# Patient Record
Sex: MALE | Race: BLACK OR AFRICAN AMERICAN | NOT HISPANIC OR LATINO | Employment: OTHER | URBAN - METROPOLITAN AREA
[De-identification: names, ages, dates, MRNs, and addresses within clinical notes are randomized per-mention and may not be internally consistent; named-entity substitution may affect disease eponyms.]

---

## 2017-03-06 DIAGNOSIS — I10 ESSENTIAL HYPERTENSION: Chronic | ICD-10-CM

## 2017-03-06 DIAGNOSIS — K64.9 HEMORRHOIDS, UNSPECIFIED HEMORRHOID TYPE: ICD-10-CM

## 2017-03-06 RX ORDER — HYDROCHLOROTHIAZIDE 25 MG/1
TABLET ORAL
Qty: 90 TABLET | Refills: 1 | Status: SHIPPED | OUTPATIENT
Start: 2017-03-06 | End: 2017-04-12 | Stop reason: SDUPTHER

## 2017-03-06 RX ORDER — DOCUSATE SODIUM 100 MG/1
CAPSULE, LIQUID FILLED ORAL
Qty: 60 CAPSULE | Refills: 2 | Status: SHIPPED | OUTPATIENT
Start: 2017-03-06 | End: 2017-04-12

## 2017-03-07 ENCOUNTER — TELEPHONE (OUTPATIENT)
Dept: FAMILY MEDICINE | Facility: CLINIC | Age: 67
End: 2017-03-07

## 2017-03-07 NOTE — TELEPHONE ENCOUNTER
----- Message from Zuly Love sent at 3/6/2017  3:40 PM CST -----  Contact: self  docusate sodium (COLACE) 100 MG capsule  hydrochlorothiazide (HYDRODIURIL) 25 MG tablet    Pt calling to request a refill of the above to be sent to [facility]. Please call pt to [phone].

## 2017-04-12 ENCOUNTER — OFFICE VISIT (OUTPATIENT)
Dept: FAMILY MEDICINE | Facility: CLINIC | Age: 67
End: 2017-04-12
Payer: MEDICARE

## 2017-04-12 VITALS
OXYGEN SATURATION: 98 % | WEIGHT: 260.06 LBS | HEART RATE: 60 BPM | HEIGHT: 68 IN | SYSTOLIC BLOOD PRESSURE: 100 MMHG | TEMPERATURE: 98 F | DIASTOLIC BLOOD PRESSURE: 72 MMHG | BODY MASS INDEX: 39.41 KG/M2

## 2017-04-12 DIAGNOSIS — E66.01 MORBID OBESITY WITH BMI OF 40.0-44.9, ADULT: Chronic | ICD-10-CM

## 2017-04-12 DIAGNOSIS — N40.0 BENIGN PROSTATIC HYPERPLASIA, PRESENCE OF LOWER URINARY TRACT SYMPTOMS UNSPECIFIED, UNSPECIFIED MORPHOLOGY: ICD-10-CM

## 2017-04-12 DIAGNOSIS — M10.9 GOUT, ARTHRITIS: Chronic | ICD-10-CM

## 2017-04-12 DIAGNOSIS — M48.02 CERVICAL STENOSIS OF SPINAL CANAL: Chronic | ICD-10-CM

## 2017-04-12 DIAGNOSIS — R53.83 FATIGUE, UNSPECIFIED TYPE: ICD-10-CM

## 2017-04-12 DIAGNOSIS — E78.5 HYPERLIPIDEMIA, UNSPECIFIED HYPERLIPIDEMIA TYPE: ICD-10-CM

## 2017-04-12 DIAGNOSIS — I10 ESSENTIAL HYPERTENSION: Chronic | ICD-10-CM

## 2017-04-12 DIAGNOSIS — R73.03 PREDIABETES: Primary | Chronic | ICD-10-CM

## 2017-04-12 DIAGNOSIS — N52.9 ERECTILE DYSFUNCTION, UNSPECIFIED ERECTILE DYSFUNCTION TYPE: ICD-10-CM

## 2017-04-12 PROCEDURE — 99215 OFFICE O/P EST HI 40 MIN: CPT | Mod: S$GLB,,, | Performed by: INTERNAL MEDICINE

## 2017-04-12 PROCEDURE — 1160F RVW MEDS BY RX/DR IN RCRD: CPT | Mod: S$GLB,,, | Performed by: INTERNAL MEDICINE

## 2017-04-12 PROCEDURE — 1157F ADVNC CARE PLAN IN RCRD: CPT | Mod: S$GLB,,, | Performed by: INTERNAL MEDICINE

## 2017-04-12 PROCEDURE — 3078F DIAST BP <80 MM HG: CPT | Mod: S$GLB,,, | Performed by: INTERNAL MEDICINE

## 2017-04-12 PROCEDURE — 99999 PR PBB SHADOW E&M-EST. PATIENT-LVL III: CPT | Mod: PBBFAC,,, | Performed by: INTERNAL MEDICINE

## 2017-04-12 PROCEDURE — 1126F AMNT PAIN NOTED NONE PRSNT: CPT | Mod: S$GLB,,, | Performed by: INTERNAL MEDICINE

## 2017-04-12 PROCEDURE — 3074F SYST BP LT 130 MM HG: CPT | Mod: S$GLB,,, | Performed by: INTERNAL MEDICINE

## 2017-04-12 PROCEDURE — 1159F MED LIST DOCD IN RCRD: CPT | Mod: S$GLB,,, | Performed by: INTERNAL MEDICINE

## 2017-04-12 PROCEDURE — 99499 UNLISTED E&M SERVICE: CPT | Mod: S$GLB,,, | Performed by: INTERNAL MEDICINE

## 2017-04-12 RX ORDER — TADALAFIL 5 MG/1
5 TABLET ORAL DAILY PRN
Qty: 3 TABLET | Refills: 11 | Status: CANCELLED | OUTPATIENT
Start: 2017-04-12 | End: 2018-04-12

## 2017-04-12 RX ORDER — ALLOPURINOL 100 MG/1
100 TABLET ORAL DAILY
Qty: 90 TABLET | Refills: 3 | Status: SHIPPED | OUTPATIENT
Start: 2017-04-12 | End: 2018-01-10 | Stop reason: SDUPTHER

## 2017-04-12 RX ORDER — METFORMIN HYDROCHLORIDE 500 MG/1
TABLET, EXTENDED RELEASE ORAL
Qty: 90 TABLET | Refills: 3 | Status: SHIPPED | OUTPATIENT
Start: 2017-04-12 | End: 2018-01-10 | Stop reason: SDUPTHER

## 2017-04-12 RX ORDER — TAMSULOSIN HYDROCHLORIDE 0.4 MG/1
0.4 CAPSULE ORAL DAILY
Qty: 30 CAPSULE | Refills: 11 | Status: SHIPPED | OUTPATIENT
Start: 2017-04-12 | End: 2018-01-10 | Stop reason: SDUPTHER

## 2017-04-12 RX ORDER — LISINOPRIL 10 MG/1
10 TABLET ORAL DAILY
Qty: 90 TABLET | Refills: 3 | Status: SHIPPED | OUTPATIENT
Start: 2017-04-12 | End: 2018-01-10 | Stop reason: SDUPTHER

## 2017-04-12 RX ORDER — HYDROCHLOROTHIAZIDE 25 MG/1
25 TABLET ORAL DAILY
Qty: 90 TABLET | Refills: 3 | Status: SHIPPED | OUTPATIENT
Start: 2017-04-12 | End: 2018-01-10 | Stop reason: SDUPTHER

## 2017-04-12 RX ORDER — PRAVASTATIN SODIUM 20 MG/1
20 TABLET ORAL DAILY
Qty: 90 TABLET | Refills: 3 | Status: SHIPPED | OUTPATIENT
Start: 2017-04-12 | End: 2018-01-10 | Stop reason: SDUPTHER

## 2017-04-12 RX ORDER — TADALAFIL 20 MG/1
20 TABLET ORAL DAILY PRN
Qty: 5 TABLET | Refills: 11 | Status: SHIPPED | OUTPATIENT
Start: 2017-04-12 | End: 2019-11-12 | Stop reason: ALTCHOICE

## 2017-04-12 NOTE — PROGRESS NOTES
Assessment & Plan  Prediabetes - discussed need for weight loss, and the benefits that he may see from even losing 10 pounds.  Refilled metformin.  Check A1c today.  Has room to increase if necessary.  -     metformin (GLUCOPHAGE-XR) 500 MG 24 hr tablet; TAKE 1 TABLET (500 MG TOTAL) BY MOUTH DAILY WITH BREAKFAST.  Dispense: 90 tablet; Refill: 3  -     Hemoglobin A1c; Future; Expected date: 4/12/17    Essential hypertension-controlled, no change, refilled HCTZ and lisinopril.  -     hydrochlorothiazide (HYDRODIURIL) 25 MG tablet; Take 1 tablet (25 mg total) by mouth once daily.  Dispense: 90 tablet; Refill: 3  -     lisinopril 10 MG tablet; Take 1 tablet (10 mg total) by mouth once daily.  Dispense: 90 tablet; Refill: 3    Hyperlipidemia, unspecified hyperlipidemia type-check lipid profile, if needed, increase the dose of pravastatin for LDL goal less than 100  -     pravastatin (PRAVACHOL) 20 MG tablet; Take 1 tablet (20 mg total) by mouth once daily.  Dispense: 90 tablet; Refill: 3  -     Comprehensive metabolic panel; Future; Expected date: 4/12/17  -     Lipid panel; Future; Expected date: 4/12/17    Erectile dysfunction, unspecified erectile dysfunction type-Cialis 5 mg ineffective.  Trial of higher dose Cialis.  -     tadalafil (CIALIS) 20 MG Tab; Take 1 tablet (20 mg total) by mouth daily as needed for Erectile Dysfunction.  Dispense: 5 tablet; Refill: 11    Gout, arthritis-check uric acid level.  Had a single episode of what sounds like podagra.  Has been on allopurinol prophylaxis ever since.  Refilled.  -     allopurinol (ZYLOPRIM) 100 MG tablet; Take 1 tablet (100 mg total) by mouth once daily.  Dispense: 90 tablet; Refill: 3  -     Uric acid; Future; Expected date: 4/12/17    Morbid obesity with BMI of 40.0-44.9, adult-discussed at length the need for weight loss, gave him some handouts about a 2000 kcal diet    Fatigue, unspecified type-check CBC, history of borderline hemoglobin.  Referral to sleep  medicine to rule out occult sleep apnea.  Certainly has risk factors of morbid obesity, male sex, age  -     CBC auto differential; Future; Expected date: 4/12/17  -     Ambulatory consult for Sleep Study    Benign prostatic hyperplasia, presence of lower urinary tract symptoms unspecified, unspecified [id number] of tamsulosin, refilled today.  -     tamsulosin (FLOMAX) 0.4 mg Cp24; Take 1 capsule (0.4 mg total) by mouth once daily.  Dispense: 30 capsule; Refill: 11    Cervical stenosis of spinal canal-overdue for follow-up with neurosurgery for this and our staff to assist him in arranging this.    Other orders  -     Cancel: tadalafil (CIALIS) 5 MG tablet; Take 1 tablet (5 mg total) by mouth daily as needed for Erectile Dysfunction.  Dispense: 3 tablet; Refill: 11        Medications Discontinued During This Encounter   Medication Reason    docusate sodium (COLACE) 100 MG capsule     hydrocortisone (PROCTOSOL HC) 2.5 % rectal cream Therapy completed    allopurinol (ZYLOPRIM) 100 MG tablet Reorder    hydrochlorothiazide (HYDRODIURIL) 25 MG tablet Reorder    lisinopril 10 MG tablet Reorder    metformin (GLUCOPHAGE-XR) 500 MG 24 hr tablet Reorder    pravastatin (PRAVACHOL) 20 MG tablet Reorder    tadalafil (CIALIS) 5 MG tablet Reorder    tamsulosin (FLOMAX) 0.4 mg Cp24 Reorder       Follow-up: Return in about 6 months (around 10/12/2017).      =================================================================      No chief complaint on file.      PARAS Acuna is a 66 y.o. male, last appointment with this clinic was 11/25/2016.    Former patient of Dr. Coe.  Hypertension. No outside BP checks.    3/11/2014 exercise stress echo negative for ischemia.  Normal LV systolic function LVEF 55-60.  Concentric remodeling.  Prediabetes, metformin.  Last A1c in May 2016, 6.1.  Sometimes overeats.  Hyperlipidemia.  Last lipid profile May 2016, borderline with an HDL less than 40, LDL less than  100  Chronic neck pain with history of surgery by neurosurgery, hydrocodone. 3/31/2016 pt underwent a C3 through C7 Laminoplasty with posterior cervical approach.  Last RF hydrocodone 11/2016 #90; prior 5/2016.  Not 100% quite yet.  Has not followed up with neurosurgery.  Does not think he finished physical therapy.  Has not seen Dr. Caruso, plan was 1 year follow up with repeat CT.   Reported history of Abdominal aortic aneurysm.  However, ultrasound done June 2016 did not show AAA.  ED, Cialis, low dose ineffective.  Hemorrhoids, hydrocortisone suppositories.  Gout, allopurinol.  No recent flare.  Would get on the foot.  1 episode.    Feels tired all the time.  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  Recalls remote sleep study, cannot recall the results.  Does not recall a dx of SIS.  Does not think he snores.  Risk factors - age, male, obesity.  No hematuria, blood in stool.  No hemoptysis.      Not taking the flomax.  Notes no dysuria.  Some nocturia.      Patient Care Team:  Clark Vargas MD as PCP - General (Internal Medicine)    Patient Active Problem List    Diagnosis Date Noted    Hemorrhoids 06/01/2016    Cervical stenosis of spinal canal 03/31/2016     3/31/2016 pt underwent a C3 through C7 Laminoplasty with posterior cervical approach      Prediabetes 03/29/2016    Hypertension 03/20/2014     3/11/2014 exercise stress echo negative for ischemia.  Normal LV systolic function LVEF 55-60.  Concentric remodeling.      Hyperlipidemia 03/20/2014    Morbid obesity with BMI of 40.0-44.9, adult 03/06/2014    Back pain 07/11/2013    ED (erectile dysfunction) 06/07/2013    Gout, arthritis 01/07/2013       PAST MEDICAL HISTORY:  Past Medical History:   Diagnosis Date    Erectile dysfunction     Gout     Hyperlipidemia     Hypertension     Morbid obesity     Screening for AAA (abdominal aortic aneurysm)     6/2016 AAA US no aneurysm       PAST SURGICAL HISTORY:  Past Surgical History:    Procedure Laterality Date    SPINE SURGERY  3/31/16    C3-C7 laminoplasty,Dr Caruso    tooth implant       Family History   Problem Relation Age of Onset    COPD Mother        SOCIAL HISTORY:  Social History     Social History    Marital status:      Spouse name: N/A    Number of children: N/A    Years of education: N/A     Occupational History    Not on file.     Social History Main Topics    Smoking status: Former Smoker     Quit date: 1/7/2009    Smokeless tobacco: Never Used    Alcohol use Yes      Comment: occasional beer- average week 12 beers a week    Drug use: No    Sexual activity: Not Currently     Partners: Female      Comment: , children.     Other Topics Concern    Not on file     Social History Narrative       ALLERGIES AND MEDICATIONS: updated and reviewed.  Review of patient's allergies indicates:   Allergen Reactions    Codeine Swelling     Codeine with Aspirin caused chest pain     Current Outpatient Prescriptions   Medication Sig Dispense Refill    allopurinol (ZYLOPRIM) 100 MG tablet TAKE 1 TABLET (100 MG TOTAL) BY MOUTH ONCE DAILY. 90 tablet 1    docusate sodium (COLACE) 100 MG capsule TAKE 1 CAPSULE (100 MG TOTAL) BY MOUTH 2 (TWO) TIMES DAILY. 60 capsule 2    hydrochlorothiazide (HYDRODIURIL) 25 MG tablet TAKE 1 TABLET (25 MG TOTAL) BY MOUTH ONCE DAILY. 90 tablet 1    hydrocodone-acetaminophen 10-325mg (NORCO)  mg Tab Take 1 tablet by mouth every 4 (four) hours as needed (pain 6-7/10). 90 tablet 0    hydrocortisone (PROCTOSOL HC) 2.5 % rectal cream PLACE RECTALLY 2 (TWO) TIMES DAILY. 28.35 g 2    lisinopril 10 MG tablet TAKE 1 TABLET (10 MG TOTAL) BY MOUTH ONCE DAILY. 90 tablet 1    metformin (GLUCOPHAGE-XR) 500 MG 24 hr tablet TAKE 1 TABLET (500 MG TOTAL) BY MOUTH DAILY WITH BREAKFAST. 90 tablet 1    pravastatin (PRAVACHOL) 20 MG tablet TAKE 1 TABLET BY MOUTH EVERY DAY 90 tablet 1    tadalafil (CIALIS) 5 MG tablet Take 1 tablet (5 mg total) by mouth  "daily as needed for Erectile Dysfunction. 3 tablet 11    tamsulosin (FLOMAX) 0.4 mg Cp24 Take 1 capsule (0.4 mg total) by mouth once daily. 15 capsule 0     No current facility-administered medications for this visit.      Not taking flomax      Review of Systems   Constitutional: Negative for fever, malaise/fatigue and weight loss.   HENT: Negative for congestion.    Eyes: Negative for blurred vision and pain.   Respiratory: Negative for shortness of breath and wheezing.    Cardiovascular: Negative for chest pain, palpitations and leg swelling.   Gastrointestinal: Negative for abdominal pain, blood in stool, constipation, diarrhea and heartburn.   Genitourinary: Positive for frequency. Negative for dysuria, hematuria and urgency.        Nocturia   Musculoskeletal: Positive for neck pain. Negative for joint pain.   Neurological: Negative for tingling, focal weakness, weakness and headaches.   Psychiatric/Behavioral: Negative for depression. The patient is not nervous/anxious.        Physical Exam   Vitals:    04/12/17 0949   BP: 100/72   Pulse: 60   Temp: 98 °F (36.7 °C)   SpO2: 98%   Weight: 117.9 kg (260 lb 0.5 oz)   Height: 5' 8" (1.727 m)    There is no height or weight on file to calculate BMI.            Physical Exam   Constitutional: He is oriented to person, place, and time. He appears well-developed and well-nourished. No distress.   Eyes: EOM are normal.   Neck:   Limited axial rotation   Cardiovascular: Normal rate, regular rhythm and normal heart sounds.    No murmur heard.  Pulmonary/Chest: Effort normal and breath sounds normal.   Genitourinary:   Genitourinary Comments: JASEN - no external hemorrhoids.  Prostate difficult to palpate, no nodules, mild enlargement   Musculoskeletal: Normal range of motion. He exhibits edema.   1+ edema to mid tibiae bilaterally   Neurological: He is alert and oriented to person, place, and time. Coordination normal.   Skin: Skin is warm and dry.   Psychiatric: He has a " normal mood and affect. His behavior is normal. Thought content normal.     Old records reviewed

## 2017-04-12 NOTE — MR AVS SNAPSHOT
[facility]  [address]  [place] [address]  Phone: [phone]  Fax: [phone]                  Armand Painitng   2017 9:40 AM   Office Visit    Description:  Male : 1950   Provider:  Clark Vargas MD   Department[facility]  [facility]           Reason for Visit     Medication Refill     Establish Care           Diagnoses this Visit        Comments    Prediabetes    -  Primary     Essential hypertension         Hyperlipidemia, unspecified hyperlipidemia type         Erectile dysfunction, unspecified erectile dysfunction type         Gout, arthritis         Morbid obesity with BMI of 40.0-44.9, adult         Fatigue, unspecified type         Benign prostatic hyperplasia, presence of lower urinary tract symptoms unspecified, unspecified morphology         Cervical stenosis of spinal canal                To Do List           Goals (5 Years of Data)              Today    16    Blood Pressure < 140/90   100/72  100/72  100/72      Follow-Up and Disposition     Return in about 6 months (around 10/12/2017).       These Medications        Disp Refills Start End    allopurinol (ZYLOPRIM) 100 MG tablet 90 tablet 3 2017     Take 1 tablet (100 mg total) by mouth once daily. - Oral    Pharmacy: [facility]/pharmacy #[address] [place], [place] [address] [facility] [employer] Ph #: [phone]       hydrochlorothiazide (HYDRODIURIL) 25 MG tablet 90 tablet 3 2017     Take 1 tablet (25 mg total) by mouth once daily. - Oral    Pharmacy: [facility]/pharmacy #[address] [place] [address] [place] [id number] [facility] [employer] Ph #: [phone]       lisinopril 10 MG tablet 90 tablet 3 2017     Take 1 tablet (10 mg total) by mouth once daily. - Oral    Pharmacy: [facility]/pharmacy #[address] [place] [address] [place] [id number] [facility] [employer] Ph #: [phone]       metformin (GLUCOPHAGE-XR) 500 MG 24 hr tablet 90 tablet 3 2017     TAKE 1 TABLET (500 MG TOTAL) BY MOUTH DAILY WITH BREAKFAST.    Pharmacy:  [facility]/pharmacy #[address] [place] [address] Ph #: [phone]       pravastatin (PRAVACHOL) 20 MG tablet 90 tablet 3 4/12/2017     Take 1 tablet (20 mg total) by mouth once daily. - Oral    Pharmacy: [facility]/pharmacy #[address] [place] [address] Ph #: [phone]       tadalafil (CIALIS) 20 MG Tab 5 tablet 11 4/12/2017 4/12/2018    Take 1 tablet (20 mg total) by mouth daily as needed for Erectile Dysfunction. - Oral    Pharmacy: [facility]/pharmacy #[address] [place] [address] Ph #: [phone]       tamsulosin (FLOMAX) 0.4 mg Cp24 30 capsule 11 4/12/2017 4/27/2017    Take 1 capsule (0.4 mg total) by mouth once daily. - Oral    [facility] [place] [address] Ph #: [phone]         [facility]     [facility] Nurse Care Line - 24/7 Assistance  Unless otherwise directed by your provider, please contact [facility], our nurse care line that is available for 24/7 assistance.     Registered nurses in the [facility] provide: appointment scheduling, clinical advisement, health education, and other advisory services.  Call: [phone] (toll free)               Medications           Message regarding Medications     Verify the changes and/or additions to your medication regime listed below are the same as discussed with your clinician today.  If any of these changes or additions are incorrect, please notify your healthcare provider.        CHANGE how you are taking these medications     Start Taking Instead of    allopurinol (ZYLOPRIM) 100 MG tablet allopurinol (ZYLOPRIM) 100 MG tablet    Dosage:  Take 1 tablet (100 mg total) by mouth once daily. Dosage:  TAKE 1 TABLET (100 MG TOTAL) BY MOUTH ONCE DAILY.    Reason for Change:  Reorder     hydrochlorothiazide (HYDRODIURIL) 25 MG tablet hydrochlorothiazide (HYDRODIURIL) 25 MG tablet    Dosage:  Take 1 tablet (25 mg total) by mouth once daily. Dosage:  TAKE 1  TABLET (25 MG TOTAL) BY MOUTH ONCE DAILY.    Reason for Change:  Reorder     lisinopril 10 MG tablet lisinopril 10 MG tablet    Dosage:  Take 1 tablet (10 mg total) by mouth once daily. Dosage:  TAKE 1 TABLET (10 MG TOTAL) BY MOUTH ONCE DAILY.    Reason for Change:  Reorder     pravastatin (PRAVACHOL) 20 MG tablet pravastatin (PRAVACHOL) 20 MG tablet    Dosage:  Take 1 tablet (20 mg total) by mouth once daily. Dosage:  TAKE 1 TABLET BY MOUTH EVERY DAY    Reason for Change:  Reorder     tadalafil (CIALIS) 20 MG Tab tadalafil (CIALIS) 5 MG tablet    Dosage:  Take 1 tablet (20 mg total) by mouth daily as needed for Erectile Dysfunction. Dosage:  Take 1 tablet (5 mg total) by mouth daily as needed for Erectile Dysfunction.    Reason for Change:  Reorder       STOP taking these medications     docusate sodium (COLACE) 100 MG capsule TAKE 1 CAPSULE (100 MG TOTAL) BY MOUTH 2 (TWO) TIMES DAILY.    hydrocortisone (PROCTOSOL HC) 2.5 % rectal cream PLACE RECTALLY 2 (TWO) TIMES DAILY.           Verify that the below list of medications is an accurate representation of the medications you are currently taking.  If none reported, the list may be blank. If incorrect, please contact your healthcare provider. Carry this list with you in case of emergency.           Current Medications     hydrocodone-acetaminophen 10-325mg (NORCO)  mg Tab Take 1 tablet by mouth every 4 (four) hours as needed (pain 6-7/10).    allopurinol (ZYLOPRIM) 100 MG tablet Take 1 tablet (100 mg total) by mouth once daily.    hydrochlorothiazide (HYDRODIURIL) 25 MG tablet Take 1 tablet (25 mg total) by mouth once daily.    lisinopril 10 MG tablet Take 1 tablet (10 mg total) by mouth once daily.    metformin (GLUCOPHAGE-XR) 500 MG 24 hr tablet TAKE 1 TABLET (500 MG TOTAL) BY MOUTH DAILY WITH BREAKFAST.    pravastatin (PRAVACHOL) 20 MG tablet Take 1 tablet (20 mg total) by mouth once daily.    tadalafil (CIALIS) 20 MG Tab Take 1 tablet (20 mg total) by  "mouth daily as needed for Erectile Dysfunction.    tamsulosin (FLOMAX) 0.4 mg Cp24 Take 1 capsule (0.4 mg total) by mouth once daily.           Clinical Reference Information           Your Vitals Were     BP Pulse Temp Height Weight SpO2    100/72 60 98 °F (36.7 °C) 5' 8" (1.727 m) 117.9 kg (260 lb 0.5 oz) 98%    BMI                39.54 kg/m2          Blood Pressure          Most Recent Value    BP  100/72      Allergies as of 4/12/2017     Codeine      Immunizations Administered on Date of Encounter - 4/12/2017     None      Orders Placed During Today's Visit      Normal Orders This Visit    Ambulatory consult for Sleep Study     Future Labs/Procedures Expected by Expires    CBC auto differential  4/12/2017 4/12/2018    Comprehensive metabolic panel  4/12/2017 4/12/2018    Hemoglobin A1c  4/12/2017 4/12/2018    Lipid panel  4/12/2017 4/12/2018    Uric acid  4/12/2017 4/12/2018      [facility] Sign-Up     Activating your [facility] account is as easy as 1-2-3!     1) Visit [email or web address], select Sign Up Now, enter this activation code and your date of birth, then select Next.  [email or web address]  Expires: 5/27/2017 10:13 AM      2) Create a username and password to use when you visit [facility] in the future and select a security question in case you lose your password and select Next.    3) Enter your e-mail address and click Sign Up!    Additional Information  If you have questions, please e-mail [email or web address] or call [phone] to talk to our [facility] staff. Remember, [facility] is NOT to be used for urgent needs. For medical emergencies, dial 911.         Instructions      Understanding USDA MyPlate  The USDA (U.S. Department of Agriculture) has guidelines to help you make healthy food choices. These are called MyPlate. MyPlate shows the food groups that make up healthy meals using the image of a place setting. Before you eat, think about the healthiest choices for what to put onto your plate or into " your cup or bowl. To learn more about building a healthy plate, visit www.choosemyplate.gov.    The food groups  · Fruits. Any fruit or 100% fruit juice counts as part of the Fruit Group. Fruits may be fresh, canned, frozen, or dried, and may be whole, cut-up, or pureed. Make half your plate fruits and vegetables.  · Vegetables. Any vegetable or 100% vegetable juice counts as a member of the Vegetable Group. Vegetables may be fresh, frozen, canned, or dried. They can be served raw or cooked and may be whole, cut-up, or mashed. Make half your plate fruits and vegetables.  · Grains. All foods made from grains are part of the Grains Group. These include wheat, rice, oats, cornmeal, and barley such as bread, pasta, oatmeal, cereal, tortillas, and grits. Grains should be no more than a quarter of your plate. At least half of your grains should be whole grains.  · Protein. This group includes meat, poultry, seafood, beans and peas, eggs, processed soy products (like tofu), nuts (including nut butters), and seeds. Make protein choices no more than a quarter of your plate. Meat and poultry choices should be lean or low fat.  · Dairy. All fluid milk products and foods made from milk that contain calcium, like yogurt and cheese are part of the Dairy Group. (Foods that have little calcium, such as cream, butter, and cream cheese, are not part of the group.) Most dairy choices should be low-fat or fat-free.  · Oils. These are fats that are liquid at room temperature. They include canola, corn, olive, soybean, and sunflower oil. Foods that are mainly oil include mayonnaise, certain salad dressings, and soft margarines. You should have only 5 to 7 teaspoons of oils a day. You probably already get this much from the food you eat.  Use [employer] to help build your meals  The [employer] can help you plan and track your meals and activity. You can look up individual foods to see or compare their nutritional value. You can get  guidelines for what and how much you should eat. You can compare your food choices. And you can assess personal physical activities and see ways you can improve. Go to www.[employer].gov/supertracker/. For more eating tips and nutritional information, go to www.VHXmyplate.gov/ten-tips.  Date Last Reviewed: 6/1/2015 © 2000-2016 [employer]. [address] [place] [address], [place] [address]. All rights reserved. This information is not intended as a substitute for professional medical care. Always follow your healthcare professional's instructions.        MyPlate Worksheet: 2,000 Calories  Your calorie needs are about 2,000 calories a day. Below are the U.S. Department of Agriculture (USDA) guidelines for your daily recommended amount of each food group.  Vegetables  2½ cups Fruits  2 cups Grains  6 ounces Dairy  3 cups Protein  5½ ounces   Eat a variety of vegetables each day.  Aim for these amounts each week:  · 1½ cups dark green vegetables  · 5½ cups red or orange- colored vegetables  · 1½ cups dry beans and peas  · 5 cups starchy vegetables  · 4 cups other vegetables Eat a variety of fruits each day.  Go easy on fruit juices.   Good choices of fruits include:  · Berries  · Bananas  · Apples  · Melon  · Dried fruit  · Frozen fruit  · Canned fruit Choose whole grains whenever you can.  Aim to eat at least 3 ounces of whole grains each day:  · Bread  · Cereal  · Rice  · Pasta  · Potatoes  · Tortillas Choose low-fat or fat-free milk, yogurt, or cheese each day.  Good choices include:  · Low-fat or fat-free milk or chocolate milk  · Low-fat or fat-free yogurt  · Low-fat or fat-free cottage cheese or other reduced-fat cheeses  · Calcium-fortified milk alternatives Choose low-fat or lean meats, poultry, fish and seafood each day.   Vary your protein. Choose more:  · Fish and other seafood  · Lean low-fat meat and poultry  · Eggs  · Beans, peas  · Tofu  · Unsalted nuts and seeds  Choose less high-fat and red  "meat.   Source: USDA MyPlate, www.choosemyplate.gov  Know your limits on oils (fats) and sugars:  · Your allowance for oils is 27 grams or 6 teaspoons a day (oil includes vegetable oil, mayonnaise, soft margarine, salad dressing, nuts, olives, avocados, and some fish).  · Limit the extras (solid fats and sugars, also called "empty calories") to 270 calories a day.  · Cut back on salt (sodium). Stay under 2,300 mg sodium a day. If you have a health condition such as heart disease or high blood pressure, your doctor will likely tell you to limit sodium to no more than 1,500 mg a day.  Get moving and be active!  Aim for at least 30 minutes of physical activity most days of the week or 150 minutes of exercise a week.  MyPlate Servings Worksheet: 2,000 calories  This worksheet tells you how many servings you should get each day from each food group, and tells you how much food makes a serving. Use this as a guide as you plan your meals throughout the day. Track your progress daily by writing in what you actually ate.  Food Group Daily MyPlate Goal  What You Ate Today    Vegetables 5 Half-cups or 5 Servings  One serving is:  ½ cup cut-up raw or cooked vegetables  1 cup raw, leafy vegetables  ½ baked sweet potato  ½ cup vegetable juice  Note: At meals, fill half your plate with vegetables and fruit.     Fruits 4 Half-cups or 4 Servings  One serving is:  ½ cup fresh, frozen, or canned fruit  1 medium piece of fruit  1 cup of berries or melon  ½ cup dried fruit  ½ cup 100% fruit juice  Note: Make most choices fruit instead of juice.     Grains 6 Servings or 6 Ounces  One serving is:  1 slice bread  1 cup dry cereal  ½ cup cooked rice, pasta, or cereal  1 5-inch tortilla  Note: Choose whole grains for at least half of your servings each day.     Dairy 3 Servings or 3 Cups  One serving is:  1 cup milk  1½ ounces reduced-fat hard cheese  2 ounces processed cheese  1 cup low-fat yogurt  1/3 cup shredded cheese  Note: Choose " low-fat or fat-free most often.     Protein 5½ Servings or 5½ Ounces  One serving is:  1 ounce cooked lean beef, pork, lamb, or ham  1 ounce cooked chicken or turkey (no skin)  1 ounce cooked fish or shellfish (not fried)  1 egg  ¼ cup egg substitute  ½ ounce nuts or seeds  1 tablespoon peanut or almond butter  ¼ cup cooked dry beans or peas  ½ cup tofu  2 tablespoons hummus        Date Last Reviewed: 6/1/2015  © 9446-5650 [employer]. [address] [place] [address]. All rights reserved. This information is not intended as a substitute for professional medical care. Always follow your healthcare professional's instructions.             Language Assistance Services     ATTENTION: Language assistance services are available, free of charge. Please call [phone].      ATENCIÓN: Si habla español, tiene a gtz disposición servicios gratuitos de asistencia lingüística. Llame al [phone].     CHÚ Ý: N?u b?n nói Ti?ng Vi?t, có các d?ch v? h? tr? ngôn ng? mi?n phí dành cho b?n. G?i s? [phone].         [facility] complies with applicable Federal civil rights laws and does not discriminate on the basis of race, color, national origin, age, disability, or sex.

## 2017-04-12 NOTE — PATIENT INSTRUCTIONS
Understanding USDA MyPlate  The USDA (U.S. Department of Agriculture) has guidelines to help you make healthy food choices. These are called MyPlate. MyPlate shows the food groups that make up healthy meals using the image of a place setting. Before you eat, think about the healthiest choices for what to put onto your plate or into your cup or bowl. To learn more about building a healthy plate, visit www.choosemyplate.gov.    The food groups  · Fruits. Any fruit or 100% fruit juice counts as part of the Fruit Group. Fruits may be fresh, canned, frozen, or dried, and may be whole, cut-up, or pureed. Make half your plate fruits and vegetables.  · Vegetables. Any vegetable or 100% vegetable juice counts as a member of the Vegetable Group. Vegetables may be fresh, frozen, canned, or dried. They can be served raw or cooked and may be whole, cut-up, or mashed. Make half your plate fruits and vegetables.  · Grains. All foods made from grains are part of the Grains Group. These include wheat, rice, oats, cornmeal, and barley such as bread, pasta, oatmeal, cereal, tortillas, and grits. Grains should be no more than a quarter of your plate. At least half of your grains should be whole grains.  · Protein. This group includes meat, poultry, seafood, beans and peas, eggs, processed soy products (like tofu), nuts (including nut butters), and seeds. Make protein choices no more than a quarter of your plate. Meat and poultry choices should be lean or low fat.  · Dairy. All fluid milk products and foods made from milk that contain calcium, like yogurt and cheese are part of the Dairy Group. (Foods that have little calcium, such as cream, butter, and cream cheese, are not part of the group.) Most dairy choices should be low-fat or fat-free.  · Oils. These are fats that are liquid at room temperature. They include canola, corn, olive, soybean, and sunflower oil. Foods that are mainly oil include mayonnaise, certain salad dressings,  and soft margarines. You should have only 5 to 7 teaspoons of oils a day. You probably already get this much from the food you eat.  Use [employer] to help build your meals  The [employer] can help you plan and track your meals and activity. You can look up individual foods to see or compare their nutritional value. You can get guidelines for what and how much you should eat. You can compare your food choices. And you can assess personal physical activities and see ways you can improve. Go to www.[employer].gov/supertracker/. For more eating tips and nutritional information, go to www.[employer].gov/ten-tips.  Date Last Reviewed: 6/1/2015 © 2000-2016 [employer]. [address] [place] [address], [place] [address]. All rights reserved. This information is not intended as a substitute for professional medical care. Always follow your healthcare professional's instructions.        MyPlate Worksheet: 2,000 Calories  Your calorie needs are about 2,000 calories a day. Below are the U.S. Department of Agriculture (USDA) guidelines for your daily recommended amount of each food group.  Vegetables  2½ cups Fruits  2 cups Grains  6 ounces Dairy  3 cups Protein  5½ ounces   Eat a variety of vegetables each day.  Aim for these amounts each week:  · 1½ cups dark green vegetables  · 5½ cups red or orange- colored vegetables  · 1½ cups dry beans and peas  · 5 cups starchy vegetables  · 4 cups other vegetables Eat a variety of fruits each day.  Go easy on fruit juices.   Good choices of fruits include:  · Berries  · Bananas  · Apples  · Melon  · Dried fruit  · Frozen fruit  · Canned fruit Choose whole grains whenever you can.  Aim to eat at least 3 ounces of whole grains each day:  · Bread  · Cereal  · Rice  · Pasta  · Potatoes  · Tortillas Choose low-fat or fat-free milk, yogurt, or cheese each day.  Good choices include:  · Low-fat or fat-free milk or chocolate milk  · Low-fat or fat-free yogurt  · Low-fat or  "fat-free cottage cheese or other reduced-fat cheeses  · Calcium-fortified milk alternatives Choose low-fat or lean meats, poultry, fish and seafood each day.   Vary your protein. Choose more:  · Fish and other seafood  · Lean low-fat meat and poultry  · Eggs  · Beans, peas  · Tofu  · Unsalted nuts and seeds  Choose less high-fat and red meat.   Source: USDA MyPlate, www.choosemyplate.gov  Know your limits on oils (fats) and sugars:  · Your allowance for oils is 27 grams or 6 teaspoons a day (oil includes vegetable oil, mayonnaise, soft margarine, salad dressing, nuts, olives, avocados, and some fish).  · Limit the extras (solid fats and sugars, also called "empty calories") to 270 calories a day.  · Cut back on salt (sodium). Stay under 2,300 mg sodium a day. If you have a health condition such as heart disease or high blood pressure, your doctor will likely tell you to limit sodium to no more than 1,500 mg a day.  Get moving and be active!  Aim for at least 30 minutes of physical activity most days of the week or 150 minutes of exercise a week.  MyPlate Servings Worksheet: 2,000 calories  This worksheet tells you how many servings you should get each day from each food group, and tells you how much food makes a serving. Use this as a guide as you plan your meals throughout the day. Track your progress daily by writing in what you actually ate.  Food Group Daily MyPlate Goal  What You Ate Today    Vegetables 5 Half-cups or 5 Servings  One serving is:  ½ cup cut-up raw or cooked vegetables  1 cup raw, leafy vegetables  ½ baked sweet potato  ½ cup vegetable juice  Note: At meals, fill half your plate with vegetables and fruit.     Fruits 4 Half-cups or 4 Servings  One serving is:  ½ cup fresh, frozen, or canned fruit  1 medium piece of fruit  1 cup of berries or melon  ½ cup dried fruit  ½ cup 100% fruit juice  Note: Make most choices fruit instead of juice.     Grains 6 Servings or 6 Ounces  One serving is:  1 slice " bread  1 cup dry cereal  ½ cup cooked rice, pasta, or cereal  1 5-inch tortilla  Note: Choose whole grains for at least half of your servings each day.     Dairy 3 Servings or 3 Cups  One serving is:  1 cup milk  1½ ounces reduced-fat hard cheese  2 ounces processed cheese  1 cup low-fat yogurt  1/3 cup shredded cheese  Note: Choose low-fat or fat-free most often.     Protein 5½ Servings or 5½ Ounces  One serving is:  1 ounce cooked lean beef, pork, lamb, or ham  1 ounce cooked chicken or turkey (no skin)  1 ounce cooked fish or shellfish (not fried)  1 egg  ¼ cup egg substitute  ½ ounce nuts or seeds  1 tablespoon peanut or almond butter  ¼ cup cooked dry beans or peas  ½ cup tofu  2 tablespoons hummus        Date Last Reviewed: 6/1/2015  © 0254-2120 [employer], [employer] [address] [place] [address], [place] [address]. All rights reserved. This information is not intended as a substitute for professional medical care. Always follow your healthcare professional's instructions.

## 2017-05-10 ENCOUNTER — OFFICE VISIT (OUTPATIENT)
Dept: FAMILY MEDICINE | Facility: CLINIC | Age: 67
End: 2017-05-10
Payer: MEDICARE

## 2017-05-10 VITALS
TEMPERATURE: 98 F | HEIGHT: 68 IN | WEIGHT: 259.81 LBS | HEART RATE: 65 BPM | OXYGEN SATURATION: 97 % | BODY MASS INDEX: 39.38 KG/M2 | SYSTOLIC BLOOD PRESSURE: 94 MMHG | DIASTOLIC BLOOD PRESSURE: 62 MMHG

## 2017-05-10 DIAGNOSIS — J01.90 ACUTE BACTERIAL SINUSITIS: Primary | ICD-10-CM

## 2017-05-10 DIAGNOSIS — B96.89 ACUTE BACTERIAL SINUSITIS: Primary | ICD-10-CM

## 2017-05-10 PROCEDURE — 99999 PR PBB SHADOW E&M-EST. PATIENT-LVL III: CPT | Mod: PBBFAC,,, | Performed by: INTERNAL MEDICINE

## 2017-05-10 PROCEDURE — 1159F MED LIST DOCD IN RCRD: CPT | Mod: S$GLB,,, | Performed by: INTERNAL MEDICINE

## 2017-05-10 PROCEDURE — 3074F SYST BP LT 130 MM HG: CPT | Mod: S$GLB,,, | Performed by: INTERNAL MEDICINE

## 2017-05-10 PROCEDURE — 99213 OFFICE O/P EST LOW 20 MIN: CPT | Mod: S$GLB,,, | Performed by: INTERNAL MEDICINE

## 2017-05-10 PROCEDURE — 1160F RVW MEDS BY RX/DR IN RCRD: CPT | Mod: S$GLB,,, | Performed by: INTERNAL MEDICINE

## 2017-05-10 PROCEDURE — 3078F DIAST BP <80 MM HG: CPT | Mod: S$GLB,,, | Performed by: INTERNAL MEDICINE

## 2017-05-10 RX ORDER — AMOXICILLIN 875 MG/1
875 TABLET, FILM COATED ORAL 2 TIMES DAILY
Qty: 10 TABLET | Refills: 0 | Status: SHIPPED | OUTPATIENT
Start: 2017-05-10 | End: 2018-01-10 | Stop reason: ALTCHOICE

## 2017-05-10 NOTE — MR AVS SNAPSHOT
[facility]  [address]  [place] [address]  Phone: [phone]  Fax: [phone]                  Armand Painting   5/10/2017 2:40 PM   Office Visit    Description:  Male : 1950   Provider:  Clark Vargas MD   Department:  [facility]           Reason for Visit     Headache     Dizziness           Diagnoses this Visit        Comments    Acute bacterial sinusitis    -  Primary            To Do List           Goals (5 Years of Data)              Today    17    Blood Pressure < 140/90   94/62  94/62  94/62       These Medications        Disp Refills Start End    amoxicillin (AMOXIL) 875 MG tablet 10 tablet 0 5/10/2017     Take 1 tablet (875 mg total) by mouth 2 (two) times daily. - Oral    Pharmacy: [facility]/pharmacy #[id number] [address] #: [phone]         [facility]     [facility] -  Assistance  Unless otherwise directed by your provider, please contact [facility], our nurse care line that is available for  assistance.     Registered nurses in the [facility] provide: appointment scheduling, clinical advisement, health education, and other advisory services.  Call: [phone] (toll free)               Medications           Message regarding Medications     Verify the changes and/or additions to your medication regime listed below are the same as discussed with your clinician today.  If any of these changes or additions are incorrect, please notify your healthcare provider.        START taking these NEW medications        Refills    amoxicillin (AMOXIL) 875 MG tablet 0    Sig: Take 1 tablet (875 mg total) by mouth 2 (two) times daily.    Class: Normal    Route: Oral           Verify that the below list of medications is an accurate representation of the medications you are currently taking.  If none reported, the list may be blank. If incorrect, please contact  "your healthcare provider. Carry this list with you in case of emergency.           Current Medications     allopurinol (ZYLOPRIM) 100 MG tablet Take 1 tablet (100 mg total) by mouth once daily.    hydrochlorothiazide (HYDRODIURIL) 25 MG tablet Take 1 tablet (25 mg total) by mouth once daily.    hydrocodone-acetaminophen 10-325mg (NORCO)  mg Tab Take 1 tablet by mouth every 4 (four) hours as needed (pain 6-7/10).    lisinopril 10 MG tablet Take 1 tablet (10 mg total) by mouth once daily.    metformin (GLUCOPHAGE-XR) 500 MG 24 hr tablet TAKE 1 TABLET (500 MG TOTAL) BY MOUTH DAILY WITH BREAKFAST.    pravastatin (PRAVACHOL) 20 MG tablet Take 1 tablet (20 mg total) by mouth once daily.    tadalafil (CIALIS) 20 MG Tab Take 1 tablet (20 mg total) by mouth daily as needed for Erectile Dysfunction.    amoxicillin (AMOXIL) 875 MG tablet Take 1 tablet (875 mg total) by mouth 2 (two) times daily.    tamsulosin (FLOMAX) 0.4 mg Cp24 Take 1 capsule (0.4 mg total) by mouth once daily.           Clinical Reference Information           Your Vitals Were     BP Pulse Temp Height Weight SpO2    94/62 65 97.9 °F (36.6 °C) (Oral) 5' 8" (1.727 m) 117.8 kg (259 lb 13 oz) 97%    BMI                39.5 kg/m2          Blood Pressure          Most Recent Value    BP  94/62      Allergies as of 5/10/2017     Codeine      Immunizations Administered on Date of Encounter - 5/10/2017     None      [facility] Sign-Up     Activating your [facility] account is as easy as 1-2-3!     1) Visit [email or web address], select Sign Up Now, enter this activation code and your date of birth, then select Next.  [email or web address]  Expires: 5/27/2017 10:13 AM      2) Create a username and password to use when you visit [facility] in the future and select a security question in case you lose your password and select Next.    3) Enter your e-mail address and click Sign Up!    Additional Information  If you have questions, please e-mail [email or web address] or call " [phone] to talk to our [facility] staff. Remember, [facility] is NOT to be used for urgent needs. For medical emergencies, dial 911.         Language Assistance Services     ATTENTION: Language assistance services are available, free of charge. Please call [phone].      ATENCIÓN: Si habla wan, tiene a gtz disposición servicios gratuitos de asistencia lingüística. Llame al [phone].     CHÚ Ý: N?u b?n nói Ti?ng Vi?t, có các d?ch v? h? tr? ngôn ng? mi?n phí dành cho b?n. G?i s? [phone].         [facility] complies with applicable Federal civil rights laws and does not discriminate on the basis of race, color, national origin, age, disability, or sex.

## 2017-05-10 NOTE — PROGRESS NOTES
Assessment & Plan  Acute bacterial sinusitis-ongoing for the past 2 weeks, she notes the pain is reminiscent of his previous head injury of 9 or 10 years ago but his neurologic exam is nonfocal and he does have tenderness of the sinus area so I will treat for bacterial sinusitis.  Amoxicillin twice a day for 10 days  -     amoxicillin (AMOXIL) 875 MG tablet; Take 1 tablet (875 mg total) by mouth 2 (two) times daily.  Dispense: 10 tablet; Refill: 0      There are no discontinued medications.    Follow-up: No Follow-up on file.      =================================================================      Chief Complaint   Patient presents with    Headache    Dizziness       PARAS Acuna is a 66 y.o. male, last appointment with this clinic was 4/12/2017.    Headaches for the past 2 weeks, feels like a toothache.  He had dentures knocked out a few years ago but feels like they're still in there.  They'll last for several hours.  Sometimes better after sleeping but sometimes not.  Recalls a hx of head injury at work years ago - knocked his teeth out and nasal fracture.  This was about 8-9 years ago.  Has had headaches on and off but not quite like this.  Pointing to the left side of his face, around the eye.  Hurts under the nose.  No ear pain.  No fever no chills.  But feels weak.  Aching.  No sore throat.  Feels like the left side of the body is weak, taht comes and goes.      Patient Care Team:  Clark Vargas MD as PCP - General (Internal Medicine)    Patient Active Problem List    Diagnosis Date Noted    Hemorrhoids 06/01/2016    Cervical stenosis of spinal canal 03/31/2016     3/31/2016 pt underwent a C3 through C7 Laminoplasty with posterior cervical approach      Prediabetes 03/29/2016    Hypertension 03/20/2014     3/11/2014 exercise stress echo negative for ischemia.  Normal LV systolic function LVEF 55-60.  Concentric remodeling.      Hyperlipidemia 03/20/2014    Morbid obesity with BMI of 40.0-44.9,  adult 03/06/2014    Back pain 07/11/2013    ED (erectile dysfunction) 06/07/2013    Gout, arthritis 01/07/2013       PAST MEDICAL HISTORY:  Past Medical History:   Diagnosis Date    Erectile dysfunction     Gout     Hyperlipidemia     Hypertension     Morbid obesity     Screening for AAA (abdominal aortic aneurysm)     6/2016 AAA US no aneurysm       PAST SURGICAL HISTORY:  Past Surgical History:   Procedure Laterality Date    SPINE SURGERY  3/31/16    C3-C7 laminoplasty,Dr Caruso    tooth implant         SOCIAL HISTORY:  Social History     Social History    Marital status:      Spouse name: N/A    Number of children: N/A    Years of education: N/A     Occupational History    Not on file.     Social History Main Topics    Smoking status: Former Smoker     Quit date: 1/7/2009    Smokeless tobacco: Never Used    Alcohol use Yes      Comment: occasional beer- average week 12 beers a week    Drug use: No    Sexual activity: Not Currently     Partners: Female      Comment: , children.     Other Topics Concern    Not on file     Social History Narrative       ALLERGIES AND MEDICATIONS: updated and reviewed.  Review of patient's allergies indicates:   Allergen Reactions    Codeine Swelling     Codeine with Aspirin caused chest pain     Current Outpatient Prescriptions   Medication Sig Dispense Refill    allopurinol (ZYLOPRIM) 100 MG tablet Take 1 tablet (100 mg total) by mouth once daily. 90 tablet 3    hydrochlorothiazide (HYDRODIURIL) 25 MG tablet Take 1 tablet (25 mg total) by mouth once daily. 90 tablet 3    hydrocodone-acetaminophen 10-325mg (NORCO)  mg Tab Take 1 tablet by mouth every 4 (four) hours as needed (pain 6-7/10). 90 tablet 0    lisinopril 10 MG tablet Take 1 tablet (10 mg total) by mouth once daily. 90 tablet 3    metformin (GLUCOPHAGE-XR) 500 MG 24 hr tablet TAKE 1 TABLET (500 MG TOTAL) BY MOUTH DAILY WITH BREAKFAST. 90 tablet 3    pravastatin (PRAVACHOL) 20  "MG tablet Take 1 tablet (20 mg total) by mouth once daily. 90 tablet 3    tadalafil (CIALIS) 20 MG Tab Take 1 tablet (20 mg total) by mouth daily as needed for Erectile Dysfunction. 5 tablet 11    tamsulosin (FLOMAX) 0.4 mg Cp24 Take 1 capsule (0.4 mg total) by mouth once daily. 30 capsule 11     No current facility-administered medications for this visit.        Review of Systems   Constitutional: Negative for chills and fever.   HENT: Negative for sore throat.    Respiratory: Negative for cough.    Neurological: Positive for headaches.       Physical Exam   Vitals:    05/10/17 1414   BP: 94/62   Pulse: 65   Temp: 97.9 °F (36.6 °C)   TempSrc: Oral   SpO2: 97%   Weight: 117.8 kg (259 lb 13 oz)   Height: 5' 8" (1.727 m)    Body mass index is 39.5 kg/(m^2).  Weight: 117.8 kg (259 lb 13 oz)   Height: 5' 8" (172.7 cm)     Physical Exam   Constitutional: He is oriented to person, place, and time. He appears well-developed and well-nourished.   HENT:   Right Ear: Tympanic membrane and ear canal normal.   Left Ear: Tympanic membrane and ear canal normal.   Mouth/Throat: No oral lesions. No oropharyngeal exudate or posterior oropharyngeal erythema.   Tender on palpation of the neck/sinus on the left  Oral cavity- he is completely edentulous on top.  The gingiva are unremarkable    Eyes: EOM are normal.   Neck: Neck supple.   Cardiovascular: Normal rate, regular rhythm and normal heart sounds.    Pulmonary/Chest: Effort normal and breath sounds normal. He has no wheezes.   Lymphadenopathy:     He has no cervical adenopathy.   Neurological: He is alert and oriented to person, place, and time.    5/5.  Knee extension 5/5.  Hip flexion 5/5.  Romberg negative, pronator drift negative.  Cranial nerves II through XII intact   Skin: Skin is warm and dry.   Psychiatric: He has a normal mood and affect. His behavior is normal.     "

## 2017-12-21 ENCOUNTER — TELEPHONE (OUTPATIENT)
Dept: FAMILY MEDICINE | Facility: CLINIC | Age: 67
End: 2017-12-21

## 2017-12-21 NOTE — TELEPHONE ENCOUNTER
Need to know which ones are costing him $. Possibly the pravastatin can change to lovastatin ($4 generic), tamsulosin can change to doxazosin, and metformin XR change to regular metformin if these are the ones costing too much.  Otherwise the other ones are the most generic/cheapest he can get.

## 2017-12-21 NOTE — TELEPHONE ENCOUNTER
Spoke w/patient, requesting cheaper medications. Patient states he use to pay only zero dollars and know [facility] informed him that it will cost $16.00 for all medications. Please advise.

## 2017-12-21 NOTE — TELEPHONE ENCOUNTER
----- Message from Gabriel Chandra sent at 12/21/2017 11:19 AM CST -----  Contact: Armand [phone]  Pt is calling inquiring about the increase in price of his medicine. He said it is unaffordable. He wants to know if something can be subscribed at a cheaper price. Please give him a call at your earliest convenience.

## 2018-01-09 NOTE — PROGRESS NOTES
This note was created by combination of typed  and Dragon dictation.  Transcription errors may be present.  If there are any questions, please contact me.    Assessment & Plan  Essential hypertension - stable refilled HCTZ and lisinopril. Checking CMP  -     hydroCHLOROthiazide (HYDRODIURIL) 25 MG tablet; Take 1 tablet (25 mg total) by mouth once daily.  Dispense: 90 tablet; Refill: 3  -     lisinopril 10 MG tablet; Take 1 tablet (10 mg total) by mouth once daily.  Dispense: 90 tablet; Refill: 3    Morbid obesity with BMI of 40.0-44.9, adult - handout re: my plate; advised to monitor and decrease intake of fatty meats, starchy foods/carbs.    Prediabetes - check A1c; on metformin, refilled.  Needs to lose weight.  -     metFORMIN (GLUCOPHAGE-XR) 500 MG 24 hr tablet; TAKE 1 TABLET (500 MG TOTAL) BY MOUTH DAILY WITH BREAKFAST.  Dispense: 90 tablet; Refill: 3    Hyperlipidemia, unspecified hyperlipidemia type- check lipid on pravastatin 20.  nonfasting but hard to get him in the office.  -     pravastatin (PRAVACHOL) 20 MG tablet; Take 1 tablet (20 mg total) by mouth once daily.  Dispense: 90 tablet; Refill: 3    Snoring - recalls remote sleep study, not on file, re-test - home sleep study.  Fatigue, snoring, HTN, obese.  -     Home Sleep Studies; Future    Greater trochanteric bursitis of right hip - referral to orthopedics for consideration of injection.  -     Ambulatory referral to Orthopedics    Gout, arthritis - check uric acid on allopurinol.  Refilled.  Stable no flares.  -     allopurinol (ZYLOPRIM) 100 MG tablet; Take 1 tablet (100 mg total) by mouth once daily.  Dispense: 90 tablet; Refill: 3    Benign prostatic hyperplasia with urinary frequency - refilled flomax. stable.  -     tamsulosin (FLOMAX) 0.4 mg Cp24; Take 1 capsule (0.4 mg total) by mouth once daily.  Dispense: 90 capsule; Refill: 3    Decreased visual acuity  -     Ambulatory referral to Optometry    Decreased hearing of both ears  -      Ambulatory referral to ENT    Need for vaccination for Strep pneumoniae  -     Pneumococcal Conjugate Vaccine (13 Valent) (IM)        Medications Discontinued During This Encounter   Medication Reason    amoxicillin (AMOXIL) 875 MG tablet Therapy completed    hydrocodone-acetaminophen 10-325mg (NORCO)  mg Tab Therapy completed       Follow-up: No Follow-up on file.      =================================================================      Chief Complaint   Patient presents with    Annual Exam    Hip Pain       PARAS Acuna is a 67 y.o. male, last appointment with this clinic was Visit date not found.    Hypertension. No outside BP checks.    3/11/2014 exercise stress echo negative for ischemia.  Normal LV systolic function LVEF 55-60.  Concentric remodeling.  Prediabetes, metformin.    Hyperlipidemia.  on statin  Chronic neck pain with history of surgery by neurosurgery, hydrocodone. 3/31/2016 pt underwent a C3 through C7 Laminoplasty with posterior cervical approach.  Last RF hydrocodone 11/2016 #90; prior 5/2016.  Not 100% quite yet.  Has not seen Dr. Caruso, plan was 1 year follow up with repeat CT.   Reported history of Abdominal aortic aneurysm.  However, ultrasound done June 2016 did not show AAA.  ED, Cialis, low dose ineffective.  Hemorrhoids, hydrocortisone suppositories.  Gout, allopurinol.  No recent flare.  Would get on the foot.  1 episode.    Last visit - wanted him to get labs, he never got them done.  He called c/o expensive meds.  Need to know which ones are costing him $. Possibly the pravastatin can change to lovastatin ($4 generic), tamsulosin can change to doxazosin, and metformin XR change to regular metformin if these are the ones costing too much.  Otherwise the other ones are the most generic/cheapest he can get.  Needs labs.already ordered.    Having pain on both sides of his ribcage.  If he stands up a long time. And back pain with this.  No pain with inspiration.      And his hip  is hurting, the right hip.  No fall.  No previous hip pain.  Walks with a cane. That is new.  Started walking with a cane about 6 months ago. B/c of right side hip pain. No knee pain.     No gout flares.    nonfasting today but I'll check labs.     Fatigue.  Snoring. Recalls hx of sleep study, thinks might have shown SIS but never given CPAP.  Wife notes he snores, he tells me.    Tells me [facility].    Requesting eye check and hearing check.  Notes decreased hearing acuity and visual acuity gradual.    Patient Care Team:  Clark Vargas MD as PCP - General (Internal Medicine)    Patient Active Problem List    Diagnosis Date Noted    Hemorrhoids 06/01/2016    Cervical stenosis of spinal canal 03/31/2016     3/31/2016 pt underwent a C3 through C7 Laminoplasty with posterior cervical approach      Prediabetes 03/29/2016    Hypertension 03/20/2014     3/11/2014 exercise stress echo negative for ischemia.  Normal LV systolic function LVEF 55-60.  Concentric remodeling.      Hyperlipidemia 03/20/2014    Morbid obesity with BMI of 40.0-44.9, adult 03/06/2014    Back pain 07/11/2013    ED (erectile dysfunction) 06/07/2013    Gout, arthritis 01/07/2013       PAST MEDICAL HISTORY:  Past Medical History:   Diagnosis Date    Erectile dysfunction     Gout     Hyperlipidemia     Hypertension     Morbid obesity     Screening for AAA (abdominal aortic aneurysm)     6/2016 AAA US no aneurysm       PAST SURGICAL HISTORY:  Past Surgical History:   Procedure Laterality Date    SPINE SURGERY  3/31/16    C3-C7 laminoplasty,Dr Caruso    tooth implant         SOCIAL HISTORY:  Social History     Social History    Marital status:      Spouse name: N/A    Number of children: N/A    Years of education: N/A     Occupational History    Not on file.     Social History Main Topics    Smoking status: Former Smoker     Quit date: 1/7/2009    Smokeless tobacco: Never Used    Alcohol use Yes       "Comment: occasional beer- average week 12 beers a week    Drug use: No    Sexual activity: Not Currently     Partners: Female      Comment: , children.     Other Topics Concern    Not on file     Social History Narrative    No narrative on file       ALLERGIES AND MEDICATIONS: updated and reviewed.  Review of patient's allergies indicates:   Allergen Reactions    Codeine Swelling     Codeine with Aspirin caused chest pain     Current Outpatient Prescriptions   Medication Sig Dispense Refill    aspirin (ECOTRIN) 81 MG EC tablet Take 81 mg by mouth once daily.      hydrochlorothiazide (HYDRODIURIL) 25 MG tablet Take 1 tablet (25 mg total) by mouth once daily. 90 tablet 3    lisinopril 10 MG tablet Take 1 tablet (10 mg total) by mouth once daily. 90 tablet 3    metformin (GLUCOPHAGE-XR) 500 MG 24 hr tablet TAKE 1 TABLET (500 MG TOTAL) BY MOUTH DAILY WITH BREAKFAST. 90 tablet 3    pravastatin (PRAVACHOL) 20 MG tablet Take 1 tablet (20 mg total) by mouth once daily. 90 tablet 3    tamsulosin (FLOMAX) 0.4 mg Cp24 Take 1 capsule (0.4 mg total) by mouth once daily. 30 capsule 11    allopurinol (ZYLOPRIM) 100 MG tablet Take 1 tablet (100 mg total) by mouth once daily. 90 tablet 3    tadalafil (CIALIS) 20 MG Tab Take 1 tablet (20 mg total) by mouth daily as needed for Erectile Dysfunction. 5 tablet 11     No current facility-administered medications for this visit.        Review of Systems   Constitutional: Negative for chills and fever.   Respiratory: Negative for shortness of breath.    Cardiovascular: Negative for chest pain and palpitations.   Musculoskeletal: Positive for back pain and joint pain.       Physical Exam   Vitals:    01/10/18 1040   BP: 122/80   Pulse: 76   Temp: 98 °F (36.7 °C)   SpO2: 97%   Weight: 117.4 kg (258 lb 14.9 oz)   Height: 5' 8" (1.727 m)    Body mass index is 39.37 kg/m².  Weight: 117.4 kg (258 lb 14.9 oz)   Height: 5' 8" (172.7 cm)     Physical Exam   Constitutional: He is " oriented to person, place, and time. He appears well-developed and well-nourished.   HENT:   Right Ear: Tympanic membrane and external ear normal.   Left Ear: Tympanic membrane and external ear normal.   Nose: Nose normal.   Mouth/Throat: Oropharynx is clear and moist.   Eyes: EOM are normal.   Neck: Trachea normal. Carotid bruit is not present. No thyroid mass and no thyromegaly present.   Decreased ROM of the neck, s/p surgery   Cardiovascular: Normal rate, regular rhythm, S1 normal, S2 normal, normal heart sounds and intact distal pulses.    No murmur heard.  Pulmonary/Chest: Effort normal and breath sounds normal.   Abdominal: Soft. He exhibits no mass. There is no splenomegaly or hepatomegaly. There is no tenderness.   Musculoskeletal: He exhibits no edema or deformity.   Right hip inversion and eversion without pain, but TTP greater trochanter which mimics his complaint.   Straight leg raise on the right without pain   Lymphadenopathy:     He has no cervical adenopathy.     He has no axillary adenopathy.   Neurological: He is alert and oriented to person, place, and time. He has normal strength and normal reflexes. No cranial nerve deficit or sensory deficit.   Skin: Skin is warm and dry. No rash (on exposed skin) noted.   On exposed skin   Psychiatric: He has a normal mood and affect. His speech is normal and behavior is normal. Thought content normal.

## 2018-01-10 ENCOUNTER — LAB VISIT (OUTPATIENT)
Dept: LAB | Facility: HOSPITAL | Age: 68
End: 2018-01-10
Attending: INTERNAL MEDICINE
Payer: MEDICARE

## 2018-01-10 ENCOUNTER — OFFICE VISIT (OUTPATIENT)
Dept: FAMILY MEDICINE | Facility: CLINIC | Age: 68
End: 2018-01-10
Payer: MEDICARE

## 2018-01-10 VITALS
HEART RATE: 76 BPM | OXYGEN SATURATION: 97 % | BODY MASS INDEX: 39.24 KG/M2 | DIASTOLIC BLOOD PRESSURE: 80 MMHG | SYSTOLIC BLOOD PRESSURE: 122 MMHG | WEIGHT: 258.94 LBS | TEMPERATURE: 98 F | HEIGHT: 68 IN

## 2018-01-10 DIAGNOSIS — H54.7 DECREASED VISUAL ACUITY: ICD-10-CM

## 2018-01-10 DIAGNOSIS — R53.83 FATIGUE, UNSPECIFIED TYPE: ICD-10-CM

## 2018-01-10 DIAGNOSIS — E78.5 HYPERLIPIDEMIA, UNSPECIFIED HYPERLIPIDEMIA TYPE: Chronic | ICD-10-CM

## 2018-01-10 DIAGNOSIS — E78.5 HYPERLIPIDEMIA, UNSPECIFIED HYPERLIPIDEMIA TYPE: ICD-10-CM

## 2018-01-10 DIAGNOSIS — M10.9 GOUT, ARTHRITIS: Chronic | ICD-10-CM

## 2018-01-10 DIAGNOSIS — Z23 NEED FOR VACCINATION FOR STREP PNEUMONIAE: ICD-10-CM

## 2018-01-10 DIAGNOSIS — R06.83 SNORING: ICD-10-CM

## 2018-01-10 DIAGNOSIS — R35.0 BENIGN PROSTATIC HYPERPLASIA WITH URINARY FREQUENCY: ICD-10-CM

## 2018-01-10 DIAGNOSIS — H91.93 DECREASED HEARING OF BOTH EARS: ICD-10-CM

## 2018-01-10 DIAGNOSIS — M70.61 GREATER TROCHANTERIC BURSITIS OF RIGHT HIP: ICD-10-CM

## 2018-01-10 DIAGNOSIS — R73.03 PREDIABETES: Chronic | ICD-10-CM

## 2018-01-10 DIAGNOSIS — I10 ESSENTIAL HYPERTENSION: Primary | Chronic | ICD-10-CM

## 2018-01-10 DIAGNOSIS — N40.1 BENIGN PROSTATIC HYPERPLASIA WITH URINARY FREQUENCY: ICD-10-CM

## 2018-01-10 DIAGNOSIS — E66.01 MORBID OBESITY WITH BMI OF 40.0-44.9, ADULT: Chronic | ICD-10-CM

## 2018-01-10 LAB
ALBUMIN SERPL BCP-MCNC: 3.8 G/DL
ALP SERPL-CCNC: 59 U/L
ALT SERPL W/O P-5'-P-CCNC: 13 U/L
ANION GAP SERPL CALC-SCNC: 8 MMOL/L
AST SERPL-CCNC: 14 U/L
BASOPHILS # BLD AUTO: 0.02 K/UL
BASOPHILS NFR BLD: 0.3 %
BILIRUB SERPL-MCNC: 0.4 MG/DL
BUN SERPL-MCNC: 14 MG/DL
CALCIUM SERPL-MCNC: 10.1 MG/DL
CHLORIDE SERPL-SCNC: 100 MMOL/L
CHOLEST SERPL-MCNC: 184 MG/DL
CHOLEST/HDLC SERPL: 4.2 {RATIO}
CO2 SERPL-SCNC: 31 MMOL/L
CREAT SERPL-MCNC: 1.1 MG/DL
DIFFERENTIAL METHOD: ABNORMAL
EOSINOPHIL # BLD AUTO: 0.1 K/UL
EOSINOPHIL NFR BLD: 2.1 %
ERYTHROCYTE [DISTWIDTH] IN BLOOD BY AUTOMATED COUNT: 15.7 %
EST. GFR  (AFRICAN AMERICAN): >60 ML/MIN/1.73 M^2
EST. GFR  (NON AFRICAN AMERICAN): >60 ML/MIN/1.73 M^2
ESTIMATED AVG GLUCOSE: 117 MG/DL
GLUCOSE SERPL-MCNC: 105 MG/DL
HBA1C MFR BLD HPLC: 5.7 %
HCT VFR BLD AUTO: 40.3 %
HDLC SERPL-MCNC: 44 MG/DL
HDLC SERPL: 23.9 %
HGB BLD-MCNC: 12.6 G/DL
IMM GRANULOCYTES # BLD AUTO: 0.01 K/UL
IMM GRANULOCYTES NFR BLD AUTO: 0.2 %
LDLC SERPL CALC-MCNC: 101.4 MG/DL
LYMPHOCYTES # BLD AUTO: 3 K/UL
LYMPHOCYTES NFR BLD: 46.2 %
MCH RBC QN AUTO: 25.6 PG
MCHC RBC AUTO-ENTMCNC: 31.3 G/DL
MCV RBC AUTO: 82 FL
MONOCYTES # BLD AUTO: 0.4 K/UL
MONOCYTES NFR BLD: 6.4 %
NEUTROPHILS # BLD AUTO: 2.9 K/UL
NEUTROPHILS NFR BLD: 44.8 %
NONHDLC SERPL-MCNC: 140 MG/DL
NRBC BLD-RTO: 0 /100 WBC
PLATELET # BLD AUTO: 264 K/UL
PMV BLD AUTO: 12.4 FL
POTASSIUM SERPL-SCNC: 3.7 MMOL/L
PROT SERPL-MCNC: 8.4 G/DL
RBC # BLD AUTO: 4.92 M/UL
SODIUM SERPL-SCNC: 139 MMOL/L
TRIGL SERPL-MCNC: 193 MG/DL
URATE SERPL-MCNC: 7.9 MG/DL
WBC # BLD AUTO: 6.52 K/UL

## 2018-01-10 PROCEDURE — 99499 UNLISTED E&M SERVICE: CPT | Mod: S$GLB,,, | Performed by: INTERNAL MEDICINE

## 2018-01-10 PROCEDURE — G0009 ADMIN PNEUMOCOCCAL VACCINE: HCPCS | Mod: S$GLB,,, | Performed by: INTERNAL MEDICINE

## 2018-01-10 PROCEDURE — 80053 COMPREHEN METABOLIC PANEL: CPT

## 2018-01-10 PROCEDURE — 82728 ASSAY OF FERRITIN: CPT

## 2018-01-10 PROCEDURE — 99499 UNLISTED E&M SERVICE: CPT | Mod: ,,, | Performed by: INTERNAL MEDICINE

## 2018-01-10 PROCEDURE — 83540 ASSAY OF IRON: CPT

## 2018-01-10 PROCEDURE — 99999 PR PBB SHADOW E&M-EST. PATIENT-LVL V: CPT | Mod: PBBFAC,,, | Performed by: INTERNAL MEDICINE

## 2018-01-10 PROCEDURE — 99214 OFFICE O/P EST MOD 30 MIN: CPT | Mod: S$GLB,,, | Performed by: INTERNAL MEDICINE

## 2018-01-10 PROCEDURE — 36415 COLL VENOUS BLD VENIPUNCTURE: CPT | Mod: PO

## 2018-01-10 PROCEDURE — 80061 LIPID PANEL: CPT

## 2018-01-10 PROCEDURE — 85025 COMPLETE CBC W/AUTO DIFF WBC: CPT

## 2018-01-10 PROCEDURE — 90670 PCV13 VACCINE IM: CPT | Mod: S$GLB,,, | Performed by: INTERNAL MEDICINE

## 2018-01-10 PROCEDURE — 84550 ASSAY OF BLOOD/URIC ACID: CPT

## 2018-01-10 PROCEDURE — 83036 HEMOGLOBIN GLYCOSYLATED A1C: CPT

## 2018-01-10 RX ORDER — TAMSULOSIN HYDROCHLORIDE 0.4 MG/1
0.4 CAPSULE ORAL DAILY
Qty: 90 CAPSULE | Refills: 3 | Status: SHIPPED | OUTPATIENT
Start: 2018-01-10 | End: 2018-10-08 | Stop reason: SDUPTHER

## 2018-01-10 RX ORDER — ALLOPURINOL 100 MG/1
100 TABLET ORAL DAILY
Qty: 90 TABLET | Refills: 3 | Status: SHIPPED | OUTPATIENT
Start: 2018-01-10 | End: 2018-10-08 | Stop reason: SDUPTHER

## 2018-01-10 RX ORDER — LISINOPRIL 10 MG/1
10 TABLET ORAL DAILY
Qty: 90 TABLET | Refills: 3 | Status: SHIPPED | OUTPATIENT
Start: 2018-01-10 | End: 2018-10-08 | Stop reason: SDUPTHER

## 2018-01-10 RX ORDER — METFORMIN HYDROCHLORIDE 500 MG/1
TABLET, EXTENDED RELEASE ORAL
Qty: 90 TABLET | Refills: 3 | Status: SHIPPED | OUTPATIENT
Start: 2018-01-10 | End: 2018-10-08 | Stop reason: SDUPTHER

## 2018-01-10 RX ORDER — ASPIRIN 81 MG/1
81 TABLET ORAL DAILY
COMMUNITY

## 2018-01-10 RX ORDER — PRAVASTATIN SODIUM 20 MG/1
20 TABLET ORAL DAILY
Qty: 90 TABLET | Refills: 3 | Status: SHIPPED | OUTPATIENT
Start: 2018-01-10 | End: 2018-10-08 | Stop reason: SDUPTHER

## 2018-01-10 RX ORDER — HYDROCHLOROTHIAZIDE 25 MG/1
25 TABLET ORAL DAILY
Qty: 90 TABLET | Refills: 3 | Status: SHIPPED | OUTPATIENT
Start: 2018-01-10 | End: 2018-10-08 | Stop reason: SDUPTHER

## 2018-01-10 NOTE — PATIENT INSTRUCTIONS
Understanding Trochanteric Bursitis    A bursa is a thin, slippery, sac-like film. It contains a small amount of fluid. This structure is found between bones and soft tissues in and around joints. A bursa cushions and protects a joint. It keeps parts of a joint from rubbing against each other. If a bursa becomes inflamed and irritated, it is known as bursitis.  The trochanteric bursa is found on the hip joint. It lies on top of the bump at the top of the thighbone called the greater trochanter. Inflammation of this bursa is called trochanteric bursitis.     How to say it  [id number]   Causes of trochanteric bursitis  Causes may include:  · Overuse of the hip during running or other sports, dance, or work  · Falling on or irritation to the side of the hip  This condition may occur along with other problems, such as osteoarthritis of the hip or knee, or low back problems. In rare cases, it may occur after hip surgery.  Symptoms of trochanteric bursitis  · Pain or aching on the side of the hip. The pain may travel down the leg.  · Swelling, tenderness, or warmth on the side of the hip at the bony bump at the top of the thigh  Treatment for trochanteric bursitis  These may include:  · Resting the hip. This allows the bursa to heal.  · Prescription or over-the-counter pain medicines. These help reduce inflammation, swelling, and pain.  · Cold packs and heat packs. These help reduce pain and swelling.  · Stretching and strengthening exercises. These improve flexibility and strength around the hip.  · Physical therapy. This includes exercises or other treatments.  · Injections of medicine into the bursa. This may help reduce inflammation and relieve symptoms.  Possible complications  If you dont give your hip time to heal, the problem may not go away, may return, or may get worse. Rest and treat your hip as directed.     When to call your healthcare provider  Call your healthcare provider right away if you have  any of these:  · Fever of 100.4°F (38°C) or higher, or as directed  · Redness, swelling, or warmth that gets worse  · Symptoms that dont get better with prescribed medicines, or get worse  · New symptoms   Date Last Reviewed: 3/29/2016  © 0541-1173 [employer]. [address] [place] [address], [place] [address]. All rights reserved. This information is not intended as a substitute for professional medical care. Always follow your healthcare professional's instructions.        Weight Management: Fact and Fiction    Knowing the truth about losing weight can help you separate what works from what doesnt. Dont be taken in by expensive weight-loss fads like pills, herbs, and special foods that promise unbelievable results. Theres no magic way to lose weight. If you have questions about weight loss, ask your healthcare provider.  Fiction: The faster I lose weight, the better.  Fact: Rapid weight loss is usually due to loss of water or muscle mass. What youre trying to get rid of is extra fat. Aim to lose a 1/2 pound to 2 pounds a week. Then youre more likely to lose fat rather than water or muscle.  Fiction: Skipping meals will help me lose weight.  Fact: When you skip meals, you dont give your body the energy it needs to work. Hunger makes you more likely to overeat later on. Its best to spread your meals throughout the day. Eat at least three meals a day.  Fiction: I cant start exercising until I lose weight.  Fact: The sooner you start exercising the better. Exercise helps burn more calories, tone your muscles, and keep your appetite in check. People who continue to exercise after they lose weight are more likely to keep the weight off.  Fiction: The fewer calories I eat, the better.  Fact: This seems like it should be true, but its not. When you eat too few calories, your body acts as if its on a desert island. It thinks food is scarce, so it slows down your metabolism (how fast you burn  calories) to save energy. By eating too few calories, you make it harder to lose weight.  Fiction: Once I lose weight, I can go back to living the way I did before.  Fact: Going back to your old eating habits and giving up exercise is a sure way to regain any weight youve lost. The lifestyle changes that help you lose extra weight can also help keep it off. This is why you need to make realistic changes you can stick with.  Fiction: Low-fat and fat-free mean low-calorie.  Fact: All foods, even fat-free ones, have calories. Eat too many calories and youll gain weight. Its OK to treat yourself to a fat-free cookie or two. Just dont eat the whole box! A dietitian will help you figure this out, and will likely recommend that you eat three meals a day, with protein with each meal.   Date Last Reviewed: 2/4/2016 © 2000-2017 [employer]. [address] [place] [address]. All rights reserved. This information is not intended as a substitute for professional medical care. Always follow your healthcare professional's instructions.        Understanding USDA MyPlate  The USDA (U.S. Department of Agriculture) has guidelines to help you make healthy food choices. These are called MyPlate. MyPlate shows the food groups that make up healthy meals using the image of a place setting. Before you eat, think about the healthiest choices for what to put onto your plate or into your cup or bowl. To learn more about building a healthy plate, visit www.choosemyplate.gov.    The food groups  · Fruits. Any fruit or 100% fruit juice counts as part of the Fruit Group. Fruits may be fresh, canned, frozen, or dried, and may be whole, cut-up, or pureed. Make half your plate fruits and vegetables.  · Vegetables. Any vegetable or 100% vegetable juice counts as a member of the Vegetable Group. Vegetables may be fresh, frozen, canned, or dried. They can be served raw or cooked and may be whole, cut-up, or mashed. Make  half your plate fruits and vegetables.  · Grains. All foods made from grains are part of the Grains Group. These include wheat, rice, oats, cornmeal, and barley such as bread, pasta, oatmeal, cereal, tortillas, and grits. Grains should be no more than a quarter of your plate. At least half of your grains should be whole grains.  · Protein. This group includes meat, poultry, seafood, beans and peas, eggs, processed soy products (like tofu), nuts (including nut butters), and seeds. Make protein choices no more than a quarter of your plate. Meat and poultry choices should be lean or low fat.  · Dairy. All fluid milk products and foods made from milk that contain calcium, like yogurt and cheese are part of the Dairy Group. (Foods that have little calcium, such as cream, butter, and cream cheese, are not part of the group.) Most dairy choices should be low-fat or fat-free.  · Oils. These are fats that are liquid at room temperature. They include canola, corn, olive, soybean, and sunflower oil. Foods that are mainly oil include mayonnaise, certain salad dressings, and soft margarines. You should have only 5 to 7 teaspoons of oils a day. You probably already get this much from the food you eat.  Use [employer] to help build your meals  The SuperTracker can help you plan and track your meals and activity. You can look up individual foods to see or compare their nutritional value. You can get guidelines for what and how much you should eat. You can compare your food choices. And you can assess personal physical activities and see ways you can improve. Go to www.choosemyplate.gov/supertracker/. For more eating tips and nutritional information, go to www.choosemyplate.gov/ten-tips.  Date Last Reviewed: 6/1/2015  © 0057-8261 [employer]. [address] [place] [address], [place] [address]. All rights reserved. This information is not intended as a substitute for professional medical care. Always follow your healthcare  professional's instructions.

## 2018-01-10 NOTE — PROGRESS NOTES
Prevnar-13 vaccine administered, anyi well. Instructed to wait 15mins for observation, no reaction noted @ time of discharge.

## 2018-01-11 ENCOUNTER — TELEPHONE (OUTPATIENT)
Dept: FAMILY MEDICINE | Facility: CLINIC | Age: 68
End: 2018-01-11

## 2018-01-11 DIAGNOSIS — D64.9 NORMOCYTIC ANEMIA: Primary | ICD-10-CM

## 2018-01-11 NOTE — TELEPHONE ENCOUNTER
Normocytic anemia seen previously.  No previous iron panel.      Please see if we can add on iron panel and ferritin for D64.9

## 2018-01-12 LAB
FERRITIN SERPL-MCNC: 44 NG/ML
IRON SERPL-MCNC: 52 UG/DL
SATURATED IRON: 11 %
TOTAL IRON BINDING CAPACITY: 453 UG/DL
TRANSFERRIN SERPL-MCNC: 306 MG/DL

## 2018-01-12 NOTE — PROGRESS NOTES
CBC mild anemia, seen previously.  Iron profile - iron level OK, though the sat level is low.  Ferritin is reasonable.  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  CMP WNL.  Uric acid high, on allopurinol but no flare since first ever episode.  Would not escalate.  Lipid OK. TG high but nonfasting.  nonHDL 140. On statin.  No change.  a1c 5.7 on metformin.    No changes.

## 2018-01-16 ENCOUNTER — TELEPHONE (OUTPATIENT)
Dept: FAMILY MEDICINE | Facility: CLINIC | Age: 68
End: 2018-01-16

## 2018-01-16 ENCOUNTER — INITIAL CONSULT (OUTPATIENT)
Dept: OPTOMETRY | Facility: CLINIC | Age: 68
End: 2018-01-16
Payer: MEDICARE

## 2018-01-16 DIAGNOSIS — H25.13 NUCLEAR SCLEROSIS OF BOTH EYES: Primary | ICD-10-CM

## 2018-01-16 DIAGNOSIS — H52.7 REFRACTIVE ERROR: ICD-10-CM

## 2018-01-16 DIAGNOSIS — R06.83 SNORING: Primary | ICD-10-CM

## 2018-01-16 DIAGNOSIS — Z13.5 SCREENING FOR GLAUCOMA: ICD-10-CM

## 2018-01-16 DIAGNOSIS — I10 ESSENTIAL HYPERTENSION: Chronic | ICD-10-CM

## 2018-01-16 PROCEDURE — 92015 DETERMINE REFRACTIVE STATE: CPT | Mod: S$GLB,,, | Performed by: OPTOMETRIST

## 2018-01-16 PROCEDURE — 99999 PR PBB SHADOW E&M-EST. PATIENT-LVL II: CPT | Mod: PBBFAC,,, | Performed by: OPTOMETRIST

## 2018-01-16 PROCEDURE — 92004 COMPRE OPH EXAM NEW PT 1/>: CPT | Mod: S$GLB,,, | Performed by: OPTOMETRIST

## 2018-01-16 NOTE — PROGRESS NOTES
Subjective:       Patient ID: Armand Painting is a 67 y.o. male      Chief Complaint   Patient presents with    Concerns About Ocular Health    Hypertensive Eye Exam     History of Present Illness  Dls: 10 yrs     Pt here for hypertensive eye exam.   Pt states no changes in vision. Pt wears pal's rarely. Pt c/o tearing ou   no burning no itching no pain some ha's no floaters.     Eye meds:  None     Assessment/Plan:     1. Nuclear sclerosis of both eyes  Educated pt on presence of cataracts and effects on vision. No surgery at this time. Recheck in one year.    2. Essential hypertension  No hypertensive retinopathy. Continue BP control. RTC 1 year for DFE.    3. Screening for glaucoma  No changes related to glaucoma. Monitor yearly.    4. Refractive error  Educated patient on refractive error and discussed lens options. Dispensed updated spectacle Rx. Educated about adaptation period to new specs.    Eyeglass Final Rx     Eyeglass Final Rx       Sphere Cylinder Axis Add    Right -0.50 +3.00 015 +2.75    Left +2.00 +1.25 165 +2.75    Expiration Date:  1/17/2019    Comments:  OPPOSITE SIGNS                Follow-up in about 1 year (around 1/16/2019) for Annual eye exam.

## 2018-01-16 NOTE — LETTER
January 16, 2018      Clark Vargas MD  [address] [place] [address]           [facility]  [address] [place] [address]  Phone: [phone]  Fax: [phone]          Patient: Armand Painting   MR Number: [id number]   YOB: 1950   Date of Visit: 1/16/2018       Dear Dr. Clark Vargas:    Thank you for referring Armand Painting to me for evaluation. Attached you will find relevant portions of my assessment and plan of care.    If you have questions, please do not hesitate to call me. I look forward to following Armand Painting along with you.    Sincerely,    Maricel Cotter, OD    Enclosure  CC:  No Recipients    If you would like to receive this communication electronically, please contact [email or web address] or [phone] to request more information on [employer] Link access.    For providers and/or their staff who would like to refer a patient to [facility], please contact us through our one-stop-shop provider referral line, [facility], at [phone].    If you feel you have received this communication in error or would no longer like to receive these types of communications, please e-mail [email or web address]

## 2018-01-16 NOTE — TELEPHONE ENCOUNTER
Please call pt - his insurance does not cover home sleep test. Apparently it has to be done at the sleep lab.  I will order the in-lab sleep study so he should be called to get that set up.

## 2018-01-26 ENCOUNTER — TELEPHONE (OUTPATIENT)
Dept: SLEEP MEDICINE | Facility: OTHER | Age: 68
End: 2018-01-26

## 2018-02-20 ENCOUNTER — TELEPHONE (OUTPATIENT)
Dept: SLEEP MEDICINE | Facility: OTHER | Age: 68
End: 2018-02-20

## 2018-03-08 ENCOUNTER — TELEPHONE (OUTPATIENT)
Dept: SLEEP MEDICINE | Facility: OTHER | Age: 68
End: 2018-03-08

## 2018-03-27 ENCOUNTER — TELEPHONE (OUTPATIENT)
Dept: SLEEP MEDICINE | Facility: OTHER | Age: 68
End: 2018-03-27

## 2018-04-03 ENCOUNTER — TELEPHONE (OUTPATIENT)
Dept: SLEEP MEDICINE | Facility: OTHER | Age: 68
End: 2018-04-03

## 2018-04-18 ENCOUNTER — HOSPITAL ENCOUNTER (OUTPATIENT)
Dept: SLEEP MEDICINE | Facility: HOSPITAL | Age: 68
Discharge: HOME | End: 2018-04-18
Attending: INTERNAL MEDICINE
Payer: MEDICARE

## 2018-04-18 DIAGNOSIS — R06.83 SNORING: ICD-10-CM

## 2018-04-18 PROCEDURE — 95811 POLYSOM 6/>YRS CPAP 4/> PARM: CPT | Mod: 26,,, | Performed by: INTERNAL MEDICINE

## 2018-04-18 PROCEDURE — 95811 POLYSOM 6/>YRS CPAP 4/> PARM: CPT

## 2018-04-18 PROCEDURE — 95811 PR POLYSOMNOGRAPHY W/CPAP: ICD-10-PCS | Mod: 26,,, | Performed by: INTERNAL MEDICINE

## 2018-04-19 NOTE — PROGRESS NOTES
Patient was educated about the purpose of the wires and what data was being collected.  Patient was informed that the technician may need to enter the room during the night to fix leads or make adjustments to the equipment. Patient qualified for cpap titration. Cpap was started with a full face mask due to patient mouth breathing.     EKG appears to be Bradycardia/Frequent PAC's, PVC's/Trigeminy/Couplet    Large Simplus Full Face Mask in use. Humidification3/Cflex3    Optimal pressure 89paF04  Supine Rem observed at optimal pressure    Patient stated that cpap was okay    Follow up instructions were provided to patient.

## 2018-05-01 ENCOUNTER — TELEPHONE (OUTPATIENT)
Dept: FAMILY MEDICINE | Facility: CLINIC | Age: 68
End: 2018-05-01

## 2018-05-01 DIAGNOSIS — G47.33 OSA (OBSTRUCTIVE SLEEP APNEA): Primary | ICD-10-CM

## 2018-05-01 NOTE — TELEPHONE ENCOUNTER
Spoke w/patient, informed of results and orders. Verbalized understanding.   Orders faxed to [facility] 5/1/18

## 2018-05-01 NOTE — PROGRESS NOTES
Sleep study positive for sleep apnea with AHI 55.1.  Oxygen braxton 73%.  Recommendations were CPAP with 15 pressure.    Please call pt - the sleep study does show he has sleep apnea.  Will order CPAP machine for pt.

## 2018-07-08 DIAGNOSIS — I10 ESSENTIAL HYPERTENSION: Chronic | ICD-10-CM

## 2018-07-09 RX ORDER — LISINOPRIL 10 MG/1
10 TABLET ORAL DAILY
Qty: 90 TABLET | Refills: 1 | Status: SHIPPED | OUTPATIENT
Start: 2018-07-09 | End: 2019-09-24 | Stop reason: SDUPTHER

## 2018-07-09 RX ORDER — HYDROCHLOROTHIAZIDE 25 MG/1
25 TABLET ORAL DAILY
Qty: 90 TABLET | Refills: 1 | Status: SHIPPED | OUTPATIENT
Start: 2018-07-09 | End: 2019-06-19 | Stop reason: SDUPTHER

## 2018-10-05 NOTE — PROGRESS NOTES
This note was created by combination of typed  and Dragon dictation.  Transcription errors may be present.  If there are any questions, please contact me.    Assessment & Plan:   Prediabetes  -check A1c on metformin.  -     metFORMIN (GLUCOPHAGE-XR) 500 MG 24 hr tablet; TAKE 1 TABLET (500 MG TOTAL) BY MOUTH DAILY WITH BREAKFAST.  Dispense: 90 tablet; Refill: 3  -     Hemoglobin A1c; Future; Expected date: 10/08/2018    Gout, arthritis  -notes had a recent flare.  Last uric acid was not to goal.  I would titrate up.  Refilled allopurinol 100 for now  -     allopurinol (ZYLOPRIM) 100 MG tablet; Take 1 tablet (100 mg total) by mouth once daily.  Dispense: 90 tablet; Refill: 3  -     Uric acid; Future; Expected date: 10/08/2018    Essential hypertension   -stable, refilled HCTZ and low-dose lisinopril.  -     hydroCHLOROthiazide (HYDRODIURIL) 25 MG tablet; Take 1 tablet (25 mg total) by mouth once daily.  Dispense: 90 tablet; Refill: 3  -     lisinopril 10 MG tablet; Take 1 tablet (10 mg total) by mouth once daily.  Dispense: 90 tablet; Refill: 3    Hyperlipidemia, unspecified hyperlipidemia type  -check lipid profile on pravastatin 20.  Ate a bowl of corn flakes this morning  -     pravastatin (PRAVACHOL) 20 MG tablet; Take 1 tablet (20 mg total) by mouth once daily.  Dispense: 90 tablet; Refill: 3  -     Comprehensive metabolic panel; Future; Expected date: 10/08/2018  -     Lipid panel; Future; Expected date: 10/08/2018    Benign prostatic hyperplasia with urinary frequency  -refilled flomax.  -     tamsulosin (FLOMAX) 0.4 mg Cap; Take 1 capsule (0.4 mg total) by mouth once daily. for 15 days  Dispense: 90 capsule; Refill: 3    Screening for colon cancer  Anemia, unspecified type  -with anemia, borderline iron - iron level was OK though the sat level was a little low. Needs colonoscopy - none on record - referral submitted.  -     Case request GI: COLONOSCOPY  -     CBC auto differential; Future; Expected  date: 10/08/2018    Cervical stenosis of spinal canal 3/2016 C3-7 laminoplasty  -s/p surgery 3/2016 lost to follow up contact info given for Dr. Caruso.  Plan with Dr. Caruso 5/2016 was follow up 1 year with CT scan.   Will defer to Dr. Caruso for imaging. If no hardware issue - home PT handout provided.  Hx of formal PT but issue was high copay per session    Flu shot at outside pharmacy    Medications Discontinued During This Encounter   Medication Reason    allopurinol (ZYLOPRIM) 100 MG tablet Reorder    hydroCHLOROthiazide (HYDRODIURIL) 25 MG tablet Reorder    lisinopril 10 MG tablet Reorder    metFORMIN (GLUCOPHAGE-XR) 500 MG 24 hr tablet Reorder    pravastatin (PRAVACHOL) 20 MG tablet Reorder    tamsulosin (FLOMAX) 0.4 mg Cp24 Reorder         Current Outpatient Medications:     allopurinol (ZYLOPRIM) 100 MG tablet, Take 1 tablet (100 mg total) by mouth once daily., Disp: 90 tablet, Rfl: 3    aspirin (ECOTRIN) 81 MG EC tablet, Take 81 mg by mouth once daily., Disp: , Rfl:     hydroCHLOROthiazide (HYDRODIURIL) 25 MG tablet, TAKE 1 TABLET (25 MG TOTAL) BY MOUTH ONCE DAILY., Disp: 90 tablet, Rfl: 1    hydroCHLOROthiazide (HYDRODIURIL) 25 MG tablet, Take 1 tablet (25 mg total) by mouth once daily., Disp: 90 tablet, Rfl: 3    lisinopril 10 MG tablet, Take 1 tablet (10 mg total) by mouth once daily., Disp: 90 tablet, Rfl: 3    metFORMIN (GLUCOPHAGE-XR) 500 MG 24 hr tablet, TAKE 1 TABLET (500 MG TOTAL) BY MOUTH DAILY WITH BREAKFAST., Disp: 90 tablet, Rfl: 3    pravastatin (PRAVACHOL) 20 MG tablet, Take 1 tablet (20 mg total) by mouth once daily., Disp: 90 tablet, Rfl: 3    lisinopril 10 MG tablet, TAKE 1 TABLET (10 MG TOTAL) BY MOUTH ONCE DAILY., Disp: 90 tablet, Rfl: 1    tadalafil (CIALIS) 20 MG Tab, Take 1 tablet (20 mg total) by mouth daily as needed for Erectile Dysfunction., Disp: 5 tablet, Rfl: 11    tamsulosin (FLOMAX) 0.4 mg Cap, Take 1 capsule (0.4 mg total) by mouth once daily. for 15 days, Disp:  90 capsule, Rfl: 3    Follow Up: No Follow-up on file.    Subjective:     Chief Complaint   Patient presents with    Follow-up       PARAS Acuna is a 68 y.o. male, last appointment with this clinic was Visit date not found.    Hypertension. No outside BP checks.    3/11/2014 exercise stress echo negative for ischemia.  Normal LV systolic function LVEF 55-60.  Concentric remodeling.  Prediabetes, metformin.    Hyperlipidemia.  on statin  Chronic neck pain with history of surgery by neurosurgery, hydrocodone. 3/31/2016 pt underwent a C3 through C7 Laminoplasty with posterior cervical approach.    Reported history of Abdominal aortic aneurysm.  However, ultrasound done June 2016 did not show AAA.  ED, Cialis, low dose ineffective.  Hemorrhoids, hydrocortisone suppositories.  Gout, allopurinol.  No recent flare.  Would get on the foot.  1 episode.  SIS    1/2018 CBC mild anemia ferritin was OK, suspect hemoglobinopathy  Uric acid was high on allopurinol but no recent flare.  Lipid nonfasting ok on statin  a1c 5.7 metformin    Needs colonoscopy. Consider Hb electrophoresis    C/o neck pain and stiffness ever since his surgery. 3/2016.  Has not seen anyone in follow up. Affecting sleep. Intensity 9/10. Worse with standing for long periods of time. Middle of the posterior neck. Still with issues with lifting the right arm.  Lost to follow up.  PT was expensive-  Per session cost was high. No chronic NSAIDS    Not doing any self directed exercises.      Denies SE of medications.     Thinks he had gout flare a few weeks ago.  Short duration. Typically right podagra    Patient Care Team:  Clark Vargas MD as PCP - General (Internal Medicine)  Fernando Jacinto MD as Consulting Physician (Orthopedic Surgery)    Patient Active Problem List    Diagnosis Date Noted    SIS (obstructive sleep apnea) on sleep study 4/2018 with AHI 55.  CPAP at 15     Nuclear sclerosis of both eyes 01/16/2018    Refractive error 01/16/2018     Benign prostatic hyperplasia with urinary frequency 01/10/2018    Hemorrhoids 2016    Cervical stenosis of spinal canal 3/2016 C3-7 laminoplasty 2016     3/31/2016 pt underwent a C3 through C7 Laminoplasty with posterior cervical approach      Prediabetes 2016    Essential hypertension 2014     3/11/2014 exercise stress echo negative for ischemia.  Normal LV systolic function LVEF 55-60.  Concentric remodeling.      Mixed hyperlipidemia 2014    Morbid obesity with BMI of 40.0-44.9, adult 2014    Back pain 2013    ED (erectile dysfunction) 2013    Gout, arthritis 2013       PAST MEDICAL HISTORY:  Past Medical History:   Diagnosis Date    Arthritis     Erectile dysfunction     Gout     Hyperlipidemia     Hypertension     Morbid obesity     Nuclear sclerosis of both eyes 2018    Screening for AAA (abdominal aortic aneurysm)     2016 AAA US no aneurysm       PAST SURGICAL HISTORY:  Past Surgical History:   Procedure Laterality Date    LAMINOPLASTY/ C3-C7 N/A 3/31/2016    Performed by [facility]uso [facility] at [facility] OR [address] [place] [address]    SPINE SURGERY  3/31/16    C3-C7 laminoplasty,Dr Caruso    tooth implant         SOCIAL HISTORY:  Social History     Socioeconomic History    Marital status:      Spouse name: Not on file    Number of children: Not on file    Years of education: Not on file    Highest education level: Not on file   Social Needs    Financial resource strain: Not on file    Food insecurity - worry: Not on file    Food insecurity - inability: Not on file    Transportation needs - medical: Not on file    Transportation needs - non-medical: Not on file   Occupational History    Not on file   Tobacco Use    Smoking status: Former Smoker     Last attempt to quit: 2009     Years since quittin.7    Smokeless tobacco: Never Used   Substance and Sexual Activity    Alcohol use: Yes     Comment: occasional beer- average week  "12 beers a week    Drug use: No    Sexual activity: Not Currently     Partners: Female     Comment: , children.   Other Topics Concern    Not on file   Social History Narrative    Not on file       ALLERGIES AND MEDICATIONS: updated and reviewed.  Review of patient's allergies indicates:   Allergen Reactions    Codeine Swelling     Codeine with Aspirin caused chest pain     Current Outpatient Medications   Medication Sig Dispense Refill    allopurinol (ZYLOPRIM) 100 MG tablet Take 1 tablet (100 mg total) by mouth once daily. 90 tablet 3    aspirin (ECOTRIN) 81 MG EC tablet Take 81 mg by mouth once daily.      hydroCHLOROthiazide (HYDRODIURIL) 25 MG tablet Take 1 tablet (25 mg total) by mouth once daily. 90 tablet 3    hydroCHLOROthiazide (HYDRODIURIL) 25 MG tablet TAKE 1 TABLET (25 MG TOTAL) BY MOUTH ONCE DAILY. 90 tablet 1    lisinopril 10 MG tablet Take 1 tablet (10 mg total) by mouth once daily. 90 tablet 3    metFORMIN (GLUCOPHAGE-XR) 500 MG 24 hr tablet TAKE 1 TABLET (500 MG TOTAL) BY MOUTH DAILY WITH BREAKFAST. 90 tablet 3    pravastatin (PRAVACHOL) 20 MG tablet Take 1 tablet (20 mg total) by mouth once daily. 90 tablet 3    lisinopril 10 MG tablet TAKE 1 TABLET (10 MG TOTAL) BY MOUTH ONCE DAILY. 90 tablet 1    tadalafil (CIALIS) 20 MG Tab Take 1 tablet (20 mg total) by mouth daily as needed for Erectile Dysfunction. 5 tablet 11    tamsulosin (FLOMAX) 0.4 mg Cp24 Take 1 capsule (0.4 mg total) by mouth once daily. 90 capsule 3     No current facility-administered medications for this visit.        Review of Systems   Constitutional: Negative for chills and fever.   Respiratory: Negative for shortness of breath.    Cardiovascular: Negative for chest pain and palpitations.       Objective:   Physical Exam   Vitals:    10/08/18 0841   BP: 134/78   Pulse: 94   Temp: 97.8 °F (36.6 °C)   SpO2: 99%   Weight: 117.9 kg (260 lb 0.5 oz)   Height: 5' 8" (1.727 m)    Body mass index is 39.54 " "kg/m².  Weight: 117.9 kg (260 lb 0.5 oz)   Height: 5' 8" (172.7 cm)     Physical Exam   Constitutional: He is oriented to person, place, and time. He appears well-developed and well-nourished. No distress.   Eyes: EOM are normal.   Neck:   Unable to maintain extension to horizontal  No deformity no swelling   Cardiovascular: Normal rate, regular rhythm and normal heart sounds.   No murmur heard.  Pulmonary/Chest: Effort normal and breath sounds normal.   Musculoskeletal: Normal range of motion. He exhibits no edema.   Seated straight leg raise to 90 degrees without pain   Neurological: He is alert and oriented to person, place, and time. Coordination normal.   Skin: Skin is warm and dry.   Psychiatric: He has a normal mood and affect. His behavior is normal. Thought content normal.     "

## 2018-10-08 ENCOUNTER — OFFICE VISIT (OUTPATIENT)
Dept: FAMILY MEDICINE | Facility: CLINIC | Age: 68
End: 2018-10-08
Payer: MEDICARE

## 2018-10-08 ENCOUNTER — LAB VISIT (OUTPATIENT)
Dept: LAB | Facility: HOSPITAL | Age: 68
End: 2018-10-08
Attending: INTERNAL MEDICINE
Payer: MEDICARE

## 2018-10-08 VITALS
DIASTOLIC BLOOD PRESSURE: 78 MMHG | HEIGHT: 68 IN | OXYGEN SATURATION: 99 % | BODY MASS INDEX: 39.41 KG/M2 | TEMPERATURE: 98 F | WEIGHT: 260.06 LBS | SYSTOLIC BLOOD PRESSURE: 134 MMHG | HEART RATE: 94 BPM

## 2018-10-08 DIAGNOSIS — I10 ESSENTIAL HYPERTENSION: Chronic | ICD-10-CM

## 2018-10-08 DIAGNOSIS — E78.5 HYPERLIPIDEMIA, UNSPECIFIED HYPERLIPIDEMIA TYPE: Chronic | ICD-10-CM

## 2018-10-08 DIAGNOSIS — D64.9 ANEMIA, UNSPECIFIED TYPE: ICD-10-CM

## 2018-10-08 DIAGNOSIS — M48.02 CERVICAL STENOSIS OF SPINAL CANAL: Chronic | ICD-10-CM

## 2018-10-08 DIAGNOSIS — N40.1 BENIGN PROSTATIC HYPERPLASIA WITH URINARY FREQUENCY: ICD-10-CM

## 2018-10-08 DIAGNOSIS — R35.0 BENIGN PROSTATIC HYPERPLASIA WITH URINARY FREQUENCY: ICD-10-CM

## 2018-10-08 DIAGNOSIS — M10.9 GOUT, ARTHRITIS: Chronic | ICD-10-CM

## 2018-10-08 DIAGNOSIS — R73.03 PREDIABETES: Primary | Chronic | ICD-10-CM

## 2018-10-08 DIAGNOSIS — R73.03 PREDIABETES: Chronic | ICD-10-CM

## 2018-10-08 DIAGNOSIS — Z12.11 SCREENING FOR COLON CANCER: ICD-10-CM

## 2018-10-08 LAB
ALBUMIN SERPL BCP-MCNC: 3.5 G/DL
ALP SERPL-CCNC: 57 U/L
ALT SERPL W/O P-5'-P-CCNC: 15 U/L
ANION GAP SERPL CALC-SCNC: 8 MMOL/L
AST SERPL-CCNC: 21 U/L
BASOPHILS # BLD AUTO: 0.02 K/UL
BASOPHILS NFR BLD: 0.3 %
BILIRUB SERPL-MCNC: 0.3 MG/DL
BUN SERPL-MCNC: 14 MG/DL
CALCIUM SERPL-MCNC: 9.5 MG/DL
CHLORIDE SERPL-SCNC: 102 MMOL/L
CHOLEST SERPL-MCNC: 147 MG/DL
CHOLEST/HDLC SERPL: 3.9 {RATIO}
CO2 SERPL-SCNC: 29 MMOL/L
CREAT SERPL-MCNC: 1 MG/DL
DIFFERENTIAL METHOD: ABNORMAL
EOSINOPHIL # BLD AUTO: 0.2 K/UL
EOSINOPHIL NFR BLD: 2.4 %
ERYTHROCYTE [DISTWIDTH] IN BLOOD BY AUTOMATED COUNT: 15.4 %
EST. GFR  (AFRICAN AMERICAN): >60 ML/MIN/1.73 M^2
EST. GFR  (NON AFRICAN AMERICAN): >60 ML/MIN/1.73 M^2
ESTIMATED AVG GLUCOSE: 126 MG/DL
GLUCOSE SERPL-MCNC: 145 MG/DL
HBA1C MFR BLD HPLC: 6 %
HCT VFR BLD AUTO: 37.1 %
HDLC SERPL-MCNC: 38 MG/DL
HDLC SERPL: 25.9 %
HGB BLD-MCNC: 11.5 G/DL
IMM GRANULOCYTES # BLD AUTO: 0.01 K/UL
IMM GRANULOCYTES NFR BLD AUTO: 0.1 %
LDLC SERPL CALC-MCNC: 74.8 MG/DL
LYMPHOCYTES # BLD AUTO: 3.1 K/UL
LYMPHOCYTES NFR BLD: 45 %
MCH RBC QN AUTO: 25.8 PG
MCHC RBC AUTO-ENTMCNC: 31 G/DL
MCV RBC AUTO: 83 FL
MONOCYTES # BLD AUTO: 0.5 K/UL
MONOCYTES NFR BLD: 7.1 %
NEUTROPHILS # BLD AUTO: 3.1 K/UL
NEUTROPHILS NFR BLD: 45.1 %
NONHDLC SERPL-MCNC: 109 MG/DL
NRBC BLD-RTO: 0 /100 WBC
PLATELET # BLD AUTO: 256 K/UL
PMV BLD AUTO: 12 FL
POTASSIUM SERPL-SCNC: 3.6 MMOL/L
PROT SERPL-MCNC: 7.5 G/DL
RBC # BLD AUTO: 4.45 M/UL
SODIUM SERPL-SCNC: 139 MMOL/L
TRIGL SERPL-MCNC: 171 MG/DL
URATE SERPL-MCNC: 6.3 MG/DL
WBC # BLD AUTO: 6.78 K/UL

## 2018-10-08 PROCEDURE — 99214 OFFICE O/P EST MOD 30 MIN: CPT | Mod: S$PBB,,, | Performed by: INTERNAL MEDICINE

## 2018-10-08 PROCEDURE — 3075F SYST BP GE 130 - 139MM HG: CPT | Mod: CPTII,,, | Performed by: INTERNAL MEDICINE

## 2018-10-08 PROCEDURE — 80061 LIPID PANEL: CPT

## 2018-10-08 PROCEDURE — 3078F DIAST BP <80 MM HG: CPT | Mod: CPTII,,, | Performed by: INTERNAL MEDICINE

## 2018-10-08 PROCEDURE — 1101F PT FALLS ASSESS-DOCD LE1/YR: CPT | Mod: CPTII,,, | Performed by: INTERNAL MEDICINE

## 2018-10-08 PROCEDURE — 80053 COMPREHEN METABOLIC PANEL: CPT

## 2018-10-08 PROCEDURE — 84550 ASSAY OF BLOOD/URIC ACID: CPT

## 2018-10-08 PROCEDURE — 99213 OFFICE O/P EST LOW 20 MIN: CPT | Mod: PBBFAC,PO | Performed by: INTERNAL MEDICINE

## 2018-10-08 PROCEDURE — 83036 HEMOGLOBIN GLYCOSYLATED A1C: CPT

## 2018-10-08 PROCEDURE — 99999 PR PBB SHADOW E&M-EST. PATIENT-LVL III: CPT | Mod: PBBFAC,,, | Performed by: INTERNAL MEDICINE

## 2018-10-08 PROCEDURE — 36415 COLL VENOUS BLD VENIPUNCTURE: CPT | Mod: PO

## 2018-10-08 PROCEDURE — 85025 COMPLETE CBC W/AUTO DIFF WBC: CPT

## 2018-10-08 RX ORDER — HYDROCHLOROTHIAZIDE 25 MG/1
25 TABLET ORAL DAILY
Qty: 90 TABLET | Refills: 3 | Status: SHIPPED | OUTPATIENT
Start: 2018-10-08 | End: 2019-11-12 | Stop reason: SDUPTHER

## 2018-10-08 RX ORDER — ALLOPURINOL 100 MG/1
100 TABLET ORAL DAILY
Qty: 90 TABLET | Refills: 3 | Status: SHIPPED | OUTPATIENT
Start: 2018-10-08 | End: 2019-06-19

## 2018-10-08 RX ORDER — METFORMIN HYDROCHLORIDE 500 MG/1
TABLET, EXTENDED RELEASE ORAL
Qty: 90 TABLET | Refills: 3 | Status: SHIPPED | OUTPATIENT
Start: 2018-10-08 | End: 2019-10-31 | Stop reason: SDUPTHER

## 2018-10-08 RX ORDER — LISINOPRIL 10 MG/1
10 TABLET ORAL DAILY
Qty: 90 TABLET | Refills: 3 | Status: SHIPPED | OUTPATIENT
Start: 2018-10-08 | End: 2019-06-19

## 2018-10-08 RX ORDER — PRAVASTATIN SODIUM 20 MG/1
20 TABLET ORAL DAILY
Qty: 90 TABLET | Refills: 3 | Status: SHIPPED | OUTPATIENT
Start: 2018-10-08 | End: 2019-10-31 | Stop reason: SDUPTHER

## 2018-10-08 RX ORDER — TAMSULOSIN HYDROCHLORIDE 0.4 MG/1
0.4 CAPSULE ORAL DAILY
Qty: 90 CAPSULE | Refills: 3 | Status: SHIPPED | OUTPATIENT
Start: 2018-10-08 | End: 2019-06-19

## 2018-10-08 NOTE — PATIENT INSTRUCTIONS
GET THE COLONOSCOPY DONE -  VERY IMPORTANT.    FOLLOW UP WITH DR. ZAFAR - [phone] ABOUT YOUR NECK SURGERY.     IF NO PROBLEMS WITH THE HARDWARE AFTER SURGERY - DO THE SELF DIRECTED PHYSICAL THERAPY.

## 2018-10-09 NOTE — PROGRESS NOTES
a1c pre-DM stable on metformin  Lipid OK on statin nonHDL 109 though TG and HDL could be better  Uric acid good on allopurinol  CBC stable mild anemia going back to 2013.   Colonoscopy ordered at [facility].  Results to pt. No changes in meds.

## 2018-10-16 DIAGNOSIS — Z12.11 COLON CANCER SCREENING: Primary | ICD-10-CM

## 2018-10-31 ENCOUNTER — ANESTHESIA EVENT (OUTPATIENT)
Dept: ENDOSCOPY | Facility: HOSPITAL | Age: 68
End: 2018-10-31
Payer: MEDICARE

## 2018-10-31 ENCOUNTER — ANESTHESIA (OUTPATIENT)
Dept: ENDOSCOPY | Facility: HOSPITAL | Age: 68
End: 2018-10-31
Payer: MEDICARE

## 2018-10-31 ENCOUNTER — HOSPITAL ENCOUNTER (OUTPATIENT)
Facility: HOSPITAL | Age: 68
Discharge: HOME | End: 2018-10-31
Attending: INTERNAL MEDICINE | Admitting: INTERNAL MEDICINE
Payer: MEDICARE

## 2018-10-31 VITALS
HEART RATE: 72 BPM | DIASTOLIC BLOOD PRESSURE: 74 MMHG | OXYGEN SATURATION: 97 % | SYSTOLIC BLOOD PRESSURE: 130 MMHG | RESPIRATION RATE: 18 BRPM | TEMPERATURE: 98 F

## 2018-10-31 DIAGNOSIS — Z12.11 SCREEN FOR COLON CANCER: ICD-10-CM

## 2018-10-31 PROCEDURE — 37000008 HC ANESTHESIA 1ST 15 MINUTES: Performed by: INTERNAL MEDICINE

## 2018-10-31 PROCEDURE — 88305 TISSUE EXAM BY PATHOLOGIST: CPT | Performed by: PATHOLOGY

## 2018-10-31 PROCEDURE — D9220A PRA ANESTHESIA: Mod: PT,ANES,, | Performed by: ANESTHESIOLOGY

## 2018-10-31 PROCEDURE — 25000003 PHARM REV CODE 250: Performed by: INTERNAL MEDICINE

## 2018-10-31 PROCEDURE — 88305 TISSUE EXAM BY PATHOLOGIST: CPT | Mod: 26,,, | Performed by: PATHOLOGY

## 2018-10-31 PROCEDURE — 27201012 HC FORCEPS, HOT/COLD, DISP: Performed by: INTERNAL MEDICINE

## 2018-10-31 PROCEDURE — 37000009 HC ANESTHESIA EA ADD 15 MINS: Performed by: INTERNAL MEDICINE

## 2018-10-31 PROCEDURE — 63600175 PHARM REV CODE 636 W HCPCS: Performed by: NURSE ANESTHETIST, CERTIFIED REGISTERED

## 2018-10-31 PROCEDURE — D9220A PRA ANESTHESIA: Mod: PT,CRNA,, | Performed by: NURSE ANESTHETIST, CERTIFIED REGISTERED

## 2018-10-31 PROCEDURE — 45380 COLONOSCOPY AND BIOPSY: CPT | Performed by: INTERNAL MEDICINE

## 2018-10-31 RX ORDER — PROPOFOL 10 MG/ML
VIAL (ML) INTRAVENOUS
Status: COMPLETED
Start: 2018-10-31 | End: 2018-10-31

## 2018-10-31 RX ORDER — PROPOFOL 10 MG/ML
VIAL (ML) INTRAVENOUS
Status: DISCONTINUED | OUTPATIENT
Start: 2018-10-31 | End: 2018-10-31

## 2018-10-31 RX ORDER — SODIUM CHLORIDE 9 MG/ML
INJECTION, SOLUTION INTRAVENOUS CONTINUOUS
Status: DISCONTINUED | OUTPATIENT
Start: 2018-10-31 | End: 2018-10-31 | Stop reason: HOSPADM

## 2018-10-31 RX ORDER — LIDOCAINE HCL/PF 100 MG/5ML
SYRINGE (ML) INTRAVENOUS
Status: DISCONTINUED | OUTPATIENT
Start: 2018-10-31 | End: 2018-10-31

## 2018-10-31 RX ORDER — LIDOCAINE HYDROCHLORIDE 20 MG/ML
INJECTION, SOLUTION EPIDURAL; INFILTRATION; INTRACAUDAL; PERINEURAL
Status: DISCONTINUED
Start: 2018-10-31 | End: 2018-10-31 | Stop reason: HOSPADM

## 2018-10-31 RX ADMIN — PROPOFOL 70 MG: 10 INJECTION, EMULSION INTRAVENOUS at 09:10

## 2018-10-31 RX ADMIN — SODIUM CHLORIDE: 0.9 INJECTION, SOLUTION INTRAVENOUS at 08:10

## 2018-10-31 RX ADMIN — PROPOFOL 50 MG: 10 INJECTION, EMULSION INTRAVENOUS at 09:10

## 2018-10-31 RX ADMIN — LIDOCAINE HYDROCHLORIDE 100 MG: 20 INJECTION, SOLUTION INTRAVENOUS at 09:10

## 2018-10-31 RX ADMIN — PROPOFOL 40 MG: 10 INJECTION, EMULSION INTRAVENOUS at 09:10

## 2018-10-31 NOTE — H&P
Endoscopy Pre-Procedure H&P    Reason for Procedure: screening colonoscopy    HPI:  Pt is a 68 y.o. male who presents for screening colonoscopy.  No family history and no GI symptoms.  He has had a colonoscopy before (unsure if 5 or 10 years ago) and is unsure of results.  He doesn't think he had polyps.    Past Medical History:   Diagnosis Date    Arthritis     Erectile dysfunction     Gout     Hyperlipidemia     Hypertension     Morbid obesity     Nuclear sclerosis of both eyes 1/16/2018    Screening for AAA (abdominal aortic aneurysm)     6/2016 AAA US no aneurysm       Past Surgical History:   Procedure Laterality Date    LAMINOPLASTY/ C3-C7 N/A 3/31/2016    Performed by Venkata Caruso MD at [facility] OR 2ND FLR    SPINE SURGERY  3/31/16    C3-C7 laminoplasty,Dr Caruso    tooth implant         Family History   Problem Relation Age of Onset    COPD Mother     No Known Problems Father     No Known Problems Sister     Cataracts Brother     Glaucoma Brother     No Known Problems Maternal Aunt     No Known Problems Maternal Uncle     No Known Problems Paternal Aunt     No Known Problems Paternal Uncle     No Known Problems Maternal Grandmother     No Known Problems Maternal Grandfather     No Known Problems Paternal Grandmother     No Known Problems Paternal Grandfather     Amblyopia Neg Hx     Blindness Neg Hx     Cancer Neg Hx     Diabetes Neg Hx     Hypertension Neg Hx     Macular degeneration Neg Hx     Retinal detachment Neg Hx     Strabismus Neg Hx     Stroke Neg Hx     Thyroid disease Neg Hx        Social History     Socioeconomic History    Marital status:      Spouse name: Not on file    Number of children: Not on file    Years of education: Not on file    Highest education level: Not on file   Social Needs    Financial resource strain: Not on file    Food insecurity - worry: Not on file    Food insecurity - inability: Not on file    Transportation needs - medical:  Not on file    Transportation needs - non-medical: Not on file   Occupational History    Not on file   Tobacco Use    Smoking status: Former Smoker     Last attempt to quit: 2009     Years since quittin.8    Smokeless tobacco: Never Used   Substance and Sexual Activity    Alcohol use: Yes     Comment: occasional beer- average week 12 beers a week    Drug use: No    Sexual activity: Not Currently     Partners: Female     Comment: , children.   Other Topics Concern    Not on file   Social History Narrative    Not on file       Review of patient's allergies indicates:   Allergen Reactions    Codeine Swelling     Codeine with Aspirin caused chest pain       No current facility-administered medications on file prior to encounter.      Current Outpatient Medications on File Prior to Encounter   Medication Sig Dispense Refill    allopurinol (ZYLOPRIM) 100 MG tablet Take 1 tablet (100 mg total) by mouth once daily. 90 tablet 3    aspirin (ECOTRIN) 81 MG EC tablet Take 81 mg by mouth once daily.      hydroCHLOROthiazide (HYDRODIURIL) 25 MG tablet TAKE 1 TABLET (25 MG TOTAL) BY MOUTH ONCE DAILY. 90 tablet 1    hydroCHLOROthiazide (HYDRODIURIL) 25 MG tablet Take 1 tablet (25 mg total) by mouth once daily. 90 tablet 3    lisinopril 10 MG tablet TAKE 1 TABLET (10 MG TOTAL) BY MOUTH ONCE DAILY. 90 tablet 1    lisinopril 10 MG tablet Take 1 tablet (10 mg total) by mouth once daily. 90 tablet 3    metFORMIN (GLUCOPHAGE-XR) 500 MG 24 hr tablet TAKE 1 TABLET (500 MG TOTAL) BY MOUTH DAILY WITH BREAKFAST. 90 tablet 3    pravastatin (PRAVACHOL) 20 MG tablet Take 1 tablet (20 mg total) by mouth once daily. 90 tablet 3    tadalafil (CIALIS) 20 MG Tab Take 1 tablet (20 mg total) by mouth daily as needed for Erectile Dysfunction. 5 tablet 11    tamsulosin (FLOMAX) 0.4 mg Cap Take 1 capsule (0.4 mg total) by mouth once daily. for 15 days 90 capsule 3       ROS: Negative x 10    Patient Vitals for the past  24 hrs:   BP Temp Pulse Resp   10/31/18 0836 117/73 97.7 °F (36.5 °C) (!) 54 18     Gen: Well developed, well nourished, no apparent distress  HEENT: Anicteric, PERRL, MMM  CV: Regular rate and rhythm, no murmurs   Lungs: CTAB, no increased work of breathing  Abd: Soft, NT, ND, normal BS, no HSM  Ext: No C/C/E  Neuro: Alert and oriented to person, place, time; no weakness  Skin: No rashes/lesions  Psych: Normal mood and affect    Assessment:  Armand Painting is a 68 y.o. male who presents for screening colonoscopy.    Recommendations:  1. Colonoscopy  2. F/U with PCP     Clementine Batista MD

## 2018-10-31 NOTE — [employer] PATIENT INSTRUCTIONS
Discharge Summary/Instructions after an Endoscopic Procedure  Patient Name: Armand Painting  Patient MRN: [id number]  Patient YOB: 1950  Wednesday, October 31, 2018  Clementine Batista MD  RESTRICTIONS:  During your procedure today, you received medications for sedation.  These   medications may affect your judgment, balance and coordination.  Therefore,   for 24 hours, you have the following restrictions:   - DO NOT drive a car, operate machinery, make legal/financial decisions,   sign important papers or drink alcohol.    ACTIVITY:  Today: no heavy lifting, straining or running due to procedural   sedation/anesthesia.  The following day: return to full activity including work.  DIET:  Eat and drink normally unless instructed otherwise.     TREATMENT FOR COMMON SIDE EFFECTS:  - Mild abdominal pain, nausea, belching, bloating or excessive gas:  rest,   eat lightly and use a heating pad.  - Sore Throat: treat with throat lozenges and/or gargle with warm salt   water.  - Because air was used during the procedure, expelling large amounts of air   from your rectum or belching is normal.  - If a bowel prep was taken, you may not have a bowel movement for 1-3 days.    This is normal.  SYMPTOMS TO WATCH FOR AND REPORT TO YOUR PHYSICIAN:  1. Abdominal pain or bloating, other than gas cramps.  2. Chest pain.  3. Back pain.  4. Signs of infection such as: chills or fever occurring within 24 hours   after the procedure.  5. Rectal bleeding, which would show as bright red, maroon, or black stools.   (A tablespoon of blood from the rectum is not serious, especially if   hemorrhoids are present.)  6. Vomiting.  7. Weakness or dizziness.  GO DIRECTLY TO THE NEAREST EMERGENCY ROOM IF YOU HAVE ANY OF THE FOLLOWING:      Difficulty breathing              Chills and/or fever over 101 F   Persistent vomiting and/or vomiting blood   Severe abdominal pain   Severe chest pain   Black, tarry stools   Bleeding- more than one  tablespoon   Any other symptom or condition that you feel may need urgent attention  Your doctor recommends these additional instructions:  If any biopsies were taken, your doctors clinic will contact you in 1 to 2   weeks with any results.  - Patient has a contact number available for emergencies.  The signs and   symptoms of potential delayed complications were discussed with the   patient.  Return to normal activities tomorrow.  Written discharge   instructions were provided to the patient.   - Discharge patient to home.   - Resume previous diet today.   - Continue present medications.   - Await pathology results.   - Repeat colonoscopy in 5-10 years for surveillance based on pathology   results.   - Return to primary care physician in 1 month.   - The findings and recommendations were discussed with the patient and their   family.  For questions, problems or results please call your physician - Clementine Batista MD at Work:  [phone].  [facility] can be reached at [phone]     IF A COMPLICATION OR EMERGENCY SITUATION ARISES AND YOU ARE UNABLE TO REACH   YOUR PHYSICIAN - GO DIRECTLY TO THE EMERGENCY ROOM.  Clementine Batista MD  10/31/2018 9:25:02 AM  This report has been verified and signed electronically.  [employer]

## 2018-10-31 NOTE — ANESTHESIA POSTPROCEDURE EVALUATION
Anesthesia Post Evaluation    Patient: Armand Painting    Procedure(s) Performed: Procedure(s) (LRB):  COLONOSCOPY (N/A)    Final Anesthesia Type: general  Patient location during evaluation: GI PACU  Patient participation: Yes- Able to Participate  Level of consciousness: awake and alert and oriented  Post-procedure vital signs: reviewed and stable  Pain management: adequate  Airway patency: patent  PONV status at discharge: No PONV  Anesthetic complications: no      Cardiovascular status: blood pressure returned to baseline and hemodynamically stable  Respiratory status: unassisted, spontaneous ventilation and room air  Hydration status: euvolemic  Follow-up not needed.        Visit Vitals  /73   Pulse 62   Temp 36.6 °C (97.9 °F) (Oral)   Resp 17   SpO2 99%       Pain/Jas Score: Pain Assessment Performed: Yes (10/31/2018  8:11 AM)  Presence of Pain: denies (10/31/2018  9:20 AM)  Jas Score: 10 (10/31/2018  9:20 AM)

## 2018-10-31 NOTE — TRANSFER OF CARE
Anesthesia Transfer of Care Note    Patient: Armand Painting    Procedure(s) Performed: Procedure(s) (LRB):  COLONOSCOPY (N/A)    Patient location: GI    Anesthesia Type: general    Transport from OR: Transported from OR on room air with adequate spontaneous ventilation    Post pain: adequate analgesia    Post assessment: no apparent anesthetic complications and tolerated procedure well    Post vital signs: stable    Level of consciousness: awake, alert and oriented    Nausea/Vomiting: no nausea/vomiting    Complications: none    Transfer of care protocol was followed      Last vitals:   Visit Vitals  /73   Pulse 62   Temp 36.6 °C (97.9 °F) (Oral)   Resp 17   SpO2 99%

## 2018-10-31 NOTE — ANESTHESIA PREPROCEDURE EVALUATION
10/31/2018  Armand Painting is a 68 y.o., male   Pre-operative evaluation for Procedure(s) (LRB):  COLONOSCOPY (N/A)    Armand Painting is a 68 y.o. male     Patient Active Problem List   Diagnosis    Gout, arthritis    ED (erectile dysfunction)    Back pain    Morbid obesity with BMI of 40.0-44.9, adult    Essential hypertension    Mixed hyperlipidemia    Prediabetes    Cervical stenosis of spinal canal 3/2016 C3-7 laminoplasty    Hemorrhoids    Benign prostatic hyperplasia with urinary frequency    Nuclear sclerosis of both eyes    Refractive error    SIS (obstructive sleep apnea) on sleep study 4/2018 with AHI 55.  CPAP at 15       Review of patient's allergies indicates:   Allergen Reactions    Codeine Swelling     Codeine with Aspirin caused chest pain       No current facility-administered medications on file prior to encounter.      Current Outpatient Medications on File Prior to Encounter   Medication Sig Dispense Refill    allopurinol (ZYLOPRIM) 100 MG tablet Take 1 tablet (100 mg total) by mouth once daily. 90 tablet 3    aspirin (ECOTRIN) 81 MG EC tablet Take 81 mg by mouth once daily.      hydroCHLOROthiazide (HYDRODIURIL) 25 MG tablet TAKE 1 TABLET (25 MG TOTAL) BY MOUTH ONCE DAILY. 90 tablet 1    hydroCHLOROthiazide (HYDRODIURIL) 25 MG tablet Take 1 tablet (25 mg total) by mouth once daily. 90 tablet 3    lisinopril 10 MG tablet TAKE 1 TABLET (10 MG TOTAL) BY MOUTH ONCE DAILY. 90 tablet 1    lisinopril 10 MG tablet Take 1 tablet (10 mg total) by mouth once daily. 90 tablet 3    metFORMIN (GLUCOPHAGE-XR) 500 MG 24 hr tablet TAKE 1 TABLET (500 MG TOTAL) BY MOUTH DAILY WITH BREAKFAST. 90 tablet 3    pravastatin (PRAVACHOL) 20 MG tablet Take 1 tablet (20 mg total) by mouth once daily. 90 tablet 3    tadalafil (CIALIS) 20 MG Tab Take 1 tablet (20 mg total) by mouth daily as needed for  Erectile Dysfunction. 5 tablet 11    tamsulosin (FLOMAX) 0.4 mg Cap Take 1 capsule (0.4 mg total) by mouth once daily. for 15 days 90 capsule 3       Past Surgical History:   Procedure Laterality Date    LAMINOPLASTY/ C3-C7 N/A 3/31/2016    Performed by Venkata Caruso MD at [facility] OR [address] [place] [address]    SPINE SURGERY  3/31/16    C3-C7 laminoplasty,Dr Caruso    tooth implant         Social History     Socioeconomic History    Marital status:      Spouse name: Not on file    Number of children: Not on file    Years of education: Not on file    Highest education level: Not on file   Social Needs    Financial resource strain: Not on file    Food insecurity - worry: Not on file    Food insecurity - inability: Not on file    Transportation needs - medical: Not on file    Transportation needs - non-medical: Not on file   Occupational History    Not on file   Tobacco Use    Smoking status: Former Smoker     Last attempt to quit: 2009     Years since quittin.8    Smokeless tobacco: Never Used   Substance and Sexual Activity    Alcohol use: Yes     Comment: occasional beer- average week 12 beers a week    Drug use: No    Sexual activity: Not Currently     Partners: Female     Comment: , children.   Other Topics Concern    Not on file   Social History Narrative    Not on file   Anesthesia Evaluation    I have reviewed the Patient Summary Reports.     I have reviewed the Medications.     Review of Systems  Anesthesia Hx:  No problems with previous Anesthesia Denies Hx of Anesthetic complications  History of prior surgery of interest to airway management or planning: Denies Family Hx of Anesthesia complications.   Denies Personal Hx of Anesthesia complications.   Social:  Smoker, Alcohol Use    Hematology/Oncology:  Hematology Normal   Oncology Normal     EENT/Dental:EENT/Dental Normal   Cardiovascular:   Exercise tolerance: good Hypertension hyperlipidemia    Pulmonary:   Sleep Apnea     Renal/:  Renal/ Normal     Hepatic/GI:  Hepatic/GI Normal    Musculoskeletal:   Cervical stenosis C3-C7, s/p laminectomy Spine Disorders: cervical    Neurological:  Neurology Normal    Endocrine:  Endocrine Normal Pre diabetes   Psych:  Psychiatric Normal       There is no height or weight on file to calculate BMI.      Physical Exam  General:  Well nourished, Morbid Obesity    Airway/Jaw/Neck:  Airway Findings: Mouth Opening: Normal Tongue: Normal  General Airway Assessment: Adult, Average  Mallampati: II  TM Distance: Normal, at least 6 cm  Jaw/Neck Findings:  Neck ROM: Normal ROM      Dental:  Dental Findings: In tact   Chest/Lungs:  Chest/Lungs Findings: Normal Respiratory Rate, Clear to auscultation     Heart/Vascular:  Heart Findings: Rate: Normal  Rhythm: Regular Rhythm        Mental Status:  Mental Status Findings:  Alert and Oriented, Cooperative         Anesthesia Plan  Type of Anesthesia, risks & benefits discussed:  Anesthesia Type:  general  Patient's Preference:   Intra-op Monitoring Plan: standard ASA monitors  Intra-op Monitoring Plan Comments:   Post Op Pain Control Plan: multimodal analgesia  Post Op Pain Control Plan Comments:   Induction:   IV  Beta Blocker:  Patient is not currently on a Beta-Blocker (No further documentation required).       Informed Consent: Patient understands risks and agrees with Anesthesia plan.  Questions answered. Anesthesia consent signed with patient.  ASA Score: 3     Day of Surgery Review of History & Physical:    H&P update referred to the provider.         Ready For Surgery From Anesthesia Perspective.

## 2018-11-01 PROBLEM — K57.30 DIVERTICULOSIS OF LARGE INTESTINE WITHOUT HEMORRHAGE: Status: ACTIVE | Noted: 2018-11-01

## 2018-11-02 PROBLEM — D12.6 TUBULAR ADENOMA OF COLON: Status: ACTIVE | Noted: 2018-11-02

## 2019-05-10 ENCOUNTER — OFFICE VISIT (OUTPATIENT)
Dept: URGENT CARE | Facility: CLINIC | Age: 69
End: 2019-05-10
Payer: MEDICARE

## 2019-05-10 VITALS
HEART RATE: 65 BPM | BODY MASS INDEX: 39.53 KG/M2 | OXYGEN SATURATION: 97 % | SYSTOLIC BLOOD PRESSURE: 110 MMHG | DIASTOLIC BLOOD PRESSURE: 84 MMHG | TEMPERATURE: 98 F | WEIGHT: 260 LBS

## 2019-05-10 DIAGNOSIS — S79.911A HIP INJURY, RIGHT, INITIAL ENCOUNTER: Primary | ICD-10-CM

## 2019-05-10 PROCEDURE — 96372 THER/PROPH/DIAG INJ SC/IM: CPT | Mod: 59,S$GLB,, | Performed by: STUDENT IN AN ORGANIZED HEALTH CARE EDUCATION/TRAINING PROGRAM

## 2019-05-10 PROCEDURE — 3074F SYST BP LT 130 MM HG: CPT | Mod: CPTII,S$GLB,, | Performed by: STUDENT IN AN ORGANIZED HEALTH CARE EDUCATION/TRAINING PROGRAM

## 2019-05-10 PROCEDURE — 3074F PR MOST RECENT SYSTOLIC BLOOD PRESSURE < 130 MM HG: ICD-10-PCS | Mod: CPTII,S$GLB,, | Performed by: STUDENT IN AN ORGANIZED HEALTH CARE EDUCATION/TRAINING PROGRAM

## 2019-05-10 PROCEDURE — 3079F PR MOST RECENT DIASTOLIC BLOOD PRESSURE 80-89 MM HG: ICD-10-PCS | Mod: CPTII,S$GLB,, | Performed by: STUDENT IN AN ORGANIZED HEALTH CARE EDUCATION/TRAINING PROGRAM

## 2019-05-10 PROCEDURE — 99214 OFFICE O/P EST MOD 30 MIN: CPT | Mod: 25,S$GLB,, | Performed by: STUDENT IN AN ORGANIZED HEALTH CARE EDUCATION/TRAINING PROGRAM

## 2019-05-10 PROCEDURE — 96372 PR INJECTION,THERAP/PROPH/DIAG2ST, IM OR SUBCUT: ICD-10-PCS | Mod: 59,S$GLB,, | Performed by: STUDENT IN AN ORGANIZED HEALTH CARE EDUCATION/TRAINING PROGRAM

## 2019-05-10 PROCEDURE — 1101F PT FALLS ASSESS-DOCD LE1/YR: CPT | Mod: CPTII,S$GLB,, | Performed by: STUDENT IN AN ORGANIZED HEALTH CARE EDUCATION/TRAINING PROGRAM

## 2019-05-10 PROCEDURE — 99214 PR OFFICE/OUTPT VISIT, EST, LEVL IV, 30-39 MIN: ICD-10-PCS | Mod: 25,S$GLB,, | Performed by: STUDENT IN AN ORGANIZED HEALTH CARE EDUCATION/TRAINING PROGRAM

## 2019-05-10 PROCEDURE — 1101F PR PT FALLS ASSESS DOC 0-1 FALLS W/OUT INJ PAST YR: ICD-10-PCS | Mod: CPTII,S$GLB,, | Performed by: STUDENT IN AN ORGANIZED HEALTH CARE EDUCATION/TRAINING PROGRAM

## 2019-05-10 PROCEDURE — 3079F DIAST BP 80-89 MM HG: CPT | Mod: CPTII,S$GLB,, | Performed by: STUDENT IN AN ORGANIZED HEALTH CARE EDUCATION/TRAINING PROGRAM

## 2019-05-10 RX ORDER — NAPROXEN 500 MG/1
500 TABLET ORAL 2 TIMES DAILY PRN
Qty: 14 TABLET | Refills: 0 | Status: SHIPPED | OUTPATIENT
Start: 2019-05-10 | End: 2019-05-17

## 2019-05-10 RX ORDER — KETOROLAC TROMETHAMINE 30 MG/ML
30 INJECTION, SOLUTION INTRAMUSCULAR; INTRAVENOUS
Status: COMPLETED | OUTPATIENT
Start: 2019-05-10 | End: 2019-05-10

## 2019-05-10 RX ORDER — IBUPROFEN 800 MG/1
TABLET ORAL
Refills: 0 | COMMUNITY
Start: 2019-03-29 | End: 2019-05-10

## 2019-05-10 RX ADMIN — KETOROLAC TROMETHAMINE 30 MG: 30 INJECTION, SOLUTION INTRAMUSCULAR; INTRAVENOUS at 09:05

## 2019-05-10 NOTE — PROGRESS NOTES
Subjective:       Patient ID: Armand Painting is a 68 y.o. male.    Vitals:  weight is 117.9 kg (260 lb).     Chief Complaint: Back Pain    Pt states that he fell yesterday while at [employer] and injury to back and right side hip      Hip Injury   This is a new problem. The current episode started yesterday. The problem occurs constantly. The problem has been unchanged. Associated symptoms include arthralgias. Pertinent negatives include no abdominal pain, change in bowel habit, chest pain, chills, fatigue, fever, headaches, joint swelling, nausea, neck pain, numbness, urinary symptoms, vertigo, vomiting or weakness. The symptoms are aggravated by walking. He has tried NSAIDs for the symptoms. The treatment provided moderate relief.       Constitution: Negative for chills, fatigue and fever.   HENT: Negative for facial swelling, facial trauma and nosebleeds.    Neck: Negative for neck pain, neck stiffness and neck swelling.   Cardiovascular: Negative for chest trauma, chest pain and leg swelling.   Eyes: Negative for eye trauma, eye pain, double vision and blurred vision.   Gastrointestinal: Negative for abdominal trauma, abdominal pain, nausea, vomiting, rectal bleeding and bowel incontinence.   Genitourinary: Negative for dysuria, bladder incontinence, hematuria and genital trauma.   Musculoskeletal: Positive for trauma and joint pain. Negative for pain, joint swelling, abnormal ROM of joint and pain with walking.   Skin: Negative for color change, wound, abrasion and laceration.   Neurological: Negative for dizziness, history of vertigo, light-headedness, passing out, coordination disturbances, headaches, altered mental status, loss of consciousness and numbness.   Hematologic/Lymphatic: Negative for history of bleeding disorder.   Psychiatric/Behavioral: Negative for altered mental status, agitation and sleep disturbance.       Objective:       Vitals:    05/10/19 0912   BP: 110/84   Pulse: 65   Temp: 97.8 °F (36.6  °C)   SpO2: 97%   Weight: 117.9 kg (260 lb)     Physical Exam   Constitutional: He is oriented to person, place, and time. He appears well-developed and well-nourished. No distress.   HENT:   Head: Normocephalic and atraumatic.   Nose: Nose normal.   Eyes: Conjunctivae and EOM are normal. No scleral icterus.   Neck: Normal range of motion. Neck supple.   Cardiovascular: Normal rate, regular rhythm and intact distal pulses.   Musculoskeletal: Normal range of motion. He exhibits tenderness (Minimally TTP in R lateral hip with mild pain with internal rotation). He exhibits no edema or deformity.   Neurological: He is alert and oriented to person, place, and time. No cranial nerve deficit (grossly intact).   Skin: Skin is warm. No rash noted.   Psychiatric: He has a normal mood and affect. His behavior is normal. Judgment and thought content normal.   Nursing note and vitals reviewed.      Assessment:       1. Hip injury, right, initial encounter        Plan:         Hip injury, right, initial encounter  -     ketorolac injection 30 mg  -     naproxen (NAPROSYN) 500 MG tablet; Take 1 tablet (500 mg total) by mouth 2 (two) times daily as needed (Pain).  Dispense: 14 tablet; Refill: 0  - counseled on OTC pain medications and to avoid NSAIDS while taking naproxen    Diagnosis and medications reviewed with patient, questions answered, and return precautions given    Follow up if symptoms worsen or fail to improve.    Wayne Perez MD/MPH  [facility]  [facility]

## 2019-05-10 NOTE — PATIENT INSTRUCTIONS
Fall Prevention  Falls often occur due to slipping, tripping or losing your balance. Millions of people fall every year and injure themselves. Here are ways to reduce your risk of falling again.  · Think about your fall, was there anything that caused your fall that can be fixed, removed, or replaced?  · Make your home safe by keeping walkways clear of objects you may trip over.  · Use non-slip pads under rugs. Do not use area rugs or small throw rugs.  · Use non-slip mats in bathtubs and showers.  · Install handrails and lights on staircases.  · Do not walk in poorly lit areas.  · Do not stand on chairs or wobbly ladders.  · Use caution when reaching overhead or looking upward. This position can cause a loss of balance.  · Be sure your shoes fit properly, have non-slip bottoms and are in good condition.   · Wear shoes both inside and out. Avoid going barefoot or wearing slippers.  · Be cautious when going up and down stairs, curbs, and when walking on uneven sidewalks.  · If your balance is poor, consider using a cane or walker.  · If your fall was related to alcohol use, stop or limit alcohol intake.   · If your fall was related to use of sleeping medicines, talk to your doctor about this. You may need to reduce your dosage at bedtime if you awaken during the night to go to the bathroom.    · To reduce the need for nighttime bathroom trips:  ¨ Avoid drinking fluids for several hours before going to bed  ¨ Empty your bladder before going to bed  ¨ Men can keep a urinal at the bedside  · Stay as active as you can. Balance, flexibility, strength, and endurance all come from exercise. They all play a role in preventing falls. Ask your healthcare provider which types of activity are right for you.  · Get your vision checked on a regular basis.  · If you have pets, know where they are before you stand up or walk so you don't trip over them.  · Use night lights.  Date Last Reviewed: 11/5/2015  © 9047-7889 [employer]. [address] [place] [address], [place] [address]. All rights reserved. This information is not intended as a substitute for professional medical care. Always follow your healthcare professional's instructions.

## 2019-06-19 ENCOUNTER — OFFICE VISIT (OUTPATIENT)
Dept: FAMILY MEDICINE | Facility: CLINIC | Age: 69
End: 2019-06-19
Payer: MEDICARE

## 2019-06-19 ENCOUNTER — LAB VISIT (OUTPATIENT)
Dept: LAB | Facility: HOSPITAL | Age: 69
End: 2019-06-19
Attending: INTERNAL MEDICINE
Payer: MEDICARE

## 2019-06-19 VITALS
BODY MASS INDEX: 40.76 KG/M2 | OXYGEN SATURATION: 99 % | SYSTOLIC BLOOD PRESSURE: 106 MMHG | DIASTOLIC BLOOD PRESSURE: 78 MMHG | WEIGHT: 268.94 LBS | HEIGHT: 68 IN | HEART RATE: 71 BPM | TEMPERATURE: 98 F

## 2019-06-19 DIAGNOSIS — D64.9 ANEMIA, UNSPECIFIED TYPE: ICD-10-CM

## 2019-06-19 DIAGNOSIS — N40.1 BENIGN PROSTATIC HYPERPLASIA WITH URINARY FREQUENCY: ICD-10-CM

## 2019-06-19 DIAGNOSIS — M48.02 CERVICAL STENOSIS OF SPINAL CANAL: Chronic | ICD-10-CM

## 2019-06-19 DIAGNOSIS — R73.03 PREDIABETES: Chronic | ICD-10-CM

## 2019-06-19 DIAGNOSIS — R30.0 DYSURIA: Primary | ICD-10-CM

## 2019-06-19 DIAGNOSIS — R35.0 BENIGN PROSTATIC HYPERPLASIA WITH URINARY FREQUENCY: ICD-10-CM

## 2019-06-19 DIAGNOSIS — E66.01 MORBID OBESITY WITH BMI OF 40.0-44.9, ADULT: Chronic | ICD-10-CM

## 2019-06-19 DIAGNOSIS — N48.1 BALANITIS: ICD-10-CM

## 2019-06-19 LAB
ALBUMIN SERPL BCP-MCNC: 3.9 G/DL (ref 3.5–5.2)
ALP SERPL-CCNC: 64 U/L (ref 55–135)
ALT SERPL W/O P-5'-P-CCNC: 24 U/L (ref 10–44)
ANION GAP SERPL CALC-SCNC: 10 MMOL/L (ref 8–16)
AST SERPL-CCNC: 21 U/L (ref 10–40)
BASOPHILS # BLD AUTO: 0.02 K/UL (ref 0–0.2)
BASOPHILS NFR BLD: 0.3 % (ref 0–1.9)
BILIRUB SERPL-MCNC: 0.1 MG/DL (ref 0.1–1)
BUN SERPL-MCNC: 16 MG/DL (ref 8–23)
CALCIUM SERPL-MCNC: 10.3 MG/DL (ref 8.7–10.5)
CHLORIDE SERPL-SCNC: 100 MMOL/L (ref 95–110)
CO2 SERPL-SCNC: 31 MMOL/L (ref 23–29)
CREAT SERPL-MCNC: 1.1 MG/DL (ref 0.5–1.4)
DIFFERENTIAL METHOD: ABNORMAL
EOSINOPHIL # BLD AUTO: 0.2 K/UL (ref 0–0.5)
EOSINOPHIL NFR BLD: 2.5 % (ref 0–8)
ERYTHROCYTE [DISTWIDTH] IN BLOOD BY AUTOMATED COUNT: 15.7 % (ref 11.5–14.5)
EST. GFR  (AFRICAN AMERICAN): >60 ML/MIN/1.73 M^2
EST. GFR  (NON AFRICAN AMERICAN): >60 ML/MIN/1.73 M^2
ESTIMATED AVG GLUCOSE: 123 MG/DL (ref 68–131)
GLUCOSE SERPL-MCNC: 100 MG/DL (ref 70–110)
HBA1C MFR BLD HPLC: 5.9 % (ref 4–5.6)
HCT VFR BLD AUTO: 38.2 % (ref 40–54)
HGB BLD-MCNC: 12 G/DL (ref 14–18)
IMM GRANULOCYTES # BLD AUTO: 0.01 K/UL (ref 0–0.04)
IMM GRANULOCYTES NFR BLD AUTO: 0.1 % (ref 0–0.5)
LYMPHOCYTES # BLD AUTO: 3.6 K/UL (ref 1–4.8)
LYMPHOCYTES NFR BLD: 50.4 % (ref 18–48)
MCH RBC QN AUTO: 25.6 PG (ref 27–31)
MCHC RBC AUTO-ENTMCNC: 31.4 G/DL (ref 32–36)
MCV RBC AUTO: 81 FL (ref 82–98)
MONOCYTES # BLD AUTO: 0.5 K/UL (ref 0.3–1)
MONOCYTES NFR BLD: 6.7 % (ref 4–15)
NEUTROPHILS # BLD AUTO: 2.9 K/UL (ref 1.8–7.7)
NEUTROPHILS NFR BLD: 40 % (ref 38–73)
NRBC BLD-RTO: 0 /100 WBC
PLATELET # BLD AUTO: 246 K/UL (ref 150–350)
PMV BLD AUTO: 12.6 FL (ref 9.2–12.9)
POTASSIUM SERPL-SCNC: 3.7 MMOL/L (ref 3.5–5.1)
PROT SERPL-MCNC: 8.2 G/DL (ref 6–8.4)
RBC # BLD AUTO: 4.69 M/UL (ref 4.6–6.2)
SODIUM SERPL-SCNC: 141 MMOL/L (ref 136–145)
WBC # BLD AUTO: 7.14 K/UL (ref 3.9–12.7)

## 2019-06-19 PROCEDURE — 99214 OFFICE O/P EST MOD 30 MIN: CPT | Mod: S$GLB,,, | Performed by: INTERNAL MEDICINE

## 2019-06-19 PROCEDURE — 99999 PR PBB SHADOW E&M-EST. PATIENT-LVL IV: ICD-10-PCS | Mod: PBBFAC,,, | Performed by: INTERNAL MEDICINE

## 2019-06-19 PROCEDURE — 99999 PR PBB SHADOW E&M-EST. PATIENT-LVL IV: CPT | Mod: PBBFAC,,, | Performed by: INTERNAL MEDICINE

## 2019-06-19 PROCEDURE — 99499 RISK ADDL DX/OHS AUDIT: ICD-10-PCS | Mod: S$GLB,,, | Performed by: INTERNAL MEDICINE

## 2019-06-19 PROCEDURE — 3078F PR MOST RECENT DIASTOLIC BLOOD PRESSURE < 80 MM HG: ICD-10-PCS | Mod: CPTII,S$GLB,, | Performed by: INTERNAL MEDICINE

## 2019-06-19 PROCEDURE — 3078F DIAST BP <80 MM HG: CPT | Mod: CPTII,S$GLB,, | Performed by: INTERNAL MEDICINE

## 2019-06-19 PROCEDURE — 36415 COLL VENOUS BLD VENIPUNCTURE: CPT | Mod: PO

## 2019-06-19 PROCEDURE — 3074F PR MOST RECENT SYSTOLIC BLOOD PRESSURE < 130 MM HG: ICD-10-PCS | Mod: CPTII,S$GLB,, | Performed by: INTERNAL MEDICINE

## 2019-06-19 PROCEDURE — 85025 COMPLETE CBC W/AUTO DIFF WBC: CPT

## 2019-06-19 PROCEDURE — 3074F SYST BP LT 130 MM HG: CPT | Mod: CPTII,S$GLB,, | Performed by: INTERNAL MEDICINE

## 2019-06-19 PROCEDURE — 99214 PR OFFICE/OUTPT VISIT, EST, LEVL IV, 30-39 MIN: ICD-10-PCS | Mod: S$GLB,,, | Performed by: INTERNAL MEDICINE

## 2019-06-19 PROCEDURE — 83036 HEMOGLOBIN GLYCOSYLATED A1C: CPT

## 2019-06-19 PROCEDURE — 99499 UNLISTED E&M SERVICE: CPT | Mod: ,,, | Performed by: INTERNAL MEDICINE

## 2019-06-19 PROCEDURE — 1101F PR PT FALLS ASSESS DOC 0-1 FALLS W/OUT INJ PAST YR: ICD-10-PCS | Mod: CPTII,S$GLB,, | Performed by: INTERNAL MEDICINE

## 2019-06-19 PROCEDURE — 83020 HEMOGLOBIN ELECTROPHORESIS: CPT

## 2019-06-19 PROCEDURE — 99499 RISK ADDL DX/OHS AUDIT: ICD-10-PCS | Mod: ,,, | Performed by: INTERNAL MEDICINE

## 2019-06-19 PROCEDURE — 99499 UNLISTED E&M SERVICE: CPT | Mod: S$GLB,,, | Performed by: INTERNAL MEDICINE

## 2019-06-19 PROCEDURE — 80053 COMPREHEN METABOLIC PANEL: CPT

## 2019-06-19 PROCEDURE — 1101F PT FALLS ASSESS-DOCD LE1/YR: CPT | Mod: CPTII,S$GLB,, | Performed by: INTERNAL MEDICINE

## 2019-06-19 RX ORDER — TAMSULOSIN HYDROCHLORIDE 0.4 MG/1
0.8 CAPSULE ORAL DAILY
Qty: 180 CAPSULE | Refills: 3 | Status: SHIPPED | OUTPATIENT
Start: 2019-06-19 | End: 2019-11-12 | Stop reason: SDUPTHER

## 2019-06-19 RX ORDER — NYSTATIN AND TRIAMCINOLONE ACETONIDE 100000; 1 [USP'U]/G; MG/G
CREAM TOPICAL 4 TIMES DAILY
Qty: 15 G | Refills: 0 | Status: SHIPPED | OUTPATIENT
Start: 2019-06-19 | End: 2019-11-18 | Stop reason: SDUPTHER

## 2019-06-19 NOTE — PATIENT INSTRUCTIONS
TAMSULOSIN - FLOMAX - MEDICINE FOR PROSTATE.  START TAKING 2 OF THESE FOR PROSTATE.    I AM REFERRING YOU TO UROLOGY AS WELL.

## 2019-06-19 NOTE — PROGRESS NOTES
This note was created by combination of typed  and Dragon dictation.  Transcription errors may be present.  If there are any questions, please contact me.    Assessment & Plan:   Dysuria  Benign prostatic hyperplasia with urinary frequency  -check urinalysis and urine culture.  Benign exam.  Suspect there may be a component of a uncontrolled BPH.  On tamsulosin 0.4, increase it to 0.8 but I will refer to Urology as well for monitoring, checking postvoid residual etc  -     Ambulatory referral to Urology  -     Urine culture; Future; Expected date: 06/19/2019  -     Urinalysis; Future; Expected date: 06/19/2019    Anemia, unspecified type  -negative workup thus far including GI workup.  Iron stores were normal.  Check hemoglobin electrophoresis.  -     Hemoglobin Electrophoresis,Hgb A2 Hermes.; Future; Expected date: 06/19/2019    Balanitis  -trial of topical Mycolog  -     nystatin-triamcinolone (MYCOLOG II) cream; Apply topically 4 (four) times daily.  Dispense: 15 g; Refill: 0    Morbid obesity with BMI of 40.0-44.9, adult    Cervical stenosis of spinal canal 3/2016 C3-7 laminoplasty   -reports that he never got any better after his laminoplasty.  He has not contacted Neurosurgery in follow-up.  I am going to have him see pain management for evaluation as I am not convinced that he would require repeat surgery.  -     Ambulatory referral to Pain Clinic    Prediabetes  -on metformin, check A1c, metabolic panel, CBC  -     Hemoglobin A1c; Future; Expected date: 06/19/2019  -     Comprehensive metabolic panel; Future; Expected date: 06/19/2019  -     CBC auto differential; Future; Expected date: 06/19/2019        Medications Discontinued During This Encounter   Medication Reason    lisinopril 10 MG tablet Patient no longer taking    hydroCHLOROthiazide (HYDRODIURIL) 25 MG tablet Duplicate Order    allopurinol (ZYLOPRIM) 100 MG tablet Patient no longer taking    tamsulosin (FLOMAX) 0.4 mg Cap        meds  sent this encounter:  Medications Ordered This Encounter   Medications    nystatin-triamcinolone (MYCOLOG II) cream     Sig: Apply topically 4 (four) times daily.     Dispense:  15 g     Refill:  0    tamsulosin (FLOMAX) 0.4 mg Cap     Sig: Take 2 capsules (0.8 mg total) by mouth once daily. Increased dose     Dispense:  180 capsule     Refill:  3       Follow Up: No follow-ups on file.    Subjective:     Chief Complaint   Patient presents with    Urinary Tract Infection     burning on and off while urinating       PARAS Acuna is a 68 y.o. male, last appointment with this clinic was Visit date not found.    I last saw him 10/2018  Labs:  a1c pre-DM stable on metformin  Lipid OK on statin nonHDL 109 though TG and HDL could be better  Uric acid good on allopurinol  CBC stable mild anemia going back to 2013.   Colonoscopy ordered at [facility].    Anemia - colonoscopy 10/2018 diverticulosis.  5/2019 UC visit for fall and hip pain  Was supposed to f/u with neurosurgery Dr. Caruso re: C spine; did not.    Experiencing a burning sensation in the middle of abdomen that improves with urination.  Sometimes constant.  Currently burning sensation.   Uncircumcised - notes a sore on the foreskin. Hurts there when he pees. x3-4 weeks. Started small and seems to be enlarging.  No drainage. No dysuria as it's passing through. The volume of urination he thinks is good.  No rectal pain. Sometimes sensation of incomplete void.  Just urinated prior to arrival.     Notes pedal edema. Worse at the end of the day better after a night of sleep.  I do suspect that his body habitus plays a role in this.    Diet - feels diet is OK. Does drink gurwinder aid. And juice.     Patient Care Team:  Clark Vargas MD as PCP - General (Internal Medicine)  Fernando Jacinto MD as Consulting Physician (Orthopedic Surgery)  Venkata Caruso MD as Consulting Physician (Neurosurgery)    Patient Active Problem List    Diagnosis Date Noted    Tubular adenoma of colon  10/2018 colonoscopy sigmoid tubular adenoma 11/02/2018    Diverticulosis of large intestine without hemorrhage on colonoscopy 10/2018 11/01/2018    SIS (obstructive sleep apnea) on sleep study 4/2018 with AHI 55.  CPAP at 15     Nuclear sclerosis of both eyes 01/16/2018    Refractive error 01/16/2018    Benign prostatic hyperplasia with urinary frequency 01/10/2018    Hemorrhoids 06/01/2016    Cervical stenosis of spinal canal 3/2016 C3-7 laminoplasty 03/31/2016     3/31/2016 pt underwent a C3 through C7 Laminoplasty with posterior cervical approach      Prediabetes 03/29/2016    Essential hypertension no outside checks; 3/2014 exercise stress echo no ischemia LVEF 55-60 03/20/2014     3/11/2014 exercise stress echo negative for ischemia.  Normal LV systolic function LVEF 55-60.  Concentric remodeling.      Mixed hyperlipidemia 03/20/2014    Morbid obesity with BMI of 40.0-44.9, adult 03/06/2014    Back pain 07/11/2013    ED (erectile dysfunction) 06/07/2013    Gout, arthritis 01/07/2013       PAST MEDICAL HISTORY:  Past Medical History:   Diagnosis Date    Arthritis     Erectile dysfunction     Gout     Hyperlipidemia     Hypertension     Morbid obesity     Nuclear sclerosis of both eyes 1/16/2018    Screening for AAA (abdominal aortic aneurysm)     6/2016 AAA US no aneurysm       PAST SURGICAL HISTORY:  Past Surgical History:   Procedure Laterality Date    COLONOSCOPY N/A 10/31/2018    Per[facility]    LAMINOPLASTY/ C3-C7 N/A 3/31/2016    Performed by Venkata [facility]uso [facility] at [facility]    SPINE SURGERY  3/31/16    C3-C7 laminoplasty,Dr Caruso    tooth implant         SOCIAL HISTORY:  Social History     Socioeconomic History    Marital status:      Spouse name: Not on file    Number of children: Not on file    Years of education: Not on file    Highest education level: Not on file   Occupational History    Not on file   Social Needs    Financial  resource strain: Not on file    Food insecurity:     Worry: Not on file     Inability: Not on file    Transportation needs:     Medical: Not on file     Non-medical: Not on file   Tobacco Use    Smoking status: Former Smoker     Last attempt to quit: 1/7/2009     Years since quitting: 10.4    Smokeless tobacco: Never Used   Substance and Sexual Activity    Alcohol use: Yes     Comment: occasional beer- average week 12 beers a week    Drug use: No    Sexual activity: Not Currently     Partners: Female     Comment: , children.   Lifestyle    Physical activity:     Days per week: Not on file     Minutes per session: Not on file    Stress: Not on file   Relationships    Social connections:     Talks on phone: Not on file     Gets together: Not on file     Attends [employer] service: Not on file     Active member of club or organization: Not on file     Attends meetings of clubs or organizations: Not on file     Relationship status: Not on file   Other Topics Concern    Not on file   Social History Narrative    Not on file       ALLERGIES AND MEDICATIONS: updated and reviewed.  Review of patient's allergies indicates:   Allergen Reactions    Codeine Swelling     Codeine with Aspirin caused chest pain     Current Outpatient Medications   Medication Sig Dispense Refill    aspirin (ECOTRIN) 81 MG EC tablet Take 81 mg by mouth once daily.      hydroCHLOROthiazide (HYDRODIURIL) 25 MG tablet Take 1 tablet (25 mg total) by mouth once daily. 90 tablet 3    lisinopril 10 MG tablet TAKE 1 TABLET (10 MG TOTAL) BY MOUTH ONCE DAILY. 90 tablet 1    metFORMIN (GLUCOPHAGE-XR) 500 MG 24 hr tablet TAKE 1 TABLET (500 MG TOTAL) BY MOUTH DAILY WITH BREAKFAST. 90 tablet 3    pravastatin (PRAVACHOL) 20 MG tablet Take 1 tablet (20 mg total) by mouth once daily. 90 tablet 3    tamsulosin (FLOMAX) 0.4 mg Cap Take 1 capsule (0.4 mg total) by mouth once daily. for 15 days 90 capsule 3    tadalafil (CIALIS) 20 MG Tab Take 1  "tablet (20 mg total) by mouth daily as needed for Erectile Dysfunction. 5 tablet 11     No current facility-administered medications for this visit.        Review of Systems   Constitutional: Negative for chills and fever.   Cardiovascular: Negative for chest pain and palpitations.       Objective:   Physical Exam   Vitals:    06/19/19 1313   BP: 106/78   BP Location: Left arm   Patient Position: Sitting   BP Method: X-Large (Manual)   Pulse: 71   Temp: 97.9 °F (36.6 °C)   TempSrc: Oral   SpO2: 99%   Weight: 122 kg (268 lb 15.4 oz)   Height: 5' 8" (1.727 m)    Body mass index is 40.9 kg/m².  Weight: 122 kg (268 lb 15.4 oz)   Height: 5' 8" (172.7 cm)     Physical Exam   Constitutional: He is oriented to person, place, and time. He appears well-developed and well-nourished. No distress.   Eyes: EOM are normal.   Cardiovascular: Normal rate, regular rhythm and normal heart sounds.   No murmur heard.  Pulmonary/Chest: Effort normal and breath sounds normal.   Genitourinary:   Genitourinary Comments: On circumcised, foreskin is easily retractable, on the right of midline and the right lateral side are 2 small shallow ulcer/skin denuded, each is about 4 mm in diameter without raised or irregular edges, no induration no drainage   Musculoskeletal: Normal range of motion. He exhibits edema.   Pitting edema to the ankles bilaterally   Neurological: He is alert and oriented to person, place, and time. Coordination normal.   Skin: Skin is warm and dry.   Psychiatric: He has a normal mood and affect. His behavior is normal. Thought content normal.     "

## 2019-06-20 PROBLEM — D64.9 NORMOCYTIC ANEMIA: Status: ACTIVE | Noted: 2019-06-20

## 2019-06-20 LAB
HGB A2 MFR BLD HPLC: 2.1 % (ref 2.2–3.2)
HGB FRACT BLD ELPH-IMP: ABNORMAL
HGB FRACT BLD ELPH-IMP: NORMAL

## 2019-06-21 NOTE — PROGRESS NOTES
Chronic anemia   Hemoglobin electrophoresis negative.  Alpha-thalassemia?  Metabolic panel normal.  A1c 5.9 from 6.0.  Results to patient.  Up-to-date on colonoscopy.  Would check alpha-thalassemia next labs though Mentzer index > 13

## 2019-06-25 ENCOUNTER — TELEPHONE (OUTPATIENT)
Dept: FAMILY MEDICINE | Facility: CLINIC | Age: 69
End: 2019-06-25

## 2019-06-25 NOTE — TELEPHONE ENCOUNTER
Patient was advised of results.verbally understands. Stated that the increase flomax is helping at this time.

## 2019-06-25 NOTE — TELEPHONE ENCOUNTER
----- Message from Clark Vargas MD sent at 6/23/2019  8:45 PM CDT -----  UCx with enterobacter pan sensitive.   UA was normal  a1c 5.9 from 6.0  CBC mild anemia longstanding. Hb electrophoresis negative. Alpha thal?    Please call pt - did he have any improvement on higher dose flomax? I'm not convinced he has a UTI.    If he has no improvement at all - would call in augmentin for 14 day course.  If improvement - no antibiotic.  Referred to urolgoy at .  Follow up 6 months.

## 2019-06-26 ENCOUNTER — TELEPHONE (OUTPATIENT)
Dept: FAMILY MEDICINE | Facility: CLINIC | Age: 69
End: 2019-06-26

## 2019-06-26 NOTE — TELEPHONE ENCOUNTER
Per Dr. Vargas's last clinic note, it appears as though he would like him to see urology for surveillance purposes. I would keep the appointment as scheduled.    Thanks!  BALA Jackson

## 2019-06-26 NOTE — TELEPHONE ENCOUNTER
----- Message from Kelli Parrish sent at 6/26/2019 10:41 AM CDT -----  Contact: TEMI JOAQUIN [[id number]]  Name of Who is Calling: TEMI JOAQUIN [[id number]]      What is the request in detail: patient would like a call back regarding results.please call     Can the clinic reply by [facility]: no    What Number to Call Back if not in [facility]: [phone]

## 2019-07-18 ENCOUNTER — OFFICE VISIT (OUTPATIENT)
Dept: UROLOGY | Facility: CLINIC | Age: 69
End: 2019-07-18
Payer: MEDICARE

## 2019-07-18 VITALS
BODY MASS INDEX: 40.93 KG/M2 | HEIGHT: 68 IN | DIASTOLIC BLOOD PRESSURE: 76 MMHG | WEIGHT: 270.06 LBS | SYSTOLIC BLOOD PRESSURE: 118 MMHG

## 2019-07-18 DIAGNOSIS — R35.1 NOCTURIA: Primary | ICD-10-CM

## 2019-07-18 DIAGNOSIS — N52.01 ERECTILE DYSFUNCTION DUE TO ARTERIAL INSUFFICIENCY: ICD-10-CM

## 2019-07-18 DIAGNOSIS — N48.9 PENILE LESION: ICD-10-CM

## 2019-07-18 DIAGNOSIS — R30.0 DYSURIA: ICD-10-CM

## 2019-07-18 PROCEDURE — 1101F PT FALLS ASSESS-DOCD LE1/YR: CPT | Mod: CPTII,S$GLB,, | Performed by: UROLOGY

## 2019-07-18 PROCEDURE — 99204 PR OFFICE/OUTPT VISIT, NEW, LEVL IV, 45-59 MIN: ICD-10-PCS | Mod: S$GLB,,, | Performed by: UROLOGY

## 2019-07-18 PROCEDURE — 51798 POCT BLADDER SCAN: ICD-10-PCS | Mod: S$GLB,,, | Performed by: UROLOGY

## 2019-07-18 PROCEDURE — 3078F PR MOST RECENT DIASTOLIC BLOOD PRESSURE < 80 MM HG: ICD-10-PCS | Mod: CPTII,S$GLB,, | Performed by: UROLOGY

## 2019-07-18 PROCEDURE — 1101F PR PT FALLS ASSESS DOC 0-1 FALLS W/OUT INJ PAST YR: ICD-10-PCS | Mod: CPTII,S$GLB,, | Performed by: UROLOGY

## 2019-07-18 PROCEDURE — 3078F DIAST BP <80 MM HG: CPT | Mod: CPTII,S$GLB,, | Performed by: UROLOGY

## 2019-07-18 PROCEDURE — 51798 US URINE CAPACITY MEASURE: CPT | Mod: S$GLB,,, | Performed by: UROLOGY

## 2019-07-18 PROCEDURE — 99204 OFFICE O/P NEW MOD 45 MIN: CPT | Mod: S$GLB,,, | Performed by: UROLOGY

## 2019-07-18 PROCEDURE — 99999 PR PBB SHADOW E&M-EST. PATIENT-LVL III: ICD-10-PCS | Mod: PBBFAC,,, | Performed by: UROLOGY

## 2019-07-18 PROCEDURE — 3074F SYST BP LT 130 MM HG: CPT | Mod: CPTII,S$GLB,, | Performed by: UROLOGY

## 2019-07-18 PROCEDURE — 99999 PR PBB SHADOW E&M-EST. PATIENT-LVL III: CPT | Mod: PBBFAC,,, | Performed by: UROLOGY

## 2019-07-18 PROCEDURE — 3074F PR MOST RECENT SYSTOLIC BLOOD PRESSURE < 130 MM HG: ICD-10-PCS | Mod: CPTII,S$GLB,, | Performed by: UROLOGY

## 2019-07-18 RX ORDER — SILDENAFIL 100 MG/1
100 TABLET, FILM COATED ORAL DAILY PRN
Qty: 10 TABLET | Refills: 11 | Status: SHIPPED | OUTPATIENT
Start: 2019-07-18 | End: 2020-08-04

## 2019-07-18 NOTE — LETTER
July 18, 2019      Clark Vargas MD  [address]  [facility] [address]           [facility]  [address] [facility] [address]  [place] [address]  Phone: [phone]  Fax: [phone]          Patient: Armand Painting   MR Number: [id number]   YOB: 1950   Date of Visit: 7/18/2019       Dear Dr. Clark Vargas:    Thank you for referring Armand Painting to me for evaluation. Attached you will find relevant portions of my assessment and plan of care.    If you have questions, please do not hesitate to call me. I look forward to following Armand Painting along with you.    Sincerely,    Gricelda Pandey MD    Enclosure  CC:  No Recipients    If you would like to receive this communication electronically, please contact [email or web address] or [phone] to request more information on [email or web address] Link access.    For providers and/or their staff who would like to refer a patient to [facility], please contact us through our one-stop-shop provider referral line, [facility], at [phone].    If you feel you have received this communication in error or would no longer like to receive these types of communications, please e-mail [email or web address]

## 2019-07-18 NOTE — PROGRESS NOTES
"Subjective:       Armand Painting is a 68 y.o. male who is a new patient who was referred by Dr Vargas for evaluation of dysuria.      He recently saw PCP with c/o dysuria. UCx at that time showed moderate growth 50-99k Enterobacter. He was also increased to Flomax BID - urgency in AM, nocturia x 2-3, strong stream. Does not appear he was treated with antibiotics. He was given cream (nystatin-triamcinolone) for suspected balantis. He notes a small cut on foreskin. He does have some burning externally after voiding but denies any dysuria.     He also reports problems with ED. He was given Cialis by PCP but may not have worked well. He has not tried Viagra.     PVR (bladder scan) today - 45cc (not true PVR)    Last PSA 5/16 - 0.41      The following portions of the patient's history were reviewed and updated as appropriate: allergies, current medications, past family history, past medical history, past social history, past surgical history and problem list.    Review of Systems  Constitutional: no fever or chills  ENT: no nasal congestion or sore throat  Respiratory: no cough or shortness of breath  Cardiovascular: no chest pain or palpitations  Gastrointestinal: no nausea or vomiting, tolerating diet  Genitourinary: as per HPI  Hematologic/Lymphatic: no easy bruising or lymphadenopathy  Musculoskeletal: no arthralgias or myalgias  Skin: no rashes or lesions  Neurological: no seizures or tremors  Behavioral/Psych: no auditory or visual hallucinations       Objective:    Vitals: /76 (BP Location: Left arm, Patient Position: Sitting, BP Method: X-Large (Manual))   Ht 5' 8" (1.727 m)   Wt 122.5 kg (270 lb 1 oz)   BMI 41.06 kg/m²     Physical Exam   General: well developed, well nourished in no acute distress  Head: normocephalic, atraumatic  Neck: supple, trachea midline, no obvious enlargement of thyroid  HEENT: EOMI, mucus membranes moist, sclera anicteric, no hearing impairment  Lungs: symmetric expansion, " non-labored breathing  Cardiovascular: regular rate and rhythm, normal pulses  Abdomen: soft, non tender, non distended, no palpable masses, no hepatosplenomegaly, no hernias, bladder nonpalpable. No CVA tenderness.  Musculoskeletal: no peripheral edema, normal ROM in bilateral upper and lower extremities  Lymphatics: no cervical or inguinal lymphadenopathy  Skin: no rashes or lesions  Neuro: alert and oriented x 3, no gross deficits  Psych: normal judgment and insight, normal mood/affect and non-anxious  Genitourinary:   Penis - uncircumcised penis without plaques or scarring. SMALL ROUND,FLAT LESION ON FORESKIN (APPOX 1CM) LOVETT, WELL HEALING Urethra - orthotopic location without stenosis.  Scrotum  - no lesions or rashes, no hydrocele or hernia.  Testes - descended bilaterally, symmetric without masses, non tender.  Epididymides - no cysts or lesions, no spermatocele, symmetric   JASEN: patient declined exam      Lab Review   Urine analysis today in clinic shows - no urine      Lab Results   Component Value Date    WBC 7.14 06/19/2019    HGB 12.0 (L) 06/19/2019    HCT 38.2 (L) 06/19/2019    MCV 81 (L) 06/19/2019     06/19/2019     Lab Results   Component Value Date    CREATININE 1.1 06/19/2019    BUN 16 06/19/2019     Lab Results   Component Value Date    PSA 0.41 05/23/2016     No results found for: PSADIAG    Imaging  Reports and images were personally reviewed by me and discussed with the patient today         Assessment/Plan:      1. Nocturia    - Less bother with Flomax BID, continue   - Last PSA years ago but very low. Likely okay to hold on repeat check.      2. Dysuria    - No current dysuria. Unsure if UTI treated recently. Dysuria improved so will monitor   - May also be external irritation   - No specimen given today     3. Penile lesion    - Continue cream from PCP   - Unsure etiology, seems to be healing well   - Recheck few months     4. Erectile dysfunction due to arterial insufficiency    -  Cialis did not work well   - Trial Viagra 100mg on empty stomach (to [facility])       Follow up in 4 months

## 2019-07-19 LAB — POC RESIDUAL URINE VOLUME: 45 ML (ref 0–100)

## 2019-07-29 ENCOUNTER — OFFICE VISIT (OUTPATIENT)
Dept: PAIN MEDICINE | Facility: CLINIC | Age: 69
End: 2019-07-29
Payer: MEDICARE

## 2019-07-29 ENCOUNTER — TELEPHONE (OUTPATIENT)
Dept: NEUROSURGERY | Facility: CLINIC | Age: 69
End: 2019-07-29

## 2019-07-29 VITALS
OXYGEN SATURATION: 98 % | BODY MASS INDEX: 40.82 KG/M2 | HEART RATE: 71 BPM | SYSTOLIC BLOOD PRESSURE: 106 MMHG | HEIGHT: 68 IN | WEIGHT: 269.31 LBS | DIASTOLIC BLOOD PRESSURE: 65 MMHG

## 2019-07-29 DIAGNOSIS — M48.02 CERVICAL STENOSIS OF SPINAL CANAL: Primary | Chronic | ICD-10-CM

## 2019-07-29 DIAGNOSIS — Z98.890 HX OF CERVICAL SPINE SURGERY: ICD-10-CM

## 2019-07-29 DIAGNOSIS — M48.02 CERVICAL SPINAL STENOSIS: Primary | ICD-10-CM

## 2019-07-29 DIAGNOSIS — M48.02 CERVICAL STENOSIS OF SPINAL CANAL: Chronic | ICD-10-CM

## 2019-07-29 PROCEDURE — 99999 PR PBB SHADOW E&M-EST. PATIENT-LVL III: ICD-10-PCS | Mod: PBBFAC,,, | Performed by: PAIN MEDICINE

## 2019-07-29 PROCEDURE — 1100F PR PT FALLS ASSESS DOC 2+ FALLS/FALL W/INJURY/YR: ICD-10-PCS | Mod: CPTII,S$GLB,, | Performed by: PAIN MEDICINE

## 2019-07-29 PROCEDURE — 3288F FALL RISK ASSESSMENT DOCD: CPT | Mod: CPTII,S$GLB,, | Performed by: PAIN MEDICINE

## 2019-07-29 PROCEDURE — 99204 PR OFFICE/OUTPT VISIT, NEW, LEVL IV, 45-59 MIN: ICD-10-PCS | Mod: S$GLB,,, | Performed by: PAIN MEDICINE

## 2019-07-29 PROCEDURE — 3078F DIAST BP <80 MM HG: CPT | Mod: CPTII,S$GLB,, | Performed by: PAIN MEDICINE

## 2019-07-29 PROCEDURE — 3074F PR MOST RECENT SYSTOLIC BLOOD PRESSURE < 130 MM HG: ICD-10-PCS | Mod: CPTII,S$GLB,, | Performed by: PAIN MEDICINE

## 2019-07-29 PROCEDURE — 99204 OFFICE O/P NEW MOD 45 MIN: CPT | Mod: S$GLB,,, | Performed by: PAIN MEDICINE

## 2019-07-29 PROCEDURE — 3074F SYST BP LT 130 MM HG: CPT | Mod: CPTII,S$GLB,, | Performed by: PAIN MEDICINE

## 2019-07-29 PROCEDURE — 3078F PR MOST RECENT DIASTOLIC BLOOD PRESSURE < 80 MM HG: ICD-10-PCS | Mod: CPTII,S$GLB,, | Performed by: PAIN MEDICINE

## 2019-07-29 PROCEDURE — 99999 PR PBB SHADOW E&M-EST. PATIENT-LVL III: CPT | Mod: PBBFAC,,, | Performed by: PAIN MEDICINE

## 2019-07-29 PROCEDURE — 3288F PR FALLS RISK ASSESSMENT DOCUMENTED: ICD-10-PCS | Mod: CPTII,S$GLB,, | Performed by: PAIN MEDICINE

## 2019-07-29 PROCEDURE — 1100F PTFALLS ASSESS-DOCD GE2>/YR: CPT | Mod: CPTII,S$GLB,, | Performed by: PAIN MEDICINE

## 2019-07-29 NOTE — LETTER
July 29, 2019      Clark Vargas MD  [address]  [facility] [address]           [facility]  [address]  [place] [address]  Phone: [phone]  Fax: [phone]          Patient: Armand Painting   MR Number: [id number]   YOB: 1950   Date of Visit: 7/29/2019       Dear Dr. Clark Vargas:    Thank you for referring Armand Painting to me for evaluation. Attached you will find relevant portions of my assessment and plan of care.    If you have questions, please do not hesitate to call me. I look forward to following Armand Painting along with you.    Sincerely,    Matti Welch Jr., MD    Enclosure  CC:  No Recipients    If you would like to receive this communication electronically, please contact [email or web address] or [phone] to request more information on [employer] Link access.    For providers and/or their staff who would like to refer a patient to [facility], please contact us through our one-stop-shop provider referral line, [facility], at [phone].    If you feel you have received this communication in error or would no longer like to receive these types of communications, please e-mail [email or web address]

## 2019-07-29 NOTE — PROGRESS NOTES
Subjective:     Patient ID: Armand Painting is a 68 y.o. male    Chief Complaint: Neck Pain (pt takes medication and has done PT in the past )      Referred by: Clark Vargas MD      HPI:    Initial Encounter (7/29/19):  Armand Painting is a 68 y.o. male who presents today with chronic neck pain. This pain has been present for years.  Patient is status post C3 through 7 laminoplasty done by Dr. Caruso in 2016.  Patient states that following the surgery his upper extremity pain, numbness and weakness have improved, but his neck pain has persisted.  He states that he has limited range of motion more recently he has began hear cracking/clicking sound his neck with movement.  He has not been evaluated by Neurosurgery recently.  He denies any upper extremity symptoms associated with this pain.  This pain is described in detail below.    Physical Therapy:  Yes.  Not recently.    Non-pharmacologic Treatment:  Rest helps         · TENS?  No    Pain Medications:         · Currently taking:  Nothing    · Has tried in the past:  NSAIDs, Tylenol    · Has not tried:  Opioids, Muscle relaxants, TCAs, SNRIs, anticonvulsants, topical creams    Blood thinners:  Aspirin 81 mg a day    Interventional Therapies:  None    Relevant Surgeries:  C3 through 7 laminoplasty done by Dr. Caruso in 2016    Affecting sleep?  Yes    Affecting daily activities? yes    Depressive symptoms? no          · SI/HI? No    Work status: Retired    Pain Scores:    Best:    3/10  Worst:    810  Usually:  3/10  Today:  6/10    Review of Systems   Constitutional: Negative for activity change, appetite change, chills, fatigue, fever and unexpected weight change.   HENT: Negative for hearing loss.    Eyes: Negative for visual disturbance.   Respiratory: Negative for chest tightness and shortness of breath.    Cardiovascular: Negative for chest pain.   Gastrointestinal: Negative for abdominal pain, constipation, diarrhea, nausea and vomiting.   Genitourinary: Negative for  difficulty urinating.   Musculoskeletal: Positive for myalgias, neck pain and neck stiffness. Negative for back pain and gait problem.   Skin: Negative for rash.   Neurological: Negative for dizziness, weakness, light-headedness, numbness and headaches.   Psychiatric/Behavioral: Positive for sleep disturbance. Negative for hallucinations and suicidal ideas. The patient is not nervous/anxious.        Past Medical History:   Diagnosis Date    Arthritis     Erectile dysfunction     Gout     Hyperlipidemia     Hypertension     Morbid obesity     Nuclear sclerosis of both eyes 1/16/2018    Screening for AAA (abdominal aortic aneurysm)     6/2016 AAA US no aneurysm       Past Surgical History:   Procedure Laterality Date    COLONOSCOPY N/A 10/31/2018    [facility]    LAMINOPLASTY/ C3-C7 N/A 3/31/2016    [facility]    SPINE SURGERY  3/31/16    C3-C7 laminoplasty,Dr Caruso    tooth implant         Social History     Socioeconomic History    Marital status:      Spouse name: Not on file    Number of children: Not on file    Years of education: Not on file    Highest education level: Not on file   Occupational History    Not on file   Social Needs    Financial resource strain: Not on file    Food insecurity:     Worry: Not on file     Inability: Not on file    Transportation needs:     Medical: Not on file     Non-medical: Not on file   Tobacco Use    Smoking status: Former Smoker     Types: Cigarettes     Last attempt to quit: 1/7/2009     Years since quitting: 10.5    Smokeless tobacco: Never Used   Substance and Sexual Activity    Alcohol use: Yes     Comment: occasional beer- average week 12 beers a week    Drug use: No    Sexual activity: Not Currently     Partners: Female     Comment: , children.   Lifestyle    Physical activity:     Days per week: Not on file     Minutes per session: Not on file    Stress: Not  "on file   Relationships    Social connections:     Talks on phone: Not on file     Gets together: Not on file     Attends Gnosticism service: Not on file     Active member of club or organization: Not on file     Attends meetings of clubs or organizations: Not on file     Relationship status: Not on file   Other Topics Concern    Not on file   Social History Narrative    Not on file       Review of patient's allergies indicates:   Allergen Reactions    Codeine Swelling     Codeine with Aspirin caused chest pain       Current Outpatient Medications on File Prior to Visit   Medication Sig Dispense Refill    aspirin (ECOTRIN) 81 MG EC tablet Take 81 mg by mouth once daily.      hydroCHLOROthiazide (HYDRODIURIL) 25 MG tablet Take 1 tablet (25 mg total) by mouth once daily. 90 tablet 3    lisinopril 10 MG tablet TAKE 1 TABLET (10 MG TOTAL) BY MOUTH ONCE DAILY. 90 tablet 1    metFORMIN (GLUCOPHAGE-XR) 500 MG 24 hr tablet TAKE 1 TABLET (500 MG TOTAL) BY MOUTH DAILY WITH BREAKFAST. 90 tablet 3    nystatin-triamcinolone (MYCOLOG II) cream Apply topically 4 (four) times daily. 15 g 0    pravastatin (PRAVACHOL) 20 MG tablet Take 1 tablet (20 mg total) by mouth once daily. 90 tablet 3    sildenafil (VIAGRA) 100 MG tablet Take 1 tablet (100 mg total) by mouth daily as needed for Erectile Dysfunction. *generic tabs* 10 tablet 11    tadalafil (CIALIS) 20 MG Tab Take 1 tablet (20 mg total) by mouth daily as needed for Erectile Dysfunction. 5 tablet 11    tamsulosin (FLOMAX) 0.4 mg Cap Take 2 capsules (0.8 mg total) by mouth once daily. Increased dose 180 capsule 3     No current facility-administered medications on file prior to visit.        Objective:      /65   Pulse 71   Ht 5' 8" (1.727 m)   Wt 122.2 kg (269 lb 4.8 oz)   SpO2 98%   BMI 40.95 kg/m²     Exam:  GEN:  Well developed, well nourished.  No acute distress.  Normal pain behavior.  HEENT:  No trauma.  Mucous membranes moist.  Nares patent " bilaterally.  PSYCH: Normal affect. Thought content appropriate.  CHEST:  Breathing symmetric.  No audible wheezing.  ABD: Soft, non-distended.  SKIN:  Warm, pink, dry.  No rash on exposed areas.    EXT:  No cyanosis, clubbing, or edema.  No color change or changes in nail or hair growth.  NEURO/MUSCULOSKELETAL:  Fully alert, oriented, and appropriate. Speech normal kenan. No cranial nerve deficits.   Gait:   normal.  5/5 motor strength throughout upper extremities.   Sensory:   no  sensory deficit in the upper extremities.   Reflexes:   1 + and symmetric throughout.   absent  Sheehan's bilaterally.  C-Spine:   limited  ROM with pain on  all movements.  negative  facet loading bilaterally.   negative  Spurling's bilaterally.    No  TTP over cervical facet joints, cervical paraspinal muscles, shoulders, elbows or hands.          Imaging:  Narrative     Procedure comment: Axial images through the cervical spine were obtained without the administration of IV contrast.  Sagittal, coronal, and axial reformatted images were reviewed.    Comparison: MRI cervical spine 3/23/16    Findings:    Postoperative changes of a C3-C7 right laminoplasty are again identified. Postoperative material appears intact.  There is no evidence of fracture or subluxation.      C2-C3:Posterior disc osteophyte complex and uncovertebral spurring resulting in mild spinal canal stenosis and moderate bilateral neuroforaminal narrowing.  C3-C4:Posterior disc osteophyte complex and uncovertebral spurring resulting in mild central canal stenosis and severe bilateral neuroforaminal narrowing.  C4-C5:Posterior disc osteophyte complex and uncovertebral spurring resulting in severe bilateral neuroforaminal narrowing. No significant spinal canal stenosis.  C5-C6:Laminoplasty material abuts and impresses upon the right posterior lateral aspect of the canal resulting in moderate central canal stenosis. There is a posterior disc osteophyte complex and  uncovertebral spurring resulting in moderate bilateral neuroforaminal narrowing.  C6-C7:Posterior disc osteophyte complex and uncovertebral spurring without significant spinal canal narrowing. There is moderate left-sided neuroforaminal narrowing.  C7-T1:Posterior disc osteophyte complex and uncovertebral spurring without significant spinal canal stenosis. There is severe bilateral neural foraminal narrowing.    Soft tissues of the neck demonstrate scattered normal sized cervical lymph nodes. There is a midline posterior surgical incision with scattered disorganized fluid.      The airway is patent and the lung apices are unremarkable.  The visualized portions of the brain demonstrate no significant abnormality.   Impression          Postoperative changes of a C3-C7 laminoplasty. Postoperative hardware appears intact.    Laminoplasty material appears to abut and impress upon the posterior lateral aspect of the canal resulting in moderate central canal stenosis at C5-C6.    Multilevel cervical spondylosis and spinal canal or neural foraminal narrowing as detailed above.    Soft tissues demonstrate a posterior midline incision with scattered disorganized underlying fluid.  ______________________________________     Electronically signed by resident: CELSO RIVERA MD  Date: 05/11/16  Time: 15:06     ______________________________________     Electronically signed by: SHAUNNA MORA MD  Date: 05/11/16  Time: 15:37          Assessment:       Encounter Diagnosis   Name Primary?    Cervical stenosis of spinal canal 3/2016 C3-7 laminoplasty Yes         Plan:       Armand was seen today for neck pain.    Diagnoses and all orders for this visit:    Cervical stenosis of spinal canal 3/2016 C3-7 laminoplasty  -     Ambulatory Referral to Neurosurgery        Armand Painting is a 68 y.o. male with chronic neck pain following cervical laminoplasty in 2016.  Patient not having any overt signs or symptoms of radiculopathy or  myelopathy at this time.    1.  Refer to Neurosurgery to follow up with Dr. Caruso.  2.  If no neuro surgical interventions deemed appropriate, and no contraindications noted by Dr. Caruso, then we will likely refer to physical therapy.  3.  Return to clinic p.r.n..

## 2019-08-21 ENCOUNTER — OFFICE VISIT (OUTPATIENT)
Dept: NEUROSURGERY | Facility: CLINIC | Age: 69
End: 2019-08-21
Payer: MEDICARE

## 2019-08-21 ENCOUNTER — HOSPITAL ENCOUNTER (OUTPATIENT)
Dept: RADIOLOGY | Facility: HOSPITAL | Age: 69
Discharge: HOME | End: 2019-08-21
Attending: NEUROLOGICAL SURGERY
Payer: MEDICARE

## 2019-08-21 VITALS
SYSTOLIC BLOOD PRESSURE: 106 MMHG | TEMPERATURE: 98 F | HEIGHT: 68 IN | DIASTOLIC BLOOD PRESSURE: 67 MMHG | HEART RATE: 67 BPM | WEIGHT: 268.5 LBS | BODY MASS INDEX: 40.69 KG/M2

## 2019-08-21 DIAGNOSIS — M40.202 KYPHOSIS OF CERVICAL REGION, UNSPECIFIED KYPHOSIS TYPE: ICD-10-CM

## 2019-08-21 DIAGNOSIS — Z98.890 HX OF CERVICAL SPINE SURGERY: ICD-10-CM

## 2019-08-21 DIAGNOSIS — M48.02 CERVICAL SPINAL STENOSIS: ICD-10-CM

## 2019-08-21 DIAGNOSIS — M47.812 SPONDYLOSIS OF CERVICAL REGION WITHOUT MYELOPATHY OR RADICULOPATHY: Primary | ICD-10-CM

## 2019-08-21 PROCEDURE — 3288F FALL RISK ASSESSMENT DOCD: CPT | Mod: CPTII,S$GLB,, | Performed by: NEUROLOGICAL SURGERY

## 2019-08-21 PROCEDURE — 72125 CT CERVICAL SPINE WITHOUT CONTRAST: ICD-10-PCS | Mod: 26,,, | Performed by: RADIOLOGY

## 2019-08-21 PROCEDURE — 3074F PR MOST RECENT SYSTOLIC BLOOD PRESSURE < 130 MM HG: ICD-10-PCS | Mod: CPTII,S$GLB,, | Performed by: NEUROLOGICAL SURGERY

## 2019-08-21 PROCEDURE — 72125 CT NECK SPINE W/O DYE: CPT | Mod: 26,,, | Performed by: RADIOLOGY

## 2019-08-21 PROCEDURE — 99203 PR OFFICE/OUTPT VISIT, NEW, LEVL III, 30-44 MIN: ICD-10-PCS | Mod: S$GLB,,, | Performed by: NEUROLOGICAL SURGERY

## 2019-08-21 PROCEDURE — 3288F PR FALLS RISK ASSESSMENT DOCUMENTED: ICD-10-PCS | Mod: CPTII,S$GLB,, | Performed by: NEUROLOGICAL SURGERY

## 2019-08-21 PROCEDURE — 99999 PR PBB SHADOW E&M-EST. PATIENT-LVL III: CPT | Mod: PBBFAC,,, | Performed by: NEUROLOGICAL SURGERY

## 2019-08-21 PROCEDURE — 3074F SYST BP LT 130 MM HG: CPT | Mod: CPTII,S$GLB,, | Performed by: NEUROLOGICAL SURGERY

## 2019-08-21 PROCEDURE — 3078F PR MOST RECENT DIASTOLIC BLOOD PRESSURE < 80 MM HG: ICD-10-PCS | Mod: CPTII,S$GLB,, | Performed by: NEUROLOGICAL SURGERY

## 2019-08-21 PROCEDURE — 72125 CT NECK SPINE W/O DYE: CPT | Mod: TC

## 2019-08-21 PROCEDURE — 99203 OFFICE O/P NEW LOW 30 MIN: CPT | Mod: S$GLB,,, | Performed by: NEUROLOGICAL SURGERY

## 2019-08-21 PROCEDURE — 3078F DIAST BP <80 MM HG: CPT | Mod: CPTII,S$GLB,, | Performed by: NEUROLOGICAL SURGERY

## 2019-08-21 PROCEDURE — 1100F PTFALLS ASSESS-DOCD GE2>/YR: CPT | Mod: CPTII,S$GLB,, | Performed by: NEUROLOGICAL SURGERY

## 2019-08-21 PROCEDURE — 1100F PR PT FALLS ASSESS DOC 2+ FALLS/FALL W/INJURY/YR: ICD-10-PCS | Mod: CPTII,S$GLB,, | Performed by: NEUROLOGICAL SURGERY

## 2019-08-21 PROCEDURE — 99999 PR PBB SHADOW E&M-EST. PATIENT-LVL III: ICD-10-PCS | Mod: PBBFAC,,, | Performed by: NEUROLOGICAL SURGERY

## 2019-08-21 NOTE — PATIENT INSTRUCTIONS
I have personally reviewed the CT Cervical Spine with the pt which shows postoperative changes from previous C3-C7 posterior laminoplasty with no acute process.    I will refer the pt to physical therapy and schedule a 2 month follow up to see how he is doing.

## 2019-08-21 NOTE — PROGRESS NOTES
Subjective:   I, Patrice Salas, attest that this documentation has been prepared under the direction and in the presence of Venkata Caruso MD.     Patient ID: Armand Painting is a 68 y.o. male     Chief Complaint: Follow-up      HPI  Mr. Armand Painting is a pleasant 68 y.o. gentleman with cervical stenosis of spinal canal, who is s/p C3-C7 posterior laminoplasty  on 03/21/2016, who presents today for follow up. At the time of the last office visit, on 05/11/2016, the pt complained of mild neck pain and soreness wt night when removing the cervical collar. He also noted an inability to raise his right arm without assistance from his left hand.     Pt states he has continued to have neck pain posteriorly. He had been taking pain medications but has ran out and tries to deal with the pain. Pt endorses limited cervical ROM, occasional instances of hand issues, though he doesn't elaborate, and leg weakness. Pt has not participated in physical therapy. 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      Review of Systems   Constitutional: Negative for activity change, appetite change, fatigue, fever and unexpected weight change.   HENT: Negative for facial swelling.    Eyes: Negative.    Respiratory: Negative.    Cardiovascular: Negative.    Gastrointestinal: Negative for diarrhea, nausea and vomiting.   Endocrine: Negative.    Genitourinary: Negative.    Musculoskeletal: Positive for neck pain (with limited ROM). Negative for back pain, joint swelling and myalgias.   Neurological: Positive for weakness (LE). Negative for dizziness, numbness and headaches.   Psychiatric/Behavioral: Negative.       Past Medical History:   Diagnosis Date    Arthritis     Erectile dysfunction     Gout     Hyperlipidemia     Hypertension     Morbid obesity     Nuclear sclerosis of both eyes 1/16/2018    Screening for AAA (abdominal aortic aneurysm)     6/2016 AAA US no aneurysm       Objective:      Vitals:    08/21/19 0917   BP:  106/67   Pulse: 67   Temp: 97.8 °F (36.6 °C)      Physical Exam   Constitutional: He is oriented to person, place, and time. He appears well-nourished.   HENT:   Head: Normocephalic and atraumatic.   Neck: Neck supple.   Neurological: He is alert and oriented to person, place, and time. No cranial nerve deficit. He displays a negative Romberg sign. GCS eye subscore is 4. GCS verbal subscore is 5. GCS motor subscore is 6.   Reflex Scores:       Patellar reflexes are 2+ on the right side and 2+ on the left side.  Good extremity strength in the upper and lower extremities.          IMAGING:  CT Cervical Spine Without Contrast (08/21/2019) shows postoperative changes from previous C3-C7 posterior laminoplasty with no acute process.      I have personally reviewed the images with the pt.     I, Dr. Venkata Caruso, personally performed the services described in this documentation. All medical record entries made by the scribe, Patrice Salas, were at my direction and in my presence.  I have reviewed the chart and agree that the record reflects my personal performance and is accurate and complete. Venkata Caruso MD. 08/21/2019    Assessment:       Cervical stenosis.  Cervical spondylosis.  S/p laminoplasty.     Plan:   I have personally reviewed the CT Cervical Spine with the pt which shows postoperative changes from previous C3-C7 posterior laminoplasty with no acute process.    I will refer the pt to physical therapy and schedule a 2 month follow up to see how he is doing.

## 2019-09-06 ENCOUNTER — CLINICAL SUPPORT (OUTPATIENT)
Dept: REHABILITATION | Facility: HOSPITAL | Age: 69
End: 2019-09-06
Attending: NEUROLOGICAL SURGERY
Payer: MEDICARE

## 2019-09-06 DIAGNOSIS — M53.82 DECREASED ROM OF INTERVERTEBRAL DISCS OF CERVICAL SPINE: ICD-10-CM

## 2019-09-06 DIAGNOSIS — Z74.09 DECREASED STRENGTH, ENDURANCE, AND MOBILITY: ICD-10-CM

## 2019-09-06 DIAGNOSIS — R68.89 DECREASED STRENGTH, ENDURANCE, AND MOBILITY: ICD-10-CM

## 2019-09-06 DIAGNOSIS — R29.3 POOR POSTURE: ICD-10-CM

## 2019-09-06 DIAGNOSIS — M54.2 CERVICAL PAIN: ICD-10-CM

## 2019-09-06 DIAGNOSIS — R53.1 DECREASED STRENGTH, ENDURANCE, AND MOBILITY: ICD-10-CM

## 2019-09-06 PROCEDURE — G8985 CARRY GOAL STATUS: HCPCS | Mod: CK,PN

## 2019-09-06 PROCEDURE — G8984 CARRY CURRENT STATUS: HCPCS | Mod: CK,PN

## 2019-09-06 PROCEDURE — 97161 PT EVAL LOW COMPLEX 20 MIN: CPT | Mod: PN

## 2019-09-06 NOTE — PLAN OF CARE
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    Cervical pain     Decreased ROM of intervertebral discs of cervical spine     Poor posture     Decreased strength, endurance, and mobility      Physician: Venkata Caruso MD    Physician Orders: PT Eval and Treat   Medical Diagnosis from Referral:   M47.812 (ICD-10-CM) - Spondylosis of cervical region without myelopathy or radiculopathy   M40.202 (ICD-10-CM) - Kyphosis of cervical region, unspecified kyphosis type   Evaluation Date: 9/6/2019  Authorization Period Expiration: 9/19/2019  Plan of Care Expiration: 12/6/2019  Visit # / Visits authorized: 1/6    Time In: 2:05p  Time Out: 2:56p  Total Billable Time: 46 minutes    Precautions: Standard    Subjective   Date of onset: 1-2 years ago  History of current condition - Armand reports: he had neck surgery and it never got better - he thinks he had this surgery about a year or 2 ago. He states he had home PT after the surgery but didn't complete it. He states he thought his neck was supposed to be fine after the surgery but it is not getting any better so that is why he is seeking help. He had surgery because he had arm pain - he doesn't have that anymore, but the neck is still hurting him. He states that the most difficult thing to do is turn his neck. When the neck does hurt he takes pain pills and rests. Aggravating factors: sitting and standing for a long time. Uses icy hot for relief. Sometimes has numbness and tingling in R hand - because of carpal tunnel so he can deal with that. 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 Gets headaches from neck pain - pretty regularly but goes away quickly. Sleeps pretty good but recently been waking up through the night due to the neck pain.      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    Medications:   Armand has a current medication list which includes the following prescription(s): aspirin, hydrochlorothiazide, lisinopril, metformin, nystatin-triamcinolone, pravastatin, sildenafil, tadalafil, and tamsulosin.    Allergies:   Review of patient's allergies indicates:   Allergen Reactions    Codeine Swelling     Codeine with Aspirin caused chest pain        Imaging, x-ray on file from 7/29/2019.     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     Right Rotation 30 p!     Left Rotation 50 p!     Right Side Bending 25 p!   Left Side Bending 20 p!      Shoulder Range of Motion:   Shoulder Left Right   Flexion 90 90   Abduction 150 150     Strength:  Cervical MMT   Flexion 3/5   Extension 3-/5   Right Side Bend 3/5   Left Side Bend 3/5     Upper Extremity Strength  (R) UE  (L) UE    Shoulder flexion: 3+/5 Shoulder flexion: 3+/5   Shoulder Abduction: 3+/5 Shoulder abduction: 3+/5   Shoulder ER  3+/5 Shoulder ER 3+/5   Shoulder IR 4-/5 Shoulder IR 4-/5   Elbow flexion: 4/5 Elbow flexion: 4-/5   Elbow extension: 4-/5 Elbow extension: 4-/5   Wrist flexion: 3+/5 Wrist flexion: 3+/5   Wrist extension: 4/5 Wrist extension: 4/5     20# :  26#   Lower Trap 3/5 Lower Trap 3/5   Middle Trap 3+/5 Middle Trap 3+/5   Rhomboids 3+/5 Rhomboids 3+/5     Special Tests:  Distraction + had pain relief   Compression + increased pain   Spurlings - increased pain; no arm pain    DNF test Decreased neck flexor endurance     Joint Mobility: pt had cervical fusion, hypomobility     Thoracic mobility: decreased PA's over T1    Palpation: ttp on L side upper trap > R side upper trap;      Sensation: intact to light touch     Flexibility: decreased cervical flexors length    DID NOT ASSESS FOTO - ASSESS NEXT VISIT     TREATMENT   Treatment Time In: 2:35p  Treatment Time Out: 2:56p  Total Treatment time separate from Evaluation: 21 minutes    Armand received therapeutic exercises to develop strength, endurance, ROM, flexibility, posture and core stabilization for 21 minutes including:    Supine Y's with YTB x15 ea  Supine T's with YTB x15   Chin tucks x10, 3 sec hold  scap retracts x15, 3 sec hold   Upper trap stretch [id number]   Levator scap stretch 2x30 sec   SNAGs 5x5 sec ea   Thoracic extension over chair 5x10 sec     Home Exercises and Patient Education Provided    Education provided:   - performing HEP to improve pain, decreased forward head posture for prolonged periods of time     Written Home Exercises Provided: yes.  Exercises were reviewed and Armand was able to demonstrate them prior to the end of the session.  Armand demonstrated good  understanding of the education provided.     See EMR under Patient Instructions for exercises provided 9/6/2019.    Assessment   Armand is a 68 y.o. male referred to outpatient Physical Therapy with a medical diagnosis of spondylosis of cervical region without myelopathy or radiculopathy  and kyphosis of cervical region. Pt presents to therapy s/p cervical laminoplasty 3 years ago, he had home physical therapy but did not complete this and has since decreased his cervical mobility all together due to increased pain. He presents today with signs and symptoms including cervical pain, decreased cervical ROM, decreased shoulder ROM, increased thoracic kyphosis, poor posture, decreased strength in UE and trunk, impaired joint mobility, soft tissue restrictions with trigger points, and impaired functional mobility. Pt had positive compression and distraction test, negative spurling's and reverse spurlings, and decreased neck flexion endurance. Pt had tenderness to palpation over bilateral upper traps (L>R) with increased pain over noticeable trigger points. Pt has difficulty with mowing his lawn and riding his bike without pain and difficulty. Pt is appropriate and would benefit from physical therapy to improve noted impairments and return to prior level of function. Due to patient's financial restrictions, he is only able to attend therapy 1x every other week. In this case, he was given an extensive starting home exercise program and patient had good relief of pain with supine exercises and scap retracts.     Pt prognosis is Fair.   Pt will benefit from skilled outpatient Physical Therapy to address the deficits stated above and in the chart below, provide pt/family education, and to maximize pt's level of independence.     Plan of care discussed with patient: Yes  Pt's spiritual, cultural and educational needs considered and patient is agreeable to the plan of care and goals as stated below:     Anticipated Barriers for therapy: financial constraints     Medical Necessity is demonstrated by the following  History  Co-morbidities and personal factors that may impact the plan of care Co-morbidities:   high BMI, HTN and prior cervical surgery    Personal Factors:   lifestyle     moderate   Examination  Body  Structures and Functions, activity limitations and participation restrictions that may impact the plan of care Body Regions:   head  neck  back  upper extremities  trunk    Body Systems:    gross symmetry  ROM  strength  gross coordinated movement  transitions  motor control  motor learning    Participation Restrictions:   moderate    Activity limitations:   Learning and applying knowledge  no deficits    General Tasks and Commands  no deficits    Communication  no deficits    Mobility  lifting and carrying objects  driving (bike, car, motorcycle)  bike riding    Self care  no deficits    Domestic Life  shopping  cooking  doing house work (cleaning house, washing dishes, laundry)    Interactions/Relationships  no deficits    Life Areas  employment    Community and Social Life  recreation and leisure         moderate   Clinical Presentation stable and uncomplicated low   Decision Making/ Complexity Score: low     Goals:  Short Term GOALS: 4 weeks. Pt agrees with goals set.  1. Patient demonstrates independence with HEP.   2. Patient demonstrates independence with Postural Awareness.   3. Patient will report pain of 8/10 at worst, on 0-10 pain scale, with all activity.   4. Patient demonstrates increased cervical ROM by 10 degrees in all directions to improve tolerance to functional activities pain free.   5. Patient demonstrates increased strength BUE's by 1/3 muscle grade or greater to improve tolerance to functional activities pain free.      Long Term GOALS: 8 weeks. Pt agrees with goals set.  1. Patient demonstrates increased cervical ROM by 15 degrees in all planes to improve tolerance to functional activities pain free.   2. Patient demonstrates increased strength BUE's to 4/5 or greater to improve tolerance to functional activities pain free.  3. Patient demonstrates increased strength in lower trap, middle trap, and rhomboids to 4/5 or greater to improve postural stability for increased amount of time.    4.  Patient demonstrates improved overall function per FOTO Neck Survey to Limitation or less. Perform next visit.   5. Patient demonstrates able to ride his bike over an even terrain with < or = to minimal pain and difficulty in his neck.     Plan   Plan of care Certification: 9/6/2019 to 12/6/2019.    Outpatient Physical Therapy 1 times every other week for 8 weeks to include the following interventions: Gait Training, Manual Therapy, Neuromuscular Re-ed, Patient Education, Self Care, Therapeutic Activites and Therapeutic Exercise.     Jennifer Pinon, PT  09/06/2019    Thank you for your referral!     I have seen the patient, reviewed the therapist's plan of care, and I agree with the plan of care.      I certify the need for these services furnished under this plan of treatment and while under my care.      ___________________ ________ Physician/Referring Practitioner             ___________________________ Date of Signature

## 2019-09-13 ENCOUNTER — CLINICAL SUPPORT (OUTPATIENT)
Dept: REHABILITATION | Facility: HOSPITAL | Age: 69
End: 2019-09-13
Attending: NEUROLOGICAL SURGERY
Payer: MEDICARE

## 2019-09-13 DIAGNOSIS — R68.89 DECREASED STRENGTH, ENDURANCE, AND MOBILITY: ICD-10-CM

## 2019-09-13 DIAGNOSIS — R29.3 POOR POSTURE: ICD-10-CM

## 2019-09-13 DIAGNOSIS — M53.82 DECREASED ROM OF INTERVERTEBRAL DISCS OF CERVICAL SPINE: ICD-10-CM

## 2019-09-13 DIAGNOSIS — R53.1 DECREASED STRENGTH, ENDURANCE, AND MOBILITY: ICD-10-CM

## 2019-09-13 DIAGNOSIS — M54.2 CERVICAL PAIN: ICD-10-CM

## 2019-09-13 DIAGNOSIS — Z74.09 DECREASED STRENGTH, ENDURANCE, AND MOBILITY: ICD-10-CM

## 2019-09-13 PROCEDURE — 97110 THERAPEUTIC EXERCISES: CPT | Mod: PN

## 2019-09-13 NOTE — PROGRESS NOTES
Physical Therapy Daily Treatment Note     Name: Armand Painting  [facility] Number: [id number]    Therapy Diagnosis:   Encounter Diagnoses   Name Primary?    Cervical pain     Decreased ROM of intervertebral discs of cervical spine     Poor posture     Decreased strength, endurance, and mobility      Physician: Venkata Caruso MD    Visit Date: 9/13/2019    Physician Orders: PT Eval and Treat   Medical Diagnosis from Referral:   M47.812 (ICD-10-CM) - Spondylosis of cervical region without myelopathy or radiculopathy   M40.202 (ICD-10-CM) - Kyphosis of cervical region, unspecified kyphosis type   Evaluation Date: 9/6/2019  Authorization Period Expiration: 9/19/2019  Plan of Care Expiration: 12/6/2019  Visit # / Visits authorized: 2/6    Time In: 4:00p  Time Out: 5:10p  Total Billable Time: 30 minutes    Precautions: Standard and history ofcervical fusion    Subjective     Pt reports: he feels pretty good, he has been trying some of the exercises at home and he likes them.  He was compliant with home exercise program.  Response to previous treatment: good   Functional change: none to note    Pain: 3/10  Location: bilateral neck      Objective     CMS Impairment/Limitation/Restriction for FOTO Neck Survey  Status Limitation G-Code CMS Severity Modifier  Intake 43% 57% Current Status CK - At least 40 percent but less than 60 percent  Predicted 54% 46% Goal Status+ CK - At least 40 percent but less than 60 percent    Armand received therapeutic exercises to develop strength, endurance, ROM, flexibility, posture and core stabilization for 60 minutes including:    UBE 3/3 fwd/bwd  Scap retracts x20, 3 sec hold  Rows, RTB x30  Bilateral shoulder extension x30, RTB   Isometric cervical strengthening in all directions x10 ea, 5 sec hold   No money, YTB 3x10   Thoracic extension over chair, x15, 5 sec hold   Chin tucks x15, 3 sec hold   Supine T's, RTB, 2x15  Supine Y's, YTB, 2x15  Open books x15 ea side   Pec stretch over 1/2 foam  roll x3 min     Armand received cold pack for 10 minutes to cervical spine.    Home Exercises Provided and Patient Education Provided     Education provided:   - continue with HEP     Written Home Exercises Provided: Patient instructed to cont prior HEP.  Exercises were reviewed and Armand was able to demonstrate them prior to the end of the session.  Armand demonstrated good  understanding of the education provided.     See EMR under Patient Instructions for exercises provided 9/5/2019.    Assessment     Pt tolerated initial therapy session well. Since patient is only attending therapy 1x every other week, he was instructed to perform all exercises given to him. He had no increase in pain, but had difficulty performing cervical isometrics due to lack of understanding - performed manually by physical therapist to improve results. Pt required moderate to max verbal cueing to decrease cervical flexion throughout standing and sitting exercise. Pt would continue to benefit from further cervical and periscapular strengthening as well as improving posture. Continue with POC.     Armand is progressing well towards his goals.   Pt prognosis is Fair.     Pt will continue to benefit from skilled outpatient physical therapy to address the deficits listed in the problem list box on initial evaluation, provide pt/family education and to maximize pt's level of independence in the home and community environment.   Pt's spiritual, cultural and educational needs considered and pt agreeable to plan of care and goals.     Anticipated barriers to physical therapy: financial constraints, prior cervical surgery     Goals:  Short Term GOALS: 4 weeks. Pt agrees with goals set.  1. Patient demonstrates independence with HEP.   2. Patient demonstrates independence with Postural Awareness.   3. Patient will report pain of 8/10 at worst, on 0-10 pain scale, with all activity.   4. Patient demonstrates increased cervical ROM by 10 degrees in all  directions to improve tolerance to functional activities pain free.   5. Patient demonstrates increased strength BUE's by 1/3 muscle grade or greater to improve tolerance to functional activities pain free.      Long Term GOALS: 8 weeks. Pt agrees with goals set.  1. Patient demonstrates increased cervical ROM by 15 degrees in all planes to improve tolerance to functional activities pain free.   2. Patient demonstrates increased strength BUE's to 4/5 or greater to improve tolerance to functional activities pain free.  3. Patient demonstrates increased strength in lower trap, middle trap, and rhomboids to 4/5 or greater to improve postural stability for increased amount of time.    4. Patient demonstrates improved overall function per FOTO Neck Survey to Limitation or less. Perform next visit.   5. Patient demonstrates able to ride his bike over an even terrain with < or = to minimal pain and difficulty in his neck.     Plan     Incorporate new periscapular strengthening exercises next session, add more exercises to home program     Jennifer Pionn, PT   09/13/2019

## 2019-09-24 DIAGNOSIS — I10 ESSENTIAL HYPERTENSION: Chronic | ICD-10-CM

## 2019-09-24 RX ORDER — LISINOPRIL 10 MG/1
TABLET ORAL
Qty: 90 TABLET | Refills: 1 | Status: SHIPPED | OUTPATIENT
Start: 2019-09-24 | End: 2019-11-12 | Stop reason: SDUPTHER

## 2019-10-14 PROBLEM — M53.82 DECREASED ROM OF INTERVERTEBRAL DISCS OF CERVICAL SPINE: Status: RESOLVED | Noted: 2019-09-06 | Resolved: 2019-10-14

## 2019-10-14 PROBLEM — M54.2 CERVICAL PAIN: Status: RESOLVED | Noted: 2019-09-06 | Resolved: 2019-10-14

## 2019-10-14 PROBLEM — R68.89 DECREASED STRENGTH, ENDURANCE, AND MOBILITY: Status: RESOLVED | Noted: 2019-09-06 | Resolved: 2019-10-14

## 2019-10-14 PROBLEM — R29.3 POOR POSTURE: Status: RESOLVED | Noted: 2019-09-06 | Resolved: 2019-10-14

## 2019-10-14 PROBLEM — Z74.09 DECREASED STRENGTH, ENDURANCE, AND MOBILITY: Status: RESOLVED | Noted: 2019-09-06 | Resolved: 2019-10-14

## 2019-10-14 PROBLEM — R53.1 DECREASED STRENGTH, ENDURANCE, AND MOBILITY: Status: RESOLVED | Noted: 2019-09-06 | Resolved: 2019-10-14

## 2019-10-16 ENCOUNTER — CLINICAL SUPPORT (OUTPATIENT)
Dept: REHABILITATION | Facility: HOSPITAL | Age: 69
End: 2019-10-16
Attending: NEUROLOGICAL SURGERY
Payer: MEDICARE

## 2019-10-16 DIAGNOSIS — R68.89 DECREASED STRENGTH, ENDURANCE, AND MOBILITY: ICD-10-CM

## 2019-10-16 DIAGNOSIS — M54.2 CERVICAL PAIN: Primary | ICD-10-CM

## 2019-10-16 DIAGNOSIS — Z74.09 DECREASED STRENGTH, ENDURANCE, AND MOBILITY: ICD-10-CM

## 2019-10-16 DIAGNOSIS — R53.1 DECREASED STRENGTH, ENDURANCE, AND MOBILITY: ICD-10-CM

## 2019-10-16 DIAGNOSIS — R29.3 POOR POSTURE: ICD-10-CM

## 2019-10-16 DIAGNOSIS — M53.82 DECREASED ROM OF INTERVERTEBRAL DISCS OF CERVICAL SPINE: ICD-10-CM

## 2019-10-16 PROCEDURE — 97110 THERAPEUTIC EXERCISES: CPT | Mod: PN

## 2019-10-16 PROCEDURE — 97140 MANUAL THERAPY 1/> REGIONS: CPT | Mod: PN

## 2019-10-16 NOTE — PROGRESS NOTES
Physical Therapy Daily Treatment Note     Name: Armand Painting  [facility] Number: [id number]    Therapy Diagnosis:   Encounter Diagnoses   Name Primary?    Cervical pain Yes    Decreased ROM of intervertebral discs of cervical spine     Poor posture     Decreased strength, endurance, and mobility      Physician: Venkata Caruso MD    Visit Date: 10/16/2019    Physician Orders: PT Eval and Treat   Medical Diagnosis from Referral:   M47.812 (ICD-10-CM) - Spondylosis of cervical region without myelopathy or radiculopathy   M40.202 (ICD-10-CM) - Kyphosis of cervical region, unspecified kyphosis type   Evaluation Date: 9/6/2019  Authorization Period Expiration: 9/19/2019  Plan of Care Expiration: 12/6/2019  Visit # / Visits authorized: 3/6    Time In: 1500  Time Out: 1600  Total Billable Time: 60 minutes    Precautions: Standard and history ofcervical fusion    Subjective     Pt reports: stiffness in neck. Mild increase in pain today. Pt reports some days are better than others, regarding pain.  He was compliant with home exercise program.  Response to previous treatment: good   Functional change: none to note    Pain: 5/10  Location: bilateral neck      Objective     CMS Impairment/Limitation/Restriction for FOTO Neck Survey  Status Limitation G-Code CMS Severity Modifier  Intake 43% 57% Current Status CK - At least 40 percent but less than 60 percent  Predicted 54% 46% Goal Status+ CK - At least 40 percent but less than 60 percent    Armand received therapeutic exercises to develop strength, endurance, ROM, flexibility, posture and core stabilization for 40 minutes including:    UBE 3/3 fwd/bwd  Scap retracts x20, 3 sec hold  Rows, RTB x30  Bilateral shoulder extension x30, RTB   Isometric cervical strengthening in all directions x10 ea, 5 sec hold   No money, YTB 3x10   Thoracic extension over chair, x20, 5 sec hold   Chin tucks x15, 3 sec hold   Supine T's, RTB, 2x15  Supine Y's, YTB, 2x15  Open books x15 ea side   Pec  stretch over 1/2 foam roll x3 min     Manual therapy: STM to B UT region x 8 min    Armand received hot pack for 10 minutes to cervical spine.    Home Exercises Provided and Patient Education Provided     Education provided:   - continue with HEP     Written Home Exercises Provided: Patient instructed to cont prior HEP.  Exercises were reviewed and Armand was able to demonstrate them prior to the end of the session.  Armand demonstrated good  understanding of the education provided.     See EMR under Patient Instructions for exercises provided 9/5/2019.    Assessment      Pt tolerated session well with appropriate response to therex. Pt required cueing throughout for proper technique and count of therex. Pt requires mod-max verbal and tactile cueing to decrease forward head and rounded shoulders. Performed STM/trigger point release to B UT today with good response from pt. Pt with increased TTP on L UT compared to R. Pt would continue to benefit from further cervical and periscapular strengthening as well as improving posture. Continue with POC.     Armand is progressing well towards his goals.   Pt prognosis is Fair.     Pt will continue to benefit from skilled outpatient physical therapy to address the deficits listed in the problem list box on initial evaluation, provide pt/family education and to maximize pt's level of independence in the home and community environment.   Pt's spiritual, cultural and educational needs considered and pt agreeable to plan of care and goals.     Anticipated barriers to physical therapy: financial constraints, prior cervical surgery     Goals:  Short Term GOALS: 4 weeks. Pt agrees with goals set.  1. Patient demonstrates independence with HEP. (In Progress)  2. Patient demonstrates independence with Postural Awareness. (In Progress)  3. Patient will report pain of 8/10 at worst, on 0-10 pain scale, with all activity. (In Progress)  4. Patient demonstrates increased cervical ROM by 10  degrees in all directions to improve tolerance to functional activities pain free. (In Progress)  5. Patient demonstrates increased strength BUE's by 1/3 muscle grade or greater to improve tolerance to functional activities pain free. (In Progress)     Long Term GOALS: 8 weeks. Pt agrees with goals set.  1. Patient demonstrates increased cervical ROM by 15 degrees in all planes to improve tolerance to functional activities pain free.   2. Patient demonstrates increased strength BUE's to 4/5 or greater to improve tolerance to functional activities pain free.  3. Patient demonstrates increased strength in lower trap, middle trap, and rhomboids to 4/5 or greater to improve postural stability for increased amount of time.    4. Patient demonstrates improved overall function per FOTO Neck Survey to Limitation or less. Perform next visit.   5. Patient demonstrates able to ride his bike over an even terrain with < or = to minimal pain and difficulty in his neck.     Plan     Incorporate new periscapular strengthening exercises next session, add more exercises to home program     Janel Bliss, PTA   10/16/2019

## 2019-10-21 ENCOUNTER — PATIENT OUTREACH (OUTPATIENT)
Dept: ADMINISTRATIVE | Facility: OTHER | Age: 69
End: 2019-10-21

## 2019-10-23 ENCOUNTER — TELEPHONE (OUTPATIENT)
Dept: NEUROSURGERY | Facility: CLINIC | Age: 69
End: 2019-10-23

## 2019-10-23 ENCOUNTER — OFFICE VISIT (OUTPATIENT)
Dept: NEUROSURGERY | Facility: CLINIC | Age: 69
End: 2019-10-23
Payer: MEDICARE

## 2019-10-23 VITALS
TEMPERATURE: 97 F | HEART RATE: 60 BPM | WEIGHT: 268.5 LBS | BODY MASS INDEX: 40.83 KG/M2 | SYSTOLIC BLOOD PRESSURE: 128 MMHG | DIASTOLIC BLOOD PRESSURE: 83 MMHG

## 2019-10-23 DIAGNOSIS — M47.812 SPONDYLOSIS OF CERVICAL REGION WITHOUT MYELOPATHY OR RADICULOPATHY: Primary | ICD-10-CM

## 2019-10-23 DIAGNOSIS — M40.202 KYPHOSIS OF CERVICAL REGION, UNSPECIFIED KYPHOSIS TYPE: ICD-10-CM

## 2019-10-23 PROCEDURE — 99214 PR OFFICE/OUTPT VISIT, EST, LEVL IV, 30-39 MIN: ICD-10-PCS | Mod: S$GLB,,, | Performed by: NEUROLOGICAL SURGERY

## 2019-10-23 PROCEDURE — 1100F PR PT FALLS ASSESS DOC 2+ FALLS/FALL W/INJURY/YR: ICD-10-PCS | Mod: CPTII,S$GLB,, | Performed by: NEUROLOGICAL SURGERY

## 2019-10-23 PROCEDURE — 99999 PR PBB SHADOW E&M-EST. PATIENT-LVL III: CPT | Mod: PBBFAC,,, | Performed by: NEUROLOGICAL SURGERY

## 2019-10-23 PROCEDURE — 3079F DIAST BP 80-89 MM HG: CPT | Mod: CPTII,S$GLB,, | Performed by: NEUROLOGICAL SURGERY

## 2019-10-23 PROCEDURE — 3288F PR FALLS RISK ASSESSMENT DOCUMENTED: ICD-10-PCS | Mod: CPTII,S$GLB,, | Performed by: NEUROLOGICAL SURGERY

## 2019-10-23 PROCEDURE — 1100F PTFALLS ASSESS-DOCD GE2>/YR: CPT | Mod: CPTII,S$GLB,, | Performed by: NEUROLOGICAL SURGERY

## 2019-10-23 PROCEDURE — 3079F PR MOST RECENT DIASTOLIC BLOOD PRESSURE 80-89 MM HG: ICD-10-PCS | Mod: CPTII,S$GLB,, | Performed by: NEUROLOGICAL SURGERY

## 2019-10-23 PROCEDURE — 3074F SYST BP LT 130 MM HG: CPT | Mod: CPTII,S$GLB,, | Performed by: NEUROLOGICAL SURGERY

## 2019-10-23 PROCEDURE — 99214 OFFICE O/P EST MOD 30 MIN: CPT | Mod: S$GLB,,, | Performed by: NEUROLOGICAL SURGERY

## 2019-10-23 PROCEDURE — 3288F FALL RISK ASSESSMENT DOCD: CPT | Mod: CPTII,S$GLB,, | Performed by: NEUROLOGICAL SURGERY

## 2019-10-23 PROCEDURE — 3074F PR MOST RECENT SYSTOLIC BLOOD PRESSURE < 130 MM HG: ICD-10-PCS | Mod: CPTII,S$GLB,, | Performed by: NEUROLOGICAL SURGERY

## 2019-10-23 PROCEDURE — 99999 PR PBB SHADOW E&M-EST. PATIENT-LVL III: ICD-10-PCS | Mod: PBBFAC,,, | Performed by: NEUROLOGICAL SURGERY

## 2019-10-23 NOTE — TELEPHONE ENCOUNTER
----- Message from Jennifer Pinon, PT sent at 10/23/2019  1:11 PM CDT -----  Regarding: RE: Compliance  Kevin Brennan,            Yes, when I saw him for eval he said he was unable to pay a copay of 10 dollars two times a week or once a week so we decided on a good HEP and to come into PT once every other week. He came in, I believe, 2 times afterwards and then not again. I called him and left a message and there was no answer. I would not mind trying again, but yes compliance and finances were his biggest issues. Thanks,     Jennifer Pinon     ----- Message -----  From: Anu Howard RN  Sent: 10/23/2019   9:36 AM CDT  To: Janel Bliss PTA, Jennifer Pinon, PT  Subject: Compliance                                       Hi, Ladies,    I am guessing there have been some compliance issues with this patient. We saw him in clinic and Dr Caruso wants him to complete 2 months of PT. Pt has agreed.    Please let me know if there is something you need me to do.    Best!    Georgina

## 2019-10-23 NOTE — PATIENT INSTRUCTIONS
I recommend the patient participate in 2 full months of physical therapy and will refer him back.     I will schedule the patient for 3 month follow up to see how he is doing at that time.

## 2019-10-23 NOTE — PROGRESS NOTES
Subjective:   I, Patrice Salas, attest that this documentation has been prepared under the direction and in the presence of Venkata Caruso MD.     Patient ID: Armand Painting is a 69 y.o. male     Chief Complaint: No chief complaint on file.      HPI  Mr. Armand Painting is a pleasant 69 y.o. gentleman with cervical stenosis and spondylosis who is s/p C3-C7 posterior laminoplasty on 03/21/2016 and presents today for his 2 month follow up s/p physical therapy. At the time of the last office visit, on 08/21/2019, the pt complained of continued pain in the posterior aspect of the neck, limited cervical ROM, occasional instances of hand issues, and leg weakness. He was referred to pt and returns to discuss his progress.    Pt states he found physical therapy effective, though he only participated in four sessions. He is under the impression that additional sessions are no longer indicated.    Review of Systems   Constitutional: Negative for activity change, appetite change, fatigue, fever and unexpected weight change.   HENT: Negative for facial swelling.    Eyes: Negative.    Respiratory: Negative.    Cardiovascular: Negative.    Gastrointestinal: Negative for diarrhea, nausea and vomiting.   Endocrine: Negative.    Genitourinary: Negative.    Musculoskeletal: Negative for back pain, joint swelling, myalgias and neck pain.   Neurological: Negative for dizziness, weakness, numbness and headaches.   Psychiatric/Behavioral: Negative.       Past Medical History:   Diagnosis Date    Arthritis     Erectile dysfunction     Gout     Hyperlipidemia     Hypertension     Morbid obesity     Nuclear sclerosis of both eyes 1/16/2018    Screening for AAA (abdominal aortic aneurysm)     6/2016 AAA US no aneurysm       Objective:      Vitals:    10/23/19 0853   BP: 128/83   Pulse: 60   Temp: 97.3 °F (36.3 °C)      Physical Exam   Constitutional: He is oriented to person, place, and time. He appears well-nourished.   HENT:   Head:  Normocephalic and atraumatic.   Neck: Neck supple.   Neurological: He is alert and oriented to person, place, and time. No cranial nerve deficit. He displays a negative Romberg sign. GCS eye subscore is 4. GCS verbal subscore is 5. GCS motor subscore is 6.         I, Dr. Venkata Caruso, personally performed the services described in this documentation. All medical record entries made by the scribe, Patrice Salas, were at my direction and in my presence.  I have reviewed the chart and agree that the record reflects my personal performance and is accurate and complete. Venkata Caruso MD. 10/23/2019    Assessment:       Cervical stenosis.  S/p laminoplasty.     Plan:     I recommend the patient participate in 2 full months of physical therapy and will refer him back.     I will schedule the patient for 3 month follow up to see how he is doing at that time.

## 2019-10-29 ENCOUNTER — TELEPHONE (OUTPATIENT)
Dept: FAMILY MEDICINE | Facility: CLINIC | Age: 69
End: 2019-10-29

## 2019-10-29 DIAGNOSIS — M10.9 GOUT, ARTHRITIS: Chronic | ICD-10-CM

## 2019-10-29 DIAGNOSIS — R73.03 PREDIABETES: Chronic | ICD-10-CM

## 2019-10-29 DIAGNOSIS — D64.9 NORMOCYTIC ANEMIA: ICD-10-CM

## 2019-10-29 DIAGNOSIS — E78.2 MIXED HYPERLIPIDEMIA: Primary | ICD-10-CM

## 2019-10-29 NOTE — TELEPHONE ENCOUNTER
----- Message from Gabriel Chandra sent at 10/29/2019  9:39 AM CDT -----  Contact: Armand [phone]  Type: Lab    Caller is requesting to schedule their Lab appointment prior to annual appointment.    Order is not listed in EPIC.  Please enter order and contact patient to schedule.    Name of Caller: Armand Amador Date and Time of Labs: November 5    Date of EPP Appointment: November 12    Where would they like the lab performed? [facility]     Would the patient rather a call back or a response via [facility]? Call back     Best Call Back Number: Armand

## 2019-10-31 DIAGNOSIS — R73.03 PREDIABETES: Chronic | ICD-10-CM

## 2019-10-31 DIAGNOSIS — E78.5 HYPERLIPIDEMIA, UNSPECIFIED HYPERLIPIDEMIA TYPE: Chronic | ICD-10-CM

## 2019-10-31 RX ORDER — METFORMIN HYDROCHLORIDE 500 MG/1
TABLET, EXTENDED RELEASE ORAL
Qty: 90 TABLET | Refills: 0 | Status: SHIPPED | OUTPATIENT
Start: 2019-10-31 | End: 2019-11-12 | Stop reason: SDUPTHER

## 2019-10-31 RX ORDER — PRAVASTATIN SODIUM 20 MG/1
TABLET ORAL
Qty: 90 TABLET | Refills: 0 | Status: SHIPPED | OUTPATIENT
Start: 2019-10-31 | End: 2019-11-12 | Stop reason: SDUPTHER

## 2019-11-08 ENCOUNTER — LAB VISIT (OUTPATIENT)
Dept: LAB | Facility: HOSPITAL | Age: 69
End: 2019-11-08
Payer: MEDICARE

## 2019-11-08 DIAGNOSIS — R73.03 PREDIABETES: Chronic | ICD-10-CM

## 2019-11-08 DIAGNOSIS — D64.9 NORMOCYTIC ANEMIA: ICD-10-CM

## 2019-11-08 DIAGNOSIS — E78.2 MIXED HYPERLIPIDEMIA: ICD-10-CM

## 2019-11-08 DIAGNOSIS — M10.9 GOUT, ARTHRITIS: Chronic | ICD-10-CM

## 2019-11-08 LAB
ALBUMIN SERPL BCP-MCNC: 3.7 G/DL (ref 3.5–5.2)
ALP SERPL-CCNC: 52 U/L (ref 55–135)
ALT SERPL W/O P-5'-P-CCNC: 18 U/L (ref 10–44)
ANION GAP SERPL CALC-SCNC: 6 MMOL/L (ref 8–16)
AST SERPL-CCNC: 18 U/L (ref 10–40)
BASOPHILS # BLD AUTO: 0.02 K/UL (ref 0–0.2)
BASOPHILS NFR BLD: 0.3 % (ref 0–1.9)
BILIRUB SERPL-MCNC: 0.5 MG/DL (ref 0.1–1)
BUN SERPL-MCNC: 15 MG/DL (ref 8–23)
CALCIUM SERPL-MCNC: 9.5 MG/DL (ref 8.7–10.5)
CHLORIDE SERPL-SCNC: 100 MMOL/L (ref 95–110)
CHOLEST SERPL-MCNC: 160 MG/DL (ref 120–199)
CHOLEST/HDLC SERPL: 4 {RATIO} (ref 2–5)
CO2 SERPL-SCNC: 32 MMOL/L (ref 23–29)
CREAT SERPL-MCNC: 1.2 MG/DL (ref 0.5–1.4)
DIFFERENTIAL METHOD: ABNORMAL
EOSINOPHIL # BLD AUTO: 0.2 K/UL (ref 0–0.5)
EOSINOPHIL NFR BLD: 2.8 % (ref 0–8)
ERYTHROCYTE [DISTWIDTH] IN BLOOD BY AUTOMATED COUNT: 15.7 % (ref 11.5–14.5)
EST. GFR  (AFRICAN AMERICAN): >60 ML/MIN/1.73 M^2
EST. GFR  (NON AFRICAN AMERICAN): >60 ML/MIN/1.73 M^2
ESTIMATED AVG GLUCOSE: 131 MG/DL (ref 68–131)
GLUCOSE SERPL-MCNC: 103 MG/DL (ref 70–110)
HBA1C MFR BLD HPLC: 6.2 % (ref 4–5.6)
HCT VFR BLD AUTO: 40.5 % (ref 40–54)
HDLC SERPL-MCNC: 40 MG/DL (ref 40–75)
HDLC SERPL: 25 % (ref 20–50)
HGB BLD-MCNC: 11.9 G/DL (ref 14–18)
IMM GRANULOCYTES # BLD AUTO: 0.01 K/UL (ref 0–0.04)
IMM GRANULOCYTES NFR BLD AUTO: 0.1 % (ref 0–0.5)
LDLC SERPL CALC-MCNC: 92 MG/DL (ref 63–159)
LYMPHOCYTES # BLD AUTO: 3.7 K/UL (ref 1–4.8)
LYMPHOCYTES NFR BLD: 49.3 % (ref 18–48)
MCH RBC QN AUTO: 25.3 PG (ref 27–31)
MCHC RBC AUTO-ENTMCNC: 29.4 G/DL (ref 32–36)
MCV RBC AUTO: 86 FL (ref 82–98)
MONOCYTES # BLD AUTO: 0.6 K/UL (ref 0.3–1)
MONOCYTES NFR BLD: 8.1 % (ref 4–15)
NEUTROPHILS # BLD AUTO: 3 K/UL (ref 1.8–7.7)
NEUTROPHILS NFR BLD: 39.4 % (ref 38–73)
NONHDLC SERPL-MCNC: 120 MG/DL
NRBC BLD-RTO: 0 /100 WBC
PLATELET # BLD AUTO: 241 K/UL (ref 150–350)
PMV BLD AUTO: 12.4 FL (ref 9.2–12.9)
POTASSIUM SERPL-SCNC: 3.9 MMOL/L (ref 3.5–5.1)
PROT SERPL-MCNC: 7.9 G/DL (ref 6–8.4)
RBC # BLD AUTO: 4.71 M/UL (ref 4.6–6.2)
SODIUM SERPL-SCNC: 138 MMOL/L (ref 136–145)
TRIGL SERPL-MCNC: 140 MG/DL (ref 30–150)
URATE SERPL-MCNC: 9.3 MG/DL (ref 3.4–7)
WBC # BLD AUTO: 7.49 K/UL (ref 3.9–12.7)

## 2019-11-08 PROCEDURE — 80053 COMPREHEN METABOLIC PANEL: CPT

## 2019-11-08 PROCEDURE — 84550 ASSAY OF BLOOD/URIC ACID: CPT

## 2019-11-08 PROCEDURE — 36415 COLL VENOUS BLD VENIPUNCTURE: CPT | Mod: PO

## 2019-11-08 PROCEDURE — 83036 HEMOGLOBIN GLYCOSYLATED A1C: CPT

## 2019-11-08 PROCEDURE — 81269 HBA1/HBA2 GENE DUP/DEL VRNTS: CPT

## 2019-11-08 PROCEDURE — 85025 COMPLETE CBC W/AUTO DIFF WBC: CPT

## 2019-11-08 PROCEDURE — 80061 LIPID PANEL: CPT

## 2019-11-12 ENCOUNTER — OFFICE VISIT (OUTPATIENT)
Dept: FAMILY MEDICINE | Facility: CLINIC | Age: 69
End: 2019-11-12
Payer: MEDICARE

## 2019-11-12 VITALS
OXYGEN SATURATION: 96 % | DIASTOLIC BLOOD PRESSURE: 68 MMHG | WEIGHT: 268.5 LBS | BODY MASS INDEX: 40.69 KG/M2 | SYSTOLIC BLOOD PRESSURE: 104 MMHG | HEART RATE: 81 BPM | HEIGHT: 68 IN | TEMPERATURE: 98 F

## 2019-11-12 DIAGNOSIS — R73.03 PREDIABETES: Chronic | ICD-10-CM

## 2019-11-12 DIAGNOSIS — M48.02 CERVICAL STENOSIS OF SPINAL CANAL: Primary | Chronic | ICD-10-CM

## 2019-11-12 DIAGNOSIS — Z23 NEEDS FLU SHOT: ICD-10-CM

## 2019-11-12 DIAGNOSIS — N40.1 BENIGN PROSTATIC HYPERPLASIA WITH URINARY FREQUENCY: ICD-10-CM

## 2019-11-12 DIAGNOSIS — E78.5 HYPERLIPIDEMIA, UNSPECIFIED HYPERLIPIDEMIA TYPE: Chronic | ICD-10-CM

## 2019-11-12 DIAGNOSIS — I10 ESSENTIAL HYPERTENSION: Chronic | ICD-10-CM

## 2019-11-12 DIAGNOSIS — R35.0 BENIGN PROSTATIC HYPERPLASIA WITH URINARY FREQUENCY: ICD-10-CM

## 2019-11-12 PROCEDURE — 3288F PR FALLS RISK ASSESSMENT DOCUMENTED: ICD-10-PCS | Mod: CPTII,S$GLB,, | Performed by: INTERNAL MEDICINE

## 2019-11-12 PROCEDURE — 90662 IIV NO PRSV INCREASED AG IM: CPT | Mod: S$GLB,,, | Performed by: INTERNAL MEDICINE

## 2019-11-12 PROCEDURE — G0008 ADMIN INFLUENZA VIRUS VAC: HCPCS | Mod: S$GLB,,, | Performed by: INTERNAL MEDICINE

## 2019-11-12 PROCEDURE — 3078F PR MOST RECENT DIASTOLIC BLOOD PRESSURE < 80 MM HG: ICD-10-PCS | Mod: CPTII,S$GLB,, | Performed by: INTERNAL MEDICINE

## 2019-11-12 PROCEDURE — G0008 FLU VACCINE - HIGH DOSE (65+) PRESERVATIVE FREE IM: ICD-10-PCS | Mod: S$GLB,,, | Performed by: INTERNAL MEDICINE

## 2019-11-12 PROCEDURE — 99999 PR PBB SHADOW E&M-EST. PATIENT-LVL IV: CPT | Mod: PBBFAC,,, | Performed by: INTERNAL MEDICINE

## 2019-11-12 PROCEDURE — 99214 OFFICE O/P EST MOD 30 MIN: CPT | Mod: 25,S$GLB,, | Performed by: INTERNAL MEDICINE

## 2019-11-12 PROCEDURE — 90662 FLU VACCINE - HIGH DOSE (65+) PRESERVATIVE FREE IM: ICD-10-PCS | Mod: S$GLB,,, | Performed by: INTERNAL MEDICINE

## 2019-11-12 PROCEDURE — 99999 PR PBB SHADOW E&M-EST. PATIENT-LVL IV: ICD-10-PCS | Mod: PBBFAC,,, | Performed by: INTERNAL MEDICINE

## 2019-11-12 PROCEDURE — 3078F DIAST BP <80 MM HG: CPT | Mod: CPTII,S$GLB,, | Performed by: INTERNAL MEDICINE

## 2019-11-12 PROCEDURE — 1100F PTFALLS ASSESS-DOCD GE2>/YR: CPT | Mod: CPTII,S$GLB,, | Performed by: INTERNAL MEDICINE

## 2019-11-12 PROCEDURE — 3074F PR MOST RECENT SYSTOLIC BLOOD PRESSURE < 130 MM HG: ICD-10-PCS | Mod: CPTII,S$GLB,, | Performed by: INTERNAL MEDICINE

## 2019-11-12 PROCEDURE — 3074F SYST BP LT 130 MM HG: CPT | Mod: CPTII,S$GLB,, | Performed by: INTERNAL MEDICINE

## 2019-11-12 PROCEDURE — 99214 PR OFFICE/OUTPT VISIT, EST, LEVL IV, 30-39 MIN: ICD-10-PCS | Mod: 25,S$GLB,, | Performed by: INTERNAL MEDICINE

## 2019-11-12 PROCEDURE — 3288F FALL RISK ASSESSMENT DOCD: CPT | Mod: CPTII,S$GLB,, | Performed by: INTERNAL MEDICINE

## 2019-11-12 PROCEDURE — 1100F PR PT FALLS ASSESS DOC 2+ FALLS/FALL W/INJURY/YR: ICD-10-PCS | Mod: CPTII,S$GLB,, | Performed by: INTERNAL MEDICINE

## 2019-11-12 RX ORDER — LISINOPRIL 10 MG/1
10 TABLET ORAL DAILY
Qty: 90 TABLET | Refills: 3 | Status: SHIPPED | OUTPATIENT
Start: 2019-11-12 | End: 2020-07-13 | Stop reason: SDUPTHER

## 2019-11-12 RX ORDER — HYDROCHLOROTHIAZIDE 25 MG/1
25 TABLET ORAL DAILY
Qty: 90 TABLET | Refills: 3 | Status: SHIPPED | OUTPATIENT
Start: 2019-11-12 | End: 2020-07-13 | Stop reason: SDUPTHER

## 2019-11-12 RX ORDER — METFORMIN HYDROCHLORIDE 500 MG/1
TABLET, EXTENDED RELEASE ORAL
Qty: 90 TABLET | Refills: 3 | Status: SHIPPED | OUTPATIENT
Start: 2019-11-12 | End: 2020-07-13 | Stop reason: SDUPTHER

## 2019-11-12 RX ORDER — PRAVASTATIN SODIUM 20 MG/1
20 TABLET ORAL DAILY
Qty: 90 TABLET | Refills: 3 | Status: SHIPPED | OUTPATIENT
Start: 2019-11-12 | End: 2020-07-13 | Stop reason: SDUPTHER

## 2019-11-12 RX ORDER — TAMSULOSIN HYDROCHLORIDE 0.4 MG/1
0.8 CAPSULE ORAL DAILY
Qty: 180 CAPSULE | Refills: 3 | Status: SHIPPED | OUTPATIENT
Start: 2019-11-12 | End: 2020-07-13 | Stop reason: SDUPTHER

## 2019-11-12 NOTE — PATIENT INSTRUCTIONS
Understanding USDA MyPlate  The USDA (U.S. Department of Agriculture) has guidelines to help you make healthy food choices. These are called MyPlate. MyPlate shows the food groups that make up healthy meals using the image of a place setting. Before you eat, think about the healthiest choices for what to put onto your plate or into your cup or bowl. To learn more about building a healthy plate, visit www.choosemyplate.gov.    The food groups  · Fruits. Any fruit or 100% fruit juice counts as part of the Fruit Group. Fruits may be fresh, canned, frozen, or dried, and may be whole, cut-up, or pureed. Make half your plate fruits and vegetables.  · Vegetables. Any vegetable or 100% vegetable juice counts as a member of the Vegetable Group. Vegetables may be fresh, frozen, canned, or dried. They can be served raw or cooked and may be whole, cut-up, or mashed. Make half your plate fruits and vegetables.  · Grains. All foods made from grains are part of the Grains Group. These include wheat, rice, oats, cornmeal, and barley such as bread, pasta, oatmeal, cereal, tortillas, and grits. Grains should be no more than a quarter of your plate. At least half of your grains should be whole grains.  · Protein. This group includes meat, poultry, seafood, beans and peas, eggs, processed soy products (like tofu), nuts (including nut butters), and seeds. Make protein choices no more than a quarter of your plate. Meat and poultry choices should be lean or low fat.  · Dairy. All fluid milk products and foods made from milk that contain calcium, like yogurt and cheese are part of the Dairy Group. (Foods that have little calcium, such as cream, butter, and cream cheese, are not part of the group.) Most dairy choices should be low-fat or fat-free.  · Oils. These are fats that are liquid at room temperature. They include canola, corn, olive, soybean, and sunflower oil. Foods that are mainly oil include mayonnaise, certain salad dressings,  and soft margarines. You should have only 5 to 7 teaspoons of oils a day. You probably already get this much from the food you eat.  Use Smart Mocha to help build your meals  The SuperTracker can help you plan and track your meals and activity. You can look up individual foods to see or compare their nutritional value. You can get guidelines for what and how much you should eat. You can compare your food choices. And you can assess personal physical activities and see ways you can improve. Go to www.Mailcloud.gov/supertracker/. For more eating tips and nutritional information, go to www.Cogbooksmyplate.gov/ten-tips.  Date Last Reviewed: 6/1/2015  © 6944-3837 [employer] [address] [place] [address], [place] [address]. All rights reserved. This information is not intended as a substitute for professional medical care. Always follow your healthcare professional's instructions.

## 2019-11-12 NOTE — PROGRESS NOTES
This note was created by combination of typed  and M-Modal dictation.  Transcription errors may be present.  If there are any questions, please contact me.    Assessment & Plan:   Cervical stenosis of spinal canal 3/2016 C3-7 laminoplasty  -neurosurgery had instructed him to do 2 months of physical therapy.  He needs a re-referral for that and I have ordered that.  -     Ambulatory Referral to Physical/Occupational Therapy    Needs flu shot  -     Influenza - High Dose (65+) (PF) (IM)    Benign prostatic hyperplasia with urinary frequency  -stable on tamsulosin, refilled.  Seen by Urology.  -     tamsulosin (FLOMAX) 0.4 mg Cap; Take 2 capsules (0.8 mg total) by mouth once daily. Increased dose  Dispense: 180 capsule; Refill: 3    Essential hypertension  -stable on lisinopril and HCT refilled  -     lisinopril 10 MG tablet; Take 1 tablet (10 mg total) by mouth once daily.  Dispense: 90 tablet; Refill: 3  -     hydroCHLOROthiazide (HYDRODIURIL) 25 MG tablet; Take 1 tablet (25 mg total) by mouth once daily.  Dispense: 90 tablet; Refill: 3    Hyperlipidemia, unspecified hyperlipidemia type  -stable on pravastatin no change.  Future labs ordered  -     pravastatin (PRAVACHOL) 20 MG tablet; Take 1 tablet (20 mg total) by mouth once daily.  Dispense: 90 tablet; Refill: 3  -     Lipid panel; Future; Expected date: 05/10/2020    Prediabetes  -stable, on metformin, needs to work on diet and physical activity and weight loss.  We talked about strategies to improve his diet.  Avoid sweet drinks.  Reduce portion sizes.  Gave him a handout about My Plate. Avoid eating where he can see the serving dishes. Use smaller plates.   Talked about how 20 pounds weight loss can be the equivalent of taking a pain pill  His insurance covers a gym membership and he is encouraged to use it.  -     metFORMIN (GLUCOPHAGE-XR) 500 MG 24 hr tablet; TAKE 1 TABLET BY MOUTH EVERY DAY WITH BREAKFAST  Dispense: 90 tablet; Refill: 3  -      Comprehensive metabolic panel; Future; Expected date: 05/10/2020  -     Hemoglobin A1c; Future; Expected date: 05/10/2020    Medications Discontinued During This Encounter   Medication Reason    tadalafil (CIALIS) 20 MG Tab Alternate therapy    tamsulosin (FLOMAX) 0.4 mg Cap Reorder    lisinopril 10 MG tablet Reorder    hydroCHLOROthiazide (HYDRODIURIL) 25 MG tablet Reorder    pravastatin (PRAVACHOL) 20 MG tablet Reorder    metFORMIN (GLUCOPHAGE-XR) 500 MG 24 hr tablet Reorder       meds sent this encounter:  Medications Ordered This Encounter   Medications    hydroCHLOROthiazide (HYDRODIURIL) 25 MG tablet     Sig: Take 1 tablet (25 mg total) by mouth once daily.     Dispense:  90 tablet     Refill:  3    lisinopril 10 MG tablet     Sig: Take 1 tablet (10 mg total) by mouth once daily.     Dispense:  90 tablet     Refill:  3    metFORMIN (GLUCOPHAGE-XR) 500 MG 24 hr tablet     Sig: TAKE 1 TABLET BY MOUTH EVERY DAY WITH BREAKFAST     Dispense:  90 tablet     Refill:  3    pravastatin (PRAVACHOL) 20 MG tablet     Sig: Take 1 tablet (20 mg total) by mouth once daily.     Dispense:  90 tablet     Refill:  3    tamsulosin (FLOMAX) 0.4 mg Cap     Sig: Take 2 capsules (0.8 mg total) by mouth once daily. Increased dose     Dispense:  180 capsule     Refill:  3       Follow Up: No follow-ups on file. OV 6 months previsit labs ordered.     Subjective:     Chief Complaint   Patient presents with    Results       HPI  Armand is a 69 y.o. male, last appointment with this clinic was 6/19/2019.    Saw pain mgmt for chronic neck pain, was recommended to see neurosurgery.   Saw neurosurgery in follow up mid October. Cervical stenosis.  S/p PT x 4 sessions. Plan was continue PT  Saw urology for nocturia, PVR felt to be negative.  Hx of gout not on meds. previsit labs uric acid elevated    previsit labs mild anemia stable. GI workup previously WNL  Lipid good  a1c 6.2 from 5.9  Alpha thal pending    Needs referral back to  PT.  Was supposed to get re-referred to PT/OT with neurosurgery but don't see the order.    No chest pain no dyspnea.     Chronic back pain.  Wonders about a back brace and I discouraged him from getting one.  Work with PT/OT.    Obesity, needs to work on diet and physical activity.  He has a gym membership as part of his insurance and I have encouraged him to use it.  We talked about strategies to reduce food portions.  Eat with small plates.  Do not face the serving dishes while eating.    No episodes of gout.  Uric acid was high.     Diet - needs more vegetables. No sweet or cold drinks. Mostly water.  Physical activity - needs more.     Patient Care Team:  Clark Vargas MD as PCP - General (Internal Medicine)  Fernando Jcainto MD as Consulting Physician (Orthopedic Surgery)  Venkata Caruso MD as Consulting Physician (Neurosurgery)  Mina Betancourt MA as Care Coordinator    Patient Active Problem List    Diagnosis Date Noted    Normocytic anemia 10/18 colonoscopy tubular adenoma; 6/2019 Hb electrophoresis WNL 06/20/2019    Tubular adenoma of colon 10/2018 colonoscopy sigmoid tubular adenoma 11/02/2018    Diverticulosis of large intestine without hemorrhage on colonoscopy 10/2018 11/01/2018    SIS (obstructive sleep apnea) on sleep study 4/2018 with AHI 55.  CPAP at 15     Nuclear sclerosis of both eyes 01/16/2018    Refractive error 01/16/2018    Benign prostatic hyperplasia with urinary frequency 01/10/2018    Hemorrhoids 06/01/2016    Cervical stenosis of spinal canal 3/2016 C3-7 laminoplasty 03/31/2016     3/31/2016 pt underwent a C3 through C7 Laminoplasty with posterior cervical approach      Prediabetes 03/29/2016    Essential hypertension no outside checks; 3/2014 exercise stress echo no ischemia LVEF 55-60 03/20/2014     3/11/2014 exercise stress echo negative for ischemia.  Normal LV systolic function LVEF 55-60.  Concentric remodeling.      Mixed hyperlipidemia 03/20/2014    Morbid  obesity with BMI of 40.0-44.9, adult 03/06/2014    Back pain 07/11/2013    ED (erectile dysfunction) 06/07/2013    Gout, arthritis 01/07/2013       PAST MEDICAL HISTORY:  Past Medical History:   Diagnosis Date    Arthritis     Erectile dysfunction     Gout     Hyperlipidemia     Hypertension     Morbid obesity     Nuclear sclerosis of both eyes 1/16/2018    Screening for AAA (abdominal aortic aneurysm)     6/2016 AAA US no aneurysm       PAST SURGICAL HISTORY:  Past Surgical History:   Procedure Laterality Date    COLONOSCOPY N/A 10/31/2018    Procedure: COLONOSCOPY;  Surgeon: Clementine Batista MD;  [facility];  Service: Endoscopy;  Laterality: N/A;    SPINE SURGERY  3/31/16    C3-C7 laminoplasty,Dr Caruso    tooth implant         SOCIAL HISTORY:  Social History     Socioeconomic History    Marital status:      Spouse name: Not on file    Number of children: Not on file    Years of education: Not on file    Highest education level: Not on file   Occupational History    Not on file   Social Needs    Financial resource strain: Not on file    Food insecurity:     Worry: Not on file     Inability: Not on file    Transportation needs:     Medical: Not on file     Non-medical: Not on file   Tobacco Use    Smoking status: Former Smoker     Types: Cigarettes     Last attempt to quit: 1/7/2009     Years since quitting: 10.8    Smokeless tobacco: Former User   Substance and Sexual Activity    Alcohol use: Yes     Comment: occasional beer- average week 12 beers a week    Drug use: No    Sexual activity: Not Currently     Partners: Female     Comment: , children.   Lifestyle    Physical activity:     Days per week: Not on file     Minutes per session: Not on file    Stress: Not on file   Relationships    Social connections:     Talks on phone: Not on file     Gets together: Not on file     Attends Zoroastrian service: Not on file     Active member of club or organization: Not on file      Attends meetings of clubs or organizations: Not on file     Relationship status: Not on file   Other Topics Concern    Not on file   Social History Narrative    Not on file       ALLERGIES AND MEDICATIONS: updated and reviewed.  Review of patient's allergies indicates:   Allergen Reactions    Codeine Swelling     Codeine with Aspirin caused chest pain     Current Outpatient Medications   Medication Sig Dispense Refill    aspirin (ECOTRIN) 81 MG EC tablet Take 81 mg by mouth once daily.      hydroCHLOROthiazide (HYDRODIURIL) 25 MG tablet Take 1 tablet (25 mg total) by mouth once daily. 90 tablet 3    lisinopril 10 MG tablet TAKE 1 TABLET BY MOUTH EVERY DAY 90 tablet 1    metFORMIN (GLUCOPHAGE-XR) 500 MG 24 hr tablet TAKE 1 TABLET BY MOUTH EVERY DAY WITH BREAKFAST 90 tablet 0    nystatin-triamcinolone (MYCOLOG II) cream Apply topically 4 (four) times daily. 15 g 0    pravastatin (PRAVACHOL) 20 MG tablet TAKE 1 TABLET BY MOUTH EVERY DAY 90 tablet 0    sildenafil (VIAGRA) 100 MG tablet Take 1 tablet (100 mg total) by mouth daily as needed for Erectile Dysfunction. *generic tabs* 10 tablet 11    tamsulosin (FLOMAX) 0.4 mg Cap Take 2 capsules (0.8 mg total) by mouth once daily. Increased dose 180 capsule 3    tadalafil (CIALIS) 20 MG Tab Take 1 tablet (20 mg total) by mouth daily as needed for Erectile Dysfunction. (Patient not taking: Reported on 10/23/2019) 5 tablet 11     No current facility-administered medications for this visit.        Review of Systems   Constitutional: Negative for chills and fever.   Respiratory: Negative for cough and shortness of breath.    Cardiovascular: Negative for chest pain and palpitations.   Gastrointestinal: Negative for abdominal pain.   Genitourinary: Negative for dysuria.       Objective:   Physical Exam   Vitals:    11/12/19 1400   BP: 104/68   BP Location: Right arm   Patient Position: Sitting   BP Method: Large (Manual)   Pulse: 81   Temp: 98 °F (36.7 °C)  "  [facility]: [facility]   SpO2: 96%   Weight: 121.8 kg (268 lb 8.3 oz)   Height: 5' 8" (1.727 m)    Body mass index is 40.83 kg/m².  Weight: 121.8 kg (268 lb 8.3 oz)   Height: 5' 8" (172.7 cm)     Physical Exam   Constitutional: He is oriented to person, place, and time. He appears well-developed and well-nourished. No distress.   Eyes: EOM are normal.   Cardiovascular: Normal rate, regular rhythm and normal heart sounds.   No murmur heard.  Pulmonary/Chest: Effort normal and breath sounds normal.   Musculoskeletal: Normal range of motion. He exhibits edema.   Neurological: He is alert and oriented to person, place, and time. Coordination normal.   Skin: Skin is warm and dry.   Psychiatric: He has a normal mood and affect. His behavior is normal. Thought content normal.        Component      Latest Ref Rng & Units 11/8/2019 6/19/2019   WBC      3.90 - 12.70 K/uL 7.49 7.14   RBC      4.60 - 6.20 M/uL 4.71 4.69   Hemoglobin      14.0 - 18.0 g/dL 11.9 (L) 12.0 (L)   Hematocrit      40.0 - 54.0 % 40.5 38.2 (L)   MCV      82 - 98 fL 86 81 (L)   MCH      27.0 - 31.0 pg 25.3 (L) 25.6 (L)   MCHC      32.0 - 36.0 g/dL 29.4 (L) 31.4 (L)   RDW      11.5 - 14.5 % 15.7 (H) 15.7 (H)   Platelets      150 - 350 K/uL 241 246   MPV      9.2 - 12.9 fL 12.4 12.6   Immature Granulocytes      0.0 - 0.5 % 0.1 0.1   Gran # (ANC)      1.8 - 7.7 K/uL 3.0 2.9   Immature Grans (Abs)      0.00 - 0.04 K/uL 0.01 0.01   Lymph #      1.0 - 4.8 K/uL 3.7 3.6   Mono #      0.3 - 1.0 K/uL 0.6 0.5   Eos #      0.0 - 0.5 K/uL 0.2 0.2   Baso #      0.00 - 0.20 K/uL 0.02 0.02   nRBC      0 /100 WBC 0 0   Gran%      38.0 - 73.0 % 39.4 40.0   Lymph%      18.0 - 48.0 % 49.3 (H) 50.4 (H)   Mono%      4.0 - 15.0 % 8.1 6.7   Eosinophil%      0.0 - 8.0 % 2.8 2.5   Basophil%      0.0 - 1.9 % 0.3 0.3   Differential Method       Automated Automated   Sodium      136 - 145 mmol/L 138 141   Potassium      3.5 - 5.1 mmol/L 3.9 3.7   Chloride      95 - 110 mmol/L 100 100 "   CO2      23 - 29 mmol/L 32 (H) 31 (H)   Glucose      70 - 110 mg/dL 103 100   BUN, Bld      8 - 23 mg/dL 15 16   Creatinine      0.5 - 1.4 mg/dL 1.2 1.1   Calcium      8.7 - 10.5 mg/dL 9.5 10.3   PROTEIN TOTAL      6.0 - 8.4 g/dL 7.9 8.2   Albumin      3.5 - 5.2 g/dL 3.7 3.9   BILIRUBIN TOTAL      0.1 - 1.0 mg/dL 0.5 0.1   Alkaline Phosphatase      55 - 135 U/L 52 (L) 64   AST      10 - 40 U/L 18 21   ALT      10 - 44 U/L 18 24   Anion Gap      8 - 16 mmol/L 6 (L) 10   eGFR if African American      >60 mL/min/1.73 m:2 >60.0 >60.0   eGFR if non African American      >60 mL/min/1.73 m:2 >60.0 >60.0   Cholesterol      120 - 199 mg/dL 160    Triglycerides      30 - 150 mg/dL 140    HDL      40 - 75 mg/dL 40    LDL Cholesterol External      63.0 - 159.0 mg/dL 92.0    Hdl/Cholesterol Ratio      20.0 - 50.0 % 25.0    Total Cholesterol/HDL Ratio      2.0 - 5.0 4.0    Non-HDL Cholesterol      mg/dL 120    Hemoglobin A1C External      4.0 - 5.6 % 6.2 (H) 5.9 (H)   Estimated Avg Glucose      68 - 131 mg/dL 131 123   Uric Acid      3.4 - 7.0 mg/dL 9.3 (H)

## 2019-11-13 LAB
ALPHA GLOBIN RELEASED BY: NORMAL
ALPHA-GLOBIN SPECIMEN: NORMAL
GENETICIST REVIEW: NORMAL
HBA1 GENE MUT ANL BLD/T: NORMAL
HBA1 GENE MUT ANL BLD/T: NORMAL
REF LAB TEST METHOD: NORMAL
SERVICE CMNT-IMP: NORMAL
SPECIMEN SOURCE: NORMAL

## 2019-11-14 PROBLEM — D56.3 ALPHA THALASSEMIA SILENT CARRIER: Status: ACTIVE | Noted: 2019-11-14

## 2019-11-15 ENCOUNTER — CLINICAL SUPPORT (OUTPATIENT)
Dept: REHABILITATION | Facility: HOSPITAL | Age: 69
End: 2019-11-15
Attending: INTERNAL MEDICINE
Payer: MEDICARE

## 2019-11-15 DIAGNOSIS — R29.898 DECREASED ROM OF NECK: Primary | ICD-10-CM

## 2019-11-15 DIAGNOSIS — M25.611 DECREASED RANGE OF MOTION OF RIGHT SHOULDER: ICD-10-CM

## 2019-11-15 DIAGNOSIS — M54.2 NECK PAIN: ICD-10-CM

## 2019-11-15 DIAGNOSIS — R29.3 POSTURAL IMBALANCE: ICD-10-CM

## 2019-11-15 PROCEDURE — 97110 THERAPEUTIC EXERCISES: CPT | Mod: PN

## 2019-11-15 PROCEDURE — 97162 PT EVAL MOD COMPLEX 30 MIN: CPT | Mod: PN

## 2019-11-15 NOTE — PLAN OF CARE
[facility]   PATIENT EVALUATION    Name: Armand Painting  [facility] Number: [id number]    Diagnosis:   Encounter Diagnoses   Name Primary?    Decreased ROM of neck Yes    Neck pain     Decreased range of motion of right shoulder     Postural imbalance      Physician: Clark Vargas MD  Treatment Orders: PT Eval and Treat    History     Past Medical History:   Diagnosis Date    Arthritis     Erectile dysfunction     Gout     Hyperlipidemia     Hypertension     Morbid obesity     Nuclear sclerosis of both eyes 1/16/2018    Screening for AAA (abdominal aortic aneurysm)     6/2016 AAA US no aneurysm     Current Outpatient Medications   Medication Sig    aspirin (ECOTRIN) 81 MG EC tablet Take 81 mg by mouth once daily.    hydroCHLOROthiazide (HYDRODIURIL) 25 MG tablet Take 1 tablet (25 mg total) by mouth once daily.    lisinopril 10 MG tablet Take 1 tablet (10 mg total) by mouth once daily.    metFORMIN (GLUCOPHAGE-XR) 500 MG 24 hr tablet TAKE 1 TABLET BY MOUTH EVERY DAY WITH BREAKFAST    nystatin-triamcinolone (MYCOLOG II) cream Apply topically 4 (four) times daily.    pravastatin (PRAVACHOL) 20 MG tablet Take 1 tablet (20 mg total) by mouth once daily.    sildenafil (VIAGRA) 100 MG tablet Take 1 tablet (100 mg total) by mouth daily as needed for Erectile Dysfunction. *generic tabs*    tamsulosin (FLOMAX) 0.4 mg Cap Take 2 capsules (0.8 mg total) by mouth once daily. Increased dose     No current facility-administered medications for this visit.      Review of patient's allergies indicates:   Allergen Reactions    Codeine Swelling     Codeine with Aspirin caused chest pain       Precautions: standard    Evaluation Date: 11/15/19  Start Time: 1000  Stop Time: 1050  Visit # authorized: 1/6  Authorization period: 11/29/19  Plan of care expiration: 1/15/19  MD referral: Clark Vargas MD    Hx of present illness: Patient was having difficulty lifting R arm that resulted in a cervical laminoplasty  C3-C7 on 3/31/16. Neck has been hurting since the surgery.  Patient received some home health after the surgery and went to 3 visits outpatient in 2019 but failed to return.      Subjective     Armand Painting states he has neck pain and difficulty looking up. It hurts worse today maybe because of the cold. His neck hurts when he steps down such as going down stairs or jumping. He used to ride his [employer]tw[employer] bike but can't do it anymore due to pain because whenever he hits a bump his neck hurts. Patient reports he fell 3 months ago slipping in [employer] where it was wet on the floor. Pt reports he went to therapy a couple times this year but just didn't have any more appointments and he didn't get told to come in.      Pain:  Location: neck  Description: aching  Activities Which Increase Pain: looking up and to the side, stepping down, riding motorcycle  Activities Which Decrease Pain: heating  Pain Scale: 7/10 now 7/10 at worst 4/10 at best      Vertebral artery symptoms: dizziness sometimes when he is walking-doesn't last long    Red flags:  Pt. denies bowel/bladder symptoms. Recent weight loss? denies Constant/Night pain that is unchanging with change of position? denies PMH of CA? denies      Physical Therapy Goals: to look upright and to ride his Antwon    Objective     Observation: Patient ambulated into clinic with no AD    Posture: FHP, rounded shoulders, increased thoracic kyphosis, depressed L scapula, decreased cervical lordosis, L lateral shift, cervical flexion and L rotation    Cervical Spine Special Tests:  Vertebral Artery Test: neg  Compression: NT  Distraction: NT  Spurling's A: NT  Deep Cervical Flexors: weak    Sensation: Dermatomes: Intact       Cervical ROM: (measured in degrees)    Degrees Quality   extension 20    Flexion 15    Right Rotation 40    Left Rotation 35    Right SB 5    Left SB 10      Shoulder Active/ Passive ROM: (measured in degrees)   Shoulder Right UE Left UE   Flexion 80/WNL  "120/WNL   Abduction 70//WNL   ER WNL WNL   IR WNL WNL       Upper Extremity Strength: (grading 1-5 out of 5)      Right UE Left UE   Shoulder Flexion: 3-/5 5/5   Shoulder Abduction: 3-/5 5/5   Shoulder ER: 4-/5 4-/5   Shoulder IR: 4-/5 4-/5   Elbow Flexion: 5/5 4+/5   Elbow Extension: 4+/5 4+/5   Lower Trap: 4-/5 4-/5   Middle Trap: 4-/5 4-/5   Rhomboids: 4-/5 4-/5       Thoracic ROM   50% B rotation, 10% extension, WNL flexion    Palpation for condition: elevated B first rib, muscle tightness surrounding c/s and shoulders      Joint Mobility:  Decreased thoracic and cervical joint mobility       PT reviewed FOTO scores for Armand Painting on 11/15/19  FOTO score:  63% limited      Functional Limitations Reports - G Codes  Category: mobility  Tool: FOTO      Current (): CL  Goal (): CK      TREATMENT time separate from evaluation    Time In; 1027  Time Out: 1044    Therapeutic exercise: Armand received therapeutic exercises to develop strength and endurance, flexibility for 16 minutes including:     Supine chin tucks with towel under back of head  Supine neck rotations x15  Supine scaption 2x10  scap squeezes 20x5"  Thoracic extension over chair 20x3"    NV Add:  UBE  pulleys  Rows  Shoulder extension  Corner stretch  Open books  Levator/UT stretch    Manual therapy: Armand  received the following manual therapy techniques x 1 min    B first rib mobilization       Pt. Education: Instructed pt. regarding: HEP including proper form/technique; body mechanics, and posture re-education. Pt demonstrated and verbalized good understanding of the education provided. Armand demonstrated good return demonstration of activities.  · No cultural, environmental, or spiritual barriers identified to treatment or learning.      Assessment   Patient is a 69 y.o. male referred to outpatient physical therapy who presents with a PT diagnosis of neck pain and postural instability demonstrating joint dysfunction and functional " limitation as described below. Level of complexity is moderate;  based on patient's past medical history including the below co-morbidities and personal factors; functional limitations, and clinical presentation directly impacting his/her plan of care. Pt demonstrates good rehab potential. Pt will benefit from physcial therapy services in order to maximize pain free functional mobility. The following goals were discussed with the patient and patient is in agreement with them as to be addressed in the treatment plan. Pt was given a HEP consisting of chin tucks, scaption, thoracic extension, and scap squeezes. Pt verbally understood the instructions as they were given and demonstrated proper form and technique during therapy. Pt was advised to perform these exercises free of pain, and to stop performing them if pain occurs.         History  Co-morbidities and personal factors that may impact the plan of care Examination  Body Structures and Functions, activity limitations and participation restrictions that may impact the plan of care Clinical Presentation   Decision Making/ Complexity Score   Co-morbidities:   Obesity, hx of neck surgery, back pain, SIS            Personal Factors:   age Body Regions: neck, low back    Body Systems: musculoskeletal      Activity limitations: stepping down, turning head      Participation Restrictions: riding Antwon       evolving   moderate       Medical necessity is demonstrated by the following IMPAIRMENTS/PROBLEM LIST:   1) Pain limiting function   2) Postural dysfunction   3) Longus colli/suboccipital tightness   4) Upper trapezius/levator scapula tightness   5) Longus colli/scapula stabilizer weakness    6) Decreased cervical/shoulder ROM    7) Decreased cervical and thoracic joint mobility   8) Decreased UT/levator scapula/scalenes soft tissue extensibility   9) Lack of HEP    GOALS:   Short Term Goals: 4 weeks  1. Patient will be proficient and compliant in HEP  2. Decrease  pain at worst to no greater than 7/10.  3. Increase cervical ROM by >/= 10 degrees in all planes.    Long Term Goals: 8 weeks  1. Patient will be </=51% limited in mobility according to FOTO.  2. Pt will demonstrate B cervical rotation >/= 50 degrees in order to improve safety while driving.  3. Pt will demonstrate cervical extension >/= 30 degrees in order to improve functional mobility.  4. Patient will demonstrate WFL R shoulder AROM in order to improve functional mobility.      Plan     Pt will be treated by physical therapy 2 times a week for 8 weeks for pt. education, HEP, therapeutic exercises, neuromuscular re-education, manual therapy, dry needling, and modalities prn to achieve established goals. Armand may at times be seen by a PTA as part of the Rehab Team.     I certify the need for these services furnished under this plan of treatment and while under my care.  ______________________________ Physician/Referring Practitioner  Date of Signature      Haley Tirado, PT, DPT

## 2019-11-18 ENCOUNTER — OFFICE VISIT (OUTPATIENT)
Dept: UROLOGY | Facility: CLINIC | Age: 69
End: 2019-11-18
Payer: MEDICARE

## 2019-11-18 VITALS
SYSTOLIC BLOOD PRESSURE: 124 MMHG | RESPIRATION RATE: 16 BRPM | WEIGHT: 271.19 LBS | DIASTOLIC BLOOD PRESSURE: 82 MMHG | HEART RATE: 64 BPM | BODY MASS INDEX: 41.1 KG/M2 | HEIGHT: 68 IN

## 2019-11-18 DIAGNOSIS — N52.01 ERECTILE DYSFUNCTION DUE TO ARTERIAL INSUFFICIENCY: ICD-10-CM

## 2019-11-18 DIAGNOSIS — N48.9 PENILE LESION: Primary | ICD-10-CM

## 2019-11-18 DIAGNOSIS — R35.1 NOCTURIA: ICD-10-CM

## 2019-11-18 DIAGNOSIS — R30.0 DYSURIA: ICD-10-CM

## 2019-11-18 DIAGNOSIS — N48.1 BALANITIS: ICD-10-CM

## 2019-11-18 PROCEDURE — 1101F PR PT FALLS ASSESS DOC 0-1 FALLS W/OUT INJ PAST YR: ICD-10-PCS | Mod: CPTII,S$GLB,, | Performed by: UROLOGY

## 2019-11-18 PROCEDURE — 99214 PR OFFICE/OUTPT VISIT, EST, LEVL IV, 30-39 MIN: ICD-10-PCS | Mod: S$GLB,,, | Performed by: UROLOGY

## 2019-11-18 PROCEDURE — 99999 PR PBB SHADOW E&M-EST. PATIENT-LVL III: CPT | Mod: PBBFAC,,, | Performed by: UROLOGY

## 2019-11-18 PROCEDURE — 99214 OFFICE O/P EST MOD 30 MIN: CPT | Mod: S$GLB,,, | Performed by: UROLOGY

## 2019-11-18 PROCEDURE — 3079F DIAST BP 80-89 MM HG: CPT | Mod: CPTII,S$GLB,, | Performed by: UROLOGY

## 2019-11-18 PROCEDURE — 3074F SYST BP LT 130 MM HG: CPT | Mod: CPTII,S$GLB,, | Performed by: UROLOGY

## 2019-11-18 PROCEDURE — 99999 PR PBB SHADOW E&M-EST. PATIENT-LVL III: ICD-10-PCS | Mod: PBBFAC,,, | Performed by: UROLOGY

## 2019-11-18 PROCEDURE — 3074F PR MOST RECENT SYSTOLIC BLOOD PRESSURE < 130 MM HG: ICD-10-PCS | Mod: CPTII,S$GLB,, | Performed by: UROLOGY

## 2019-11-18 PROCEDURE — 3079F PR MOST RECENT DIASTOLIC BLOOD PRESSURE 80-89 MM HG: ICD-10-PCS | Mod: CPTII,S$GLB,, | Performed by: UROLOGY

## 2019-11-18 PROCEDURE — 1101F PT FALLS ASSESS-DOCD LE1/YR: CPT | Mod: CPTII,S$GLB,, | Performed by: UROLOGY

## 2019-11-18 RX ORDER — NYSTATIN AND TRIAMCINOLONE ACETONIDE 100000; 1 [USP'U]/G; MG/G
CREAM TOPICAL 3 TIMES DAILY
Qty: 60 G | Refills: 3 | Status: SHIPPED | OUTPATIENT
Start: 2019-11-18 | End: 2022-08-16 | Stop reason: ALTCHOICE

## 2019-11-18 NOTE — PROGRESS NOTES
"Subjective:       Armand Painting is a 69 y.o. male who is an established patient who was referred by Dr Vargas for evaluation of dysuria.      He recently saw PCP with c/o dysuria. UCx at that time showed moderate growth 50-99k Enterobacter. He was also increased to Flomax BID - urgency in AM, nocturia x 2-3, strong stream. Does not appear he was treated with antibiotics. He was given cream (nystatin-triamcinolone) for suspected balantis. He notes a small cut on foreskin. He does have some burning externally after voiding but denies any dysuria.     He also reports problems with ED. He was given Cialis by PCP but may not have worked well. He has not tried Viagra.     PVR (bladder scan) today - 45cc (not true PVR)    Last PSA 5/16 - 0.41    11/18/2019  Now reports intermittent dysuria that has returned. He reports this is more external irritation. The lesion has reappeared on foreskin. He is out of cream from PCP.     Also reports Viagra 100mg is not working well, worked only one time. He did not try it with emptying stomach.       The following portions of the patient's history were reviewed and updated as appropriate: allergies, current medications, past family history, past medical history, past social history, past surgical history and problem list.    Review of Systems  Constitutional: no fever or chills  ENT: no nasal congestion or sore throat  Respiratory: no cough or shortness of breath  Cardiovascular: no chest pain or palpitations  Gastrointestinal: no nausea or vomiting, tolerating diet  Genitourinary: as per HPI  Hematologic/Lymphatic: no easy bruising or lymphadenopathy  Musculoskeletal: no arthralgias or myalgias  Skin: no rashes or lesions  Neurological: no seizures or tremors  Behavioral/Psych: no auditory or visual hallucinations       Objective:    Vitals: Resp 16   Ht 5' 8" (1.727 m)   Wt 123 kg (271 lb 2.7 oz)   BMI 41.23 kg/m²     Physical Exam   General: well developed, well nourished in no acute " distress  Head: normocephalic, atraumatic  Neck: supple, trachea midline, no obvious enlargement of thyroid  HEENT: EOMI, mucus membranes moist, sclera anicteric, no hearing impairment  Lungs: symmetric expansion, non-labored breathing  Cardiovascular: regular rate and rhythm, normal pulses  Abdomen: soft, non tender, non distended, no palpable masses, no hepatosplenomegaly, no hernias, bladder nonpalpable. No CVA tenderness.  Musculoskeletal: no peripheral edema, normal ROM in bilateral upper and lower extremities  Lymphatics: no cervical or inguinal lymphadenopathy  Skin: no rashes or lesions  Neuro: alert and oriented x 3, no gross deficits  Psych: normal judgment and insight, normal mood/affect and non-anxious  Genitourinary:   Penis - uncircumcised penis without plaques or scarring. SMALL ROUND,FLAT LESION ON FORESKIN (R dorsal) (APPOX 1CM) LOVETT, WELL HEALING Urethra - orthotopic location without stenosis.  Scrotum  - no lesions or rashes, no hydrocele or hernia.  Testes - descended bilaterally, symmetric without masses, non tender.  Epididymides - no cysts or lesions, no spermatocele, symmetric   JASEN: patient declined exam      Lab Review   Urine analysis today in clinic shows - ++LE, 5-10 RBCs      Lab Results   Component Value Date    WBC 7.49 11/08/2019    HGB 11.9 (L) 11/08/2019    HCT 40.5 11/08/2019    MCV 86 11/08/2019     11/08/2019     Lab Results   Component Value Date    CREATININE 1.2 11/08/2019    BUN 15 11/08/2019     Lab Results   Component Value Date    PSA 0.41 05/23/2016     No results found for: PSADIAG    Imaging  Reports and images were personally reviewed by me and discussed with the patient today         Assessment/Plan:      1. Nocturia    - Less bother with Flomax BID, continue   - Last PSA years ago but very low. Likely okay to hold on repeat check.      2. Dysuria    - Intermittent dysuria has returned   - Consider cystoscopy - seems to be more external     3. Penile lesion     - Continue cream from PCP - refilled today (Mycolog)   - Unsure etiology   - Recheck few months   - If not improved, recommend biopsy of lesion.     4. Erectile dysfunction due to arterial insufficiency    - Cialis did not work well   - Trial Viagra 100mg on empty stomach (to [facility]) - not helpful. Again recommend empty stomach.    - Briefly discussed KESHIA or [facility].        Follow up in 3 months

## 2019-11-19 ENCOUNTER — CLINICAL SUPPORT (OUTPATIENT)
Dept: REHABILITATION | Facility: HOSPITAL | Age: 69
End: 2019-11-19
Attending: INTERNAL MEDICINE
Payer: MEDICARE

## 2019-11-19 DIAGNOSIS — M25.611 DECREASED RANGE OF MOTION OF RIGHT SHOULDER: ICD-10-CM

## 2019-11-19 DIAGNOSIS — R29.3 POSTURAL IMBALANCE: ICD-10-CM

## 2019-11-19 DIAGNOSIS — R29.898 DECREASED ROM OF NECK: ICD-10-CM

## 2019-11-19 DIAGNOSIS — M54.2 NECK PAIN: ICD-10-CM

## 2019-11-19 PROCEDURE — 97110 THERAPEUTIC EXERCISES: CPT | Mod: PN

## 2019-11-19 NOTE — PROGRESS NOTES
"  Physical Therapy Daily Treatment Note     Name: Armand Painting  Clinic Number: [id number]    Therapy Diagnosis:   Encounter Diagnoses   Name Primary?    Decreased range of motion of right shoulder     Decreased ROM of neck     Neck pain     Postural imbalance      Physician: Clark Vargas MD    Visit Date: 11/19/2019    Physician Orders: PT Eval and Treat  Medical Diagnosis: M48.02 (ICD-10-CM) - Cervical stenosis of spinal canal  Evaluation Date: 11/15/2019  Authorization Period Expiration: 11/29/19  Plan of Care Certification Period: 1/15/2020  Visit #/Visits authorized: 2/ 6                 PN Due: 12/13/2019     Time In: 0900  Time Out: 1000  Total Billable Time: 50+10 minutes heat    Precautions: Standard    Subjective     Pt reports: He is feeling some pain in neck this morning but not bad overall.     He was compliant with home exercise program.  Response to previous treatment: none reported  Functional change: ongoing    Pain: 2/10  Location: bilateral neck      Objective     Armand received therapeutic exercises to develop strength, endurance, ROM, flexibility and posture for 50 minutes including:    +UBE x 6 minutes    +Pulleys Flex/Abd x 3 min ea  +Corner Stretch 3x30 ea  +Open Books x 10 ea  +UT stretch 3x30 ea  +LS stretch 3x30 ea  Supine chin tucks with towel under back of head  Supine neck rotations x15  Supine scaption 2x10  Thoracic extension over chair 20x3"    Add Next Visit:  Rows 2x10  Shoulder Extension 2x10  +Standing wall angels 2x10     Armand received hot pack for 10 minutes to cervical spine.    Home Exercises Provided and Patient Education Provided     Education provided:   Cont to perform HEP as provided.     Written Home Exercises Provided: Patient instructed to cont prior HEP.  Exercises were reviewed and Armand was able to demonstrate them prior to the end of the session.  Armand demonstrated good  understanding of the education provided.     See EMR under Patient Instructions for " exercises provided prior visit.    Assessment     Pt tolerated initial treatment session well overall with no adverse response noted. Pt with increased cervical flexion noted throughout standing/seated there-ex, some improvement noted with verbal cueing. Pt with most difficulty this session completing supine chin tucks and supine cervical rotation. Progress to seated/standing chin tucks as appropriate.     Armand is progressing well towards his goals.   Pt prognosis is Good.     Pt will continue to benefit from skilled outpatient physical therapy to address the deficits listed in the problem list box on initial evaluation, provide pt/family education and to maximize pt's level of independence in the home and community environment.     Pt's spiritual, cultural and educational needs considered and pt agreeable to plan of care and goals.    Anticipated barriers to physical therapy: none    GOALS:   Short Term Goals: 4 weeks  1. Patient will be proficient and compliant in HEP  2. Decrease pain at worst to no greater than 7/10.  3. Increase cervical ROM by >/= 10 degrees in all planes.     Long Term Goals: 8 weeks  1. Patient will be </=51% limited in mobility according to FOTO.  2. Pt will demonstrate B cervical rotation >/= 50 degrees in order to improve safety while driving.  3. Pt will demonstrate cervical extension >/= 30 degrees in order to improve functional mobility.  4. Patient will demonstrate WFL R shoulder AROM in order to improve functional mobility.    Plan     Certification date: 11/15/2019 - 1/15/2020    Cont skilled PT session towards PT and patient's goals.    Nathalie Moffett, PT   11/19/2019

## 2019-11-20 ENCOUNTER — TELEPHONE (OUTPATIENT)
Dept: UROLOGY | Facility: CLINIC | Age: 69
End: 2019-11-20

## 2019-11-20 ENCOUNTER — NURSE TRIAGE (OUTPATIENT)
Dept: ADMINISTRATIVE | Facility: CLINIC | Age: 69
End: 2019-11-20

## 2019-11-20 RX ORDER — CLOTRIMAZOLE AND BETAMETHASONE DIPROPIONATE 10; .64 MG/G; MG/G
CREAM TOPICAL 2 TIMES DAILY
Qty: 45 G | Refills: 3 | Status: SHIPPED | OUTPATIENT
Start: 2019-11-20 | End: 2020-03-03 | Stop reason: SDUPTHER

## 2019-11-20 NOTE — TELEPHONE ENCOUNTER
----- Message from Jossy Hameed sent at 11/20/2019 11:36 AM CST -----  Contact: TEMI JOAQUIN [[id number]]   Name of Who is Calling : TEMI JOAQUIN [[id number]]      Patient is returning a call from staff in regards to medication patient also requesting medication be sent due to physical therapy yesterday patient states he is in pain  .....Please contact to further discuss and advise.    Can the clinic reply by [facility] : No    What Number to Call Back : [phone]

## 2019-11-20 NOTE — TELEPHONE ENCOUNTER
His insurance does not cover nystatin-triamcinolone cream. They cover both ingredients in separate tubes or their formulary is clotrimazole/betamethasone. Thanks

## 2019-11-20 NOTE — TELEPHONE ENCOUNTER
"Pt states he went to therapy yesterday and today he has excruciating neck pain and cannot move his head up, he states he also has a terrible headache and ibuprofen is not helping, he states he cannot lay down, advised him to see a provider within 4 hours, caller agreed    Reason for Disposition   [1] SEVERE neck pain (e.g., excruciating, unable to do any normal activities) AND [2] not improved after 2 hours of pain medicine    Additional Information   Negative: Shock suspected (e.g., cold/pale/clammy skin, too weak to stand, low BP, rapid pulse)   Negative: Difficult to awaken or acting confused (e.g., disoriented, slurred speech)   Negative: [1] Similar pain previously AND [2] it was from "heart attack"   Negative: [1] Similar pain previously AND [2] it was from "angina" AND [3] not relieved by nitroglycerin   Negative: Sounds like a life-threatening emergency to the triager   Negative: Followed a neck injury (e.g., MVA, sports, impact or collision)   Negative: Chest pain   Negative: Lymph node in the neck is swollen or painful to the touch   Negative: Sore throat is main symptom    Protocols used: NECK PAIN OR [id number]      "

## 2019-11-21 ENCOUNTER — TELEPHONE (OUTPATIENT)
Dept: NEUROSURGERY | Facility: CLINIC | Age: 69
End: 2019-11-21

## 2019-11-25 ENCOUNTER — HOSPITAL ENCOUNTER (EMERGENCY)
Facility: HOSPITAL | Age: 69
Discharge: HOME | End: 2019-11-26
Attending: EMERGENCY MEDICINE
Payer: MEDICARE

## 2019-11-25 VITALS
TEMPERATURE: 98 F | HEIGHT: 68 IN | SYSTOLIC BLOOD PRESSURE: 125 MMHG | OXYGEN SATURATION: 97 % | DIASTOLIC BLOOD PRESSURE: 82 MMHG | BODY MASS INDEX: 41.07 KG/M2 | RESPIRATION RATE: 15 BRPM | WEIGHT: 271 LBS | HEART RATE: 65 BPM

## 2019-11-25 DIAGNOSIS — M62.838 MUSCLE SPASMS OF NECK: ICD-10-CM

## 2019-11-25 DIAGNOSIS — G89.29 CHRONIC NECK PAIN: Primary | ICD-10-CM

## 2019-11-25 DIAGNOSIS — M54.2 CHRONIC NECK PAIN: Primary | ICD-10-CM

## 2019-11-25 PROCEDURE — 96372 THER/PROPH/DIAG INJ SC/IM: CPT

## 2019-11-25 PROCEDURE — 63600175 PHARM REV CODE 636 W HCPCS: Performed by: EMERGENCY MEDICINE

## 2019-11-25 PROCEDURE — 25000003 PHARM REV CODE 250: Performed by: EMERGENCY MEDICINE

## 2019-11-25 PROCEDURE — 99284 EMERGENCY DEPT VISIT MOD MDM: CPT | Mod: 25

## 2019-11-25 RX ORDER — TRAMADOL HYDROCHLORIDE 50 MG/1
50 TABLET ORAL
Status: COMPLETED | OUTPATIENT
Start: 2019-11-25 | End: 2019-11-25

## 2019-11-25 RX ORDER — METHOCARBAMOL 500 MG/1
500 TABLET, FILM COATED ORAL 3 TIMES DAILY
Qty: 15 TABLET | Refills: 0 | Status: SHIPPED | OUTPATIENT
Start: 2019-11-25 | End: 2019-11-30

## 2019-11-25 RX ORDER — LORAZEPAM 2 MG/ML
1 INJECTION INTRAMUSCULAR
Status: COMPLETED | OUTPATIENT
Start: 2019-11-25 | End: 2019-11-25

## 2019-11-25 RX ORDER — TRAMADOL HYDROCHLORIDE 50 MG/1
50 TABLET ORAL EVERY 6 HOURS PRN
Qty: 20 TABLET | Refills: 0 | Status: SHIPPED | OUTPATIENT
Start: 2019-11-25 | End: 2019-12-05

## 2019-11-25 RX ORDER — MELOXICAM 7.5 MG/1
7.5 TABLET ORAL DAILY
Qty: 20 TABLET | Refills: 0 | Status: SHIPPED | OUTPATIENT
Start: 2019-11-25 | End: 2020-03-03

## 2019-11-25 RX ORDER — KETOROLAC TROMETHAMINE 30 MG/ML
30 INJECTION, SOLUTION INTRAMUSCULAR; INTRAVENOUS
Status: COMPLETED | OUTPATIENT
Start: 2019-11-25 | End: 2019-11-25

## 2019-11-25 RX ADMIN — TRAMADOL HYDROCHLORIDE 50 MG: 50 TABLET, FILM COATED ORAL at 11:11

## 2019-11-25 RX ADMIN — LORAZEPAM 1 MG: 2 INJECTION, SOLUTION INTRAMUSCULAR; INTRAVENOUS at 11:11

## 2019-11-25 RX ADMIN — KETOROLAC TROMETHAMINE 30 MG: 30 INJECTION, SOLUTION INTRAMUSCULAR at 11:11

## 2019-11-26 NOTE — ED PROVIDER NOTES
Encounter Date: 11/25/2019    SCRIBE #1 NOTE: I, Chelo Osborne, am scribing for, and in the presence of,  Jensen Tineo MD. I have scribed the following portions of the note - Other sections scribed: HPI, ROS, PE.       History     Chief Complaint   Patient presents with    Neck Pain     reports neck surgery 1 year ago, he reports after therapy on Tuesday he has been having increased pain, left sided neck through spine and down to his leg      CC: Neck Pain    HPI: The patient is a 69 y.o male who presents to the ED complaining of chronic neck pain that gradually worsened 6 days ago. Patient states that he received neck surgery about 1 yr ago to correct RUE weakness. He sees a physical therapist and states that neck pain worsened after his therapy session 6 days ago. He reports of associated neck stiffness, and states that he was unable to sleep last night due to pain. Patient normally takes ibuprofen and tylenol with only mild relief. However, symptoms were too severe to move, prompting ED visit. He also reports of limited ROM of his neck, daily muscle spasms, and LUE weakness since the surgery. Current neck pain does not radiate to his back, upper extremities, or lower extremities. He denies urinary and fecal incontinence. No numbness or tingling. No fever. Other PMHx includes HTN and DM. Patient is allergic to codeine.     The history is provided by the patient. No  was used.     Review of patient's allergies indicates:   Allergen Reactions    Codeine Swelling     Codeine with Aspirin caused chest pain     Past Medical History:   Diagnosis Date    Arthritis     Erectile dysfunction     Gout     Hyperlipidemia     Hypertension     Morbid obesity     Nuclear sclerosis of both eyes 1/16/2018    Screening for AAA (abdominal aortic aneurysm)     6/2016 AAA US no aneurysm     Past Surgical History:   Procedure Laterality Date    COLONOSCOPY N/A 10/31/2018    Procedure: COLONOSCOPY;   Surgeon: Clementine Batista MD;  Location: [facility] END[facility];  Service: Endoscopy;  Laterality: N/A;    SPINE SURGERY  3/31/16    C3-C7 laminoplasty,Dr Caruso    tooth implant       Family History   Problem Relation Age of Onset    COPD Mother     No Known Problems Father     No Known Problems Sister     Cataracts Brother     Glaucoma Brother     No Known Problems Maternal Aunt     No Known Problems Maternal Uncle     No Known Problems Paternal Aunt     No Known Problems Paternal Uncle     No Known Problems Maternal Grandmother     No Known Problems Maternal Grandfather     No Known Problems Paternal Grandmother     No Known Problems Paternal Grandfather     Amblyopia Neg Hx     Blindness Neg Hx     Cancer Neg Hx     Diabetes Neg Hx     Hypertension Neg Hx     Macular degeneration Neg Hx     Retinal detachment Neg Hx     Strabismus Neg Hx     Stroke Neg Hx     Thyroid disease Neg Hx      Social History     Tobacco Use    Smoking status: Former Smoker     Types: Cigarettes     Last attempt to quit: 1/7/2009     Years since quitting: 10.8    Smokeless tobacco: Former User   Substance Use Topics    Alcohol use: Yes     Comment: occasional beer- average week 12 beers a week    Drug use: No     Review of Systems   Constitutional: Negative for chills, diaphoresis and fever.   HENT: Negative for congestion and sore throat.    Eyes: Negative.    Respiratory: Negative for cough and shortness of breath.    Cardiovascular: Negative for chest pain.   Gastrointestinal: Negative for abdominal pain.        No fecal incontinence.    Genitourinary: Negative for dysuria.        No urinary incontinence.    Musculoskeletal: Positive for neck pain and neck stiffness. Negative for back pain and myalgias (No extremity pain).   Skin: Negative for rash and wound.   Neurological: Positive for weakness (LUE weakness). Negative for numbness.        No tingling.    Psychiatric/Behavioral: Negative for confusion.   All other  systems reviewed and are negative.      Physical Exam     Initial Vitals [11/25/19 2302]   BP Pulse Resp Temp SpO2   (!) 142/85 76 15 98.2 °F (36.8 °C) 98 %      MAP       --         Physical Exam    Nursing note and vitals reviewed.  Constitutional: He appears well-developed and well-nourished. He is not diaphoretic. No distress.   HENT:   Head: Normocephalic and atraumatic.   Nose: Nose normal.   Eyes: Conjunctivae and EOM are normal. Pupils are equal, round, and reactive to light. Right eye exhibits no discharge. Left eye exhibits no discharge.   Neck: Neck supple. No tracheal deviation present.   Pain with ROM. Tenderness to the cervical paraspinous and trapezius muscles bilaterally.   Cardiovascular: Normal rate, regular rhythm and normal heart sounds.   No murmur heard.  Pulmonary/Chest: Breath sounds normal. No stridor. No respiratory distress. He has no wheezes. He has no rhonchi. He has no rales.   Abdominal: Soft. He exhibits no distension. There is no tenderness. There is no rebound and no guarding.   Musculoskeletal: Normal range of motion. He exhibits no edema or tenderness.   Neurological: He is alert and oriented to person, place, and time. He has normal strength. No cranial nerve deficit or sensory deficit. GCS score is 15. GCS eye subscore is 4. GCS verbal subscore is 5. GCS motor subscore is 6.   Skin: Skin is warm and dry. No rash noted.   Psychiatric: He has a normal mood and affect. His behavior is normal. Judgment and thought content normal.         ED Course   Procedures  Labs Reviewed - No data to display       Imaging Results    None          Medical Decision Making:   History:   Old Medical Records: I decided to obtain old medical records.  Initial Assessment:   Patient presents with worsening chronic neck pain. Patient unable to move his neck due to pain. No radicular symptoms. No neurologic symptoms. No fever.  No incontinence.  Tender palpation over cervical paraspinous muscles trapezius  muscles.  Will place on analgesics and muscle relaxers.  Patient may follow up with his primary care physician for further evaluation.  Differential Diagnosis:   Muscle strain, cervical radiculopathy, disc herniation,            Scribe Attestation:   Scribe #1: I performed the above scribed service and the documentation accurately describes the services I performed. I attest to the accuracy of the note.           I, Jensen Tineo MD, personally performed the services described in this documentation. All medical record entries made by the scribe were at my direction and in my presence.  I have reviewed the chart and agree that the record reflects my personal performance and is accurate and complete.                  Clinical Impression:       ICD-10-CM ICD-9-CM   1. Chronic neck pain M54.2 723.1    G89.29 338.29   2. Muscle spasms of neck M62.838 728.85         Disposition:   Disposition: Discharged  Condition: Stable                     Jensen Tineo MD  11/26/19 0020

## 2020-01-29 ENCOUNTER — OFFICE VISIT (OUTPATIENT)
Dept: NEUROSURGERY | Facility: CLINIC | Age: 70
End: 2020-01-29
Payer: MEDICARE

## 2020-01-29 VITALS — HEART RATE: 75 BPM | DIASTOLIC BLOOD PRESSURE: 89 MMHG | SYSTOLIC BLOOD PRESSURE: 126 MMHG | TEMPERATURE: 97 F

## 2020-01-29 DIAGNOSIS — M40.202 KYPHOSIS OF CERVICAL REGION, UNSPECIFIED KYPHOSIS TYPE: ICD-10-CM

## 2020-01-29 DIAGNOSIS — M48.02 CERVICAL SPINAL STENOSIS: ICD-10-CM

## 2020-01-29 DIAGNOSIS — M47.812 SPONDYLOSIS OF CERVICAL REGION WITHOUT MYELOPATHY OR RADICULOPATHY: Primary | ICD-10-CM

## 2020-01-29 PROCEDURE — 3079F DIAST BP 80-89 MM HG: CPT | Mod: CPTII,S$GLB,, | Performed by: NEUROLOGICAL SURGERY

## 2020-01-29 PROCEDURE — 1101F PR PT FALLS ASSESS DOC 0-1 FALLS W/OUT INJ PAST YR: ICD-10-PCS | Mod: CPTII,S$GLB,, | Performed by: NEUROLOGICAL SURGERY

## 2020-01-29 PROCEDURE — 99999 PR PBB SHADOW E&M-EST. PATIENT-LVL III: CPT | Mod: PBBFAC,,, | Performed by: NEUROLOGICAL SURGERY

## 2020-01-29 PROCEDURE — 1159F MED LIST DOCD IN RCRD: CPT | Mod: S$GLB,,, | Performed by: NEUROLOGICAL SURGERY

## 2020-01-29 PROCEDURE — 1159F PR MEDICATION LIST DOCUMENTED IN MEDICAL RECORD: ICD-10-PCS | Mod: S$GLB,,, | Performed by: NEUROLOGICAL SURGERY

## 2020-01-29 PROCEDURE — 1101F PT FALLS ASSESS-DOCD LE1/YR: CPT | Mod: CPTII,S$GLB,, | Performed by: NEUROLOGICAL SURGERY

## 2020-01-29 PROCEDURE — 3074F PR MOST RECENT SYSTOLIC BLOOD PRESSURE < 130 MM HG: ICD-10-PCS | Mod: CPTII,S$GLB,, | Performed by: NEUROLOGICAL SURGERY

## 2020-01-29 PROCEDURE — 99214 OFFICE O/P EST MOD 30 MIN: CPT | Mod: S$GLB,,, | Performed by: NEUROLOGICAL SURGERY

## 2020-01-29 PROCEDURE — 1125F AMNT PAIN NOTED PAIN PRSNT: CPT | Mod: S$GLB,,, | Performed by: NEUROLOGICAL SURGERY

## 2020-01-29 PROCEDURE — 3074F SYST BP LT 130 MM HG: CPT | Mod: CPTII,S$GLB,, | Performed by: NEUROLOGICAL SURGERY

## 2020-01-29 PROCEDURE — 99999 PR PBB SHADOW E&M-EST. PATIENT-LVL III: ICD-10-PCS | Mod: PBBFAC,,, | Performed by: NEUROLOGICAL SURGERY

## 2020-01-29 PROCEDURE — 3079F PR MOST RECENT DIASTOLIC BLOOD PRESSURE 80-89 MM HG: ICD-10-PCS | Mod: CPTII,S$GLB,, | Performed by: NEUROLOGICAL SURGERY

## 2020-01-29 PROCEDURE — 99214 PR OFFICE/OUTPT VISIT, EST, LEVL IV, 30-39 MIN: ICD-10-PCS | Mod: S$GLB,,, | Performed by: NEUROLOGICAL SURGERY

## 2020-01-29 PROCEDURE — 1125F PR PAIN SEVERITY QUANTIFIED, PAIN PRESENT: ICD-10-PCS | Mod: S$GLB,,, | Performed by: NEUROLOGICAL SURGERY

## 2020-01-29 NOTE — PATIENT INSTRUCTIONS
I have personally reviewed the CT cervical spine with the pt which shows postoperative changes from previous C3-C7 posterior laminoplasty with multilevel cervical spondylosis.    I will order a new MRI of the cervical spine and contact the pt with the results. If findings are unremarkable will refer to pain management.

## 2020-01-29 NOTE — PROGRESS NOTES
Subjective:   I, Patrice Salas, attest that this documentation has been prepared under the direction and in the presence of Venkata Caruso MD.     Patient ID: Armand Painting is a 69 y.o. male     Chief Complaint: No chief complaint on file.      HPI  Mr. Armand Painting is a pleasant 69 y.o. gentleman with Cervical stenosis, s/p  C3-C7 posterior laminoplasty on 03/21/2016, who presents today for his 3 month follow up after completing physical therapy. At the time of the last office visit, on 10/23/2019, the pt reported an improvement in his symptoms after just four sessions of PT.    Pt states he participated in three sessions of physical therapy before discontinuing due to pronounced soreness. He states he is refractory to return. He states he experiences pain upon waking up and whenever he inactive. He notes mild pain with extension and bilateral rotation and exhibits neck stiffness.      Review of Systems   Constitutional: Negative for activity change, appetite change, fatigue, fever and unexpected weight change.   HENT: Negative for facial swelling.    Eyes: Negative.    Respiratory: Negative.    Cardiovascular: Negative.    Gastrointestinal: Negative for diarrhea, nausea and vomiting.   Endocrine: Negative.    Genitourinary: Negative.    Musculoskeletal: Positive for neck pain and neck stiffness. Negative for back pain, joint swelling and myalgias.   Neurological: Negative for dizziness, weakness, numbness and headaches.   Psychiatric/Behavioral: Negative.       Past Medical History:   Diagnosis Date    Arthritis     Erectile dysfunction     Gout     Hyperlipidemia     Hypertension     Morbid obesity     Nuclear sclerosis of both eyes 1/16/2018    Screening for AAA (abdominal aortic aneurysm)     6/2016 AAA US no aneurysm       Objective:      Vitals:    01/29/20 0919   BP: 126/89   Pulse: 75   Temp: 97.4 °F (36.3 °C)      Physical Exam   Constitutional: He is oriented to person, place, and time. He appears  well-nourished.   HENT:   Head: Normocephalic and atraumatic.   Neck: Neck supple.   Limited cervical ROM secondary to pain.   Neurological: He is alert and oriented to person, place, and time. No cranial nerve deficit. He displays a negative Romberg sign. GCS eye subscore is 4. GCS verbal subscore is 5. GCS motor subscore is 6.          IMAGING:  CT Cervical Spine Without Contrast (08/21/2019) shows postoperative changes from previous C3-C7 posterior laminoplasty with multilevel cervical spondylosis.    I have personally reviewed the images with the pt.      I, Dr. Venkata Caruso, personally performed the services described in this documentation. All medical record entries made by the scribe, Patrice Salas, were at my direction and in my presence.  I have reviewed the chart and agree that the record reflects my personal performance and is accurate and complete. Venkata Caruso MD. 01/29/2020    Assessment:       1. Spondylosis of cervical region without myelopathy or radiculopathy    2. Cervical spinal stenosis    3. Kyphosis of cervical region, unspecified kyphosis type         Plan:   I have personally reviewed the CT cervical spine with the pt which shows postoperative changes from previous C3-C7 posterior laminoplasty with multilevel cervical spondylosis.    I will order a new MRI of the cervical spine and contact the pt with the results. If findings are unremarkable will refer to pain management.

## 2020-02-01 ENCOUNTER — HOSPITAL ENCOUNTER (OUTPATIENT)
Dept: RADIOLOGY | Facility: HOSPITAL | Age: 70
Discharge: HOME | End: 2020-02-01
Attending: NEUROLOGICAL SURGERY
Payer: MEDICARE

## 2020-02-01 DIAGNOSIS — M48.02 CERVICAL SPINAL STENOSIS: ICD-10-CM

## 2020-02-01 DIAGNOSIS — M40.202 KYPHOSIS OF CERVICAL REGION, UNSPECIFIED KYPHOSIS TYPE: ICD-10-CM

## 2020-02-01 DIAGNOSIS — M47.812 SPONDYLOSIS OF CERVICAL REGION WITHOUT MYELOPATHY OR RADICULOPATHY: ICD-10-CM

## 2020-02-01 PROCEDURE — 72141 MRI NECK SPINE W/O DYE: CPT | Mod: 26,,, | Performed by: RADIOLOGY

## 2020-02-01 PROCEDURE — 72141 MRI CERVICAL SPINE WITHOUT CONTRAST: ICD-10-PCS | Mod: 26,,, | Performed by: RADIOLOGY

## 2020-02-01 PROCEDURE — 72141 MRI NECK SPINE W/O DYE: CPT | Mod: TC

## 2020-02-03 ENCOUNTER — TELEPHONE (OUTPATIENT)
Dept: NEUROSURGERY | Facility: CLINIC | Age: 70
End: 2020-02-03

## 2020-02-03 DIAGNOSIS — M47.812 SPONDYLOSIS OF CERVICAL REGION WITHOUT MYELOPATHY OR RADICULOPATHY: Primary | ICD-10-CM

## 2020-02-04 ENCOUNTER — TELEPHONE (OUTPATIENT)
Dept: ADMINISTRATIVE | Facility: OTHER | Age: 70
End: 2020-02-04

## 2020-02-04 NOTE — TELEPHONE ENCOUNTER
Left voice message for patient to return call to schedule appointment from referral to Pain Medicine department.  Danni SALEH [phone]

## 2020-02-05 ENCOUNTER — TELEPHONE (OUTPATIENT)
Dept: NEUROSURGERY | Facility: CLINIC | Age: 70
End: 2020-02-05

## 2020-02-05 DIAGNOSIS — R29.898 DECREASED ROM OF NECK: ICD-10-CM

## 2020-02-05 DIAGNOSIS — M54.2 NECK PAIN: Primary | ICD-10-CM

## 2020-02-05 NOTE — TELEPHONE ENCOUNTER
----- Message from Jean Grant MA sent at 2/5/2020 11:12 AM CST -----      ----- Message -----  From: Venkata Caruso MD  Sent: 2/3/2020  11:32 AM CST  To: Jean Grant MA, Anu Howard RN    There is no significant stenosis at this point.  Would send to pain management to see if there is something that they can do.

## 2020-02-05 NOTE — TELEPHONE ENCOUNTER
Spoke with pt and informed of results and referral.  Made appointment for pain management and placed appt slip in mail per pt request

## 2020-02-18 DIAGNOSIS — M10.9 GOUT, ARTHRITIS: Chronic | ICD-10-CM

## 2020-02-18 NOTE — TELEPHONE ENCOUNTER
----- Message from Aleks Chapman sent at 2/18/2020  1:46 PM CST -----  Rx Refill/Request     Is this a Refill or New Rx: Refill   Rx Name and Strength: allopurinol 100 MG   Preferred Pharmacy with phone number: see below  Communication Preference: [phone]  Additional Information:     [facility]/pharmacy #[id number] - [place], [place] - [address] [facility] [address]  Phone: [phone] Fax: [phone]

## 2020-02-19 RX ORDER — ALLOPURINOL 100 MG/1
100 TABLET ORAL DAILY
Qty: 90 TABLET | Refills: 3 | Status: SHIPPED | OUTPATIENT
Start: 2020-02-19 | End: 2020-07-13 | Stop reason: SDUPTHER

## 2020-03-03 ENCOUNTER — OFFICE VISIT (OUTPATIENT)
Dept: UROLOGY | Facility: CLINIC | Age: 70
End: 2020-03-03
Payer: MEDICARE

## 2020-03-03 VITALS
BODY MASS INDEX: 40.76 KG/M2 | HEIGHT: 68 IN | DIASTOLIC BLOOD PRESSURE: 64 MMHG | WEIGHT: 268.94 LBS | SYSTOLIC BLOOD PRESSURE: 102 MMHG

## 2020-03-03 DIAGNOSIS — N52.01 ERECTILE DYSFUNCTION DUE TO ARTERIAL INSUFFICIENCY: ICD-10-CM

## 2020-03-03 DIAGNOSIS — N48.9 PENILE LESION: Primary | ICD-10-CM

## 2020-03-03 DIAGNOSIS — R30.0 DYSURIA: ICD-10-CM

## 2020-03-03 DIAGNOSIS — R35.1 NOCTURIA: ICD-10-CM

## 2020-03-03 PROCEDURE — 3074F PR MOST RECENT SYSTOLIC BLOOD PRESSURE < 130 MM HG: ICD-10-PCS | Mod: CPTII,S$GLB,, | Performed by: UROLOGY

## 2020-03-03 PROCEDURE — 99214 PR OFFICE/OUTPT VISIT, EST, LEVL IV, 30-39 MIN: ICD-10-PCS | Mod: S$GLB,,, | Performed by: UROLOGY

## 2020-03-03 PROCEDURE — 3078F DIAST BP <80 MM HG: CPT | Mod: CPTII,S$GLB,, | Performed by: UROLOGY

## 2020-03-03 PROCEDURE — 1101F PR PT FALLS ASSESS DOC 0-1 FALLS W/OUT INJ PAST YR: ICD-10-PCS | Mod: CPTII,S$GLB,, | Performed by: UROLOGY

## 2020-03-03 PROCEDURE — 99499 UNLISTED E&M SERVICE: CPT | Mod: S$GLB,,, | Performed by: UROLOGY

## 2020-03-03 PROCEDURE — 3078F PR MOST RECENT DIASTOLIC BLOOD PRESSURE < 80 MM HG: ICD-10-PCS | Mod: CPTII,S$GLB,, | Performed by: UROLOGY

## 2020-03-03 PROCEDURE — 99499 RISK ADDL DX/OHS AUDIT: ICD-10-PCS | Mod: S$GLB,,, | Performed by: UROLOGY

## 2020-03-03 PROCEDURE — 99214 OFFICE O/P EST MOD 30 MIN: CPT | Mod: S$GLB,,, | Performed by: UROLOGY

## 2020-03-03 PROCEDURE — 99999 PR PBB SHADOW E&M-EST. PATIENT-LVL III: ICD-10-PCS | Mod: PBBFAC,,, | Performed by: UROLOGY

## 2020-03-03 PROCEDURE — 1159F MED LIST DOCD IN RCRD: CPT | Mod: S$GLB,,, | Performed by: UROLOGY

## 2020-03-03 PROCEDURE — 1101F PT FALLS ASSESS-DOCD LE1/YR: CPT | Mod: CPTII,S$GLB,, | Performed by: UROLOGY

## 2020-03-03 PROCEDURE — 3074F SYST BP LT 130 MM HG: CPT | Mod: CPTII,S$GLB,, | Performed by: UROLOGY

## 2020-03-03 PROCEDURE — 99999 PR PBB SHADOW E&M-EST. PATIENT-LVL III: CPT | Mod: PBBFAC,,, | Performed by: UROLOGY

## 2020-03-03 PROCEDURE — 1126F AMNT PAIN NOTED NONE PRSNT: CPT | Mod: S$GLB,,, | Performed by: UROLOGY

## 2020-03-03 PROCEDURE — 1159F PR MEDICATION LIST DOCUMENTED IN MEDICAL RECORD: ICD-10-PCS | Mod: S$GLB,,, | Performed by: UROLOGY

## 2020-03-03 PROCEDURE — 1126F PR PAIN SEVERITY QUANTIFIED, NO PAIN PRESENT: ICD-10-PCS | Mod: S$GLB,,, | Performed by: UROLOGY

## 2020-03-03 RX ORDER — CLOTRIMAZOLE AND BETAMETHASONE DIPROPIONATE 10; .64 MG/G; MG/G
CREAM TOPICAL 2 TIMES DAILY
Qty: 45 G | Refills: 3 | Status: SHIPPED | OUTPATIENT
Start: 2020-03-03 | End: 2020-08-04 | Stop reason: SDUPTHER

## 2020-03-03 NOTE — PROGRESS NOTES
Patient, Armand Painting (MRN #[id number]), presented with a recorded BMI of 40.9 kg/m^2 consistent with the definition of morbid obesity (ICD-10 E66.01). The patient's morbid obesity was monitored, evaluated, addressed and/or treated. This addendum to the medical record is made on 03/03/2020.

## 2020-03-03 NOTE — PROGRESS NOTES
"Subjective:       Armand Painting is a 69 y.o. male who is an established patient who was referred by Dr Vargas for evaluation of dysuria.      He recently saw PCP with c/o dysuria. UCx at that time showed moderate growth 50-99k Enterobacter. He was also increased to Flomax BID - urgency in AM, nocturia x 2-3, strong stream. Does not appear he was treated with antibiotics. He was given cream (nystatin-triamcinolone) for suspected balantis. He notes a small cut on foreskin. He does have some burning externally after voiding but denies any dysuria.     He also reports problems with ED. He was given Cialis by PCP but may not have worked well. He has not tried Viagra.     PVR (bladder scan) today - 45cc (not true PVR)    Last PSA 5/16 - 0.41    11/18/2019  Now reports intermittent dysuria that has returned. He reports this is more external irritation. The lesion has reappeared on foreskin. He is out of cream from PCP.   Also reports Viagra 100mg is not working well, worked only one time. He did not try it with emptying stomach.     3/3/2020  He is using Lotrisone cream. He reports penile lesion is still present but improving. Concerned about cost of cream.       The following portions of the patient's history were reviewed and updated as appropriate: allergies, current medications, past family history, past medical history, past social history, past surgical history and problem list.    Review of Systems  Constitutional: no fever or chills  ENT: no nasal congestion or sore throat  Respiratory: no cough or shortness of breath  Cardiovascular: no chest pain or palpitations  Gastrointestinal: no nausea or vomiting, tolerating diet  Genitourinary: as per HPI  Hematologic/Lymphatic: no easy bruising or lymphadenopathy  Musculoskeletal: no arthralgias or myalgias  Skin: no rashes or lesions  Neurological: no seizures or tremors  Behavioral/Psych: no auditory or visual hallucinations       Objective:    Vitals: /64   Ht 5' 8" " (1.727 m)   Wt 122 kg (268 lb 15.4 oz)   BMI 40.90 kg/m²     Physical Exam   General: well developed, well nourished in no acute distress  Head: normocephalic, atraumatic  Neck: supple, trachea midline, no obvious enlargement of thyroid  HEENT: EOMI, mucus membranes moist, sclera anicteric, no hearing impairment  Lungs: symmetric expansion, non-labored breathing  Cardiovascular: regular rate and rhythm, normal pulses  Abdomen: soft, non tender, non distended, no palpable masses, no hepatosplenomegaly, no hernias, bladder nonpalpable. No CVA tenderness.  Musculoskeletal: no peripheral edema, normal ROM in bilateral upper and lower extremities  Lymphatics: no cervical or inguinal lymphadenopathy  Skin: no rashes or lesions  Neuro: alert and oriented x 3, no gross deficits  Psych: normal judgment and insight, normal mood/affect and non-anxious  Genitourinary:   Penis - uncircumcised penis without plaques or scarring. SMALL ROUND,FLAT LESION ON FORESKIN (R dorsal) (APPOX 1CM) LOVETT, WELL HEALING Urethra - orthotopic location without stenosis.  Scrotum  - no lesions or rashes, no hydrocele or hernia.  Testes - descended bilaterally, symmetric without masses, non tender.  Epididymides - no cysts or lesions, no spermatocele, symmetric   JASEN: patient declined exam      Lab Review   Urine analysis today in clinic shows - no urine      Lab Results   Component Value Date    WBC 7.49 11/08/2019    HGB 11.9 (L) 11/08/2019    HCT 40.5 11/08/2019    MCV 86 11/08/2019     11/08/2019     Lab Results   Component Value Date    CREATININE 1.2 11/08/2019    BUN 15 11/08/2019     Lab Results   Component Value Date    PSA 0.41 05/23/2016     No results found for: PSADIAG    Imaging  Reports and images were personally reviewed by me and discussed with the patient today         Assessment/Plan:      1. Nocturia    - Less bother with Flomax BID, continue   - Last PSA years ago but very low. Likely okay to hold on repeat check.       2. Dysuria    - Intermittent dysuria has returned   - Consider cystoscopy - seems to be more external     3. Penile lesion    - Lotrisone cream refilled   - Unsure etiology   - Recheck 5-6 months   - If not improved, recommend biopsy of lesion. Discussed excisional biopsy more thoroughly today - he declines this.      4. Erectile dysfunction due to arterial insufficiency    - Cialis did not work well   - Viagra 100mg on empty stomach (to [facility]) - not helpful. Again recommend empty stomach.    - Briefly discussed [facility] or [facility].        Follow up in 5 months

## 2020-03-10 ENCOUNTER — OFFICE VISIT (OUTPATIENT)
Dept: PAIN MEDICINE | Facility: CLINIC | Age: 70
End: 2020-03-10
Payer: MEDICARE

## 2020-03-10 VITALS
WEIGHT: 271.19 LBS | DIASTOLIC BLOOD PRESSURE: 76 MMHG | RESPIRATION RATE: 18 BRPM | HEIGHT: 68 IN | BODY MASS INDEX: 41.1 KG/M2 | OXYGEN SATURATION: 100 % | TEMPERATURE: 98 F | SYSTOLIC BLOOD PRESSURE: 109 MMHG | HEART RATE: 64 BPM

## 2020-03-10 DIAGNOSIS — M48.02 CERVICAL STENOSIS OF SPINAL CANAL: Primary | Chronic | ICD-10-CM

## 2020-03-10 DIAGNOSIS — M96.1 CERVICAL POST-LAMINECTOMY SYNDROME: ICD-10-CM

## 2020-03-10 DIAGNOSIS — M47.816 LUMBAR SPONDYLOSIS: ICD-10-CM

## 2020-03-10 DIAGNOSIS — G89.4 CHRONIC PAIN DISORDER: ICD-10-CM

## 2020-03-10 PROCEDURE — 1101F PT FALLS ASSESS-DOCD LE1/YR: CPT | Mod: CPTII,S$GLB,, | Performed by: ANESTHESIOLOGY

## 2020-03-10 PROCEDURE — 99999 PR PBB SHADOW E&M-EST. PATIENT-LVL III: CPT | Mod: PBBFAC,,, | Performed by: ANESTHESIOLOGY

## 2020-03-10 PROCEDURE — 1159F MED LIST DOCD IN RCRD: CPT | Mod: S$GLB,,, | Performed by: ANESTHESIOLOGY

## 2020-03-10 PROCEDURE — 1159F PR MEDICATION LIST DOCUMENTED IN MEDICAL RECORD: ICD-10-PCS | Mod: S$GLB,,, | Performed by: ANESTHESIOLOGY

## 2020-03-10 PROCEDURE — 3074F SYST BP LT 130 MM HG: CPT | Mod: CPTII,S$GLB,, | Performed by: ANESTHESIOLOGY

## 2020-03-10 PROCEDURE — 3078F PR MOST RECENT DIASTOLIC BLOOD PRESSURE < 80 MM HG: ICD-10-PCS | Mod: CPTII,S$GLB,, | Performed by: ANESTHESIOLOGY

## 2020-03-10 PROCEDURE — 99999 PR PBB SHADOW E&M-EST. PATIENT-LVL III: ICD-10-PCS | Mod: PBBFAC,,, | Performed by: ANESTHESIOLOGY

## 2020-03-10 PROCEDURE — 1125F PR PAIN SEVERITY QUANTIFIED, PAIN PRESENT: ICD-10-PCS | Mod: S$GLB,,, | Performed by: ANESTHESIOLOGY

## 2020-03-10 PROCEDURE — 99499 UNLISTED E&M SERVICE: CPT | Mod: S$GLB,,, | Performed by: ANESTHESIOLOGY

## 2020-03-10 PROCEDURE — 3074F PR MOST RECENT SYSTOLIC BLOOD PRESSURE < 130 MM HG: ICD-10-PCS | Mod: CPTII,S$GLB,, | Performed by: ANESTHESIOLOGY

## 2020-03-10 PROCEDURE — 1125F AMNT PAIN NOTED PAIN PRSNT: CPT | Mod: S$GLB,,, | Performed by: ANESTHESIOLOGY

## 2020-03-10 PROCEDURE — 99214 PR OFFICE/OUTPT VISIT, EST, LEVL IV, 30-39 MIN: ICD-10-PCS | Mod: S$GLB,,, | Performed by: ANESTHESIOLOGY

## 2020-03-10 PROCEDURE — 1101F PR PT FALLS ASSESS DOC 0-1 FALLS W/OUT INJ PAST YR: ICD-10-PCS | Mod: CPTII,S$GLB,, | Performed by: ANESTHESIOLOGY

## 2020-03-10 PROCEDURE — 99499 RISK ADDL DX/OHS AUDIT: ICD-10-PCS | Mod: S$GLB,,, | Performed by: ANESTHESIOLOGY

## 2020-03-10 PROCEDURE — 99214 OFFICE O/P EST MOD 30 MIN: CPT | Mod: S$GLB,,, | Performed by: ANESTHESIOLOGY

## 2020-03-10 PROCEDURE — 3078F DIAST BP <80 MM HG: CPT | Mod: CPTII,S$GLB,, | Performed by: ANESTHESIOLOGY

## 2020-03-10 NOTE — PROGRESS NOTES
Chronic Pain - New Consult    Referring Physician: Venkata Caruso MD    Chief Complaint:   Chief Complaint   Patient presents with    Low-back Pain    Hand Pain     Right Side    Neck Pain        SUBJECTIVE:    [facility] presents to the clinic for the evaluation of neck, lower back and right hand pain. The pain started 4 years ago and symptoms have been worsening.The pain is located in the neck area and radiates to the lower back and right hand.  The pain is described as aching, burning, numbing and sharp and is rated as 10/10. The pain is rated with a score of  10/10 on the BEST day and a score of 10/10 on the WORST day.  Symptoms interfere with daily activity, sleeping and work. The pain is exacerbated by Sitting, Laying, Bending, Walking, Night Time and Lifting.  The pain is mitigated by heat and physical therapy. He reports spending 0 hours per day reclining. The patient reports 2 hours of uninterrupted sleep per night.    Pt is a 66 yo male who presents for initial evaluation of chronic neck pain s/p C3-C7 posterior laminectomy in 2016 for cervical spinal stenosis and myelomalacia. Pt has been following w/ NSGY (Dr. Caruso) as he has had a recurrence of his neck pain over the past year or so. A trial of PT was intolerable due to pain. Dr Caruso updated his CT and MRI (see below) and referred him to us to consider interventional procedures. Patient reports that his pain is primarily located over his mid-upper cervical spine. When the pain is very bad, it will give him a posterior headache. He denies any radiation down his arms. Pain is constant, but made worse with head rotation and extension. He keeps his neck slightly flexed for comfort. He has occasional parasthesias in his right arm down to his thumb. He denies any weakness in his arms. He currently takes Tylenol PRN for the pain. He could not tolerate PT. He has never had spinal procedures.   Pt also reports chronic low back pain for over a decade. No  inciting injury. Pain is band-like pattern across low back. No pain shooting down his legs. No numbness, tingling, or weakness in the legs. He has never had injections or surgeries to the low back.     Patient denies night fever/night sweats, urinary incontinence, bowel incontinence, significant weight loss, significant motor weakness.    Physical Therapy/Home Exercise: Yes and was not able to tolerate after 3 sessions 2/2 excessive pain.    Pain Disability Index Review:  Last 3 PDI Scores 3/10/2020 2019   Pain Disability Index (PDI) 39 25       Pain Medications: None     report:  Not applicable    Pain Procedures: None    Imagin2020  MRI Cervical Spine Without Contrast  Narrative     EXAMINATION:  MRI CERVICAL SPINE WITHOUT CONTRAST    CLINICAL HISTORY:  cervical stenosis;.  Spondylosis without myelopathy or radiculopathy, cervical region    TECHNIQUE:  Multiplanar, multisequence MR images of the cervical spine were acquired without the administration of contrast.    COMPARISON:  2016 MRI, CT 2019    FINDINGS:  Prior C3 through C7 laminoplasty.    Artifacts noted from the hardware.    There is reversal of the cervical lordosis.    The vertebral body heights are well maintained.    There is mild anterolisthesis of C4 relative to C5.    There is significant disc space narrowing at C4-C5 and C6-C7.    There is ill define focal area of cord signal abnormality at C4 and C5 levels suggestive of areas of myelomalacia.    C2-C3: Posterior disc osteophyte complex, mild central canal stenosis.  There is moderate foraminal narrowing bilaterally.    C3-C4: Posterior disc osteophyte complex, significant facet joint osseous hypertrophy bilaterally the canal appears moderately narrowed, this could be accentuated from artifact from the hardware.  There is severe bilateral foraminal narrowing.    C4-C5: Anterolisthesis of C4 on C5 with uncovering of the disc.  Bilateral uncovertebral spur.  No canal  stenosis.  Severe bilateral foraminal narrowing.  Myelomalacia noted within the cord at this level.    C5-C6: No significant disc abnormality.  The hardware causes artifact in the posterior oral right lateral area of the canal possibly creating right lateral canal stenosis.  There is bilateral severe foraminal narrowing.    C6-C7: No disc abnormality, no canal stenosis.  There is uncovertebral spur creating severe left and mild right foraminal narrowing.    C7-T1: Posterior disc osteophyte complex and ligamentum flavum hypertrophy and uncovertebral spur bilaterally.  There is severe central canal stenosis and severe left and moderate right foraminal narrowing.    The upper thoracic spine appear within normal limits.  The paraspinal soft tissues demonstrate no significant abnormalities.  Incidentally seen is medial deviation of the bilateral common carotid arteries.   Impression       Postoperative changes of laminoplasty C3 through C7.    There is artifact created by the hardware limiting the evaluation of the spinal canal.    Multilevel variable degree of foraminal narrowing as detailed above.    Area of myelomalacia at the level of C4 and C5, similar to the prior exam.    Please see details of each levels above.      Electronically signed by: Cleo Espinoza MD  Date: 02/02/2020  Time: 14:06    Encounter     View Encounter               08/21/2019  CT Cervical Spine Without Contrast  Narrative     EXAMINATION:  CT CERVICAL SPINE WITHOUT CONTRAST    CLINICAL HISTORY:  s/p cervical laminoplasty; Spinal stenosis, cervical region    TECHNIQUE:  Low dose axial images, sagittal and coronal reformations were performed though the cervical spine.  Contrast was not administered.    COMPARISON:  CT cervical spine 05/11/2016, MRI cervical spine 03/23/2016    FINDINGS:  Craniocervical junction is within normal limits.    Postoperative changes from C3-C7 laminoplasty.  No evidence of hardware fracture.  Mild lucency  surrounding the right inferior C3 facet screw suggesting component of loosening.    Cervical kyphosis centered at C5-C6, progressed.    Vertebral body heights are stable.  No acute fracture.    Progressed disc space height loss C4-C5 through C7-T1 with endplate sclerosis and spurring, consistent with degenerative disc disease.    Degenerative findings:    *C2-C3: Posterior disc osteophyte complex, uncovertebral spurring, and facet arthropathy resulting in mild spinal canal stenosis and moderate bilateral neural foraminal narrowing, left greater than right.  *C3-C4: Posterior disc osteophyte complex, uncovertebral spurring, and facet arthropathy resulting in mild spinal canal stenosis, severe right and moderate left neural foraminal narrowing.  *C4-C5: Posterior disc osteophyte complex, uncovertebral spurring, and facet arthropathy resulting in severe bilateral neural foraminal narrowing.  No significant spinal canal stenosis.  *C5-C6: Laminoplasty material encroaches within the right lateral recess, posterior disc osteophyte complex, uncovertebral spurring, and facet arthropathy resulting in moderate spinal canal stenosis, and moderate bilateral neural foraminal narrowing.  *C6-C7: Posterior disc osteophyte complex, uncovertebral spurring, and facet arthropathy resulting in mild right and moderate left neural foraminal narrowing.  No significant spinal canal stenosis.  *C7-T1: Posterior disc osteophyte complex, uncovertebral spurring, and facet arthropathy resulting in moderate right and severe left neural foraminal narrowing.  No significant spinal canal stenosis.  Resolution of previously identified fluid within the posterior paraspinal soft tissues.   Impression       1. Postoperative changes from right sided C3-C7 instrumented laminoplasty.  No evidence of hardware failure.  Mild lucency surrounding the right inferior C3 facet screw, may represent component of loosening.  2. Resolution of previously identified  fluid within the posterior paraspinous soft tissues/ surgical bed.  3. Progression of multilevel cervical spondylosis and kyphosis, as above.    Electronically signed by resident: Brendon Gunn  Date: 2019  Time: 10:02             Past Medical History:   Diagnosis Date    Arthritis     Erectile dysfunction     Gout     Hyperlipidemia     Hypertension     Morbid obesity     Nuclear sclerosis of both eyes 2018    Screening for AAA (abdominal aortic aneurysm)     2016 AAA US no aneurysm     Past Surgical History:   Procedure Laterality Date    COLONOSCOPY N/A 10/31/2018    Procedure: COLONOSCOPY;  Surgeon: Clementine Batista MD;  Location: [facility];  Service: Endoscopy;  Laterality: N/A;    SPINE SURGERY  3/31/16    C3-C7 laminoplasty,Dr Caruso    tooth implant       Social History     Socioeconomic History    Marital status:      Spouse name: Not on file    Number of children: Not on file    Years of education: Not on file    Highest education level: Not on file   Occupational History    Not on file   Social Needs    Financial resource strain: Not on file    Food insecurity:     Worry: Not on file     Inability: Not on file    Transportation needs:     Medical: Not on file     Non-medical: Not on file   Tobacco Use    Smoking status: Former Smoker     Types: Cigarettes     Last attempt to quit: 2009     Years since quittin.1    Smokeless tobacco: Former User   Substance and Sexual Activity    Alcohol use: Yes     Comment: occasional beer- average week 12 beers a week    Drug use: No    Sexual activity: Not Currently     Partners: Female     Comment: , children.   Lifestyle    Physical activity:     Days per week: Not on file     Minutes per session: Not on file    Stress: Not on file   Relationships    Social connections:     Talks on phone: Not on file     Gets together: Not on file     Attends Restoration service: Not on file     Active member of club or  organization: Not on file     Attends meetings of clubs or organizations: Not on file     Relationship status: Not on file   Other Topics Concern    Not on file   Social History Narrative    Not on file     Family History   Problem Relation Age of Onset    COPD Mother     No Known Problems Father     No Known Problems Sister     Cataracts Brother     Glaucoma Brother     No Known Problems Maternal Aunt     No Known Problems Maternal Uncle     No Known Problems Paternal Aunt     No Known Problems Paternal Uncle     No Known Problems Maternal Grandmother     No Known Problems Maternal Grandfather     No Known Problems Paternal Grandmother     No Known Problems Paternal Grandfather     Amblyopia Neg Hx     Blindness Neg Hx     Cancer Neg Hx     Diabetes Neg Hx     Hypertension Neg Hx     Macular degeneration Neg Hx     Retinal detachment Neg Hx     Strabismus Neg Hx     Stroke Neg Hx     Thyroid disease Neg Hx        Review of patient's allergies indicates:   Allergen Reactions    Codeine Swelling     Codeine with Aspirin caused chest pain       Current Outpatient Medications   Medication Sig    allopurinoL (ZYLOPRIM) 100 MG tablet Take 1 tablet (100 mg total) by mouth once daily.    aspirin (ECOTRIN) 81 MG EC tablet Take 81 mg by mouth once daily.    clotrimazole-betamethasone 1-0.05% (LOTRISONE) cream Apply topically 2 (two) times daily.    hydroCHLOROthiazide (HYDRODIURIL) 25 MG tablet Take 1 tablet (25 mg total) by mouth once daily.    lisinopril 10 MG tablet Take 1 tablet (10 mg total) by mouth once daily.    metFORMIN (GLUCOPHAGE-XR) 500 MG 24 hr tablet TAKE 1 TABLET BY MOUTH EVERY DAY WITH BREAKFAST    multivitamin (ONE-A-DAY ESSENTIAL ORAL) Take by mouth.    nystatin-triamcinolone (MYCOLOG II) cream Apply topically 3 (three) times daily.    pravastatin (PRAVACHOL) 20 MG tablet Take 1 tablet (20 mg total) by mouth once daily.    sildenafil (VIAGRA) 100 MG tablet Take 1 tablet  "(100 mg total) by mouth daily as needed for Erectile Dysfunction. *generic tabs*    tamsulosin (FLOMAX) 0.4 mg Cap Take 2 capsules (0.8 mg total) by mouth once daily. Increased dose     No current facility-administered medications for this visit.        REVIEW OF SYSTEMS:    GENERAL:  No weight loss, malaise or fevers. + obesity  HEENT:  Negative for frequent or significant headaches.  NECK:  Negative for lumps, goiter and significant neck swelling. +Neck pain  RESPIRATORY:  Negative for cough, wheezing or shortness of breath.  CARDIOVASCULAR:  Negative for chest pain, leg swelling or palpitations. +HTN, HLD  GI:  Negative for abdominal discomfort, blood in stools or black stools or change in bowel habits.  : + BPH  MUSCULOSKELETAL:  See HPI. + gout  SKIN:  Negative for lesions, rash, and itching.  PSYCH:  Negative for mood disorder and recent psychosocial stressors.  HEMATOLOGY/LYMPHOLOGY:  Negative for prolonged bleeding, bruising easily or swollen nodes.  NEURO:   No history of syncope, paralysis, seizures or tremors.  All other reviewed and negative other than HPI.    OBJECTIVE:    /76   Pulse 64   Temp 97.7 °F (36.5 °C)   Resp 18   Ht 5' 8" (1.727 m)   Wt 123 kg (271 lb 2.7 oz)   SpO2 100%   BMI 41.23 kg/m²     PHYSICAL EXAMINATION:    General appearance: Well appearing, in no acute distress, alert and oriented x3. Obese  Psych:  Mood and affect appropriate.  Skin: Skin color, texture, turgor normal, no rashes or lesions, in both upper and lower body.  Head/face:  Normocephalic, atraumatic. No palpable lymph nodes.  Neck:  + kyphotic posture. Very limited ROM due to pain, especially with extension and lateral rotation. TTP throughout the cervical paraspinal muscles and BL superior trapezius muscles. + facet loading BL. Spurling Negative.  Cor: RRR  Pulm: CTA  GI:  Soft and non-tender.  Low back: + poor posture. ROM very limited/ painful with extension and rotation. TTP over lumbar paraspinals " BL. Facet loading + BL, negative SLR BL  Extremities: Peripheral joint ROM is full and pain free without obvious instability or laxity in all four extremities. No deformities, edema, or skin discoloration. Good capillary refill.  Musculoskeletal: Bilateral upper and lower extremity strength is normal and symmetric, except for symmetrically diminished (4/5) elbow extensor strength. No atrophy or tone abnormalities are noted.  Neuro: Bilateral upper and lower extremity coordination and muscle stretch reflexes are slightly brisk throughout but symmetric.  Plantar response are downgoing. No Sheehan's No loss of sensation is noted.  Gait: normal.    ASSESSMENT: 69 y.o. year old male with chronic neck pain, consistent with degenerative disc disease, cervical spondylosis, and a myofascial component of pain. He is s/p C3-C7 posterior laminoplasty in 2016 (Dr. Caruso) for cervical stenosis with myelomalacia. Recent imaging show stable post-surgical changes but advanced degenerative changes throughout his neck. With his history of C3-C7 posterior laminoplasty with hardware in place, we are somewhat limited in what procedures we can do for his neck. We will start with a cervical epidural steroid injection at C7-T1 or at T1-T2 for his cervical degenerative disc disease and see how well his pain responds.  Pt also has chronic, low back pain primarily 2/2 lumbar spondylosis. We will address this further at his next visit and consider scheduling BL lumbar MBBs to progress to lumbar RFA.    1. Cervical stenosis of spinal canal 3/2016 C3-7 laminoplasty     2. Chronic pain disorder     3. Cervical post-laminectomy syndrome     4. Lumbar spondylosis           PLAN:     - I have stressed the importance of physical activity and a home exercise plan to help with pain and improve health.  - Schedule for a Cervical Epidural Injection at C7-T1 or T1-T2 (depending on prior surgical extent) to help with pain and progress with a Home exercise  program.  - RTC 2 weeks after procedure to assess results of cervical RADHA and to further discuss low back pain and consider medial branch blocks  - Counseled patient regarding the importance of activity modification, smoking cessation, constant sleeping habits, weight loss, and staying active.    The above plan and management options were discussed at length with patient. Patient is in agreement with the above and verbalized understanding. It will be communicated with the referring physician via electronic record, fax, or mail.      Mykel Rain  03/10/2020    I have reviewed and concur with the resident's history, physical, assessment, and plan.  I have personally interviewed and examined the patient at bedside.  See below addendum for my evaluation and additional findings.  69-year-old male with chronic neck and lower back pain consistent with cervical post laminectomy syndrome and lumbar spondylosis.  We will schedule him for cervical epidural steroid injection at C7/T1 level for thoracic RADHA at T1/T2 level depending on prior surgical extent.  We will also consider bilateral lumbar medial branch block in the future for lumbar spondylosis.  Will follow up with him 2 weeks after the procedure.    Stan Brunner  03/10/2020

## 2020-03-23 PROBLEM — R29.898 DECREASED ROM OF NECK: Status: RESOLVED | Noted: 2019-09-06 | Resolved: 2020-03-23

## 2020-03-23 PROBLEM — M25.611 DECREASED RANGE OF MOTION OF RIGHT SHOULDER: Status: RESOLVED | Noted: 2019-11-15 | Resolved: 2020-03-23

## 2020-03-23 PROBLEM — M54.2 NECK PAIN: Status: RESOLVED | Noted: 2019-09-06 | Resolved: 2020-03-23

## 2020-03-23 PROBLEM — R29.3 POSTURAL IMBALANCE: Status: RESOLVED | Noted: 2019-09-06 | Resolved: 2020-03-23

## 2020-03-27 ENCOUNTER — TELEPHONE (OUTPATIENT)
Dept: PAIN MEDICINE | Facility: CLINIC | Age: 70
End: 2020-03-27

## 2020-03-27 NOTE — TELEPHONE ENCOUNTER
----- Message from Joseph Lozano sent at 3/26/2020  3:23 PM CDT -----  Contact: TEMI JOAQUIN [[id number]]  Name of Who is Calling: TEMI JOAQUIN [[id number]]      What is the request in detail: Would like to cancel upcoming injection due to COVID_19      Can the clinic reply by [facility]: no      [facility]: [phone]

## 2020-04-23 ENCOUNTER — TELEPHONE (OUTPATIENT)
Dept: PAIN MEDICINE | Facility: OTHER | Age: 70
End: 2020-04-23

## 2020-06-09 ENCOUNTER — TELEPHONE (OUTPATIENT)
Dept: FAMILY MEDICINE | Facility: CLINIC | Age: 70
End: 2020-06-09

## 2020-07-09 ENCOUNTER — LAB VISIT (OUTPATIENT)
Dept: LAB | Facility: HOSPITAL | Age: 70
End: 2020-07-09
Attending: INTERNAL MEDICINE
Payer: MEDICARE

## 2020-07-09 DIAGNOSIS — E78.5 HYPERLIPIDEMIA, UNSPECIFIED HYPERLIPIDEMIA TYPE: Chronic | ICD-10-CM

## 2020-07-09 DIAGNOSIS — R73.03 PREDIABETES: Chronic | ICD-10-CM

## 2020-07-09 LAB
ALBUMIN SERPL BCP-MCNC: 3.6 G/DL (ref 3.5–5.2)
ALP SERPL-CCNC: 58 U/L (ref 55–135)
ALT SERPL W/O P-5'-P-CCNC: 18 U/L (ref 10–44)
ANION GAP SERPL CALC-SCNC: 8 MMOL/L (ref 8–16)
AST SERPL-CCNC: 17 U/L (ref 10–40)
BILIRUB SERPL-MCNC: 0.2 MG/DL (ref 0.1–1)
BUN SERPL-MCNC: 13 MG/DL (ref 8–23)
CALCIUM SERPL-MCNC: 9.8 MG/DL (ref 8.7–10.5)
CHLORIDE SERPL-SCNC: 100 MMOL/L (ref 95–110)
CHOLEST SERPL-MCNC: 163 MG/DL (ref 120–199)
CHOLEST/HDLC SERPL: 4.1 {RATIO} (ref 2–5)
CO2 SERPL-SCNC: 32 MMOL/L (ref 23–29)
CREAT SERPL-MCNC: 1.2 MG/DL (ref 0.5–1.4)
EST. GFR  (AFRICAN AMERICAN): >60 ML/MIN/1.73 M^2
EST. GFR  (NON AFRICAN AMERICAN): >60 ML/MIN/1.73 M^2
ESTIMATED AVG GLUCOSE: 128 MG/DL (ref 68–131)
GLUCOSE SERPL-MCNC: 109 MG/DL (ref 70–110)
HBA1C MFR BLD HPLC: 6.1 % (ref 4–5.6)
HDLC SERPL-MCNC: 40 MG/DL (ref 40–75)
HDLC SERPL: 24.5 % (ref 20–50)
LDLC SERPL CALC-MCNC: 90.8 MG/DL (ref 63–159)
NONHDLC SERPL-MCNC: 123 MG/DL
POTASSIUM SERPL-SCNC: 4 MMOL/L (ref 3.5–5.1)
PROT SERPL-MCNC: 7.9 G/DL (ref 6–8.4)
SODIUM SERPL-SCNC: 140 MMOL/L (ref 136–145)
TRIGL SERPL-MCNC: 161 MG/DL (ref 30–150)

## 2020-07-09 PROCEDURE — 83036 HEMOGLOBIN GLYCOSYLATED A1C: CPT

## 2020-07-09 PROCEDURE — 80061 LIPID PANEL: CPT

## 2020-07-09 PROCEDURE — 36415 COLL VENOUS BLD VENIPUNCTURE: CPT | Mod: PO

## 2020-07-09 PROCEDURE — 80053 COMPREHEN METABOLIC PANEL: CPT

## 2020-07-12 PROBLEM — D64.9 NORMOCYTIC ANEMIA: Status: RESOLVED | Noted: 2019-06-20 | Resolved: 2020-07-12

## 2020-07-12 NOTE — PROGRESS NOTES
This note was created by combination of typed  and M-Modal dictation.  Transcription errors may be present.  If there are any questions, please contact me.    Assessment:     1. Prediabetes  metFORMIN (GLUCOPHAGE-XR) 500 MG XR 24hr tablet    Comprehensive metabolic panel    Hemoglobin A1C    CBC auto differential   2. Morbid obesity with BMI of 40.0-44.9, adult     3. Essential hypertension no outside checks; 3/2014 exercise stress echo no ischemia LVEF 55-60  lisinopriL 10 MG tablet    hydroCHLOROthiazide (HYDRODIURIL) 25 MG tablet   4. Mixed hyperlipidemia  pravastatin (PRAVACHOL) 20 MG tablet    Comprehensive metabolic panel    Lipid Panel   5. Alpha thalassemia silent carrier     6. Cervical stenosis of spinal canal 3/2016 C3-7 laminoplasty     7. Yeast dermatitis of penis     8. Tinea corporis  ketoconazole (NIZORAL) 2 % cream   9. Gout, arthritis  allopurinoL (ZYLOPRIM) 100 MG tablet    Uric acid   10. Benign prostatic hyperplasia with urinary frequency  tamsulosin (FLOMAX) 0.4 mg Cap       Plan:   1, 2.  Stay on metformin.  Work on therapeutic lifestyle modification and we talked about the importance of weight loss.  I have given him some home chair based exercises, given ongoing issues with his neck and his back.  3. Stable no change on lisinopril and hctz  4. TG not ideal, work on TLC, no change, pravastatin to pharmacy  5. Stable  6. Contacted pain mgmt to assist pt in rescheduling for cervical epidural injection  7. Continue lotrisone. Previous lesion appears to have healed.   8. Topical ketozonazole  9. Refilled allopurinol, check labs next visit.  10. Refilled flomax 0.8      Medications Discontinued During This Encounter   Medication Reason    tamsulosin (FLOMAX) 0.4 mg Cap Reorder    lisinopril 10 MG tablet Reorder    hydroCHLOROthiazide (HYDRODIURIL) 25 MG tablet Reorder    pravastatin (PRAVACHOL) 20 MG tablet Reorder    metFORMIN (GLUCOPHAGE-XR) 500 MG 24 hr tablet Reorder     allopurinoL (ZYLOPRIM) 100 MG tablet Reorder       meds sent this encounter:  Medications Ordered This Encounter   Medications    allopurinoL (ZYLOPRIM) 100 MG tablet     Sig: Take 1 tablet (100 mg total) by mouth once daily.     Dispense:  90 tablet     Refill:  3    hydroCHLOROthiazide (HYDRODIURIL) 25 MG tablet     Sig: Take 1 tablet (25 mg total) by mouth once daily.     Dispense:  90 tablet     Refill:  3     .    ketoconazole (NIZORAL) 2 % cream     Sig: Apply topically once daily. To the armpits     Dispense:  60 g     Refill:  2    lisinopriL 10 MG tablet     Sig: Take 1 tablet (10 mg total) by mouth once daily.     Dispense:  90 tablet     Refill:  3     .    metFORMIN (GLUCOPHAGE-XR) 500 MG XR 24hr tablet     Sig: TAKE 1 TABLET BY MOUTH EVERY DAY WITH BREAKFAST     Dispense:  90 tablet     Refill:  3    pravastatin (PRAVACHOL) 20 MG tablet     Sig: Take 1 tablet (20 mg total) by mouth once daily.     Dispense:  90 tablet     Refill:  3    tamsulosin (FLOMAX) 0.4 mg Cap     Sig: Take 2 capsules (0.8 mg total) by mouth once daily. Increased dose     Dispense:  180 capsule     Refill:  3       Follow Up: No follow-ups on file. OV 6 mo with previsit labs, recall entered    Subjective:     Chief Complaint   Patient presents with    Hypertension    Diabetes       PARAS Acuna is a 69 y.o. male, last appointment with this clinic was Visit date not found.    Saw NS and pain mgmt for neck and L spine pain. Hx of intervention.  Plan was cervical epidural steroid injection  Consider lumbar MBBs     lesion, seen by urology 3/2020, plan was if no improvement then needs bx.    previsit labs lipid TG elevated, nonHDL < 130  CMP WNL  a1c 6.1 from 6.2    Ongoing back issues. Did not get the steroid injection b/c of covid. Is interested in rescheduling this.    Sometimes burning initially with urinating in the morning.   Has been using the cream (lotrisone) but not 100% relief. Thinks that the original lesion  resolved but then a new one appeared, next to the original area.  About a month ago? He wonders if this appeared b/c of dribbling on the skin and irritating it. And stopping the cream.     Rash in the axillae no pain. No itching.     Needs more physical activity. Discussed importance of weight loss on BP, glucose, ortho issues.    Patient Care Team:  Clark Vargas MD as PCP - General (Internal Medicine)  Fernando Jacinto MD as Consulting Physician (Orthopedic Surgery)  Venkata Caruso MD as Consulting Physician (Neurosurgery)  Mina Betancourt MA as Care Coordinator    Patient Active Problem List    Diagnosis Date Noted    Chronic pain disorder 03/10/2020    Cervical post-laminectomy syndrome 03/10/2020    Lumbar spondylosis 03/10/2020    Alpha thalassemia silent carrier 11/14/2019    Tubular adenoma of colon 10/2018 colonoscopy sigmoid tubular adenoma 11/02/2018    Diverticulosis of large intestine without hemorrhage on colonoscopy 10/2018 11/01/2018    SIS (obstructive sleep apnea) on sleep study 4/2018 with AHI 55.  CPAP at 15     Nuclear sclerosis of both eyes 01/16/2018    Refractive error 01/16/2018    Benign prostatic hyperplasia with urinary frequency 01/10/2018    Hemorrhoids 06/01/2016    Cervical stenosis of spinal canal 3/2016 C3-7 laminoplasty 03/31/2016     3/31/2016 pt underwent a C3 through C7 Laminoplasty with posterior cervical approach      Prediabetes 03/29/2016    Essential hypertension no outside checks; 3/2014 exercise stress echo no ischemia LVEF 55-60 03/20/2014     3/11/2014 exercise stress echo negative for ischemia.  Normal LV systolic function LVEF 55-60.  Concentric remodeling.      Mixed hyperlipidemia 03/20/2014    Morbid obesity with BMI of 40.0-44.9, adult 03/06/2014    Back pain 07/11/2013    ED (erectile dysfunction) 06/07/2013    Gout, arthritis 01/07/2013       PAST MEDICAL HISTORY:  Past Medical History:   Diagnosis Date    Arthritis     Erectile  dysfunction     Gout     Hyperlipidemia     Hypertension     Morbid obesity     Nuclear sclerosis of both eyes 2018    Screening for AAA (abdominal aortic aneurysm)     2016 AAA US no aneurysm       PAST SURGICAL HISTORY:  Past Surgical History:   Procedure Laterality Date    COLONOSCOPY N/A 10/31/2018    Procedure: COLONOSCOPY;  Surgeon: Clementine Batista MD;  Location: [facility];  Service: Endoscopy;  Laterality: N/A;    SPINE SURGERY  3/31/16    C3-C7 laminoplasty,Dr Caruso    tooth implant         SOCIAL HISTORY:  Social History     Socioeconomic History    Marital status:      Spouse name: Not on file    Number of children: Not on file    Years of education: Not on file    Highest education level: Not on file   Occupational History    Not on file   Social Needs    Financial resource strain: Not on file    Food insecurity     Worry: Not on file     Inability: Not on file    Transportation needs     Medical: Not on file     Non-medical: Not on file   Tobacco Use    Smoking status: Former Smoker     Types: Cigarettes     Quit date: 2009     Years since quittin.5    Smokeless tobacco: Former User   Substance and Sexual Activity    Alcohol use: Yes     Comment: occasional beer- average week 12 beers a week    Drug use: No    Sexual activity: Not Currently     Partners: Female     Comment: , children.   Lifestyle    Physical activity     Days per week: Not on file     Minutes per session: Not on file    Stress: Not on file   Relationships    Social connections     Talks on phone: Not on file     Gets together: Not on file     Attends [employer] service: Not on file     Active member of club or organization: Not on file     Attends meetings of clubs or organizations: Not on file     Relationship status: Not on file   Other Topics Concern    Not on file   Social History Narrative    Not on file       ALLERGIES AND MEDICATIONS: updated and reviewed.  Review of patient's  allergies indicates:   Allergen Reactions    Codeine Swelling     Codeine with Aspirin caused chest pain     Current Outpatient Medications   Medication Sig Dispense Refill    allopurinoL (ZYLOPRIM) 100 MG tablet Take 1 tablet (100 mg total) by mouth once daily. 90 tablet 3    aspirin (ECOTRIN) 81 MG EC tablet Take 81 mg by mouth once daily.      clotrimazole-betamethasone 1-0.05% (LOTRISONE) cream Apply topically 2 (two) times daily. 45 g 3    hydroCHLOROthiazide (HYDRODIURIL) 25 MG tablet Take 1 tablet (25 mg total) by mouth once daily. 90 tablet 3    lisinopril 10 MG tablet Take 1 tablet (10 mg total) by mouth once daily. 90 tablet 3    metFORMIN (GLUCOPHAGE-XR) 500 MG 24 hr tablet TAKE 1 TABLET BY MOUTH EVERY DAY WITH BREAKFAST 90 tablet 3    multivitamin (ONE-A-DAY ESSENTIAL ORAL) Take by mouth.      nystatin-triamcinolone (MYCOLOG II) cream Apply topically 3 (three) times daily. 60 g 3    pravastatin (PRAVACHOL) 20 MG tablet Take 1 tablet (20 mg total) by mouth once daily. 90 tablet 3    sildenafil (VIAGRA) 100 MG tablet Take 1 tablet (100 mg total) by mouth daily as needed for Erectile Dysfunction. *generic tabs* 10 tablet 11    tamsulosin (FLOMAX) 0.4 mg Cap Take 2 capsules (0.8 mg total) by mouth once daily. Increased dose 180 capsule 3     No current facility-administered medications for this visit.        Review of Systems   All other systems reviewed and are negative.      Objective:   Physical Exam   Vitals:    07/13/20 1258   BP: 94/66   BP Location: Right arm   Patient Position: Sitting   BP Method: Large (Manual)   Pulse: 91   Temp: 97 °F (36.1 °C)   TempSrc: Temporal   SpO2: 98%   Weight: 120.7 kg (266 lb)    Body mass index is 40.45 kg/m².  Weight: 120.7 kg (266 lb)         Physical Exam  Constitutional:       General: He is not in acute distress.     Appearance: He is well-developed.   Cardiovascular:      Rate and Rhythm: Normal rate and regular rhythm.      Heart sounds: Normal heart  sounds. No murmur.   Pulmonary:      Effort: Pulmonary effort is normal.      Breath sounds: Normal breath sounds.   Genitourinary:     Comments: Uncircumcised, foreskin easily retractable, on the right lateral inside foreskin about 1 cm area of superficial ulceration without drainage. The area of the original lesion is healed, hypopigmented, is lateral to the current lesion.  Musculoskeletal: Normal range of motion.   Skin:     General: Skin is warm and dry.      Findings: Rash present.      Comments:  rash as above  The axillae - with hyperemic, well defined rash diffuse - patch.  No active scale. No vesicles no pustules   Neurological:      Mental Status: He is alert and oriented to person, place, and time.      Coordination: Coordination normal.   Psychiatric:         Behavior: Behavior normal.         Thought Content: Thought content normal.         Component      Latest Ref Rng & Units 7/9/2020 11/8/2019   WBC      3.90 - 12.70 K/uL  7.49   RBC      4.60 - 6.20 M/uL  4.71   Hemoglobin      14.0 - 18.0 g/dL  11.9 (L)   Hematocrit      40.0 - 54.0 %  40.5   MCV      82 - 98 fL  86   MCH      27.0 - 31.0 pg  25.3 (L)   MCHC      32.0 - 36.0 g/dL  29.4 (L)   RDW      11.5 - 14.5 %  15.7 (H)   Platelets      150 - 350 K/uL  241   MPV      9.2 - 12.9 fL  12.4   Immature Granulocytes      0.0 - 0.5 %  0.1   Gran # (ANC)      1.8 - 7.7 K/uL  3.0   Immature Grans (Abs)      0.00 - 0.04 K/uL  0.01   Lymph #      1.0 - 4.8 K/uL  3.7   Mono #      0.3 - 1.0 K/uL  0.6   Eos #      0.0 - 0.5 K/uL  0.2   Baso #      0.00 - 0.20 K/uL  0.02   nRBC      0 /100 WBC  0   Gran%      38.0 - 73.0 %  39.4   Lymph%      18.0 - 48.0 %  49.3 (H)   Mono%      4.0 - 15.0 %  8.1   Eosinophil%      0.0 - 8.0 %  2.8   Basophil%      0.0 - 1.9 %  0.3   Differential Method        Automated   Sodium      136 - 145 mmol/L 140 138   Potassium      3.5 - 5.1 mmol/L 4.0 3.9   Chloride      95 - 110 mmol/L 100 100   CO2      23 - 29 mmol/L 32 (H)  32 (H)   Glucose      70 - 110 mg/dL 109 103   BUN, Bld      8 - 23 mg/dL 13 15   Creatinine      0.5 - 1.4 mg/dL 1.2 1.2   Calcium      8.7 - 10.5 mg/dL 9.8 9.5   PROTEIN TOTAL      6.0 - 8.4 g/dL 7.9 7.9   Albumin      3.5 - 5.2 g/dL 3.6 3.7   BILIRUBIN TOTAL      0.1 - 1.0 mg/dL 0.2 0.5   Alkaline Phosphatase      55 - 135 U/L 58 52 (L)   AST      10 - 40 U/L 17 18   ALT      10 - 44 U/L 18 18   Anion Gap      8 - 16 mmol/L 8 6 (L)   eGFR if African American      >60 mL/min/1.73 m:2 >60.0 >60.0   eGFR if non African American      >60 mL/min/1.73 m:2 >60.0 >60.0   Cholesterol      120 - 199 mg/dL 163 160   Triglycerides      30 - 150 mg/dL 161 (H) 140   HDL      40 - 75 mg/dL 40 40   LDL Cholesterol External      63.0 - 159.0 mg/dL 90.8 92.0   Hdl/Cholesterol Ratio      20.0 - 50.0 % 24.5 25.0   Total Cholesterol/HDL Ratio      2.0 - 5.0 4.1 4.0   Non-HDL Cholesterol      mg/dL 123 120   Hemoglobin A1C External      4.0 - 5.6 % 6.1 (H) 6.2 (H)   Estimated Avg Glucose      68 - 131 mg/dL 128 131   Uric Acid      3.4 - 7.0 mg/dL  9.3 (H)

## 2020-07-13 ENCOUNTER — OFFICE VISIT (OUTPATIENT)
Dept: FAMILY MEDICINE | Facility: CLINIC | Age: 70
End: 2020-07-13
Payer: MEDICARE

## 2020-07-13 ENCOUNTER — TELEPHONE (OUTPATIENT)
Dept: PAIN MEDICINE | Facility: CLINIC | Age: 70
End: 2020-07-13

## 2020-07-13 VITALS
HEART RATE: 91 BPM | DIASTOLIC BLOOD PRESSURE: 66 MMHG | OXYGEN SATURATION: 98 % | BODY MASS INDEX: 40.45 KG/M2 | WEIGHT: 266 LBS | TEMPERATURE: 97 F | SYSTOLIC BLOOD PRESSURE: 94 MMHG

## 2020-07-13 DIAGNOSIS — R35.0 BENIGN PROSTATIC HYPERPLASIA WITH URINARY FREQUENCY: ICD-10-CM

## 2020-07-13 DIAGNOSIS — E66.01 MORBID OBESITY WITH BMI OF 40.0-44.9, ADULT: Chronic | ICD-10-CM

## 2020-07-13 DIAGNOSIS — M10.9 GOUT, ARTHRITIS: Chronic | ICD-10-CM

## 2020-07-13 DIAGNOSIS — I10 ESSENTIAL HYPERTENSION: ICD-10-CM

## 2020-07-13 DIAGNOSIS — N40.1 BENIGN PROSTATIC HYPERPLASIA WITH URINARY FREQUENCY: ICD-10-CM

## 2020-07-13 DIAGNOSIS — E78.2 MIXED HYPERLIPIDEMIA: ICD-10-CM

## 2020-07-13 DIAGNOSIS — B35.4 TINEA CORPORIS: ICD-10-CM

## 2020-07-13 DIAGNOSIS — M48.02 CERVICAL STENOSIS OF SPINAL CANAL: Chronic | ICD-10-CM

## 2020-07-13 DIAGNOSIS — D56.3 ALPHA THALASSEMIA SILENT CARRIER: ICD-10-CM

## 2020-07-13 DIAGNOSIS — B37.49 YEAST DERMATITIS OF PENIS: ICD-10-CM

## 2020-07-13 DIAGNOSIS — R73.03 PREDIABETES: Primary | Chronic | ICD-10-CM

## 2020-07-13 PROCEDURE — 1126F AMNT PAIN NOTED NONE PRSNT: CPT | Mod: S$GLB,,, | Performed by: INTERNAL MEDICINE

## 2020-07-13 PROCEDURE — 1159F MED LIST DOCD IN RCRD: CPT | Mod: S$GLB,,, | Performed by: INTERNAL MEDICINE

## 2020-07-13 PROCEDURE — 99214 PR OFFICE/OUTPT VISIT, EST, LEVL IV, 30-39 MIN: ICD-10-PCS | Mod: S$GLB,,, | Performed by: INTERNAL MEDICINE

## 2020-07-13 PROCEDURE — 99499 RISK ADDL DX/OHS AUDIT: ICD-10-PCS | Mod: S$GLB,,, | Performed by: INTERNAL MEDICINE

## 2020-07-13 PROCEDURE — 3008F PR BODY MASS INDEX (BMI) DOCUMENTED: ICD-10-PCS | Mod: CPTII,S$GLB,, | Performed by: INTERNAL MEDICINE

## 2020-07-13 PROCEDURE — 1100F PR PT FALLS ASSESS DOC 2+ FALLS/FALL W/INJURY/YR: ICD-10-PCS | Mod: CPTII,S$GLB,, | Performed by: INTERNAL MEDICINE

## 2020-07-13 PROCEDURE — 99214 OFFICE O/P EST MOD 30 MIN: CPT | Mod: S$GLB,,, | Performed by: INTERNAL MEDICINE

## 2020-07-13 PROCEDURE — 1159F PR MEDICATION LIST DOCUMENTED IN MEDICAL RECORD: ICD-10-PCS | Mod: S$GLB,,, | Performed by: INTERNAL MEDICINE

## 2020-07-13 PROCEDURE — 3078F PR MOST RECENT DIASTOLIC BLOOD PRESSURE < 80 MM HG: ICD-10-PCS | Mod: CPTII,S$GLB,, | Performed by: INTERNAL MEDICINE

## 2020-07-13 PROCEDURE — 3074F SYST BP LT 130 MM HG: CPT | Mod: CPTII,S$GLB,, | Performed by: INTERNAL MEDICINE

## 2020-07-13 PROCEDURE — 3288F PR FALLS RISK ASSESSMENT DOCUMENTED: ICD-10-PCS | Mod: CPTII,S$GLB,, | Performed by: INTERNAL MEDICINE

## 2020-07-13 PROCEDURE — 99999 PR PBB SHADOW E&M-EST. PATIENT-LVL III: ICD-10-PCS | Mod: PBBFAC,,, | Performed by: INTERNAL MEDICINE

## 2020-07-13 PROCEDURE — 99999 PR PBB SHADOW E&M-EST. PATIENT-LVL III: CPT | Mod: PBBFAC,,, | Performed by: INTERNAL MEDICINE

## 2020-07-13 PROCEDURE — 3078F DIAST BP <80 MM HG: CPT | Mod: CPTII,S$GLB,, | Performed by: INTERNAL MEDICINE

## 2020-07-13 PROCEDURE — 99499 UNLISTED E&M SERVICE: CPT | Mod: S$GLB,,, | Performed by: INTERNAL MEDICINE

## 2020-07-13 PROCEDURE — 3288F FALL RISK ASSESSMENT DOCD: CPT | Mod: CPTII,S$GLB,, | Performed by: INTERNAL MEDICINE

## 2020-07-13 PROCEDURE — 1100F PTFALLS ASSESS-DOCD GE2>/YR: CPT | Mod: CPTII,S$GLB,, | Performed by: INTERNAL MEDICINE

## 2020-07-13 PROCEDURE — 1126F PR PAIN SEVERITY QUANTIFIED, NO PAIN PRESENT: ICD-10-PCS | Mod: S$GLB,,, | Performed by: INTERNAL MEDICINE

## 2020-07-13 PROCEDURE — 3008F BODY MASS INDEX DOCD: CPT | Mod: CPTII,S$GLB,, | Performed by: INTERNAL MEDICINE

## 2020-07-13 PROCEDURE — 3074F PR MOST RECENT SYSTOLIC BLOOD PRESSURE < 130 MM HG: ICD-10-PCS | Mod: CPTII,S$GLB,, | Performed by: INTERNAL MEDICINE

## 2020-07-13 RX ORDER — LISINOPRIL 10 MG/1
10 TABLET ORAL DAILY
Qty: 90 TABLET | Refills: 3 | Status: SHIPPED | OUTPATIENT
Start: 2020-07-13 | End: 2021-10-05

## 2020-07-13 RX ORDER — ALLOPURINOL 100 MG/1
100 TABLET ORAL DAILY
Qty: 90 TABLET | Refills: 3 | Status: SHIPPED | OUTPATIENT
Start: 2020-07-13 | End: 2021-07-04

## 2020-07-13 RX ORDER — TAMSULOSIN HYDROCHLORIDE 0.4 MG/1
0.8 CAPSULE ORAL DAILY
Qty: 180 CAPSULE | Refills: 3 | Status: SHIPPED | OUTPATIENT
Start: 2020-07-13 | End: 2021-07-25

## 2020-07-13 RX ORDER — KETOCONAZOLE 20 MG/G
CREAM TOPICAL DAILY
Qty: 60 G | Refills: 2 | Status: ON HOLD | OUTPATIENT
Start: 2020-07-13 | End: 2024-03-07 | Stop reason: CLARIF

## 2020-07-13 RX ORDER — METFORMIN HYDROCHLORIDE 500 MG/1
TABLET, EXTENDED RELEASE ORAL
Qty: 90 TABLET | Refills: 3 | Status: SHIPPED | OUTPATIENT
Start: 2020-07-13 | End: 2021-07-25

## 2020-07-13 RX ORDER — PRAVASTATIN SODIUM 20 MG/1
20 TABLET ORAL DAILY
Qty: 90 TABLET | Refills: 3 | Status: SHIPPED | OUTPATIENT
Start: 2020-07-13 | End: 2021-10-05

## 2020-07-13 RX ORDER — HYDROCHLOROTHIAZIDE 25 MG/1
25 TABLET ORAL DAILY
Qty: 90 TABLET | Refills: 3 | Status: SHIPPED | OUTPATIENT
Start: 2020-07-13 | End: 2021-10-05

## 2020-07-13 NOTE — TELEPHONE ENCOUNTER
----- Message from Betito Vargas MD sent at 7/13/2020  1:06 PM CDT -----  Regarding: please call pt to reschedule cervical epidural steroid  He is not familiar with [place] - please give him detailed directions to get there. Thanks, betito

## 2020-08-04 ENCOUNTER — OFFICE VISIT (OUTPATIENT)
Dept: UROLOGY | Facility: CLINIC | Age: 70
End: 2020-08-04
Payer: MEDICARE

## 2020-08-04 VITALS — HEIGHT: 68 IN | WEIGHT: 268.44 LBS | BODY MASS INDEX: 40.68 KG/M2

## 2020-08-04 DIAGNOSIS — N52.01 ERECTILE DYSFUNCTION DUE TO ARTERIAL INSUFFICIENCY: ICD-10-CM

## 2020-08-04 DIAGNOSIS — N48.1 BALANITIS: ICD-10-CM

## 2020-08-04 DIAGNOSIS — R35.1 NOCTURIA: ICD-10-CM

## 2020-08-04 DIAGNOSIS — N48.9 PENILE LESION: Primary | ICD-10-CM

## 2020-08-04 DIAGNOSIS — R30.0 DYSURIA: ICD-10-CM

## 2020-08-04 PROCEDURE — 99999 PR PBB SHADOW E&M-EST. PATIENT-LVL III: CPT | Mod: PBBFAC,,, | Performed by: UROLOGY

## 2020-08-04 PROCEDURE — 99999 PR PBB SHADOW E&M-EST. PATIENT-LVL III: ICD-10-PCS | Mod: PBBFAC,,, | Performed by: UROLOGY

## 2020-08-04 PROCEDURE — 3008F PR BODY MASS INDEX (BMI) DOCUMENTED: ICD-10-PCS | Mod: CPTII,S$GLB,, | Performed by: UROLOGY

## 2020-08-04 PROCEDURE — 1101F PR PT FALLS ASSESS DOC 0-1 FALLS W/OUT INJ PAST YR: ICD-10-PCS | Mod: CPTII,S$GLB,, | Performed by: UROLOGY

## 2020-08-04 PROCEDURE — 99214 PR OFFICE/OUTPT VISIT, EST, LEVL IV, 30-39 MIN: ICD-10-PCS | Mod: S$GLB,,, | Performed by: UROLOGY

## 2020-08-04 PROCEDURE — 99214 OFFICE O/P EST MOD 30 MIN: CPT | Mod: S$GLB,,, | Performed by: UROLOGY

## 2020-08-04 PROCEDURE — 1101F PT FALLS ASSESS-DOCD LE1/YR: CPT | Mod: CPTII,S$GLB,, | Performed by: UROLOGY

## 2020-08-04 PROCEDURE — 1126F PR PAIN SEVERITY QUANTIFIED, NO PAIN PRESENT: ICD-10-PCS | Mod: S$GLB,,, | Performed by: UROLOGY

## 2020-08-04 PROCEDURE — 1126F AMNT PAIN NOTED NONE PRSNT: CPT | Mod: S$GLB,,, | Performed by: UROLOGY

## 2020-08-04 PROCEDURE — 1159F PR MEDICATION LIST DOCUMENTED IN MEDICAL RECORD: ICD-10-PCS | Mod: S$GLB,,, | Performed by: UROLOGY

## 2020-08-04 PROCEDURE — 1159F MED LIST DOCD IN RCRD: CPT | Mod: S$GLB,,, | Performed by: UROLOGY

## 2020-08-04 PROCEDURE — 3008F BODY MASS INDEX DOCD: CPT | Mod: CPTII,S$GLB,, | Performed by: UROLOGY

## 2020-08-04 RX ORDER — CLOTRIMAZOLE AND BETAMETHASONE DIPROPIONATE 10; .64 MG/G; MG/G
CREAM TOPICAL 2 TIMES DAILY
Qty: 45 G | Refills: 3 | Status: SHIPPED | OUTPATIENT
Start: 2020-08-04 | End: 2021-10-04

## 2020-08-07 ENCOUNTER — HOSPITAL ENCOUNTER (OUTPATIENT)
Facility: OTHER | Age: 70
Discharge: HOME | End: 2020-08-07
Attending: ANESTHESIOLOGY | Admitting: ANESTHESIOLOGY
Payer: MEDICARE

## 2020-08-07 VITALS
RESPIRATION RATE: 18 BRPM | HEART RATE: 66 BPM | SYSTOLIC BLOOD PRESSURE: 129 MMHG | OXYGEN SATURATION: 98 % | DIASTOLIC BLOOD PRESSURE: 92 MMHG

## 2020-08-07 DIAGNOSIS — M54.12 CERVICAL RADICULOPATHY: Primary | ICD-10-CM

## 2020-08-07 DIAGNOSIS — G89.29 CHRONIC PAIN: ICD-10-CM

## 2020-08-07 LAB — POCT GLUCOSE: 103 MG/DL (ref 70–110)

## 2020-08-07 PROCEDURE — 62323 PR INJ LUMBAR/SACRAL, W/IMAGING GUIDANCE: ICD-10-PCS | Mod: ,,, | Performed by: ANESTHESIOLOGY

## 2020-08-07 PROCEDURE — 25500020 PHARM REV CODE 255: Performed by: ANESTHESIOLOGY

## 2020-08-07 PROCEDURE — 82947 ASSAY GLUCOSE BLOOD QUANT: CPT | Performed by: ANESTHESIOLOGY

## 2020-08-07 PROCEDURE — 62323 NJX INTERLAMINAR LMBR/SAC: CPT | Mod: ,,, | Performed by: ANESTHESIOLOGY

## 2020-08-07 PROCEDURE — 99152 MOD SED SAME PHYS/QHP 5/>YRS: CPT | Mod: ,,, | Performed by: ANESTHESIOLOGY

## 2020-08-07 PROCEDURE — 63600175 PHARM REV CODE 636 W HCPCS: Performed by: ANESTHESIOLOGY

## 2020-08-07 PROCEDURE — 62323 NJX INTERLAMINAR LMBR/SAC: CPT | Performed by: ANESTHESIOLOGY

## 2020-08-07 PROCEDURE — 62321 NJX INTERLAMINAR CRV/THRC: CPT | Performed by: ANESTHESIOLOGY

## 2020-08-07 PROCEDURE — 25000003 PHARM REV CODE 250: Performed by: ANESTHESIOLOGY

## 2020-08-07 PROCEDURE — 99152 PR MOD CONSCIOUS SEDATION, SAME PHYS, 5+ YRS, FIRST 15 MIN: ICD-10-PCS | Mod: ,,, | Performed by: ANESTHESIOLOGY

## 2020-08-07 PROCEDURE — 99152 MOD SED SAME PHYS/QHP 5/>YRS: CPT | Performed by: ANESTHESIOLOGY

## 2020-08-07 RX ORDER — DEXAMETHASONE SODIUM PHOSPHATE 4 MG/ML
INJECTION, SOLUTION INTRA-ARTICULAR; INTRALESIONAL; INTRAMUSCULAR; INTRAVENOUS; SOFT TISSUE
Status: DISCONTINUED | OUTPATIENT
Start: 2020-08-07 | End: 2020-08-07 | Stop reason: HOSPADM

## 2020-08-07 RX ORDER — LIDOCAINE HYDROCHLORIDE 5 MG/ML
INJECTION, SOLUTION INFILTRATION; INTRAVENOUS
Status: DISCONTINUED | OUTPATIENT
Start: 2020-08-07 | End: 2020-08-07 | Stop reason: HOSPADM

## 2020-08-07 RX ORDER — SODIUM CHLORIDE 9 MG/ML
500 INJECTION, SOLUTION INTRAVENOUS CONTINUOUS
Status: DISCONTINUED | OUTPATIENT
Start: 2020-08-07 | End: 2020-08-07 | Stop reason: HOSPADM

## 2020-08-07 RX ORDER — LIDOCAINE HYDROCHLORIDE 10 MG/ML
INJECTION INFILTRATION; PERINEURAL
Status: DISCONTINUED | OUTPATIENT
Start: 2020-08-07 | End: 2020-08-07 | Stop reason: HOSPADM

## 2020-08-07 RX ORDER — MIDAZOLAM HYDROCHLORIDE 1 MG/ML
INJECTION INTRAMUSCULAR; INTRAVENOUS
Status: DISCONTINUED | OUTPATIENT
Start: 2020-08-07 | End: 2020-08-07 | Stop reason: HOSPADM

## 2020-08-07 RX ADMIN — SODIUM CHLORIDE 500 ML: 0.9 INJECTION, SOLUTION INTRAVENOUS at 09:08

## 2020-08-07 NOTE — DISCHARGE SUMMARY
Discharge Note  Short Stay      SUMMARY     Admit Date: 8/7/2020    Attending Physician: Stan Brunner    Discharge Physician: Keith Salinas      Discharge Date: 8/7/2020 11:20 AM    Procedure(s) (LRB):  INJECTION, STEROID, EPIDURAL  C7-T1 (N/A)    Final Diagnosis: DDD (degenerative disc disease), cervical [M50.30]    Disposition: Home or self care    Patient Instructions:   Current Discharge Medication List      CONTINUE these medications which have NOT CHANGED    Details   allopurinoL (ZYLOPRIM) 100 MG tablet Take 1 tablet (100 mg total) by mouth once daily.  Qty: 90 tablet, Refills: 3    Associated Diagnoses: Gout, arthritis      aspirin (ECOTRIN) 81 MG EC tablet Take 81 mg by mouth once daily.      clotrimazole-betamethasone 1-0.05% (LOTRISONE) cream Apply topically 2 (two) times daily.  Qty: 45 g, Refills: 3      hydroCHLOROthiazide (HYDRODIURIL) 25 MG tablet Take 1 tablet (25 mg total) by mouth once daily.  Qty: 90 tablet, Refills: 3    Comments: .  Associated Diagnoses: Essential hypertension      ketoconazole (NIZORAL) 2 % cream Apply topically once daily. To the armpits  Qty: 60 g, Refills: 2    Associated Diagnoses: Tinea corporis      lisinopriL 10 MG tablet Take 1 tablet (10 mg total) by mouth once daily.  Qty: 90 tablet, Refills: 3    Comments: .  Associated Diagnoses: Essential hypertension      metFORMIN (GLUCOPHAGE-XR) 500 MG XR 24hr tablet TAKE 1 TABLET BY MOUTH EVERY DAY WITH BREAKFAST  Qty: 90 tablet, Refills: 3    Associated Diagnoses: Prediabetes      multivitamin (ONE-A-DAY ESSENTIAL ORAL) Take by mouth.      nystatin-triamcinolone (MYCOLOG II) cream Apply topically 3 (three) times daily.  Qty: 60 g, Refills: 3    Associated Diagnoses: Balanitis      pravastatin (PRAVACHOL) 20 MG tablet Take 1 tablet (20 mg total) by mouth once daily.  Qty: 90 tablet, Refills: 3    Associated Diagnoses: Mixed hyperlipidemia      tamsulosin (FLOMAX) 0.4 mg Cap Take 2 capsules (0.8 mg total) by mouth once  daily. Increased dose  Qty: 180 capsule, Refills: 3    Associated Diagnoses: Benign prostatic hyperplasia with urinary frequency                 Discharge Diagnosis: DDD (degenerative disc disease), cervical [M50.30]  Condition on Discharge: Stable with no complications to procedure   Diet on Discharge: Same as before.  Activity: as per instruction sheet.  Discharge to: Home with a responsible adult.  Follow up: 2-4 weeks       Please call my office or pager at [phone] if experienced any weakness or loss of sensation, fever > 101.5, pain uncontrolled with oral medications, persistent nausea/vomiting/or diarrhea, redness or drainage from the incisions, or any other worrisome concerns. If physician on call was not reached or could not communicate with our office for any reason please go to the nearest emergency department

## 2020-08-07 NOTE — OP NOTE
"Patient Name: Armand Painting  MRN: [id number]    INFORMED CONSENT: The procedure, risks, benefits and options were discussed with patient. There are no contraindications to the procedure. The patient expressed understanding and agreed to proceed. The personnel performing the procedure was discussed. I verify that I personally obtained Armand's consent prior to the start of the procedure and the signed consent can be found on the patient's chart.    Procedure Date: 08/07/2020    Anesthesia: Topical    Pre Procedure diagnosis: DDD (degenerative disc disease), cervical [M50.30]  1. Cervical radiculopathy    2. Chronic pain      Post-Procedure diagnosis: SAME        Moderate Sedation: None  Sedation time: Less than 15 minutes    PROCEDURE: C7/T1 CERVICAL EPIDURAL STEROID INJECTION         DESCRIPTION OF PROCEDURE: The patient was brought to the procedure room. After performing time out.  IV access was obtained prior to the procedure. The patient was positioned prone on the fluoroscopy table. Continuous hemodynamic monitoring was initiated including blood pressure, EKG, and pulse oximetry. The area of the cervical spine was prepped with chlorhexidine and draped in a sterile fashion. Skin anesthesia was achieved using 3 mL of Lidocaine 1% over the respective injection site. The C7/T1 interspace was visualized under fluoroscopic imaging. An 20 gauge 3 1/2" Tuohy needle was slowly inserted and advanced using loss of resistance to saline with AP, oblique and lateral fluoroscopic imaging for needle guidance. Negative aspiration for blood or CSF was confirmed. Epidural contrast spread was confirmed using 2mL of Omnipaque 300 contrast. Spread of the contrast in the cervical epidural space was noted . 6 mL of lidocaine 0.5% and 10 mg decadron was injected. The needle was removed and bleeding was nil. A sterile dressing was applied. No specimens collected. patient was taken back to the recovery room for further observation.     Blood " Loss: Nill  Specimen: None    The procedure is considered time sensitive and medically-necessary given the patient's current clinical condition with moderate to severe pain, and/or significant functional impairment, and /or lack of alternative that present less risk of adverse event such as but not limited to a medication-related adverse event or care needed in the emergency department or hospitalization  due to inadequate pain relief.       Stan Brunner MD

## 2020-08-07 NOTE — DISCHARGE INSTRUCTIONS
Thank you for allowing us to care for you today. You may receive a survey about the care we provided. Your feedback is valuable and helps us provide excellent care throughout the community.     Home Care Instructions for Pain Management:    1. DIET:   You may resume your normal diet today.   2. BATHING:   You may shower with luke warm water. No tub baths or anything that will soak injection sites under water for the next 24 hours.  3. DRESSING:   You may remove your bandage today.   4. ACTIVITY LEVEL:   You may resume your normal activities 24 hrs after your procedure. Nothing strenuous today.  5. MEDICATIONS:   You may resume your normal medications today. To restart blood thinners, ask your doctor.  6. DRIVING    If you have received any sedatives by mouth today, you may not drive for 12 hours.    If you have received any sedation through your IV, you may not drive for 24 hrs.   7. SPECIAL INSTRUCTIONS:   No heat to the injection site for 24 hrs including, hot bath or shower, heating pad, moist heat, or hot tubs.    Use ice pack to injection site for any pain or discomfort.  Apply ice packs for 20 minute intervals as needed.    IF you have diabetes, be sure to monitor your blood sugar more closely. IF your injection contained steroids your blood sugar levels may become higher than normal.    If you are still having pain upon discharge:  Your pain may improve over the next 48 hours. The anesthetic (numbing medication) works immediately to 48 hours. IF your injection contained a steroid (anti-inflammatory medication), it takes approximately 3 days to start feeling relief and 7-10 days to see your greatest results from the medication. It is possible you may need subsequent injections. This would be discussed at your follow up appointment with pain management or your referring doctor.    Please call the PAIN MANAGEMENT office at [phone] or ON CALL pager at [phone] if you experienced any:   -Weakness or  loss of sensation  -Fever > 101.5  -Pain uncontrolled with oral medications   -Persistent nausea, vomiting, or diarrhea  -Redness or drainage from the injection sites, or any other worrisome concerns.   If physician on call was not reached or could not communicate with our office for any reason please go to the nearest emergency department.  Adult Procedural Sedation Instructions    Recovery After Procedural Sedation (Adult)  You have been given medicine by vein to make you sleep during your surgery. This may have included both a pain medicine and sleeping medicine. Most of the effects have worn off. But you may still have some drowsiness for the next 6 to 8 hours.  Home care  Follow these guidelines when you get home:  · For the next 8 hours, you should be watched by a responsible adult. This person should make sure your condition is not getting worse.  · Don't drink any alcohol for the next 24 hours.  · Don't drive, operate dangerous machinery, or make important business or personal decisions during the next 24 hours.  Note: Your healthcare provider may tell you not to take any medicine by mouth for pain or sleep in the next 4 hours. These medicines may react with the medicines you were given in the hospital. This could cause a much stronger response than usual.  Follow-up care  Follow up with your healthcare provider if you are not alert and back to your usual level of activity within 12 hours.  When to seek medical advice  Call your healthcare provider right away if any of these occur:  · Drowsiness gets worse  · Weakness or dizziness gets worse  · Repeated vomiting  · You can't be awakened   Date Last Reviewed: 10/18/2016  © 6568-2614 [employer]. [address] [place] [address], [place] [address]. All rights reserved. This information is not intended as a substitute for professional medical care. Always follow your healthcare professional's instructions.

## 2020-08-07 NOTE — H&P
HPI  Armand Painting is a 68 yo male with neck and back pain, consistent with cervical and thoracic DDD and cervical spondylosis.        Past Medical History:   Diagnosis Date    Arthritis     Erectile dysfunction     Gout     Hyperlipidemia     Hypertension     Morbid obesity     Nuclear sclerosis of both eyes 1/16/2018    Screening for AAA (abdominal aortic aneurysm)     6/2016 AAA US no aneurysm     Past Surgical History:   Procedure Laterality Date    COLONOSCOPY N/A 10/31/2018    Procedure: COLONOSCOPY;  Surgeon: Clementine Batista MD;  [facility];  Service: Endoscopy;  Laterality: N/A;    SPINE SURGERY  3/31/16    C3-C7 laminoplasty,Dr Caruso    tooth implant       Review of patient's allergies indicates:   Allergen Reactions    Codeine Swelling     Codeine with Aspirin caused chest pain        PMHx, PSHx, Allergies, Medications reviewed in epic      ROS negative except pain complaints in HPI    OBJECTIVE:    There were no vitals taken for this visit.    PHYSICAL EXAMINATION:    GENERAL: Well appearing, in no acute distress, alert and oriented x3.  PSYCH:  Mood and affect appropriate.  SKIN: Skin color, texture, turgor normal, no rashes or lesions.  CV: RRR with palpation of the radial artery.  PULM: No evidence of respiratory difficulty, symmetric chest rise. Clear to auscultation.  NEURO: Cranial nerves grossly intact.    Plan:    Proceed with procedure as planned    Keith Salinas  08/07/2020

## 2020-08-25 ENCOUNTER — OFFICE VISIT (OUTPATIENT)
Dept: PAIN MEDICINE | Facility: CLINIC | Age: 70
End: 2020-08-25
Payer: MEDICARE

## 2020-08-25 VITALS
BODY MASS INDEX: 40.09 KG/M2 | TEMPERATURE: 98 F | WEIGHT: 264.56 LBS | HEIGHT: 68 IN | SYSTOLIC BLOOD PRESSURE: 125 MMHG | RESPIRATION RATE: 18 BRPM | OXYGEN SATURATION: 100 % | HEART RATE: 91 BPM | DIASTOLIC BLOOD PRESSURE: 78 MMHG

## 2020-08-25 DIAGNOSIS — G89.29 CHRONIC RIGHT-SIDED LOW BACK PAIN WITH RIGHT-SIDED SCIATICA: ICD-10-CM

## 2020-08-25 DIAGNOSIS — M48.02 CERVICAL STENOSIS OF SPINAL CANAL: ICD-10-CM

## 2020-08-25 DIAGNOSIS — M54.12 CERVICAL RADICULOPATHY: Primary | ICD-10-CM

## 2020-08-25 DIAGNOSIS — G89.4 CHRONIC PAIN DISORDER: ICD-10-CM

## 2020-08-25 DIAGNOSIS — M96.1 CERVICAL POSTLAMINECTOMY SYNDROME: ICD-10-CM

## 2020-08-25 DIAGNOSIS — M54.41 CHRONIC RIGHT-SIDED LOW BACK PAIN WITH RIGHT-SIDED SCIATICA: ICD-10-CM

## 2020-08-25 DIAGNOSIS — M96.1 CERVICAL POST-LAMINECTOMY SYNDROME: ICD-10-CM

## 2020-08-25 DIAGNOSIS — M47.816 LUMBAR SPONDYLOSIS: ICD-10-CM

## 2020-08-25 PROCEDURE — 99214 OFFICE O/P EST MOD 30 MIN: CPT | Mod: PBBFAC | Performed by: ANESTHESIOLOGY

## 2020-08-25 PROCEDURE — 99214 PR OFFICE/OUTPT VISIT, EST, LEVL IV, 30-39 MIN: ICD-10-PCS | Mod: GC,S$GLB,, | Performed by: ANESTHESIOLOGY

## 2020-08-25 PROCEDURE — 99999 PR PBB SHADOW E&M-EST. PATIENT-LVL IV: CPT | Mod: PBBFAC,,, | Performed by: ANESTHESIOLOGY

## 2020-08-25 PROCEDURE — 99999 PR PBB SHADOW E&M-EST. PATIENT-LVL IV: ICD-10-PCS | Mod: PBBFAC,,, | Performed by: ANESTHESIOLOGY

## 2020-08-25 PROCEDURE — 99214 OFFICE O/P EST MOD 30 MIN: CPT | Mod: GC,S$GLB,, | Performed by: ANESTHESIOLOGY

## 2020-08-25 RX ORDER — GABAPENTIN 100 MG/1
100 CAPSULE ORAL 3 TIMES DAILY
Qty: 90 CAPSULE | Refills: 2 | Status: SHIPPED | OUTPATIENT
Start: 2020-08-25 | End: 2022-02-16

## 2020-08-25 RX ORDER — NAPROXEN 500 MG/1
500 TABLET ORAL 2 TIMES DAILY WITH MEALS
Qty: 60 TABLET | Refills: 1 | Status: SHIPPED | OUTPATIENT
Start: 2020-08-25 | End: 2022-02-16

## 2020-08-25 NOTE — PROGRESS NOTES
Chronic Pain - New Consult    Referring Physician: No ref. provider found    Chief Complaint: No chief complaint on file.       SUBJECTIVE:    [facility] presents to the clinic for the evaluation of *** pain. The pain started *** ago following *** and symptoms have been {IUW:20639}.The pain is located in the *** area and radiates to the ***.  The pain is described as {Desc; pain character:58993} and is rated as {GEN PAIN SCALE HI:11662}. The pain is rated with a score of  {GEN PAIN SCALE HI:33275} on the BEST day and a score of {GEN PAIN SCALE HI:50346} on the WORST day.  Symptoms interfere with {INTERFERE:84195}. The pain is exacerbated by {Causes; Pain:15883}.  The pain is mitigated by {MITIGATIN}. He reports spending *** hours per day reclining. The patient reports *** hours of uninterrupted sleep per night.    Patient {Denies / Reports:75571} {RED FLAGS:82370}.    Physical Therapy/Home Exercise: {YES/NO:63}      Pain Disability Index Review:  Last 3 PDI Scores 2020 3/10/2020 2019   Pain Disability Index (PDI) 49 39 25       Pain Medications:    {PMC - MEDICATIONS:01399}     report:  {:36545}    Pain Procedures: ***    Imaging: ***    Past Medical History:   Diagnosis Date    Arthritis     Erectile dysfunction     Gout     Hyperlipidemia     Hypertension     Morbid obesity     Nuclear sclerosis of both eyes 2018    Screening for AAA (abdominal aortic aneurysm)     2016 AAA US no aneurysm     Past Surgical History:   Procedure Laterality Date    COLONOSCOPY N/A 10/31/2018    Procedure: COLONOSCOPY;  Surgeon: Clementine Batista MD;  Location: [facility] ENDO;  Service: Endoscopy;  Laterality: N/A;    EPIDURAL STEROID INJECTION N/A 2020    Procedure: INJECTION, STEROID, EPIDURAL  C7-T1;  Surgeon: Stan Brunner MD;  Location: [facility] PAIN [facility]GT;  Service: Pain Management;  Laterality: N/A;  C7-T1 Epidural Steroid Injection  consent needed    SPINE SURGERY  3/31/16    C3-C7  laminoplasty,Dr Caruso    tooth implant       Social History     Socioeconomic History    Marital status:      Spouse name: Not on file    Number of children: Not on file    Years of education: Not on file    Highest education level: Not on file   Occupational History    Not on file   Social Needs    Financial resource strain: Not on file    Food insecurity     Worry: Not on file     Inability: Not on file    Transportation needs     Medical: Not on file     Non-medical: Not on file   Tobacco Use    Smoking status: Former Smoker     Types: Cigarettes     Quit date: 2009     Years since quittin.6    Smokeless tobacco: Former User   Substance and Sexual Activity    Alcohol use: Yes     Comment: occasional beer- average week 12 beers a week    Drug use: No    Sexual activity: Not Currently     Partners: Female     Comment: , children.   Lifestyle    Physical activity     Days per week: Not on file     Minutes per session: Not on file    Stress: Not on file   Relationships    Social connections     Talks on phone: Not on file     Gets together: Not on file     Attends [employer] service: Not on file     Active member of club or organization: Not on file     Attends meetings of clubs or organizations: Not on file     Relationship status: Not on file   Other Topics Concern    Not on file   Social History Narrative    Not on file     Family History   Problem Relation Age of Onset    COPD Mother     No Known Problems Father     No Known Problems Sister     Cataracts Brother     Glaucoma Brother     No Known Problems Maternal Aunt     No Known Problems Maternal Uncle     No Known Problems Paternal Aunt     No Known Problems Paternal Uncle     No Known Problems Maternal Grandmother     No Known Problems Maternal Grandfather     No Known Problems Paternal Grandmother     No Known Problems Paternal Grandfather     Amblyopia Neg Hx     Blindness Neg Hx     Cancer Neg Hx      Diabetes Neg Hx     Hypertension Neg Hx     Macular degeneration Neg Hx     Retinal detachment Neg Hx     Strabismus Neg Hx     Stroke Neg Hx     Thyroid disease Neg Hx        Review of patient's allergies indicates:   Allergen Reactions    Codeine Swelling     Codeine with Aspirin caused chest pain       Current Outpatient Medications   Medication Sig    allopurinoL (ZYLOPRIM) 100 MG tablet Take 1 tablet (100 mg total) by mouth once daily.    aspirin (ECOTRIN) 81 MG EC tablet Take 81 mg by mouth once daily.    clotrimazole-betamethasone 1-0.05% (LOTRISONE) cream Apply topically 2 (two) times daily.    hydroCHLOROthiazide (HYDRODIURIL) 25 MG tablet Take 1 tablet (25 mg total) by mouth once daily.    ketoconazole (NIZORAL) 2 % cream Apply topically once daily. To the armpits    lisinopriL 10 MG tablet Take 1 tablet (10 mg total) by mouth once daily.    metFORMIN (GLUCOPHAGE-XR) 500 MG XR 24hr tablet TAKE 1 TABLET BY MOUTH EVERY DAY WITH BREAKFAST    multivitamin (ONE-A-DAY ESSENTIAL ORAL) Take by mouth.    nystatin-triamcinolone (MYCOLOG II) cream Apply topically 3 (three) times daily.    pravastatin (PRAVACHOL) 20 MG tablet Take 1 tablet (20 mg total) by mouth once daily.    tamsulosin (FLOMAX) 0.4 mg Cap Take 2 capsules (0.8 mg total) by mouth once daily. Increased dose     No current facility-administered medications for this visit.        REVIEW OF SYSTEMS:    GENERAL:  No weight loss, malaise or fevers.  HEENT:  Negative for frequent or significant headaches.  NECK:  Negative for lumps, goiter, pain and significant neck swelling.  RESPIRATORY:  Negative for cough, wheezing or shortness of breath.  CARDIOVASCULAR:  Negative for chest pain, leg swelling or palpitations.  GI:  Negative for abdominal discomfort, blood in stools or black stools or change in bowel habits.  MUSCULOSKELETAL:  See HPI.  SKIN:  Negative for lesions, rash, and itching.  PSYCH:  Negative for sleep disturbance, mood  disorder and recent psychosocial stressors.  HEMATOLOGY/LYMPHOLOGY:  Negative for prolonged bleeding, bruising easily or swollen nodes.  NEURO:   No history of headaches, syncope, paralysis, seizures or tremors.  All other reviewed and negative other than HPI.    OBJECTIVE:    Temp 98.2 °F (36.8 °C)   Resp 18   SpO2 100%     PHYSICAL EXAMINATION:    General appearance: Well appearing, in no acute distress, alert and oriented x3.  Psych:  Mood and affect appropriate.  Skin: Skin color, texture, turgor normal, no rashes or lesions, in both upper and lower body.  Head/face:  Normocephalic, atraumatic. No palpable lymph nodes.  Neck: No pain to palpation over the cervical paraspinous muscles. Spurling Negative. No pain with neck flexion, extension, or lateral flexion.   Cor: RRR  Pulm: CTA  GI:  Soft and non-tender.  Back: Straight leg raising in the sitting and supine positions is negative to radicular pain. No pain to palpation over the spine or costovertebral angles. Normal range of motion without pain reproduction.  Extremities: Peripheral joint ROM is full and pain free without obvious instability or laxity in all four extremities. No deformities, edema, or skin discoloration. Good capillary refill.  Musculoskeletal: Shoulder, hip, sacroiliac and knee provocative maneuvers are negative. Bilateral upper and lower extremity strength is normal and symmetric.  No atrophy or tone abnormalities are noted.  Neuro: Bilateral upper and lower extremity coordination and muscle stretch reflexes are physiologic and symmetric.  Plantar response are downgoing. No loss of sensation is noted.  Gait: normal.    ASSESSMENT: 69 y.o. year old male with *** pain, consistent with ***     No diagnosis found.      PLAN:     {PLAN:05307}  - RTC ***  - Counseled patient regarding the importance of {:18406}.    The above plan and management options were discussed at length with patient. Patient is in agreement with the above and  verbalized understanding. It will be communicated with the referring physician via electronic record, fax, or mail.    Yolette Marc  08/25/2020

## 2020-08-25 NOTE — PROGRESS NOTES
Chronic patient Established Note (Follow up visit)      SUBJECTIVE:    Armand Painting presents to the clinic for a follow-up appointment for neck, lower back pain and right hand pain. Since the last visit, Armand Painting states the pain has been persistent. He is s/p C7/T1 RADHA on 8/7/2020. He reports that he had 100% relief for about 1 week. Pain has returned after that. Current pain intensity is 7/10.  Pain is primarily located over his mid to upper cervical spine and denies any radiation to his arms.  Pain is worsened by extension and lateral rotation.  He also has low back pain that has been chronic for many years.  Pain is present bilaterally around his lower back and does not radiate to his lower extremities.  His back pain is not as bothersome as his neck.  He tried couple of sessions of physical therapy around 3 months ago, but stopped because he felt that it worsened his pain. He is willing to try physical therapy again. He is currently not taking any medications for pain.    Initial Visit 8/25/2020:  Armand Painting presents to the clinic for the evaluation of neck, lower back and right hand pain. The pain started 4 years ago and symptoms have been worsening.The pain is located in the neck area and radiates to the lower back and right hand.  The pain is described as aching, burning, numbing and sharp and is rated as 10/10. The pain is rated with a score of  10/10 on the BEST day and a score of 10/10 on the WORST day.  Symptoms interfere with daily activity, sleeping and work. The pain is exacerbated by Sitting, Laying, Bending, Walking, Night Time and Lifting.  The pain is mitigated by heat and physical therapy. He reports spending 0 hours per day reclining. The patient reports 2 hours of uninterrupted sleep per night.     Pt is a 64 yo male who presents for initial evaluation of chronic neck pain s/p C3-C7 posterior laminectomy in 2016 for cervical spinal stenosis and myelomalacia. Pt has been following w/ [facility] (  [facility]) as he has had a recurrence of his neck pain over the past year or so. A trial of PT was intolerable due to pain. Dr Caruso updated his CT and MRI (see below) and referred him to us to consider interventional procedures. Patient reports that his pain is primarily located over his mid-upper cervical spine. When the pain is very bad, it will give him a posterior headache. He denies any radiation down his arms. Pain is constant, but made worse with head rotation and extension. He keeps his neck slightly flexed for comfort. He has occasional parasthesias in his right arm down to his thumb. He denies any weakness in his arms. He currently takes Tylenol PRN for the pain. He could not tolerate PT. He has never had spinal procedures.   Pt also reports chronic low back pain for over a decade. No inciting injury. Pain is band-like pattern across low back. No pain shooting down his legs. No numbness, tingling, or weakness in the legs. He has never had injections or surgeries to the low back.     Pain Disability Index Review:  Last 3 PDI Scores 2020 3/10/2020 2019   Pain Disability Index (PDI) [phone]       Pain Medications:    none    Opioid Contract: not applicable     report:  Reviewed and consistent with medication use as prescribed.    Pain Procedures:  C7/T1 RADHA 2020    Physical Therapy/Home Exercise: no    Imagin2020  MRI Cervical Spine Without Contrast  Narrative      EXAMINATION:  MRI CERVICAL SPINE WITHOUT CONTRAST    CLINICAL HISTORY:  cervical stenosis;.  Spondylosis without myelopathy or radiculopathy, cervical region    TECHNIQUE:  Multiplanar, multisequence MR images of the cervical spine were acquired without the administration of contrast.    COMPARISON:  2016 MRI, CT 2019    FINDINGS:  Prior C3 through C7 laminoplasty.    Artifacts noted from the hardware.    There is reversal of the cervical lordosis.    The vertebral body heights are well maintained.    There is  mild anterolisthesis of C4 relative to C5.    There is significant disc space narrowing at C4-C5 and C6-C7.    There is ill define focal area of cord signal abnormality at C4 and C5 levels suggestive of areas of myelomalacia.    C2-C3: Posterior disc osteophyte complex, mild central canal stenosis.  There is moderate foraminal narrowing bilaterally.    C3-C4: Posterior disc osteophyte complex, significant facet joint osseous hypertrophy bilaterally the canal appears moderately narrowed, this could be accentuated from artifact from the hardware.  There is severe bilateral foraminal narrowing.    C4-C5: Anterolisthesis of C4 on C5 with uncovering of the disc.  Bilateral uncovertebral spur.  No canal stenosis.  Severe bilateral foraminal narrowing.  Myelomalacia noted within the cord at this level.    C5-C6: No significant disc abnormality.  The hardware causes artifact in the posterior oral right lateral area of the canal possibly creating right lateral canal stenosis.  There is bilateral severe foraminal narrowing.    C6-C7: No disc abnormality, no canal stenosis.  There is uncovertebral spur creating severe left and mild right foraminal narrowing.    C7-T1: Posterior disc osteophyte complex and ligamentum flavum hypertrophy and uncovertebral spur bilaterally.  There is severe central canal stenosis and severe left and moderate right foraminal narrowing.    The upper thoracic spine appear within normal limits.  The paraspinal soft tissues demonstrate no significant abnormalities.  Incidentally seen is medial deviation of the bilateral common carotid arteries.   Impression        Postoperative changes of laminoplasty C3 through C7.    There is artifact created by the hardware limiting the evaluation of the spinal canal.    Multilevel variable degree of foraminal narrowing as detailed above.    Area of myelomalacia at the level of C4 and C5, similar to the prior exam.    Please see details of each levels  above.      Electronically signed by: Cleo Espinoza MD  Date: 02/02/2020  Time: 14:06    Encounter      View Encounter                 08/21/2019  CT Cervical Spine Without Contrast  Narrative      EXAMINATION:  CT CERVICAL SPINE WITHOUT CONTRAST    CLINICAL HISTORY:  s/p cervical laminoplasty; Spinal stenosis, cervical region    TECHNIQUE:  Low dose axial images, sagittal and coronal reformations were performed though the cervical spine.  Contrast was not administered.    COMPARISON:  CT cervical spine 05/11/2016, MRI cervical spine 03/23/2016    FINDINGS:  Craniocervical junction is within normal limits.    Postoperative changes from C3-C7 laminoplasty.  No evidence of hardware fracture.  Mild lucency surrounding the right inferior C3 facet screw suggesting component of loosening.    Cervical kyphosis centered at C5-C6, progressed.    Vertebral body heights are stable.  No acute fracture.    Progressed disc space height loss C4-C5 through C7-T1 with endplate sclerosis and spurring, consistent with degenerative disc disease.    Degenerative findings:    *C2-C3: Posterior disc osteophyte complex, uncovertebral spurring, and facet arthropathy resulting in mild spinal canal stenosis and moderate bilateral neural foraminal narrowing, left greater than right.  *C3-C4: Posterior disc osteophyte complex, uncovertebral spurring, and facet arthropathy resulting in mild spinal canal stenosis, severe right and moderate left neural foraminal narrowing.  *C4-C5: Posterior disc osteophyte complex, uncovertebral spurring, and facet arthropathy resulting in severe bilateral neural foraminal narrowing.  No significant spinal canal stenosis.  *C5-C6: Laminoplasty material encroaches within the right lateral recess, posterior disc osteophyte complex, uncovertebral spurring, and facet arthropathy resulting in moderate spinal canal stenosis, and moderate bilateral neural foraminal narrowing.  *C6-C7: Posterior disc osteophyte  complex, uncovertebral spurring, and facet arthropathy resulting in mild right and moderate left neural foraminal narrowing.  No significant spinal canal stenosis.  *C7-T1: Posterior disc osteophyte complex, uncovertebral spurring, and facet arthropathy resulting in moderate right and severe left neural foraminal narrowing.  No significant spinal canal stenosis.  Resolution of previously identified fluid within the posterior paraspinal soft tissues.   Impression        1. Postoperative changes from right sided C3-C7 instrumented laminoplasty.  No evidence of hardware failure.  Mild lucency surrounding the right inferior C3 facet screw, may represent component of loosening.  2. Resolution of previously identified fluid within the posterior paraspinous soft tissues/ surgical bed.  3. Progression of multilevel cervical spondylosis and kyphosis, as above.    Electronically signed by resident: Brendon uGnn  Date: 08/21/2019  Time: 10:02             Allergies:   Review of patient's allergies indicates:   Allergen Reactions    Codeine Swelling     Codeine with Aspirin caused chest pain       Current Medications:   Current Outpatient Medications   Medication Sig Dispense Refill    allopurinoL (ZYLOPRIM) 100 MG tablet Take 1 tablet (100 mg total) by mouth once daily. 90 tablet 3    aspirin (ECOTRIN) 81 MG EC tablet Take 81 mg by mouth once daily.      clotrimazole-betamethasone 1-0.05% (LOTRISONE) cream Apply topically 2 (two) times daily. 45 g 3    hydroCHLOROthiazide (HYDRODIURIL) 25 MG tablet Take 1 tablet (25 mg total) by mouth once daily. 90 tablet 3    ketoconazole (NIZORAL) 2 % cream Apply topically once daily. To the armpits 60 g 2    lisinopriL 10 MG tablet Take 1 tablet (10 mg total) by mouth once daily. 90 tablet 3    metFORMIN (GLUCOPHAGE-XR) 500 MG XR 24hr tablet TAKE 1 TABLET BY MOUTH EVERY DAY WITH BREAKFAST 90 tablet 3    multivitamin (ONE-A-DAY ESSENTIAL ORAL) Take by mouth.       nystatin-triamcinolone (MYCOLOG II) cream Apply topically 3 (three) times daily. 60 g 3    pravastatin (PRAVACHOL) 20 MG tablet Take 1 tablet (20 mg total) by mouth once daily. 90 tablet 3    tamsulosin (FLOMAX) 0.4 mg Cap Take 2 capsules (0.8 mg total) by mouth once daily. Increased dose 180 capsule 3    gabapentin (NEURONTIN) 100 MG capsule Take 1 capsule (100 mg total) by mouth 3 (three) times daily. 90 capsule 2    naproxen (NAPROSYN) 500 MG tablet Take 1 tablet (500 mg total) by mouth 2 (two) times daily with meals. 60 tablet 1     No current facility-administered medications for this visit.        REVIEW OF SYSTEMS:    GENERAL:  No weight loss, malaise or fevers.  HEENT:  Negative for frequent or significant headaches.  NECK:  Negative for lumps, goiter, pain and significant neck swelling.  RESPIRATORY:  SIS, Negative for cough, wheezing or shortness of breath.  CARDIOVASCULAR:  Hypertension, Negative for chest pain, leg swelling or palpitations.  GI:  Negative for abdominal discomfort, blood in stools or black stools or change in bowel habits.  MUSCULOSKELETAL:  Gout, See HPI.  SKIN:  Negative for lesions, rash, and itching.  PSYCH:  Negative for sleep disturbance, mood disorder and recent psychosocial stressors.  HEMATOLOGY/LYMPHOLOGY:  Negative for prolonged bleeding, bruising easily or swollen nodes.  NEURO:   No history of headaches, syncope, paralysis, seizures or tremors.  All other reviewed and negative other than HPI.    Past Medical History:  Past Medical History:   Diagnosis Date    Arthritis     Erectile dysfunction     Gout     Hyperlipidemia     Hypertension     Morbid obesity     Nuclear sclerosis of both eyes 1/16/2018    Screening for AAA (abdominal aortic aneurysm)     6/2016 AAA US no aneurysm       Past Surgical History:  Past Surgical History:   Procedure Laterality Date    COLONOSCOPY N/A 10/31/2018    Procedure: COLONOSCOPY;  Surgeon: Clementine Batista MD;  Location: [facility];   Service: Endoscopy;  Laterality: N/A;    EPIDURAL STEROID INJECTION N/A 2020    Procedure: INJECTION, STEROID, EPIDURAL  C7-T1;  Surgeon: Stan Brunner MD;  Location: [facility];  Service: Pain Management;  Laterality: N/A;  C7-T1 Epidural Steroid Injection  consent needed    SPINE SURGERY  3/31/16    C3-C7 laminoplasty,Dr Caruso    tooth implant         Family History:  Family History   Problem Relation Age of Onset    COPD Mother     No Known Problems Father     No Known Problems Sister     Cataracts Brother     Glaucoma Brother     No Known Problems Maternal Aunt     No Known Problems Maternal Uncle     No Known Problems Paternal Aunt     No Known Problems Paternal Uncle     No Known Problems Maternal Grandmother     No Known Problems Maternal Grandfather     No Known Problems Paternal Grandmother     No Known Problems Paternal Grandfather     Amblyopia Neg Hx     Blindness Neg Hx     Cancer Neg Hx     Diabetes Neg Hx     Hypertension Neg Hx     Macular degeneration Neg Hx     Retinal detachment Neg Hx     Strabismus Neg Hx     Stroke Neg Hx     Thyroid disease Neg Hx        Social History:  Social History     Socioeconomic History    Marital status:      Spouse name: Not on file    Number of children: Not on file    Years of education: Not on file    Highest education level: Not on file   Occupational History    Not on file   Social Needs    Financial resource strain: Not on file    Food insecurity     Worry: Not on file     Inability: Not on file    Transportation needs     Medical: Not on file     Non-medical: Not on file   Tobacco Use    Smoking status: Former Smoker     Types: Cigarettes     Quit date: 2009     Years since quittin.6    Smokeless tobacco: Former User   Substance and Sexual Activity    Alcohol use: Yes     Comment: occasional beer- average week 12 beers a week    Drug use: No    Sexual activity: Not Currently     Partners: Female  "    Comment: , children.   Lifestyle    Physical activity     Days per week: Not on file     Minutes per session: Not on file    Stress: Not on file   Relationships    Social connections     Talks on phone: Not on file     Gets together: Not on file     Attends [employer] service: Not on file     Active member of club or organization: Not on file     Attends meetings of clubs or organizations: Not on file     Relationship status: Not on file   Other Topics Concern    Not on file   Social History Narrative    Not on file       OBJECTIVE:    /78   Pulse 91   Temp 98.2 °F (36.8 °C)   Resp 18   Ht 5' 8" (1.727 m)   Wt 120 kg (264 lb 8.8 oz)   SpO2 100%   BMI 40.22 kg/m²     PHYSICAL EXAMINATION:    General appearance: Well appearing, in no acute distress, alert and oriented x3. Obese  Psych:  Mood and affect appropriate.  Skin: Skin color, texture, turgor normal, no rashes or lesions, in both upper and lower body.  Head/face:  Normocephalic, atraumatic. No palpable lymph nodes.  Neck:  + kyphotic posture. Very limited ROM due to pain, especially with extension and lateral rotation. TTP throughout the cervical paraspinal muscles and BL superior trapezius muscles. + facet loading BL. Spurling Negative.  Cor: RRR  Pulm: CTA  GI:  Soft and non-tender.  Low back: + poor posture. ROM very limited/ painful with extension and rotation. TTP over lumbar paraspinals BL. Facet loading + BL, negative SLR BL  Extremities: Peripheral joint ROM is full and pain free without obvious instability or laxity in all four extremities. No deformities, edema, or skin discoloration. Good capillary refill.  Musculoskeletal: Bilateral upper and lower extremity strength is normal and symmetric. No atrophy or tone abnormalities are noted.  Neuro: Bilateral upper and lower extremity coordination and muscle stretch reflexes are physiologic and symmetric.  Plantar response are downgoing. No Sheehan's No loss of sensation is " noted.  Gait: Antalgic    ASSESSMENT: 69 y.o. year old male with neck and low back pain, consistent with     1. Cervical radiculopathy  Ambulatory referral/consult to Spine Surgery   2. Cervical stenosis of spinal canal 3/2016 C3-7 laminoplasty     3. Cervical postlaminectomy syndrome  Ambulatory referral/consult to Spine Surgery    Ambulatory referral/consult to Physical/Occupational Therapy   4. Lumbar spondylosis  Ambulatory referral/consult to Physical/Occupational Therapy         PLAN:     - I have stressed the importance of physical activity and a home exercise plan to help with pain and improve health.  - Start Neurontin 100mg gradually to three times a day to help with radicular pain.  - Start Naproxen 500 mg bid prn to help with pain.  - Referral to physical therapy to restart treatment  - Neurosurgery consultation to evaluate for any possible surgical interventions to help with pain.  - RTC 1 month  - Counseled patient regarding the importance of activity modification, constant sleeping habits, use of CPAP, physical therapy.    The above plan and management options were discussed at length with patient. Patient is in agreement with the above and verbalized understanding.    Robbin Johnson  08/25/2020     I have reviewed and concur with the resident's history, physical, assessment, and plan.  I have personally interviewed and examined the patient at bedside.  See below addendum for my evaluation and additional findings.  69-year-old male with chronic neck and lower back pain consistent with cervical radiculopathy and cervical postlaminectomy syndrome.  Patient is status post cervical epidural steroid injection with 1 week of complete pain resolution but later his pain return to baseline.  We will start him on gabapentin and naproxen.  We will refer him to physical therapy as he reports improvement in pain following physical therapy 3 months ago.  We will also refer him to spine surgery as there is a  suspicion for mild loose screw on cervical CT.  Will follow up with him and 4 weeks.   We will consider repeat cervical RADHA in the future.    Stan Brunner MD

## 2020-08-25 NOTE — PROGRESS NOTES
Chronic patient Established Note (Follow up visit)      SUBJECTIVE:    Armand Painting presents to the clinic for a follow-up appointment for neck, lower back pain and right hand pain. Since the last visit, Armand Painting states the pain has been persistent. He is s/p C7/T1 RADHA on 8/7/2020. He reports that he had 100% relief for about 1 week. Pain has returned after that. Current pain intensity is 7/10.  Pain is primarily located over his mid to upper cervical spine and denies any radiation to his arms.  Pain is worsened by extension and lateral rotation.  He also has low back pain that has been chronic for many years.  Pain is present bilaterally around his lower back and does not radiate to his lower extremities.  His back pain is not as bothersome as his neck.  He tried couple of sessions of physical therapy around 3 months ago, but stopped because he felt that it worsened his pain. He is willing to try physical therapy again. He is currently not taking any medications for pain.    Initial Visit 8/25/2020:  Armand Painting presents to the clinic for the evaluation of neck, lower back and right hand pain. The pain started 4 years ago and symptoms have been worsening.The pain is located in the neck area and radiates to the lower back and right hand.  The pain is described as aching, burning, numbing and sharp and is rated as 10/10. The pain is rated with a score of  10/10 on the BEST day and a score of 10/10 on the WORST day.  Symptoms interfere with daily activity, sleeping and work. The pain is exacerbated by Sitting, Laying, Bending, Walking, Night Time and Lifting.  The pain is mitigated by heat and physical therapy. He reports spending 0 hours per day reclining. The patient reports 2 hours of uninterrupted sleep per night.     Pt is a 66 yo male who presents for initial evaluation of chronic neck pain s/p C3-C7 posterior laminectomy in 2016 for cervical spinal stenosis and myelomalacia. Pt has been following w/ [facility] (  [facility]) as he has had a recurrence of his neck pain over the past year or so. A trial of PT was intolerable due to pain. Dr Caruso updated his CT and MRI (see below) and referred him to us to consider interventional procedures. Patient reports that his pain is primarily located over his mid-upper cervical spine. When the pain is very bad, it will give him a posterior headache. He denies any radiation down his arms. Pain is constant, but made worse with head rotation and extension. He keeps his neck slightly flexed for comfort. He has occasional parasthesias in his right arm down to his thumb. He denies any weakness in his arms. He currently takes Tylenol PRN for the pain. He could not tolerate PT. He has never had spinal procedures.   Pt also reports chronic low back pain for over a decade. No inciting injury. Pain is band-like pattern across low back. No pain shooting down his legs. No numbness, tingling, or weakness in the legs. He has never had injections or surgeries to the low back.     Pain Disability Index Review:  Last 3 PDI Scores 2020 3/10/2020 2019   Pain Disability Index (PDI) [phone]       Pain Medications:    none    Opioid Contract: not applicable     report:  Reviewed and consistent with medication use as prescribed.    Pain Procedures:  C7/T1 RADHA 2020    Physical Therapy/Home Exercise: no    Imagin2020  MRI Cervical Spine Without Contrast  Narrative      EXAMINATION:  MRI CERVICAL SPINE WITHOUT CONTRAST    CLINICAL HISTORY:  cervical stenosis;.  Spondylosis without myelopathy or radiculopathy, cervical region    TECHNIQUE:  Multiplanar, multisequence MR images of the cervical spine were acquired without the administration of contrast.    COMPARISON:  2016 MRI, CT 2019    FINDINGS:  Prior C3 through C7 laminoplasty.    Artifacts noted from the hardware.    There is reversal of the cervical lordosis.    The vertebral body heights are well maintained.    There is  mild anterolisthesis of C4 relative to C5.    There is significant disc space narrowing at C4-C5 and C6-C7.    There is ill define focal area of cord signal abnormality at C4 and C5 levels suggestive of areas of myelomalacia.    C2-C3: Posterior disc osteophyte complex, mild central canal stenosis.  There is moderate foraminal narrowing bilaterally.    C3-C4: Posterior disc osteophyte complex, significant facet joint osseous hypertrophy bilaterally the canal appears moderately narrowed, this could be accentuated from artifact from the hardware.  There is severe bilateral foraminal narrowing.    C4-C5: Anterolisthesis of C4 on C5 with uncovering of the disc.  Bilateral uncovertebral spur.  No canal stenosis.  Severe bilateral foraminal narrowing.  Myelomalacia noted within the cord at this level.    C5-C6: No significant disc abnormality.  The hardware causes artifact in the posterior oral right lateral area of the canal possibly creating right lateral canal stenosis.  There is bilateral severe foraminal narrowing.    C6-C7: No disc abnormality, no canal stenosis.  There is uncovertebral spur creating severe left and mild right foraminal narrowing.    C7-T1: Posterior disc osteophyte complex and ligamentum flavum hypertrophy and uncovertebral spur bilaterally.  There is severe central canal stenosis and severe left and moderate right foraminal narrowing.    The upper thoracic spine appear within normal limits.  The paraspinal soft tissues demonstrate no significant abnormalities.  Incidentally seen is medial deviation of the bilateral common carotid arteries.   Impression        Postoperative changes of laminoplasty C3 through C7.    There is artifact created by the hardware limiting the evaluation of the spinal canal.    Multilevel variable degree of foraminal narrowing as detailed above.    Area of myelomalacia at the level of C4 and C5, similar to the prior exam.    Please see details of each levels  above.      Electronically signed by: Cleo Espinoza MD  Date: 02/02/2020  Time: 14:06    Encounter      View Encounter                 08/21/2019  CT Cervical Spine Without Contrast  Narrative      EXAMINATION:  CT CERVICAL SPINE WITHOUT CONTRAST    CLINICAL HISTORY:  s/p cervical laminoplasty; Spinal stenosis, cervical region    TECHNIQUE:  Low dose axial images, sagittal and coronal reformations were performed though the cervical spine.  Contrast was not administered.    COMPARISON:  CT cervical spine 05/11/2016, MRI cervical spine 03/23/2016    FINDINGS:  Craniocervical junction is within normal limits.    Postoperative changes from C3-C7 laminoplasty.  No evidence of hardware fracture.  Mild lucency surrounding the right inferior C3 facet screw suggesting component of loosening.    Cervical kyphosis centered at C5-C6, progressed.    Vertebral body heights are stable.  No acute fracture.    Progressed disc space height loss C4-C5 through C7-T1 with endplate sclerosis and spurring, consistent with degenerative disc disease.    Degenerative findings:    *C2-C3: Posterior disc osteophyte complex, uncovertebral spurring, and facet arthropathy resulting in mild spinal canal stenosis and moderate bilateral neural foraminal narrowing, left greater than right.  *C3-C4: Posterior disc osteophyte complex, uncovertebral spurring, and facet arthropathy resulting in mild spinal canal stenosis, severe right and moderate left neural foraminal narrowing.  *C4-C5: Posterior disc osteophyte complex, uncovertebral spurring, and facet arthropathy resulting in severe bilateral neural foraminal narrowing.  No significant spinal canal stenosis.  *C5-C6: Laminoplasty material encroaches within the right lateral recess, posterior disc osteophyte complex, uncovertebral spurring, and facet arthropathy resulting in moderate spinal canal stenosis, and moderate bilateral neural foraminal narrowing.  *C6-C7: Posterior disc osteophyte  complex, uncovertebral spurring, and facet arthropathy resulting in mild right and moderate left neural foraminal narrowing.  No significant spinal canal stenosis.  *C7-T1: Posterior disc osteophyte complex, uncovertebral spurring, and facet arthropathy resulting in moderate right and severe left neural foraminal narrowing.  No significant spinal canal stenosis.  Resolution of previously identified fluid within the posterior paraspinal soft tissues.   Impression        1. Postoperative changes from right sided C3-C7 instrumented laminoplasty.  No evidence of hardware failure.  Mild lucency surrounding the right inferior C3 facet screw, may represent component of loosening.  2. Resolution of previously identified fluid within the posterior paraspinous soft tissues/ surgical bed.  3. Progression of multilevel cervical spondylosis and kyphosis, as above.    Electronically signed by resident: Brendon Gunn  Date: 08/21/2019  Time: 10:02             Allergies:   Review of patient's allergies indicates:   Allergen Reactions    Codeine Swelling     Codeine with Aspirin caused chest pain       Current Medications:   Current Outpatient Medications   Medication Sig Dispense Refill    allopurinoL (ZYLOPRIM) 100 MG tablet Take 1 tablet (100 mg total) by mouth once daily. 90 tablet 3    aspirin (ECOTRIN) 81 MG EC tablet Take 81 mg by mouth once daily.      clotrimazole-betamethasone 1-0.05% (LOTRISONE) cream Apply topically 2 (two) times daily. 45 g 3    hydroCHLOROthiazide (HYDRODIURIL) 25 MG tablet Take 1 tablet (25 mg total) by mouth once daily. 90 tablet 3    ketoconazole (NIZORAL) 2 % cream Apply topically once daily. To the armpits 60 g 2    lisinopriL 10 MG tablet Take 1 tablet (10 mg total) by mouth once daily. 90 tablet 3    metFORMIN (GLUCOPHAGE-XR) 500 MG XR 24hr tablet TAKE 1 TABLET BY MOUTH EVERY DAY WITH BREAKFAST 90 tablet 3    multivitamin (ONE-A-DAY ESSENTIAL ORAL) Take by mouth.       nystatin-triamcinolone (MYCOLOG II) cream Apply topically 3 (three) times daily. 60 g 3    pravastatin (PRAVACHOL) 20 MG tablet Take 1 tablet (20 mg total) by mouth once daily. 90 tablet 3    tamsulosin (FLOMAX) 0.4 mg Cap Take 2 capsules (0.8 mg total) by mouth once daily. Increased dose 180 capsule 3    gabapentin (NEURONTIN) 100 MG capsule Take 1 capsule (100 mg total) by mouth 3 (three) times daily. 90 capsule 2    naproxen (NAPROSYN) 500 MG tablet Take 1 tablet (500 mg total) by mouth 2 (two) times daily with meals. 60 tablet 1     No current facility-administered medications for this visit.        REVIEW OF SYSTEMS:    GENERAL:  No weight loss, malaise or fevers.  HEENT:  Negative for frequent or significant headaches.  NECK:  Negative for lumps, goiter, pain and significant neck swelling.  RESPIRATORY:  SIS, Negative for cough, wheezing or shortness of breath.  CARDIOVASCULAR:  Hypertension, Negative for chest pain, leg swelling or palpitations.  GI:  Negative for abdominal discomfort, blood in stools or black stools or change in bowel habits.  MUSCULOSKELETAL:  Gout, See HPI.  SKIN:  Negative for lesions, rash, and itching.  PSYCH:  Negative for sleep disturbance, mood disorder and recent psychosocial stressors.  HEMATOLOGY/LYMPHOLOGY:  Negative for prolonged bleeding, bruising easily or swollen nodes.  NEURO:   No history of headaches, syncope, paralysis, seizures or tremors.  All other reviewed and negative other than HPI.    Past Medical History:  Past Medical History:   Diagnosis Date    Arthritis     Erectile dysfunction     Gout     Hyperlipidemia     Hypertension     Morbid obesity     Nuclear sclerosis of both eyes 1/16/2018    Screening for AAA (abdominal aortic aneurysm)     6/2016 AAA US no aneurysm       Past Surgical History:  Past Surgical History:   Procedure Laterality Date    COLONOSCOPY N/A 10/31/2018    Procedure: COLONOSCOPY;  Surgeon: Clementine Batista MD;  Location: [facility] END[facility];   Service: Endoscopy;  Laterality: N/A;    EPIDURAL STEROID INJECTION N/A 2020    Procedure: INJECTION, STEROID, EPIDURAL  C7-T1;  Surgeon: Stan Brunner MD;  Location: [facility];  Service: Pain Management;  Laterality: N/A;  C7-T1 Epidural Steroid Injection  consent needed    SPINE SURGERY  3/31/16    C3-C7 laminoplasty,Dr Caruso    tooth implant         Family History:  Family History   Problem Relation Age of Onset    COPD Mother     No Known Problems Father     No Known Problems Sister     Cataracts Brother     Glaucoma Brother     No Known Problems Maternal Aunt     No Known Problems Maternal Uncle     No Known Problems Paternal Aunt     No Known Problems Paternal Uncle     No Known Problems Maternal Grandmother     No Known Problems Maternal Grandfather     No Known Problems Paternal Grandmother     No Known Problems Paternal Grandfather     Amblyopia Neg Hx     Blindness Neg Hx     Cancer Neg Hx     Diabetes Neg Hx     Hypertension Neg Hx     Macular degeneration Neg Hx     Retinal detachment Neg Hx     Strabismus Neg Hx     Stroke Neg Hx     Thyroid disease Neg Hx        Social History:  Social History     Socioeconomic History    Marital status:      Spouse name: Not on file    Number of children: Not on file    Years of education: Not on file    Highest education level: Not on file   Occupational History    Not on file   Social Needs    Financial resource strain: Not on file    Food insecurity     Worry: Not on file     Inability: Not on file    Transportation needs     Medical: Not on file     Non-medical: Not on file   Tobacco Use    Smoking status: Former Smoker     Types: Cigarettes     Quit date: 2009     Years since quittin.6    Smokeless tobacco: Former User   Substance and Sexual Activity    Alcohol use: Yes     Comment: occasional beer- average week 12 beers a week    Drug use: No    Sexual activity: Not Currently     Partners: Female  "    Comment: , children.   Lifestyle    Physical activity     Days per week: Not on file     Minutes per session: Not on file    Stress: Not on file   Relationships    Social connections     Talks on phone: Not on file     Gets together: Not on file     Attends Gnosticist service: Not on file     Active member of club or organization: Not on file     Attends meetings of clubs or organizations: Not on file     Relationship status: Not on file   Other Topics Concern    Not on file   Social History Narrative    Not on file       OBJECTIVE:    /78   Pulse 91   Temp 98.2 °F (36.8 °C)   Resp 18   Ht 5' 8" (1.727 m)   Wt 120 kg (264 lb 8.8 oz)   SpO2 100%   BMI 40.22 kg/m²     PHYSICAL EXAMINATION:    General appearance: Well appearing, in no acute distress, alert and oriented x3. Obese  Psych:  Mood and affect appropriate.  Skin: Skin color, texture, turgor normal, no rashes or lesions, in both upper and lower body.  Head/face:  Normocephalic, atraumatic. No palpable lymph nodes.  Neck:  + kyphotic posture. Very limited ROM due to pain, especially with extension and lateral rotation. TTP throughout the cervical paraspinal muscles and BL superior trapezius muscles. + facet loading BL. Spurling Negative.  Cor: RRR  Pulm: CTA  GI:  Soft and non-tender.  Low back: + poor posture. ROM very limited/ painful with extension and rotation. TTP over lumbar paraspinals BL. Facet loading + BL, negative SLR BL  Extremities: Peripheral joint ROM is full and pain free without obvious instability or laxity in all four extremities. No deformities, edema, or skin discoloration. Good capillary refill.  Musculoskeletal: Bilateral upper and lower extremity strength is normal and symmetric. No atrophy or tone abnormalities are noted.  Neuro: Bilateral upper and lower extremity coordination and muscle stretch reflexes are physiologic and symmetric.  Plantar response are downgoing. No Sheehan's No loss of sensation is " noted.  Gait: Antalgic    ASSESSMENT: 69 y.o. year old male with neck and low back pain, consistent with     1. Cervical radiculopathy  Ambulatory referral/consult to Spine Surgery   2. Cervical stenosis of spinal canal 3/2016 C3-7 laminoplasty     3. Cervical postlaminectomy syndrome  Ambulatory referral/consult to Spine Surgery    Ambulatory referral/consult to Physical/Occupational Therapy   4. Lumbar spondylosis  Ambulatory referral/consult to Physical/Occupational Therapy         PLAN:     - I have stressed the importance of physical activity and a home exercise plan to help with pain and improve health.  - Start Neurontin 100mg gradually to three times a day to help with radicular pain.  - Start Naproxen 500 mg bid prn to help with pain.  - Referral to physical therapy to restart treatment  - Neurosurgery consultation to evaluate for any possible surgical interventions to help with pain.  - RTC 1 month  - Counseled patient regarding the importance of activity modification, constant sleeping habits, use of CPAP, physical therapy.    The above plan and management options were discussed at length with patient. Patient is in agreement with the above and verbalized understanding.    Robbin Johnson  08/25/2020     I have reviewed and concur with the resident's history, physical, assessment, and plan.  I have personally interviewed and examined the patient at bedside.  See below addendum for my evaluation and additional findings.  69-year-old male with chronic neck and lower back pain consistent with cervical radiculopathy and cervical postlaminectomy syndrome.  Patient is status post cervical epidural steroid injection with 1 week of complete pain resolution but later his pain return to baseline.  We will start him on gabapentin and naproxen.  We will refer him to physical therapy as he reports improvement in pain following physical therapy 3 months ago.  We will also refer him to spine surgery as there is a  suspicion for mild loose screw on cervical CT.  Will follow up with him and 4 weeks.   We will consider repeat cervical RADHA in the future.    Stan Brunner MD

## 2020-09-01 ENCOUNTER — TELEPHONE (OUTPATIENT)
Dept: NEUROSURGERY | Facility: CLINIC | Age: 70
End: 2020-09-01

## 2020-09-01 DIAGNOSIS — M96.1 CERVICAL POST-LAMINECTOMY SYNDROME: Primary | ICD-10-CM

## 2020-09-01 DIAGNOSIS — M48.02 CERVICAL SPINAL STENOSIS: ICD-10-CM

## 2020-09-03 ENCOUNTER — TELEPHONE (OUTPATIENT)
Dept: PAIN MEDICINE | Facility: CLINIC | Age: 70
End: 2020-09-03

## 2020-09-03 NOTE — TELEPHONE ENCOUNTER
Staff left detailed message regarding rescheduling appointment 9/25/20 due to a change in Sigifredo Efraín schedule appointment is cancelled until staff speak with patient to reschedule

## 2020-09-08 ENCOUNTER — CLINICAL SUPPORT (OUTPATIENT)
Dept: REHABILITATION | Facility: HOSPITAL | Age: 70
End: 2020-09-08
Attending: ANESTHESIOLOGY
Payer: MEDICARE

## 2020-09-08 DIAGNOSIS — M47.816 LUMBAR SPONDYLOSIS: ICD-10-CM

## 2020-09-08 DIAGNOSIS — M96.1 CERVICAL POSTLAMINECTOMY SYNDROME: Primary | ICD-10-CM

## 2020-09-08 PROCEDURE — 97161 PT EVAL LOW COMPLEX 20 MIN: CPT | Mod: PN

## 2020-09-08 NOTE — PLAN OF CARE
[facility] - PHYSICAL THERAPY EVALUATION     Name: Armand Painting  Clinic Number: [id number]    Therapy Diagnosis:   Encounter Diagnoses   Name Primary?    Cervical postlaminectomy syndrome     Lumbar spondylosis      Physician: Robbin Johnson MD    Physician Orders: PT Eval and Treat   Medical Diagnosis from Referral: Cervical postlaminectomy syndrome  Evaluation Date: 9/8/2020  Authorization Period Expiration: 9/22/20  Plan of Care Expiration: 12/8/20  Reassessment Due: 10/8/20    Visit # / Visits authorized: 1/ 1    Time In: 10:00  Time Out: 10:47  Total Billable Time: 47 minutes    Precautions: Standard      Subjective   Date of onset: 6/8/20  History of current condition - Armand reports: neck pain for over a year. Tried therapy previously, but felt that therapy made it worse. Pt. Reports there is a loose screw in his neck where he had surgery. He is not sure what he had done, all he knows is that it didn't help. Patient is unable to work or move around like he used to. He feels fatigued very easily. Patient reports he usually has headaches in the morning, but they usually subside after he moves around a little bit. Has to sleep with sleep apnea machine. Pt. 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 Pain is worst with looking up and neck extension. Patient was in a neck brace for 3 months. Pain causes patient to wake up in the middle of the night and lose sleep. Patient reports intermittent bilateral flank pain that has been on and off for about a year. Previous falls, most recently was several months ago and felt like his legs gave out on him.      Medical History:   Past Medical History:   Diagnosis Date    Arthritis     Erectile dysfunction     Gout     Hyperlipidemia     Hypertension     Morbid obesity     Nuclear sclerosis of both eyes 1/16/2018    Screening for AAA (abdominal aortic aneurysm)     6/2016 AAA US no aneurysm       Surgical History:   Armand Painting  has a past surgical  history that includes tooth implant; Spine surgery (3/31/16); Colonoscopy (N/A, 10/31/2018); and Epidural steroid injection (N/A, 8/7/2020).    Medications:   Armand has a current medication list which includes the following prescription(s): allopurinol, aspirin, clotrimazole-betamethasone 1-0.05%, gabapentin, hydrochlorothiazide, ketoconazole, lisinopril, metformin, multivitamin, naproxen, nystatin-triamcinolone, pravastatin, and tamsulosin.    Allergies:   Review of patient's allergies indicates:   Allergen Reactions    Codeine Swelling     Codeine with Aspirin caused chest pain        Imaging, none:     Prior Therapy: Yes  Prior Treatment: Cervical Stabilization surgery  Social History:  lives with their family  Occupation: retired  Leisure: ride motorcycle, walk,    Prior Level of Function: Able to walk exercise and ride his motorcycle without limitations  Current Level of Function: unable to exercise or ride his motorcycle due to loss of neck motion  DME owned/used: none        Pain:  Current 8/10, worst 9/10, best 4/10   Location: bilateral neck   Description: soreness  Aggravating Factors: Walking, Night Time, Morning, Extension, Lifting and Getting out of bed/chair  Easing Factors: pain medication, movement  Disturbed Sleep: yes        Pattern of pain questions:  1.  Where is your pain the worst? Central/midline neck  2.  Is your pain constant or intermittent? intermittent  3.  Does bending forward make your typical pain worse? yes  4.  Since the start of your neck pain, has there been a change in your bowel or bladder? No   5.  What can't you do now that you use to be able to do? Walking, riding motorcycle      Pts goals: get his neck back to 100%      Red Flag Screening:     Bladder/ bowel: (--)  Falls: (+)  Night pain: (+)  General health: fair    OBJECTIVE   Postural examination/scapula alignment: Rounded shoulder, Head forward, Affected scapula abducted, Lateral weight shift of hips and Slouched  posture  Joint integrity: Hard end feel  Edema: Mild  Correction of posture: better with lumbar roll    Range of Motion - MOVEMENT LOSS    ROM Loss   Flexion major loss   Extension major loss   Side bending Right major loss   Side bending Left major loss   Rotation Right major loss   Rotation Left major loss   Protraction major loss   Retraction  major loss       Upper Extremity Strength  (R) UE  (L) UE    Shoulder flexion: 3+/5 Shoulder flexion: 3+/5   Shoulder Abduction: 3+/5 Shoulder abduction: 3+/5   Elbow flexion: 4-/5 Elbow flexion: 4/5   Elbow extension: 4+/5 Elbow extension: 4+/5   Wrist flexion: 4-/5 Wrist flexion: 4/5   Wrist extension: 4-/5 Wrist extension: 4/5    4-/5 : 4/5     NEUROLOGICAL SCREEN    Sensory deficit: decreased sensation and numbness tingling in right C2, C5, C6, C7, C8 dermatomes    Special Tests:   Test Name  Testing Result   Compression Not tested   Distraction (--)   Neural Tension Test (+)   Saddle Sensation (--)     Reflexes:    Left Right   Biceps  absent absent   Brachioradialis  absent absent   Clonus (+) (+)   Babinski (--) (--)         GAIT:  Assistive Device used: none  Level of Assistance: supervision  Patient displays the following gait deviations:  unsteady gait and decreased step length.         Limitation/Restriction for FOTO 25 Survey    Therapist reviewed FOTO scores for Armand Painting on 9/8/2020.   FOTO documents entered into [employer] - see Media section.    Limitation Score: 46%             Treatment   Treatment Time In: 1035  Treatment Time Out: 1045  Total Treatment time separate from Evaluation: 10 minutes    Written Home Exercises Provided: yes.  Exercises were reviewed and Armand was able to demonstrate them prior to the end of the session.  Armand demonstrated fair  understanding of the education provided.     See EMR under Patient Instructions for exercises provided 9/8/2020.      Education provided:   - Patient received education regarding proper posture and  body mechanics.  Patient was given top 10 tips handout which discusses posture seated, standing, lifting correctly, components of exercise, importance of nutrition and hydration, and importance of sleep.  - Phoebe minor tried, recommended, and purchase information was provided.    Assessment   Armand is a 69 y.o. male referred to physical therapy with a medical diagnosis of cervical postlaminectomy syndrome. Pt presents with signs and symptoms consistent with cervicalgia with radicular symptoms into R UE. Pt has limitations in periscapular, cervical, and upper extremity strength, ROM, flexibility, soft tissue mobility, as well as gait and functional mobility and fine motor skills. Potential for hardware failure from previous surgery based on patient history. Patient symptoms were very easily reproducable and reported symptom provocation with all measurements and most muscle tests. Patient responded well to therapy and is a good candidate for physical therapy.    Pt prognosis is Good.   Pt will benefit from skilled outpatient Physical Therapy to address the deficits stated above and in the chart below, provide pt/family education, and to maximize pt's level of independence.     Plan of care discussed with patient: Yes  Pt's spiritual, cultural and educational needs considered and patient is agreeable to the plan of care and goals as stated below:     Anticipated Barriers for therapy: Chronicity of symptoms    PT Evaluation Completed? Yes    Medical necessity is demonstrated by the following problem list.    Pt presents with the following impairments:     History  Co-morbidities and personal factors that may impact the plan of care Co-morbidities:   advanced age, difficulty sleeping and high BMI    Personal Factors:   age  education level     low   Examination  Body Structures and Functions, activity limitations and participation restrictions that may impact the plan of care Body Regions:   neck  back    Body Systems:     ROM  strength  gross coordinated movement  gait    Participation Restrictions:   None    Activity limitations:   Learning and applying knowledge  no deficits    General Tasks and Commands  no deficits    Communication  no deficits    Mobility  lifting and carrying objects  fine hand use (grasping/picking up)  driving (bike, car, motorcycle)    Self care  no deficits    Domestic Life  no deficits    Interactions/Relationships  no deficits    Life Areas  school education    Community and Social Life  recreation and leisure         low   Clinical Presentation stable and uncomplicated low   Decision Making/ Complexity Score: low       GOALS: Short Term Goals: 6 weeks  1. Report decreased L neck pain  <  / =  5/10 at worst to increase tolerance for lying on side.   2. Pt will be able to tolerate multi-directional periscapular strengthening without adverse effects to improve postural mechanics.   3. Pt will report 50% improvement in tolerance for prolonged sitting since start of care to indicate improved functional mobility.   4. Pt will be able to demonstrate proper upright posture without verbal cueing to decrease abnormal spinal stresses.   5. Pt to tolerate HEP to improve ROM and independence with ADL's.     Long Term Goals: 12 weeks  1. Report decreased L neck pain  <  / =  3/10 at worst to increase tolerance for lying on R.   2. Pt will be able to perform 2 x 10 multi-directional periscapular strengthening without fatigue to indicate improved postural strength and endurance.  3. Pt will proper upright posture for 80% of treatment without verbal cueing to decrease cervical spinal stresses.   4. Pt will report 80% improvement in tolerance for prolonged sitting since start of care to indicate improved functional mobility.   5. Pt to be Independent with HEP to improve ROM and independence with ADL's.  6. Pt will be less than 40% impaired on FOTO.    Plan   Outpatient physical therapy 2x week for 10 weeks or 20 visits  "to include the following:   - Patient education  - Therapeutic exercise  - Manual therapy  - Performance testing   - Neuromuscular Re-education  - Therapeutic activity   - Modalities    Pt may be seen by PTA as part of the rehabilitation team.     Therapist: Chava Sykes, PT  9/8/2020      "I certify the need for these services furnished under this plan of treatment and while under my care."    ____________________________________  Physician/Referring Practitioner    _______________  Date of Signature          "

## 2020-09-11 ENCOUNTER — PATIENT OUTREACH (OUTPATIENT)
Dept: ADMINISTRATIVE | Facility: OTHER | Age: 70
End: 2020-09-11

## 2020-09-18 ENCOUNTER — CLINICAL SUPPORT (OUTPATIENT)
Dept: REHABILITATION | Facility: HOSPITAL | Age: 70
End: 2020-09-18
Attending: ANESTHESIOLOGY
Payer: MEDICARE

## 2020-09-18 ENCOUNTER — TELEPHONE (OUTPATIENT)
Dept: NEUROSURGERY | Facility: CLINIC | Age: 70
End: 2020-09-18

## 2020-09-18 PROCEDURE — 97110 THERAPEUTIC EXERCISES: CPT | Mod: PN,CQ

## 2020-09-18 PROCEDURE — 97140 MANUAL THERAPY 1/> REGIONS: CPT | Mod: PN,CQ

## 2020-09-18 NOTE — PROGRESS NOTES
Physical Therapy Daily Treatment Note     Name: Armand Painting  Clinic Number: [id number]    Therapy Diagnosis: No diagnosis found.  Physician: Robbin Johnson MD    Visit Date: 9/18/2020    Physician Orders: PT Eval and Treat   Medical Diagnosis from Referral: Cervical postlaminectomy syndrome  Evaluation Date: 9/8/2020  Authorization Period Expiration: 9/22/20  Plan of Care Expiration: 12/8/20  Reassessment Due: 10/8/20                      Visit # / Visits authorized: 2/ 6     Time In: 0945  Time Out: 1040  Total Billable Time: 47 minutes     Precautions: Standard      Subjective     Pt reports: he is doing pretty good, neck is hurting me. It hurts to hold up my head.  He was somewhat compliant with home exercise program.  Response to previous treatment: 1st treat  Functional change: none    Pain: 9/10  Location:  neck      Objective     Armand received therapeutic exercises to develop strength for 35 minutes including:    UBE 3/3  Scap retractions x20  Supine pec stretch 3 minutes   Supine wand flx x20  Horizontal abd RTB  Rows RTB 3x10  EOM scaption 2x10    Armand received the following manual therapy techniques: Soft tissue Mobilization, stretching, ROM were applied to the: cervical spine for 15 minutes, including:  Side bend, rotation ROM  UT stretch [id number] each  STM sub occipitals     Armand participated in neuromuscular re-education activities to improve:  for  minutes. The following activities were included:        Armand received the following direct contact modalities after being cleared for contraindications:   Armand received the following supervised modalities after being cleared for contradictions:     Armand received hot pack for 10  minutes to cervical spine.    Armand received cold pack for  minutes to .      Home Exercises Provided and Patient Education Provided     Education provided:   - Cont HEP    Written Home Exercises Provided: Patient instructed to cont prior HEP.  Exercises were reviewed and  Armand was able to demonstrate them prior to the end of the session.  Armand demonstrated fair  understanding of the education provided.     See EMR under Patient Instructions for exercises provided prior visit.    Assessment     Pt tolerated session well today. Pt entered clinic with forward head posture and rounded shoulders. Pt requires constant VCs throughout treatment for improved slouched posture when in sitting. Pt performed all therex with no adverse affects. Pt was limited in ROM with all motions. Pt responded well to stretching/ ROM on neck.    Armand is progressing well towards his goals.   Pt prognosis is Good.     Pt will continue to benefit from skilled outpatient physical therapy to address the deficits listed in the problem list box on initial evaluation, provide pt/family education and to maximize pt's level of independence in the home and community environment.     Pt's spiritual, cultural and educational needs considered and pt agreeable to plan of care and goals.     Anticipated barriers to physical therapy: none    GOALS: Short Term Goals: 6 weeks  1. Report decreased L neck pain  <  / =  5/10 at worst to increase tolerance for lying on side.   2. Pt will be able to tolerate multi-directional periscapular strengthening without adverse effects to improve postural mechanics.   3. Pt will report 50% improvement in tolerance for prolonged sitting since start of care to indicate improved functional mobility.   4. Pt will be able to demonstrate proper upright posture without verbal cueing to decrease abnormal spinal stresses.   5. Pt to tolerate HEP to improve ROM and independence with ADL's.     Long Term Goals: 12 weeks  1. Report decreased L neck pain  <  / =  3/10 at worst to increase tolerance for lying on R.   2. Pt will be able to perform 2 x 10 multi-directional periscapular strengthening without fatigue to indicate improved postural strength and endurance.  3. Pt will proper upright posture for  80% of treatment without verbal cueing to decrease cervical spinal stresses.   4. Pt will report 80% improvement in tolerance for prolonged sitting since start of care to indicate improved functional mobility.   5. Pt to be Independent with HEP to improve ROM and independence with ADL's.  6. Pt will be less than 40% impaired on FOTO.      Plan     Outpatient physical therapy 2x week for 10 weeks or 20 visits to include the following:   - Patient education  - Therapeutic exercise  - Manual therapy  - Performance testing   - Neuromuscular Re-education  - Therapeutic activity   - Modalities      Brandyn Esquivel, PTA

## 2020-09-22 ENCOUNTER — CLINICAL SUPPORT (OUTPATIENT)
Dept: REHABILITATION | Facility: HOSPITAL | Age: 70
End: 2020-09-22
Attending: ANESTHESIOLOGY
Payer: MEDICARE

## 2020-09-22 DIAGNOSIS — R29.3 POOR POSTURE: ICD-10-CM

## 2020-09-22 DIAGNOSIS — M54.2 NECK PAIN: Primary | ICD-10-CM

## 2020-09-22 DIAGNOSIS — M47.816 LUMBAR SPONDYLOSIS: ICD-10-CM

## 2020-09-22 DIAGNOSIS — M96.1 CERVICAL POSTLAMINECTOMY SYNDROME: ICD-10-CM

## 2020-09-22 PROCEDURE — 97110 THERAPEUTIC EXERCISES: CPT | Mod: PN

## 2020-09-22 PROCEDURE — 97140 MANUAL THERAPY 1/> REGIONS: CPT | Mod: PN

## 2020-09-22 NOTE — PROGRESS NOTES
Physical Therapy Daily Treatment Note     Name: Armand Painting  Clinic Number: [id number]    Therapy Diagnosis:   Encounter Diagnoses   Name Primary?    Neck pain Yes    Poor posture     Cervical postlaminectomy syndrome     Lumbar spondylosis      Physician: Robbin Johnson MD    Visit Date: 9/22/2020    Physician Orders: PT Eval and Treat   Medical Diagnosis from Referral: Cervical postlaminectomy syndrome  Evaluation Date: 9/8/2020  Authorization Period Expiration: 9/22/20  Plan of Care Expiration: 12/8/20  Reassessment Due: 10/8/20                      Visit # / Visits authorized: 3/ 6     Time In: 1225  Time Out: 1320  Total Billable Time: 55 minutes     Precautions: Standard      Subjective     Pt reports: the stretches at home are helping. Some soreness after last visit, but overall less pain than what he had on day of initial eval, but still with soreness stiffness and constant pain.    He was compliant with home exercise program.  Response to previous treatment: Some soreness  Functional change: none    Pain: 6/10  Location:  neck      Objective     Armand received therapeutic exercises to develop strength for 40 minutes including:    UBE 3/3  Scap retractions RTB 2x10  Supine pec stretch 3 minutes   Supine wand flx 2x10  +Supine wand scapular pushup 2x10  Supine Horizontal abd RTB 2x10  EOM scaption 2x10 (add 1# next visit)  GTB ER 2x10  +Matrix rows 2 x10 #30  +KB arm hang with neck circles CW/CCW x10, sidebending x10, rotation x10  +Pulleys flex, scaption, abduction x1 min each    Armand received the following manual therapy techniques: Soft tissue Mobilization, stretching, ROM were applied to the: cervical spine for 15 minutes, including:  Side bend, rotation ROM  UT stretch [id number] each  STM sub occipitals       Armand received hot pack for 10 minutes to cervical spine.    Armand received cold pack for  minutes to .      Home Exercises Provided and Patient Education Provided     Education provided:    - Cont HEP    Written Home Exercises Provided: Patient instructed to cont prior HEP.  Exercises were reviewed and Armand was able to demonstrate them prior to the end of the session.  Armand demonstrated fair  understanding of the education provided.     See EMR under Patient Instructions for exercises provided prior visit.    Assessment     Pt tolerated session well today. Still with severely limited cervical ROM in all directions especially BL sidebending and extension. Weakness in R UE (dominant hand) is more pronounced than left. Improved cervical AROM post manual therapy and flexibility exercises. Patient with poor periscapular activation, strength, motor control requiring repeated cues for scapular retraction and correct posture. Continue to focus on periscapular strength to promote better resting posture to allow for better mechanics for UE overhead activities. Continue to progress as tolerated.     Armand is progressing well towards his goals.   Pt prognosis is Good.     Pt will continue to benefit from skilled outpatient physical therapy to address the deficits listed in the problem list box on initial evaluation, provide pt/family education and to maximize pt's level of independence in the home and community environment.     Pt's spiritual, cultural and educational needs considered and pt agreeable to plan of care and goals.     Anticipated barriers to physical therapy: none    GOALS: Short Term Goals: 6 weeks  1. Report decreased L neck pain  <  / =  5/10 at worst to increase tolerance for lying on side.  In progress  2. Pt will be able to tolerate multi-directional periscapular strengthening without adverse effects to improve postural mechanics. In progress  3. Pt will report 50% improvement in tolerance for prolonged sitting since start of care to indicate improved functional mobility. In progress  4. Pt will be able to demonstrate proper upright posture without verbal cueing to decrease abnormal spinal  stresses. In progress  5. Pt to tolerate HEP to improve ROM and independence with ADL's. In progress     Long Term Goals: 12 weeks  1. Report decreased L neck pain  <  / =  3/10 at worst to increase tolerance for lying on R. In progress  2. Pt will be able to perform 2 x 10 multi-directional periscapular strengthening without fatigue to indicate improved postural strength and endurance. In progress  3. Pt will proper upright posture for 80% of treatment without verbal cueing to decrease cervical spinal stresses. In progress  4. Pt will report 80% improvement in tolerance for prolonged sitting since start of care to indicate improved functional mobility.  In progress  5. Pt to be Independent with HEP to improve ROM and independence with ADL's. In progress  6. Pt will be less than 40% impaired on FOTO. In progress      Plan     Outpatient physical therapy 2x week for 10 weeks or 20 visits to include the following:   - Patient education  - Therapeutic exercise  - Manual therapy  - Performance testing   - Neuromuscular Re-education  - Therapeutic activity   - Modalities      Chava Sykes, PT, DPT

## 2020-09-24 ENCOUNTER — CLINICAL SUPPORT (OUTPATIENT)
Dept: REHABILITATION | Facility: HOSPITAL | Age: 70
End: 2020-09-24
Attending: ANESTHESIOLOGY
Payer: MEDICARE

## 2020-09-24 DIAGNOSIS — R29.3 POOR POSTURE: ICD-10-CM

## 2020-09-24 DIAGNOSIS — M47.816 LUMBAR SPONDYLOSIS: Primary | ICD-10-CM

## 2020-09-24 DIAGNOSIS — M54.2 NECK PAIN: ICD-10-CM

## 2020-09-24 DIAGNOSIS — M96.1 CERVICAL POST-LAMINECTOMY SYNDROME: ICD-10-CM

## 2020-09-24 PROCEDURE — 97140 MANUAL THERAPY 1/> REGIONS: CPT | Mod: PN

## 2020-09-24 PROCEDURE — 97110 THERAPEUTIC EXERCISES: CPT | Mod: PN

## 2020-09-24 NOTE — PROGRESS NOTES
Physical Therapy Daily Treatment Note     Name: Armand Painting  Clinic Number: [id number]    Therapy Diagnosis:   Encounter Diagnoses   Name Primary?    Lumbar spondylosis Yes    Cervical post-laminectomy syndrome     Neck pain     Poor posture      Physician: Robbin Johnson MD    Visit Date: 9/24/2020    Physician Orders: PT Eval and Treat   Medical Diagnosis from Referral: Cervical postlaminectomy syndrome  Evaluation Date: 9/8/2020  Authorization Period Expiration: 11/15/20  Plan of Care Expiration: 12/8/20  Reassessment Due: 10/8/20                      Visit # / Visits authorized: 4/ 6     Time In: 1215  Time Out: 1300  Total Billable Time: 45 minutes     Precautions: Standard      Subjective     Pt reports: he is sleeping better and waking up fewer times throughout the night. Still with constant neck pain.    He was compliant with home exercise program.  Response to previous treatment: no change  Functional change: none    Pain: 7/10  Location:  neck      Objective     Armand received therapeutic exercises to develop strength for 35 minutes including:    UBE 3/3  Scap retractions RTB 2x10  Standing doorway pec stretch 3x30 sec (supine manual stretch NV)  Supine wand flx 2x10  Supine wand scapular pushup 2x10  Supine Horizontal abd RTB 2x10  EOM scaption 2x10 1#   GTB ER 2x10  Matrix rows 2 x10 #35  Dumbbell carry neck circles CW/CCW x10 ea  Pulleys flex, scaption, abduction x1 min each    Armand received the following manual therapy techniques: Soft tissue Mobilization, stretching, ROM were applied to the: cervical spine for 10 minutes, including:  Side bend, rotation ROM  UT stretch [id number] each  STM sub occipitals       Armand received hot pack for 10 minutes to cervical spine.      Home Exercises Provided and Patient Education Provided     Education provided:   - Cont HEP    Written Home Exercises Provided: Patient instructed to cont prior HEP.  Exercises were reviewed and Armand was able to demonstrate  them prior to the end of the session.  Armand demonstrated fair  understanding of the education provided.     See EMR under Patient Instructions for exercises provided prior visit.    Assessment     Pt tolerated session well today. Continuation and progression of dynamic UE strengthening and cervical ROM exercises without adverse effects. Decreased tightness and soreness and improved neck AROM post manual therapy and flexibility exercises. Frequent cues needed for posture especially scapular retraction and upright head. Patient has tendency to let head drop. Improved scapular mobility with periscapular strengthening. Continue to progress as tolerated    Armand is progressing well towards his goals.   Pt prognosis is Good.     Pt will continue to benefit from skilled outpatient physical therapy to address the deficits listed in the problem list box on initial evaluation, provide pt/family education and to maximize pt's level of independence in the home and community environment.     Pt's spiritual, cultural and educational needs considered and pt agreeable to plan of care and goals.     Anticipated barriers to physical therapy: none    GOALS: Short Term Goals: 6 weeks  1. Report decreased L neck pain  <  / =  5/10 at worst to increase tolerance for lying on side.  In progress  2. Pt will be able to tolerate multi-directional periscapular strengthening without adverse effects to improve postural mechanics. In progress  3. Pt will report 50% improvement in tolerance for prolonged sitting since start of care to indicate improved functional mobility. In progress  4. Pt will be able to demonstrate proper upright posture without verbal cueing to decrease abnormal spinal stresses. In progress  5. Pt to tolerate HEP to improve ROM and independence with ADL's. In progress     Long Term Goals: 12 weeks  1. Report decreased L neck pain  <  / =  3/10 at worst to increase tolerance for lying on R. In progress  2. Pt will be able to  perform 2 x 10 multi-directional periscapular strengthening without fatigue to indicate improved postural strength and endurance. In progress  3. Pt will proper upright posture for 80% of treatment without verbal cueing to decrease cervical spinal stresses. In progress  4. Pt will report 80% improvement in tolerance for prolonged sitting since start of care to indicate improved functional mobility.  In progress  5. Pt to be Independent with HEP to improve ROM and independence with ADL's. In progress  6. Pt will be less than 40% impaired on FOTO. In progress      Plan     Outpatient physical therapy 2x week for 10 weeks or 20 visits to include the following:   - Patient education  - Therapeutic exercise  - Manual therapy  - Performance testing   - Neuromuscular Re-education  - Therapeutic activity   - Modalities      Chava Sykes, PT, DPT

## 2020-09-29 ENCOUNTER — CLINICAL SUPPORT (OUTPATIENT)
Dept: REHABILITATION | Facility: HOSPITAL | Age: 70
End: 2020-09-29
Attending: ANESTHESIOLOGY
Payer: MEDICARE

## 2020-09-29 DIAGNOSIS — M96.1 CERVICAL POST-LAMINECTOMY SYNDROME: ICD-10-CM

## 2020-09-29 DIAGNOSIS — M54.2 NECK PAIN: Primary | ICD-10-CM

## 2020-09-29 DIAGNOSIS — R29.3 POOR POSTURE: ICD-10-CM

## 2020-09-29 PROCEDURE — 97110 THERAPEUTIC EXERCISES: CPT | Mod: PN

## 2020-09-29 NOTE — PROGRESS NOTES
Physical Therapy Daily Treatment Note     Name: Armand Painting  Clinic Number: [id number]    Therapy Diagnosis:   Encounter Diagnoses   Name Primary?    Neck pain Yes    Cervical post-laminectomy syndrome     Poor posture      Physician: Robbin Johnson MD    Visit Date: 9/29/2020    Physician Orders: PT Eval and Treat   Medical Diagnosis from Referral: Cervical postlaminectomy syndrome  Evaluation Date: 9/8/2020  Authorization Period Expiration: 11/15/20  Plan of Care Expiration: 12/8/20  Reassessment Due: 10/8/20                      Visit # / Visits authorized: 5/ 6     Time In: 1002  Time Out: 1047  Total Billable Time: 45 minutes     Precautions: Standard      Subjective     Pt reports: the neck seems to feel better in the morning, but get worse throughout the day. He was able to ride his motorcycle this weekend without the neck causing him issues.    He was compliant with home exercise program.  Response to previous treatment: no change  Functional change: none    Pain: 7/10  Location:  neck      Objective     Armand received therapeutic exercises to develop strength for 35 minutes including:    UBE 3/3  Scap retractions RTB 2x10  Standing doorway pec stretch 3x30 sec   Supine wand flx 2x10  Supine wand scapular pushup 2x10  Supine Horizontal abd RTB 2x10  EOM scaption 2x10 1#   GTB ER 2x10  Matrix rows 2 x10 #40  Dumbbell carry neck circles CW/CCW x10 ea  Pulleys flex, scaption, abduction x1 min each    Armand received the following manual therapy techniques: Soft tissue Mobilization, stretching, ROM were applied to the: cervical spine for 10 minutes, including:  Side bend, rotation ROM  UT stretch [id number] each  STM sub occipitals   Manual pec stretch 3x30      Armand received hot pack for 10 minutes to cervical spine.      Home Exercises Provided and Patient Education Provided     Education provided:   - Cont HEP    Written Home Exercises Provided: Patient instructed to cont prior HEP.  Exercises were  reviewed and Armand was able to demonstrate them prior to the end of the session.  Armand demonstrated fair  understanding of the education provided.     See EMR under Patient Instructions for exercises provided prior visit.    Assessment     Pt tolerated session well today. Continuation and progression of dynamic UE strengthening and cervical ROM exercises without adverse effects. Improved cervical AROM at end of treatment session, but is still significantly limited in all directions, most notably bilateral side bending. Continues to need cues for scapular retraction during overhead UE movement and cues to keep head up and eyes forward during exercises. Stressed the importance of daily HEP, especially stretching to continue to improve cervical ROM. Continue to progress as tolerated.      Armand is progressing well towards his goals.   Pt prognosis is Good.     Pt will continue to benefit from skilled outpatient physical therapy to address the deficits listed in the problem list box on initial evaluation, provide pt/family education and to maximize pt's level of independence in the home and community environment.     Pt's spiritual, cultural and educational needs considered and pt agreeable to plan of care and goals.     Anticipated barriers to physical therapy: none    GOALS: Short Term Goals: 6 weeks  1. Report decreased L neck pain  <  / =  5/10 at worst to increase tolerance for lying on side.  In progress  2. Pt will be able to tolerate multi-directional periscapular strengthening without adverse effects to improve postural mechanics. In progress  3. Pt will report 50% improvement in tolerance for prolonged sitting since start of care to indicate improved functional mobility. In progress  4. Pt will be able to demonstrate proper upright posture without verbal cueing to decrease abnormal spinal stresses. In progress  5. Pt to tolerate HEP to improve ROM and independence with ADL's. In progress     Long Term Goals:  12 weeks  1. Report decreased L neck pain  <  / =  3/10 at worst to increase tolerance for lying on R. In progress  2. Pt will be able to perform 2 x 10 multi-directional periscapular strengthening without fatigue to indicate improved postural strength and endurance. In progress  3. Pt will proper upright posture for 80% of treatment without verbal cueing to decrease cervical spinal stresses. In progress  4. Pt will report 80% improvement in tolerance for prolonged sitting since start of care to indicate improved functional mobility.  In progress  5. Pt to be Independent with HEP to improve ROM and independence with ADL's. In progress  6. Pt will be less than 40% impaired on FOTO. In progress      Plan     Outpatient physical therapy 2x week for 10 weeks or 20 visits to include the following:   - Patient education  - Therapeutic exercise  - Manual therapy  - Performance testing   - Neuromuscular Re-education  - Therapeutic activity   - Modalities      Chava Sykes, PT, DPT

## 2020-10-01 ENCOUNTER — CLINICAL SUPPORT (OUTPATIENT)
Dept: REHABILITATION | Facility: HOSPITAL | Age: 70
End: 2020-10-01
Attending: ANESTHESIOLOGY
Payer: MEDICARE

## 2020-10-01 DIAGNOSIS — M54.2 NECK PAIN: ICD-10-CM

## 2020-10-01 DIAGNOSIS — M47.816 LUMBAR SPONDYLOSIS: Primary | ICD-10-CM

## 2020-10-01 DIAGNOSIS — M96.1 CERVICAL POST-LAMINECTOMY SYNDROME: ICD-10-CM

## 2020-10-01 DIAGNOSIS — R29.3 POOR POSTURE: ICD-10-CM

## 2020-10-01 PROCEDURE — 97110 THERAPEUTIC EXERCISES: CPT | Mod: PN

## 2020-10-01 NOTE — PROGRESS NOTES
Physical Therapy Daily Treatment Note     Name: Armand Painting  Clinic Number: [id number]    Therapy Diagnosis:   Encounter Diagnoses   Name Primary?    Lumbar spondylosis Yes    Neck pain     Cervical post-laminectomy syndrome     Poor posture      Physician: Robbin Johnson MD    Visit Date: 10/1/2020    Physician Orders: PT Eval and Treat   Medical Diagnosis from Referral: Cervical postlaminectomy syndrome  Evaluation Date: 9/8/2020  Authorization Period Expiration: 11/15/20  Plan of Care Expiration: 12/8/20  Reassessment Due: 10/8/20                      Visit # / Visits authorized: 6/ 6     Time In: 952  Time Out: 1037  Total Billable Time: 45 minutes     Precautions: Standard      Subjective     Pt reports: the neck seems to be more stiff and painful today. He cannot recall anything out of the ordinary that he did to cause the increase stiffness and pain.    He was compliant with home exercise program.  Response to previous treatment: no change  Functional change: none    Pain: 10/10  Location:  neck      Objective     Armand received therapeutic exercises to develop strength for 35 minutes including:    UBE 3/3  Scap retractions RTB 2x10  Standing doorway pec stretch 3x30 sec   Supine wand flx 2x10  Supine wand scapular pushup 2x10  Supine Horizontal abd RTB 2x10  GTB ER 2x10  D2 Flexion YTB 2x10  Serratus Wall slides YTB x15  Matrix rows 2 x10 #40  Pulleys flex, scaption, abduction x1 min each    Armand received the following manual therapy techniques: Soft tissue Mobilization, stretching, ROM were applied to the: cervical spine for 10 minutes, including:  Side bend, rotation ROM  UT stretch [id number] each  STM sub occipitals   Manual pec stretch 3x30      Armand received cold pack for 10 minutes to cervical spine.      Home Exercises Provided and Patient Education Provided     Education provided:   - Cont HEP    Written Home Exercises Provided: Patient instructed to cont prior HEP.  Exercises were reviewed  and Armand was able to demonstrate them prior to the end of the session.  Armand demonstrated fair  understanding of the education provided.     See EMR under Patient Instructions for exercises provided prior visit.    Assessment     Pt tolerated session well today. Continuation and progression of dynamic UE strengthening and cervical ROM exercises without adverse effects. Improved scapular and glenohumeral mechanics with UE elevation in scapular plane, however significant periscapular weakness is still present. Continues to need cues for scapular retraction during overhead UE movement and cues to keep head up and eyes forward during exercises. Stressed the importance of maintaining good progress throughout the day. Continue to progress as tolerated and focus on dynamic loaded overhead strengthening.    Armand is progressing well towards his goals.   Pt prognosis is Good.     Pt will continue to benefit from skilled outpatient physical therapy to address the deficits listed in the problem list box on initial evaluation, provide pt/family education and to maximize pt's level of independence in the home and community environment.     Pt's spiritual, cultural and educational needs considered and pt agreeable to plan of care and goals.     Anticipated barriers to physical therapy: none    GOALS: Short Term Goals: 6 weeks  1. Report decreased L neck pain  <  / =  5/10 at worst to increase tolerance for lying on side.  In progress  2. Pt will be able to tolerate multi-directional periscapular strengthening without adverse effects to improve postural mechanics. In progress  3. Pt will report 50% improvement in tolerance for prolonged sitting since start of care to indicate improved functional mobility. In progress  4. Pt will be able to demonstrate proper upright posture without verbal cueing to decrease abnormal spinal stresses. In progress  5. Pt to tolerate HEP to improve ROM and independence with ADL's. In progress      Long Term Goals: 12 weeks  1. Report decreased L neck pain  <  / =  3/10 at worst to increase tolerance for lying on R. In progress  2. Pt will be able to perform 2 x 10 multi-directional periscapular strengthening without fatigue to indicate improved postural strength and endurance. In progress  3. Pt will proper upright posture for 80% of treatment without verbal cueing to decrease cervical spinal stresses. In progress  4. Pt will report 80% improvement in tolerance for prolonged sitting since start of care to indicate improved functional mobility.  In progress  5. Pt to be Independent with HEP to improve ROM and independence with ADL's. In progress  6. Pt will be less than 40% impaired on FOTO. In progress      Plan     Outpatient physical therapy 2x week for 10 weeks or 20 visits to include the following:   - Patient education  - Therapeutic exercise  - Manual therapy  - Performance testing   - Neuromuscular Re-education  - Therapeutic activity   - Modalities      Chava Sykes, PT, DPT

## 2020-10-06 ENCOUNTER — CLINICAL SUPPORT (OUTPATIENT)
Dept: REHABILITATION | Facility: HOSPITAL | Age: 70
End: 2020-10-06
Attending: ANESTHESIOLOGY
Payer: MEDICARE

## 2020-10-06 DIAGNOSIS — M96.1 CERVICAL POST-LAMINECTOMY SYNDROME: ICD-10-CM

## 2020-10-06 DIAGNOSIS — M47.816 LUMBAR SPONDYLOSIS: Primary | ICD-10-CM

## 2020-10-06 DIAGNOSIS — M54.2 NECK PAIN: ICD-10-CM

## 2020-10-06 DIAGNOSIS — R29.3 POOR POSTURE: ICD-10-CM

## 2020-10-06 PROCEDURE — 97140 MANUAL THERAPY 1/> REGIONS: CPT | Mod: PN

## 2020-10-06 PROCEDURE — 97110 THERAPEUTIC EXERCISES: CPT | Mod: PN

## 2020-10-06 NOTE — PROGRESS NOTES
Physical Therapy Daily Treatment Note     Name: Armand Painting  Clinic Number: [id number]    Therapy Diagnosis:   Encounter Diagnoses   Name Primary?    Lumbar spondylosis Yes    Neck pain     Cervical post-laminectomy syndrome     Poor posture      Physician: Robbin Johnson MD    Visit Date: 10/6/2020    Physician Orders: PT Eval and Treat   Medical Diagnosis from Referral: Cervical postlaminectomy syndrome  Evaluation Date: 9/8/2020  Authorization Period Expiration: 11/15/20  Plan of Care Expiration: 12/8/20  Reassessment Due: 10/8/20                      Visit # / Visits authorized: 6/ 6     Time In: 1003  Time Out: 1044  Total Billable Time: 41 minutes     Precautions: Standard      Subjective     Pt reports: the neck is steadily improving, he is not in as much constant pain. Pain is worst in AM upon waking    He was compliant with home exercise program.  Response to previous treatment: no change  Functional change: none    Pain: 7/10  Location:  neck      Objective     Armand received therapeutic exercises to develop strength for 31 minutes including:    UBE 3/3  Scap retractions RTB 2x10  Thoracic exts w/ towel roll x20  Open books x15 ea  Standing doorway pec stretch 3x30 sec   Supine wand flx 2x10  Supine wand scapular pushup 2x10  Supine Horizontal abd RTB 2x10  GTB ER 2x10  Serratus Wall slides YTB x15  Matrix rows 2 x10 #45  Pulleys flex, scaption, abduction x1 min each    Armand received the following manual therapy techniques: Soft tissue Mobilization, stretching, ROM were applied to the: cervical spine for 10 minutes, including:  Side bend, rotation ROM  UT stretch [id number] each  STM sub occipitals   Manual pec stretch 3x30      Armand received hot pack for 10 minutes to cervical spine.      Home Exercises Provided and Patient Education Provided     Education provided:   - Cont HEP    Written Home Exercises Provided: Patient instructed to cont prior HEP.  Exercises were reviewed and Armand was able  to demonstrate them prior to the end of the session.  Armand demonstrated fair  understanding of the education provided.     See EMR under Patient Instructions for exercises provided prior visit.    Assessment     Pt tolerated session well today. Still with significant deficits in strength, ROM, and posture and needing more visits in order to address described deficits. Able to tolerate continuation and progression of UE strengthening, and cervical stabilization and ROM exercises without adverse effects. Introduction of additional thoracic ROM exercises due to lack of thoracic mobility and poor posture with and without cueing. Continue to progress as tolerated.    Armand is progressing well towards his goals.   Pt prognosis is Good.     Pt will continue to benefit from skilled outpatient physical therapy to address the deficits listed in the problem list box on initial evaluation, provide pt/family education and to maximize pt's level of independence in the home and community environment.     Pt's spiritual, cultural and educational needs considered and pt agreeable to plan of care and goals.     Anticipated barriers to physical therapy: none    GOALS: Short Term Goals: 6 weeks  1. Report decreased L neck pain  <  / =  5/10 at worst to increase tolerance for lying on side.  In progress  2. Pt will be able to tolerate multi-directional periscapular strengthening without adverse effects to improve postural mechanics. In progress  3. Pt will report 50% improvement in tolerance for prolonged sitting since start of care to indicate improved functional mobility. In progress  4. Pt will be able to demonstrate proper upright posture without verbal cueing to decrease abnormal spinal stresses. In progress  5. Pt to tolerate HEP to improve ROM and independence with ADL's. In progress     Long Term Goals: 12 weeks  1. Report decreased L neck pain  <  / =  3/10 at worst to increase tolerance for lying on R. In progress  2. Pt will  be able to perform 2 x 10 multi-directional periscapular strengthening without fatigue to indicate improved postural strength and endurance. In progress  3. Pt will proper upright posture for 80% of treatment without verbal cueing to decrease cervical spinal stresses. In progress  4. Pt will report 80% improvement in tolerance for prolonged sitting since start of care to indicate improved functional mobility.  In progress  5. Pt to be Independent with HEP to improve ROM and independence with ADL's. In progress  6. Pt will be less than 40% impaired on FOTO. In progress      Plan     Outpatient physical therapy 2x week for 10 weeks or 20 visits to include the following:   - Patient education  - Therapeutic exercise  - Manual therapy  - Performance testing   - Neuromuscular Re-education  - Therapeutic activity   - Modalities      Chava Sykes, PT, DPT

## 2020-10-08 ENCOUNTER — CLINICAL SUPPORT (OUTPATIENT)
Dept: REHABILITATION | Facility: HOSPITAL | Age: 70
End: 2020-10-08
Attending: ANESTHESIOLOGY
Payer: MEDICARE

## 2020-10-08 DIAGNOSIS — M96.1 CERVICAL POST-LAMINECTOMY SYNDROME: ICD-10-CM

## 2020-10-08 DIAGNOSIS — M47.816 LUMBAR SPONDYLOSIS: Primary | ICD-10-CM

## 2020-10-08 DIAGNOSIS — R29.3 POOR POSTURE: ICD-10-CM

## 2020-10-08 DIAGNOSIS — M54.2 NECK PAIN: ICD-10-CM

## 2020-10-08 PROCEDURE — 97110 THERAPEUTIC EXERCISES: CPT | Mod: PN

## 2020-10-08 PROCEDURE — 97140 MANUAL THERAPY 1/> REGIONS: CPT | Mod: PN

## 2020-10-08 NOTE — PROGRESS NOTES
"  Physical Therapy Daily Treatment Note     Name: Armand Painting  Clinic Number: [id number]    Therapy Diagnosis:   Encounter Diagnoses   Name Primary?    Lumbar spondylosis Yes    Neck pain     Cervical post-laminectomy syndrome     Poor posture      Physician: Robbin Johnson MD    Visit Date: 10/8/2020    Physician Orders: PT Eval and Treat   Medical Diagnosis from Referral: Cervical postlaminectomy syndrome  Evaluation Date: 9/8/2020  Authorization Period Expiration: 11/15/20  Plan of Care Expiration: 12/8/20  Reassessment Due: 10/8/20                      Visit # / Visits authorized: 6/ 6     Time In: 1002  Time Out: 1045  Total Billable Time: 42 minutes     Precautions: Standard      Subjective     Pt reports: the neck is steadily improving, he is not in as much constant pain. Pain is worst in AM upon waking    He was compliant with home exercise program.  Response to previous treatment: no change  Functional change: none    Pain: 7/10  Location:  neck      Objective       Armand received therapeutic exercises to develop strength for 32 minutes including:    UBE 3/3  Chin Tucks x15x5"  Scap retractions RTB 2x10  Thoracic exts w/ towel roll x20  Open books x15 ea  Standing doorway pec stretch 3x30 sec   Supine wand scapular pushup 2x10  Supine Horizontal abd RTB 2x10  GTB ER 2x10  Serratus Wall slides YTB x15  Matrix rows 2 x10 #45  Pulleys flex, scaption, abduction x1 min each    Armand received the following manual therapy techniques: Soft tissue Mobilization, stretching, ROM were applied to the: cervical spine for 10 minutes, including:  Side bend, rotation ROM  UT stretch [id number] each  STM sub occipitals   Manual pec stretch 3x30      Armand received hot pack for 10 minutes to cervical spine.      Home Exercises Provided and Patient Education Provided     Education provided:   - Cont HEP    Written Home Exercises Provided: Patient instructed to cont prior HEP.  Exercises were reviewed and Armand was able to " demonstrate them prior to the end of the session.  Armand demonstrated fair  understanding of the education provided.     See EMR under Patient Instructions for exercises provided prior visit.    Assessment     Pt tolerated session well today. Continuation and progression of dynamic UE strengthening, cervical ROM, and postural exercises without adverse effects. Patient demonstrates gains in cervical active and passive ROM, but is still very limited and needing further gains for full function. Introduction of chin tuck exercise with repeated cueing to allow patient to isolate the correct musculature. Patient needs additional visits in order to achieve maximal therapeutic benefit and has expressed desire to continue to be treated and has motivation to improve. Decreased tightness and soreness in cervical musculature post manual therapy and stretching. Continue to progress as tolerated.    Armand is progressing well towards his goals.   Pt prognosis is Good.     Pt will continue to benefit from skilled outpatient physical therapy to address the deficits listed in the problem list box on initial evaluation, provide pt/family education and to maximize pt's level of independence in the home and community environment.     Pt's spiritual, cultural and educational needs considered and pt agreeable to plan of care and goals.     Anticipated barriers to physical therapy: none    GOALS: Short Term Goals: 6 weeks  1. Report decreased L neck pain  <  / =  5/10 at worst to increase tolerance for lying on side.  In progress  2. Pt will be able to tolerate multi-directional periscapular strengthening without adverse effects to improve postural mechanics. In progress  3. Pt will report 50% improvement in tolerance for prolonged sitting since start of care to indicate improved functional mobility. In progress  4. Pt will be able to demonstrate proper upright posture without verbal cueing to decrease abnormal spinal stresses. In  progress  5. Pt to tolerate HEP to improve ROM and independence with ADL's. In progress     Long Term Goals: 12 weeks  1. Report decreased L neck pain  <  / =  3/10 at worst to increase tolerance for lying on R. In progress  2. Pt will be able to perform 2 x 10 multi-directional periscapular strengthening without fatigue to indicate improved postural strength and endurance. In progress  3. Pt will proper upright posture for 80% of treatment without verbal cueing to decrease cervical spinal stresses. In progress  4. Pt will report 80% improvement in tolerance for prolonged sitting since start of care to indicate improved functional mobility.  In progress  5. Pt to be Independent with HEP to improve ROM and independence with ADL's. In progress  6. Pt will be less than 40% impaired on FOTO. In progress      Plan     Outpatient physical therapy 2x week for 10 weeks or 20 visits to include the following:   - Patient education  - Therapeutic exercise  - Manual therapy  - Performance testing   - Neuromuscular Re-education  - Therapeutic activity   - Modalities      Chava Sykes, PT, DPT

## 2021-01-11 ENCOUNTER — IMMUNIZATION (OUTPATIENT)
Dept: OBSTETRICS AND GYNECOLOGY | Facility: CLINIC | Age: 71
End: 2021-01-11
Payer: MEDICARE

## 2021-01-11 DIAGNOSIS — Z23 NEED FOR VACCINATION: ICD-10-CM

## 2021-01-11 PROCEDURE — 91300 COVID-19, MRNA, LNP-S, PF, 30 MCG/0.3 ML DOSE VACCINE: CPT | Mod: PBBFAC | Performed by: FAMILY MEDICINE

## 2021-02-01 ENCOUNTER — IMMUNIZATION (OUTPATIENT)
Dept: OBSTETRICS AND GYNECOLOGY | Facility: CLINIC | Age: 71
End: 2021-02-01
Payer: MEDICARE

## 2021-02-01 DIAGNOSIS — Z23 NEED FOR VACCINATION: Primary | ICD-10-CM

## 2021-02-01 PROCEDURE — 0002A COVID-19, MRNA, LNP-S, PF, 30 MCG/0.3 ML DOSE VACCINE: CPT | Mod: PBBFAC | Performed by: FAMILY MEDICINE

## 2021-02-01 PROCEDURE — 91300 COVID-19, MRNA, LNP-S, PF, 30 MCG/0.3 ML DOSE VACCINE: CPT | Mod: PBBFAC | Performed by: FAMILY MEDICINE

## 2021-02-04 ENCOUNTER — OFFICE VISIT (OUTPATIENT)
Dept: UROLOGY | Facility: CLINIC | Age: 71
End: 2021-02-04
Payer: MEDICARE

## 2021-02-04 VITALS — WEIGHT: 275.44 LBS | HEIGHT: 68 IN | BODY MASS INDEX: 41.75 KG/M2

## 2021-02-04 DIAGNOSIS — R30.0 DYSURIA: ICD-10-CM

## 2021-02-04 DIAGNOSIS — N52.01 ERECTILE DYSFUNCTION DUE TO ARTERIAL INSUFFICIENCY: ICD-10-CM

## 2021-02-04 DIAGNOSIS — N48.1 BALANITIS: ICD-10-CM

## 2021-02-04 DIAGNOSIS — N48.9 PENILE LESION: Primary | ICD-10-CM

## 2021-02-04 DIAGNOSIS — R35.1 NOCTURIA: ICD-10-CM

## 2021-02-04 PROCEDURE — 99214 PR OFFICE/OUTPT VISIT, EST, LEVL IV, 30-39 MIN: ICD-10-PCS | Mod: S$GLB,,, | Performed by: UROLOGY

## 2021-02-04 PROCEDURE — 3288F PR FALLS RISK ASSESSMENT DOCUMENTED: ICD-10-PCS | Mod: CPTII,S$GLB,, | Performed by: UROLOGY

## 2021-02-04 PROCEDURE — 1101F PT FALLS ASSESS-DOCD LE1/YR: CPT | Mod: CPTII,S$GLB,, | Performed by: UROLOGY

## 2021-02-04 PROCEDURE — 99999 PR PBB SHADOW E&M-EST. PATIENT-LVL III: ICD-10-PCS | Mod: PBBFAC,,, | Performed by: UROLOGY

## 2021-02-04 PROCEDURE — 1159F MED LIST DOCD IN RCRD: CPT | Mod: S$GLB,,, | Performed by: UROLOGY

## 2021-02-04 PROCEDURE — 1159F PR MEDICATION LIST DOCUMENTED IN MEDICAL RECORD: ICD-10-PCS | Mod: S$GLB,,, | Performed by: UROLOGY

## 2021-02-04 PROCEDURE — 1126F PR PAIN SEVERITY QUANTIFIED, NO PAIN PRESENT: ICD-10-PCS | Mod: S$GLB,,, | Performed by: UROLOGY

## 2021-02-04 PROCEDURE — 99499 RISK ADDL DX/OHS AUDIT: ICD-10-PCS | Mod: S$GLB,,, | Performed by: UROLOGY

## 2021-02-04 PROCEDURE — 99499 UNLISTED E&M SERVICE: CPT | Mod: S$GLB,,, | Performed by: UROLOGY

## 2021-02-04 PROCEDURE — 1126F AMNT PAIN NOTED NONE PRSNT: CPT | Mod: S$GLB,,, | Performed by: UROLOGY

## 2021-02-04 PROCEDURE — 1101F PR PT FALLS ASSESS DOC 0-1 FALLS W/OUT INJ PAST YR: ICD-10-PCS | Mod: CPTII,S$GLB,, | Performed by: UROLOGY

## 2021-02-04 PROCEDURE — 99214 OFFICE O/P EST MOD 30 MIN: CPT | Mod: S$GLB,,, | Performed by: UROLOGY

## 2021-02-04 PROCEDURE — 99999 PR PBB SHADOW E&M-EST. PATIENT-LVL III: CPT | Mod: PBBFAC,,, | Performed by: UROLOGY

## 2021-02-04 PROCEDURE — 3288F FALL RISK ASSESSMENT DOCD: CPT | Mod: CPTII,S$GLB,, | Performed by: UROLOGY

## 2021-02-04 PROCEDURE — 3008F PR BODY MASS INDEX (BMI) DOCUMENTED: ICD-10-PCS | Mod: CPTII,S$GLB,, | Performed by: UROLOGY

## 2021-02-04 PROCEDURE — 3008F BODY MASS INDEX DOCD: CPT | Mod: CPTII,S$GLB,, | Performed by: UROLOGY

## 2021-04-20 ENCOUNTER — OFFICE VISIT (OUTPATIENT)
Dept: OPTOMETRY | Facility: CLINIC | Age: 71
End: 2021-04-20
Payer: MEDICARE

## 2021-04-20 DIAGNOSIS — H52.7 REFRACTIVE ERROR: ICD-10-CM

## 2021-04-20 DIAGNOSIS — H35.341 MACULAR HOLE, RIGHT EYE: Primary | ICD-10-CM

## 2021-04-20 DIAGNOSIS — H25.13 NUCLEAR SCLEROSIS OF BOTH EYES: ICD-10-CM

## 2021-04-20 PROCEDURE — 92134 POSTERIOR SEGMENT OCT RETINA (OCULAR COHERENCE TOMOGRAPHY)-BOTH EYES: ICD-10-PCS | Mod: S$GLB,,, | Performed by: OPTOMETRIST

## 2021-04-20 PROCEDURE — 1101F PR PT FALLS ASSESS DOC 0-1 FALLS W/OUT INJ PAST YR: ICD-10-PCS | Mod: CPTII,S$GLB,, | Performed by: OPTOMETRIST

## 2021-04-20 PROCEDURE — 92004 COMPRE OPH EXAM NEW PT 1/>: CPT | Mod: S$GLB,,, | Performed by: OPTOMETRIST

## 2021-04-20 PROCEDURE — 99999 PR PBB SHADOW E&M-EST. PATIENT-LVL III: ICD-10-PCS | Mod: PBBFAC,,, | Performed by: OPTOMETRIST

## 2021-04-20 PROCEDURE — 3288F PR FALLS RISK ASSESSMENT DOCUMENTED: ICD-10-PCS | Mod: CPTII,S$GLB,, | Performed by: OPTOMETRIST

## 2021-04-20 PROCEDURE — 92015 PR REFRACTION: ICD-10-PCS | Mod: S$GLB,,, | Performed by: OPTOMETRIST

## 2021-04-20 PROCEDURE — 3288F FALL RISK ASSESSMENT DOCD: CPT | Mod: CPTII,S$GLB,, | Performed by: OPTOMETRIST

## 2021-04-20 PROCEDURE — 99999 PR PBB SHADOW E&M-EST. PATIENT-LVL III: CPT | Mod: PBBFAC,,, | Performed by: OPTOMETRIST

## 2021-04-20 PROCEDURE — 1101F PT FALLS ASSESS-DOCD LE1/YR: CPT | Mod: CPTII,S$GLB,, | Performed by: OPTOMETRIST

## 2021-04-20 PROCEDURE — 92004 PR EYE EXAM, NEW PATIENT,COMPREHESV: ICD-10-PCS | Mod: S$GLB,,, | Performed by: OPTOMETRIST

## 2021-04-20 PROCEDURE — 92134 CPTRZ OPH DX IMG PST SGM RTA: CPT | Mod: S$GLB,,, | Performed by: OPTOMETRIST

## 2021-04-20 PROCEDURE — 92015 DETERMINE REFRACTIVE STATE: CPT | Mod: S$GLB,,, | Performed by: OPTOMETRIST

## 2021-05-24 ENCOUNTER — OFFICE VISIT (OUTPATIENT)
Dept: OPHTHALMOLOGY | Facility: CLINIC | Age: 71
End: 2021-05-24
Attending: OPHTHALMOLOGY
Payer: MEDICARE

## 2021-05-24 DIAGNOSIS — H25.13 CATARACT, NUCLEAR SCLEROTIC, BOTH EYES: ICD-10-CM

## 2021-05-24 DIAGNOSIS — H35.341 MACULAR HOLE, RIGHT: Primary | ICD-10-CM

## 2021-05-24 PROCEDURE — 99204 OFFICE O/P NEW MOD 45 MIN: CPT | Mod: S$GLB,,, | Performed by: OPHTHALMOLOGY

## 2021-05-24 PROCEDURE — 1101F PR PT FALLS ASSESS DOC 0-1 FALLS W/OUT INJ PAST YR: ICD-10-PCS | Mod: CPTII,S$GLB,, | Performed by: OPHTHALMOLOGY

## 2021-05-24 PROCEDURE — 1159F PR MEDICATION LIST DOCUMENTED IN MEDICAL RECORD: ICD-10-PCS | Mod: S$GLB,,, | Performed by: OPHTHALMOLOGY

## 2021-05-24 PROCEDURE — 3288F PR FALLS RISK ASSESSMENT DOCUMENTED: ICD-10-PCS | Mod: CPTII,S$GLB,, | Performed by: OPHTHALMOLOGY

## 2021-05-24 PROCEDURE — 99204 PR OFFICE/OUTPT VISIT, NEW, LEVL IV, 45-59 MIN: ICD-10-PCS | Mod: S$GLB,,, | Performed by: OPHTHALMOLOGY

## 2021-05-24 PROCEDURE — 3288F FALL RISK ASSESSMENT DOCD: CPT | Mod: CPTII,S$GLB,, | Performed by: OPHTHALMOLOGY

## 2021-05-24 PROCEDURE — 1101F PT FALLS ASSESS-DOCD LE1/YR: CPT | Mod: CPTII,S$GLB,, | Performed by: OPHTHALMOLOGY

## 2021-05-24 PROCEDURE — 1159F MED LIST DOCD IN RCRD: CPT | Mod: S$GLB,,, | Performed by: OPHTHALMOLOGY

## 2021-05-24 PROCEDURE — 99999 PR PBB SHADOW E&M-EST. PATIENT-LVL I: CPT | Mod: PBBFAC,,, | Performed by: OPHTHALMOLOGY

## 2021-05-24 PROCEDURE — 99999 PR PBB SHADOW E&M-EST. PATIENT-LVL I: ICD-10-PCS | Mod: PBBFAC,,, | Performed by: OPHTHALMOLOGY

## 2021-05-25 ENCOUNTER — TELEPHONE (OUTPATIENT)
Dept: OPHTHALMOLOGY | Facility: CLINIC | Age: 71
End: 2021-05-25

## 2021-05-25 DIAGNOSIS — H35.341 MACULAR HOLE, RIGHT: Primary | ICD-10-CM

## 2022-01-12 ENCOUNTER — PES CALL (OUTPATIENT)
Dept: ADMINISTRATIVE | Facility: CLINIC | Age: 72
End: 2022-01-12
Payer: MEDICARE

## 2022-02-01 ENCOUNTER — PATIENT OUTREACH (OUTPATIENT)
Dept: ADMINISTRATIVE | Facility: HOSPITAL | Age: 72
End: 2022-02-01
Payer: MEDICARE

## 2022-02-01 RX ORDER — INFLUENZA A VIRUS A/VICTORIA/2570/2019 IVR-215 (H1N1) ANTIGEN (FORMALDEHYDE INACTIVATED), INFLUENZA A VIRUS A/TASMANIA/503/2020 IVR-221 (H3N2) ANTIGEN (FORMALDEHYDE INACTIVATED), INFLUENZA B VIRUS B/PHUKET/3073/2013 ANTIGEN (FORMALDEHYDE INACTIVATED), AND INFLUENZA B VIRUS B/WASHINGTON/02/2019 ANTIGEN (FORMALDEHYDE INACTIVATED) 60; 60; 60; 60 UG/.7ML; UG/.7ML; UG/.7ML; UG/.7ML
INJECTION, SUSPENSION INTRAMUSCULAR
COMMUNITY
Start: 2021-12-14 | End: 2022-02-16

## 2022-02-01 RX ORDER — IBUPROFEN 800 MG/1
TABLET ORAL
COMMUNITY
Start: 2022-01-03 | End: 2022-02-16

## 2022-02-01 RX ORDER — AMOXICILLIN 500 MG/1
500 CAPSULE ORAL 3 TIMES DAILY
COMMUNITY
Start: 2022-01-03 | End: 2022-02-16

## 2022-02-01 NOTE — PROGRESS NOTES
N A1C Control gap report - no A1C done since 07/2020, appointment scheduled.    PCP visit needed - appointment scheduled.

## 2022-02-14 ENCOUNTER — LAB VISIT (OUTPATIENT)
Dept: LAB | Facility: HOSPITAL | Age: 72
End: 2022-02-14
Attending: INTERNAL MEDICINE
Payer: MEDICARE

## 2022-02-14 DIAGNOSIS — M10.9 GOUT, ARTHRITIS: Chronic | ICD-10-CM

## 2022-02-14 DIAGNOSIS — R73.03 PREDIABETES: ICD-10-CM

## 2022-02-14 DIAGNOSIS — E78.2 MIXED HYPERLIPIDEMIA: ICD-10-CM

## 2022-02-14 LAB
ALBUMIN SERPL BCP-MCNC: 3.9 G/DL (ref 3.5–5.2)
ALP SERPL-CCNC: 54 U/L (ref 55–135)
ALT SERPL W/O P-5'-P-CCNC: 26 U/L (ref 10–44)
ANION GAP SERPL CALC-SCNC: 9 MMOL/L (ref 8–16)
AST SERPL-CCNC: 20 U/L (ref 10–40)
BASOPHILS # BLD AUTO: 0.03 K/UL (ref 0–0.2)
BASOPHILS NFR BLD: 0.4 % (ref 0–1.9)
BILIRUB SERPL-MCNC: 0.3 MG/DL (ref 0.1–1)
BUN SERPL-MCNC: 14 MG/DL (ref 8–23)
CALCIUM SERPL-MCNC: 10.1 MG/DL (ref 8.7–10.5)
CHLORIDE SERPL-SCNC: 98 MMOL/L (ref 95–110)
CHOLEST SERPL-MCNC: 164 MG/DL (ref 120–199)
CHOLEST/HDLC SERPL: 3.9 {RATIO} (ref 2–5)
CO2 SERPL-SCNC: 33 MMOL/L (ref 23–29)
CREAT SERPL-MCNC: 1 MG/DL (ref 0.5–1.4)
DIFFERENTIAL METHOD: ABNORMAL
EOSINOPHIL # BLD AUTO: 0.2 K/UL (ref 0–0.5)
EOSINOPHIL NFR BLD: 2.1 % (ref 0–8)
ERYTHROCYTE [DISTWIDTH] IN BLOOD BY AUTOMATED COUNT: 15 % (ref 11.5–14.5)
EST. GFR  (AFRICAN AMERICAN): >60 ML/MIN/1.73 M^2
EST. GFR  (NON AFRICAN AMERICAN): >60 ML/MIN/1.73 M^2
ESTIMATED AVG GLUCOSE: 131 MG/DL (ref 68–131)
GLUCOSE SERPL-MCNC: 106 MG/DL (ref 70–110)
HBA1C MFR BLD: 6.2 % (ref 4–5.6)
HCT VFR BLD AUTO: 43.4 % (ref 40–54)
HDLC SERPL-MCNC: 42 MG/DL (ref 40–75)
HDLC SERPL: 25.6 % (ref 20–50)
HGB BLD-MCNC: 12.9 G/DL (ref 14–18)
IMM GRANULOCYTES # BLD AUTO: 0.01 K/UL (ref 0–0.04)
IMM GRANULOCYTES NFR BLD AUTO: 0.1 % (ref 0–0.5)
LDLC SERPL CALC-MCNC: 82.4 MG/DL (ref 63–159)
LYMPHOCYTES # BLD AUTO: 4.2 K/UL (ref 1–4.8)
LYMPHOCYTES NFR BLD: 50.3 % (ref 18–48)
MCH RBC QN AUTO: 26.1 PG (ref 27–31)
MCHC RBC AUTO-ENTMCNC: 29.7 G/DL (ref 32–36)
MCV RBC AUTO: 88 FL (ref 82–98)
MONOCYTES # BLD AUTO: 0.7 K/UL (ref 0.3–1)
MONOCYTES NFR BLD: 8.5 % (ref 4–15)
NEUTROPHILS # BLD AUTO: 3.2 K/UL (ref 1.8–7.7)
NEUTROPHILS NFR BLD: 38.6 % (ref 38–73)
NONHDLC SERPL-MCNC: 122 MG/DL
NRBC BLD-RTO: 0 /100 WBC
PLATELET # BLD AUTO: 212 K/UL (ref 150–450)
PMV BLD AUTO: 12.5 FL (ref 9.2–12.9)
POTASSIUM SERPL-SCNC: 4 MMOL/L (ref 3.5–5.1)
PROT SERPL-MCNC: 8.4 G/DL (ref 6–8.4)
RBC # BLD AUTO: 4.94 M/UL (ref 4.6–6.2)
SODIUM SERPL-SCNC: 140 MMOL/L (ref 136–145)
TRIGL SERPL-MCNC: 198 MG/DL (ref 30–150)
URATE SERPL-MCNC: 6.3 MG/DL (ref 3.4–7)
WBC # BLD AUTO: 8.25 K/UL (ref 3.9–12.7)

## 2022-02-14 PROCEDURE — 80061 LIPID PANEL: CPT | Performed by: INTERNAL MEDICINE

## 2022-02-14 PROCEDURE — 83036 HEMOGLOBIN GLYCOSYLATED A1C: CPT | Performed by: INTERNAL MEDICINE

## 2022-02-14 PROCEDURE — 85025 COMPLETE CBC W/AUTO DIFF WBC: CPT | Performed by: INTERNAL MEDICINE

## 2022-02-14 PROCEDURE — 84550 ASSAY OF BLOOD/URIC ACID: CPT | Performed by: INTERNAL MEDICINE

## 2022-02-14 PROCEDURE — 80053 COMPREHEN METABOLIC PANEL: CPT | Performed by: INTERNAL MEDICINE

## 2022-02-14 PROCEDURE — 36415 COLL VENOUS BLD VENIPUNCTURE: CPT | Mod: PO | Performed by: INTERNAL MEDICINE

## 2022-02-15 NOTE — PROGRESS NOTES
This note was created by combination of typed  and M-Modal dictation.  Transcription errors may be present.  If there are any questions, please contact me.    Assessment and Plan:   Normal physical exam  Tubular adenoma of colon 10/2018 colonoscopy sigmoid tubular adenoma  -pre visit labs reviewed    Essential hypertension no outside checks; 3/2014 exercise stress echo no ischemia LVEF 55-60  -stable, no changes, refilled lisinopril and HCTZ.  -     lisinopriL 10 MG tablet; Take 1 tablet (10 mg total) by mouth once daily.  Dispense: 90 tablet; Refill: 1  -     hydroCHLOROthiazide (HYDRODIURIL) 25 MG tablet; Take 1 tablet (25 mg total) by mouth once daily.  Dispense: 90 tablet; Refill: 1  -     Comprehensive Metabolic Panel; Future; Expected date: 02/16/2022  -     CBC Auto Differential; Future; Expected date: 02/16/2022    Mixed hyperlipidemia  -pre visit labs triglycerides high.  Work on diet and physical activity modification.  Stay on pravastatin.  May consider changing to atorvastatin in the future.  -     pravastatin (PRAVACHOL) 20 MG tablet; Take 1 tablet (20 mg total) by mouth once daily.  Dispense: 90 tablet; Refill: 1    Morbid obesity with BMI of 40.0-44.9, adult  Body mass index (BMI) 45.0-49.9, adult  Prediabetes  -work on TLC.  Stay on metformin.  Refill to pharmacy.  -     metFORMIN (GLUCOPHAGE-XR) 500 MG ER 24hr tablet; Take 1 tablet (500 mg total) by mouth once daily.  Dispense: 90 tablet; Refill: 1  -     Hemoglobin A1C; Future; Expected date: 02/16/2022    SIS (obstructive sleep apnea) on sleep study 4/2018 with AHI 55.  CPAP at 15  Insomnia, unspecified type  -notes significant fatigue.  Seems to correlate with starting Benadryl.  Discussed that Benadryl can have lingering sedating affects and I would stop the Benadryl.  Discussed sleep hygiene.  No TV 2 hours before bedtime.  No TV in the bedroom.  Start turning down lights to stimulate self production of melatonin.  No alcohol/beer -  that can worsen insomnia  Has not been using his CPAP machine.  Advised to restart.  It has been awhile since he last used it.  He may need new supplies.  If he has not been using it for an extended period of time it is possible that he may need to repeat the sleep study in order to get this covered by his insurance.    Gout, arthritis  -stable on allopurinol.  Check future uric acid  -     allopurinoL (ZYLOPRIM) 100 MG tablet; Take 1 tablet (100 mg total) by mouth once daily.  Dispense: 90 tablet; Refill: 1  -     Uric Acid; Future; Expected date: 02/16/2022    Alpha thalassemia silent carrier    Need for shingles vaccine  -rx to pharmacy  -     varicella-zoster gE-AS01B, PF, (SHINGRIX, PF,) 50 mcg/0.5 mL injection; Inject 0.5 mLs into the muscle once. Repeat in 2 months for 1 dose  Dispense: 1 each; Refill: 1      Advised to get covid booster with pharmacy.    Medications Discontinued During This Encounter   Medication Reason    naproxen (NAPROSYN) 500 MG tablet     gabapentin (NEURONTIN) 100 MG capsule     FLUZONE HIGHDOSE QUAD 21-22  mcg/0.7 mL Syrg     amoxicillin (AMOXIL) 500 MG capsule     ibuprofen (ADVIL,MOTRIN) 800 MG tablet     metFORMIN (GLUCOPHAGE-XR) 500 MG ER 24hr tablet Reorder    allopurinoL (ZYLOPRIM) 100 MG tablet Reorder    lisinopriL 10 MG tablet Reorder    pravastatin (PRAVACHOL) 20 MG tablet Reorder    hydroCHLOROthiazide (HYDRODIURIL) 25 MG tablet Reorder       meds sent this encounter:  Medications Ordered This Encounter   Medications    allopurinoL (ZYLOPRIM) 100 MG tablet     Sig: Take 1 tablet (100 mg total) by mouth once daily.     Dispense:  90 tablet     Refill:  1    hydroCHLOROthiazide (HYDRODIURIL) 25 MG tablet     Sig: Take 1 tablet (25 mg total) by mouth once daily.     Dispense:  90 tablet     Refill:  1     .    lisinopriL 10 MG tablet     Sig: Take 1 tablet (10 mg total) by mouth once daily.     Dispense:  90 tablet     Refill:  1     .    metFORMIN  (GLUCOPHAGE-XR) 500 MG ER 24hr tablet     Sig: Take 1 tablet (500 mg total) by mouth once daily.     Dispense:  90 tablet     Refill:  1     DX Code Needed  .    pravastatin (PRAVACHOL) 20 MG tablet     Sig: Take 1 tablet (20 mg total) by mouth once daily.     Dispense:  90 tablet     Refill:  1    varicella-zoster gE-AS01B, PF, (SHINGRIX, PF,) 50 mcg/0.5 mL injection     Sig: Inject 0.5 mLs into the muscle once. Repeat in 2 months for 1 dose     Dispense:  1 each     Refill:  1       Follow Up: No follow-ups on file.  Follow-up 6 months with labs    Subjective:     Chief Complaint   Patient presents with    Fatigue    Back Pain    Neck Pain       PARAS Acuna is a 71 y.o. male, last appointment with this clinic was Visit date not found.  Social History     Tobacco Use    Smoking status: Former Smoker     Types: Cigarettes     Quit date: 2009     Years since quittin.1    Smokeless tobacco: Former User   Substance Use Topics    Alcohol use: Yes     Comment: occasional beer- average week 12 beers a week          Social History     Social History Narrative    Not on file     Last visit with me in 2020  Morbid obesity, pre diabetes  Hypertension, hyperlipidemia  Gout/allopurinol  Cervical stenosis followed by pain management    Saw Urology in follow-up for  lesion.  Patient declined biopsy.  BPH increased Flomax    Pre visit labs  CBC mild anemia known alpha-thalassemia solid carrier  CMP normal  Lipid profile triglycerides high, non   A1c 6.2 from previous 6.1  Uric acid 6.3 from 9.3    For the past several months he can feeling fatigued.  For the past maybe 2 months or so.  Feeling tired all the time.  Falls asleep easily when sitting in a chair during the day.  Taking NyQuil nightly to help him sleep.  Issues with sleep maintenance.  Start taking that maybe a month or so ago maybe.  Does seem like his fatigue correlates with start of the Benadryl.  Discussed that this could be the cause  of it.  We talked about sleep hygiene.  Has TV in the living room and TV in the bedroom.  Stop TV in the bedroom.  Drinks beer, several nights a week.  Advised to stop.    Not using the CPAP has the machine.  Thinks that when he used to use it he felt better with it but seems to have dropped off.  Encouraged to restart.  Thinks that he may need to have new supplies.  I have asked him to contact his DME supplier and if he needs a new prescription he can contact me.  But if he has not been using it in quite some time, his insurance may require a new sleep study    Not taking gabapentin.        Patient Care Team:  Clark Vargas MD as PCP - General (Internal Medicine)  Fernando Jacinto MD as Consulting Physician (Orthopedic Surgery)  Venkata Caruso MD as Consulting Physician (Neurosurgery)  Mina Betancourt MA as Care Coordinator    Patient Active Problem List    Diagnosis Date Noted    Macular hole, right eye 04/20/2021    Chronic pain 08/07/2020    Chronic pain disorder 03/10/2020    Cervical post-laminectomy syndrome 03/10/2020    Lumbar spondylosis 03/10/2020    Alpha thalassemia silent carrier 11/14/2019    Neck pain 09/06/2019    Poor posture 09/06/2019    Tubular adenoma of colon 10/2018 colonoscopy sigmoid tubular adenoma 11/02/2018    Diverticulosis of large intestine without hemorrhage on colonoscopy 10/2018 11/01/2018    SIS (obstructive sleep apnea) on sleep study 4/2018 with AHI 55.  CPAP at 15     Nuclear sclerosis of both eyes 01/16/2018    Refractive error 01/16/2018    Benign prostatic hyperplasia with urinary frequency 01/10/2018    Hemorrhoids 06/01/2016    Cervical stenosis of spinal canal 3/2016 C3-7 laminoplasty 03/31/2016     3/31/2016 pt underwent a C3 through C7 Laminoplasty with posterior cervical approach      Prediabetes 03/29/2016    Essential hypertension no outside checks; 3/2014 exercise stress echo no ischemia LVEF 55-60 03/20/2014     3/11/2014 exercise stress  echo negative for ischemia.  Normal LV systolic function LVEF 55-60.  Concentric remodeling.      Mixed hyperlipidemia 03/20/2014    Morbid obesity with BMI of 40.0-44.9, adult 03/06/2014    Back pain 07/11/2013    ED (erectile dysfunction) 06/07/2013    Gout, arthritis 01/07/2013       PAST MEDICAL PROBLEMS, PAST SURGICAL HISTORY: please see relevant portions of the electronic medical record    ALLERGIES AND MEDICATIONS: updated and reviewed.  Review of patient's allergies indicates:   Allergen Reactions    Codeine Swelling     Codeine with Aspirin caused chest pain       Medication List with Changes/Refills   Current Medications    ALLOPURINOL (ZYLOPRIM) 100 MG TABLET    TAKE 1 TABLET BY MOUTH EVERY DAY    AMOXICILLIN (AMOXIL) 500 MG CAPSULE    Take 500 mg by mouth 3 (three) times daily.    ASPIRIN (ECOTRIN) 81 MG EC TABLET    Take 81 mg by mouth once daily.    CLOTRIMAZOLE-BETAMETHASONE 1-0.05% (LOTRISONE) CREAM    APPLY TO AFFECTED AREA TWICE A DAY    FLUZONE HIGHDOSE QUAD 21-22  MCG/0.7 ML SYRG        GABAPENTIN (NEURONTIN) 100 MG CAPSULE    Take 1 capsule (100 mg total) by mouth 3 (three) times daily.    HYDROCHLOROTHIAZIDE (HYDRODIURIL) 25 MG TABLET    TAKE 1 TABLET BY MOUTH EVERY DAY    IBUPROFEN (ADVIL,MOTRIN) 800 MG TABLET    Take by mouth.    KETOCONAZOLE (NIZORAL) 2 % CREAM    Apply topically once daily. To the armpits    LISINOPRIL 10 MG TABLET    TAKE 1 TABLET BY MOUTH EVERY DAY    METFORMIN (GLUCOPHAGE-XR) 500 MG ER 24HR TABLET    TAKE 1 TABLET BY MOUTH EVERY DAY WITH BREAKFAST    MULTIVITAMIN (ONE-A-DAY ESSENTIAL ORAL)    Take by mouth.    NAPROXEN (NAPROSYN) 500 MG TABLET    Take 1 tablet (500 mg total) by mouth 2 (two) times daily with meals.    NYSTATIN-TRIAMCINOLONE (MYCOLOG II) CREAM    Apply topically 3 (three) times daily.    PRAVASTATIN (PRAVACHOL) 20 MG TABLET    TAKE 1 TABLET BY MOUTH EVERY DAY    TAMSULOSIN (FLOMAX) 0.4 MG CAP    TAKE 2 CAPSULES (0.8 MG TOTAL) BY MOUTH ONCE  "DAILY. INCREASED DOSE        Objective:   Physical Exam   Vitals:    02/16/22 1451   BP: 118/72   Pulse: 88   Temp: 97.8 °F (36.6 °C)   TempSrc: Oral   SpO2: 98%   Weight: 123.2 kg (271 lb 9.7 oz)   Height: 5' 8" (1.727 m)    Body mass index is 41.3 kg/m².            Physical Exam  Constitutional:       General: He is not in acute distress.     Appearance: Normal appearance. He is well-developed and well-nourished.   HENT:      Right Ear: Tympanic membrane and external ear normal.      Left Ear: Tympanic membrane and external ear normal.      Nose: Nose normal.   Eyes:      General: No scleral icterus.     Extraocular Movements: EOM normal.      Conjunctiva/sclera: Conjunctivae normal.   Neck:      Thyroid: No thyroid mass or thyromegaly.      Trachea: Trachea normal.   Cardiovascular:      Rate and Rhythm: Normal rate and regular rhythm.      Heart sounds: Normal heart sounds, S1 normal and S2 normal. No murmur heard.      Pulmonary:      Effort: Pulmonary effort is normal.      Breath sounds: Normal breath sounds.   Abdominal:      General: There is no distension.      Palpations: Abdomen is soft. There is no hepatomegaly, splenomegaly or mass.      Tenderness: There is no abdominal tenderness.   Musculoskeletal:         General: No deformity. Normal range of motion.      Right lower leg: Edema present.      Left lower leg: Edema present.      Comments: 1+ edema to mid tibia bilaterally   Lymphadenopathy:      Cervical: No cervical adenopathy.   Skin:     General: Skin is warm and dry.      Findings: No rash (on exposed skin).      Comments: On exposed skin   Neurological:      Mental Status: He is alert and oriented to person, place, and time.      Cranial Nerves: No cranial nerve deficit.      Sensory: No sensory deficit.      Coordination: Coordination normal.      Deep Tendon Reflexes: Reflexes are normal and symmetric.   Psychiatric:         Mood and Affect: Mood and affect normal.         Speech: Speech " normal.         Behavior: Behavior normal.         Thought Content: Thought content normal.         Judgment: Judgment normal.         Component      Latest Ref Rng & Units 2/14/2022 7/9/2020 11/8/2019   WBC      3.90 - 12.70 K/uL 8.25     RBC      4.60 - 6.20 M/uL 4.94     Hemoglobin      14.0 - 18.0 g/dL 12.9 (L)     Hematocrit      40.0 - 54.0 % 43.4     MCV      82 - 98 fL 88     MCH      27.0 - 31.0 pg 26.1 (L)     MCHC      32.0 - 36.0 g/dL 29.7 (L)     RDW      11.5 - 14.5 % 15.0 (H)     Platelets      150 - 450 K/uL 212     MPV      9.2 - 12.9 fL 12.5     Immature Granulocytes      0.0 - 0.5 % 0.1     Gran # (ANC)      1.8 - 7.7 K/uL 3.2     Immature Grans (Abs)      0.00 - 0.04 K/uL 0.01     Lymph #      1.0 - 4.8 K/uL 4.2     Mono #      0.3 - 1.0 K/uL 0.7     Eos #      0.0 - 0.5 K/uL 0.2     Baso #      0.00 - 0.20 K/uL 0.03     nRBC      0 /100 WBC 0     Gran %      38.0 - 73.0 % 38.6     Lymph %      18.0 - 48.0 % 50.3 (H)     Mono %      4.0 - 15.0 % 8.5     Eosinophil %      0.0 - 8.0 % 2.1     Basophil %      0.0 - 1.9 % 0.4     Differential Method       Automated     Sodium      136 - 145 mmol/L 140 140    Potassium      3.5 - 5.1 mmol/L 4.0 4.0    Chloride      95 - 110 mmol/L 98 100    CO2      23 - 29 mmol/L 33 (H) 32 (H)    Glucose      70 - 110 mg/dL 106 109    BUN      8 - 23 mg/dL 14 13    Creatinine      0.5 - 1.4 mg/dL 1.0 1.2    Calcium      8.7 - 10.5 mg/dL 10.1 9.8    PROTEIN TOTAL      6.0 - 8.4 g/dL 8.4 7.9    Albumin      3.5 - 5.2 g/dL 3.9 3.6    BILIRUBIN TOTAL      0.1 - 1.0 mg/dL 0.3 0.2    Alkaline Phosphatase      55 - 135 U/L 54 (L) 58    AST      10 - 40 U/L 20 17    ALT      10 - 44 U/L 26 18    Anion Gap      8 - 16 mmol/L 9 8    eGFR if African American      >60 mL/min/1.73 m:2 >60.0 >60.0    eGFR if non African American      >60 mL/min/1.73 m:2 >60.0 >60.0    Cholesterol      120 - 199 mg/dL 164 163    Triglycerides      30 - 150 mg/dL 198 (H) 161 (H)    HDL      40 - 75  mg/dL 42 40    LDL Cholesterol External      63.0 - 159.0 mg/dL 82.4 90.8    HDL/Cholesterol Ratio      20.0 - 50.0 % 25.6 24.5    Total Cholesterol/HDL Ratio      2.0 - 5.0 3.9 4.1    Non-HDL Cholesterol      mg/dL 122 123    Hemoglobin A1C External      4.0 - 5.6 % 6.2 (H) 6.1 (H)    Estimated Avg Glucose      68 - 131 mg/dL 131 128    Uric Acid      3.4 - 7.0 mg/dL 6.3  9.3 (H)

## 2022-02-16 ENCOUNTER — OFFICE VISIT (OUTPATIENT)
Dept: FAMILY MEDICINE | Facility: CLINIC | Age: 72
End: 2022-02-16
Payer: MEDICARE

## 2022-02-16 VITALS
BODY MASS INDEX: 41.17 KG/M2 | OXYGEN SATURATION: 98 % | HEIGHT: 68 IN | DIASTOLIC BLOOD PRESSURE: 72 MMHG | TEMPERATURE: 98 F | HEART RATE: 88 BPM | SYSTOLIC BLOOD PRESSURE: 118 MMHG | WEIGHT: 271.63 LBS

## 2022-02-16 DIAGNOSIS — Z23 NEED FOR SHINGLES VACCINE: ICD-10-CM

## 2022-02-16 DIAGNOSIS — E66.01 MORBID OBESITY WITH BMI OF 40.0-44.9, ADULT: Chronic | ICD-10-CM

## 2022-02-16 DIAGNOSIS — G47.33 OSA (OBSTRUCTIVE SLEEP APNEA): ICD-10-CM

## 2022-02-16 DIAGNOSIS — Z00.00 NORMAL PHYSICAL EXAM: Primary | ICD-10-CM

## 2022-02-16 DIAGNOSIS — R73.03 PREDIABETES: Chronic | ICD-10-CM

## 2022-02-16 DIAGNOSIS — M10.9 GOUT, ARTHRITIS: Chronic | ICD-10-CM

## 2022-02-16 DIAGNOSIS — G47.00 INSOMNIA, UNSPECIFIED TYPE: ICD-10-CM

## 2022-02-16 DIAGNOSIS — D56.3 ALPHA THALASSEMIA SILENT CARRIER: ICD-10-CM

## 2022-02-16 DIAGNOSIS — E78.2 MIXED HYPERLIPIDEMIA: ICD-10-CM

## 2022-02-16 DIAGNOSIS — D12.6 TUBULAR ADENOMA OF COLON: ICD-10-CM

## 2022-02-16 DIAGNOSIS — I10 ESSENTIAL HYPERTENSION: ICD-10-CM

## 2022-02-16 PROCEDURE — 3074F SYST BP LT 130 MM HG: CPT | Mod: CPTII,S$GLB,, | Performed by: INTERNAL MEDICINE

## 2022-02-16 PROCEDURE — 1160F PR REVIEW ALL MEDS BY PRESCRIBER/CLIN PHARMACIST DOCUMENTED: ICD-10-PCS | Mod: CPTII,S$GLB,, | Performed by: INTERNAL MEDICINE

## 2022-02-16 PROCEDURE — 99999 PR PBB SHADOW E&M-EST. PATIENT-LVL IV: CPT | Mod: PBBFAC,,, | Performed by: INTERNAL MEDICINE

## 2022-02-16 PROCEDURE — 4010F PR ACE/ARB THEARPY RXD/TAKEN: ICD-10-PCS | Mod: CPTII,S$GLB,, | Performed by: INTERNAL MEDICINE

## 2022-02-16 PROCEDURE — 3044F HG A1C LEVEL LT 7.0%: CPT | Mod: CPTII,S$GLB,, | Performed by: INTERNAL MEDICINE

## 2022-02-16 PROCEDURE — 1101F PR PT FALLS ASSESS DOC 0-1 FALLS W/OUT INJ PAST YR: ICD-10-PCS | Mod: CPTII,S$GLB,, | Performed by: INTERNAL MEDICINE

## 2022-02-16 PROCEDURE — 1159F PR MEDICATION LIST DOCUMENTED IN MEDICAL RECORD: ICD-10-PCS | Mod: CPTII,S$GLB,, | Performed by: INTERNAL MEDICINE

## 2022-02-16 PROCEDURE — 3044F PR MOST RECENT HEMOGLOBIN A1C LEVEL <7.0%: ICD-10-PCS | Mod: CPTII,S$GLB,, | Performed by: INTERNAL MEDICINE

## 2022-02-16 PROCEDURE — 3074F PR MOST RECENT SYSTOLIC BLOOD PRESSURE < 130 MM HG: ICD-10-PCS | Mod: CPTII,S$GLB,, | Performed by: INTERNAL MEDICINE

## 2022-02-16 PROCEDURE — 3078F DIAST BP <80 MM HG: CPT | Mod: CPTII,S$GLB,, | Performed by: INTERNAL MEDICINE

## 2022-02-16 PROCEDURE — 99397 PR PREVENTIVE VISIT,EST,65 & OVER: ICD-10-PCS | Mod: S$GLB,,, | Performed by: INTERNAL MEDICINE

## 2022-02-16 PROCEDURE — 3008F BODY MASS INDEX DOCD: CPT | Mod: CPTII,S$GLB,, | Performed by: INTERNAL MEDICINE

## 2022-02-16 PROCEDURE — 99499 UNLISTED E&M SERVICE: CPT | Mod: S$GLB,,, | Performed by: INTERNAL MEDICINE

## 2022-02-16 PROCEDURE — 99999 PR PBB SHADOW E&M-EST. PATIENT-LVL IV: ICD-10-PCS | Mod: PBBFAC,,, | Performed by: INTERNAL MEDICINE

## 2022-02-16 PROCEDURE — 1101F PT FALLS ASSESS-DOCD LE1/YR: CPT | Mod: CPTII,S$GLB,, | Performed by: INTERNAL MEDICINE

## 2022-02-16 PROCEDURE — 1126F AMNT PAIN NOTED NONE PRSNT: CPT | Mod: CPTII,S$GLB,, | Performed by: INTERNAL MEDICINE

## 2022-02-16 PROCEDURE — 1160F RVW MEDS BY RX/DR IN RCRD: CPT | Mod: CPTII,S$GLB,, | Performed by: INTERNAL MEDICINE

## 2022-02-16 PROCEDURE — 99397 PER PM REEVAL EST PAT 65+ YR: CPT | Mod: S$GLB,,, | Performed by: INTERNAL MEDICINE

## 2022-02-16 PROCEDURE — 3078F PR MOST RECENT DIASTOLIC BLOOD PRESSURE < 80 MM HG: ICD-10-PCS | Mod: CPTII,S$GLB,, | Performed by: INTERNAL MEDICINE

## 2022-02-16 PROCEDURE — 3288F PR FALLS RISK ASSESSMENT DOCUMENTED: ICD-10-PCS | Mod: CPTII,S$GLB,, | Performed by: INTERNAL MEDICINE

## 2022-02-16 PROCEDURE — 1159F MED LIST DOCD IN RCRD: CPT | Mod: CPTII,S$GLB,, | Performed by: INTERNAL MEDICINE

## 2022-02-16 PROCEDURE — 3008F PR BODY MASS INDEX (BMI) DOCUMENTED: ICD-10-PCS | Mod: CPTII,S$GLB,, | Performed by: INTERNAL MEDICINE

## 2022-02-16 PROCEDURE — 3288F FALL RISK ASSESSMENT DOCD: CPT | Mod: CPTII,S$GLB,, | Performed by: INTERNAL MEDICINE

## 2022-02-16 PROCEDURE — 4010F ACE/ARB THERAPY RXD/TAKEN: CPT | Mod: CPTII,S$GLB,, | Performed by: INTERNAL MEDICINE

## 2022-02-16 PROCEDURE — 99499 RISK ADDL DX/OHS AUDIT: ICD-10-PCS | Mod: S$GLB,,, | Performed by: INTERNAL MEDICINE

## 2022-02-16 PROCEDURE — 1126F PR PAIN SEVERITY QUANTIFIED, NO PAIN PRESENT: ICD-10-PCS | Mod: CPTII,S$GLB,, | Performed by: INTERNAL MEDICINE

## 2022-02-16 RX ORDER — ALLOPURINOL 100 MG/1
100 TABLET ORAL DAILY
Qty: 90 TABLET | Refills: 1 | Status: SHIPPED | OUTPATIENT
Start: 2022-02-16 | End: 2022-08-10

## 2022-02-16 RX ORDER — LISINOPRIL 10 MG/1
10 TABLET ORAL DAILY
Qty: 90 TABLET | Refills: 1 | Status: SHIPPED | OUTPATIENT
Start: 2022-02-16 | End: 2022-08-16 | Stop reason: SDUPTHER

## 2022-02-16 RX ORDER — HYDROCHLOROTHIAZIDE 25 MG/1
25 TABLET ORAL DAILY
Qty: 90 TABLET | Refills: 1 | Status: SHIPPED | OUTPATIENT
Start: 2022-02-16 | End: 2022-08-04

## 2022-02-16 RX ORDER — METFORMIN HYDROCHLORIDE 500 MG/1
500 TABLET, EXTENDED RELEASE ORAL DAILY
Qty: 90 TABLET | Refills: 1 | Status: SHIPPED | OUTPATIENT
Start: 2022-02-16 | End: 2022-08-16 | Stop reason: SDUPTHER

## 2022-02-16 RX ORDER — ZOSTER VACCINE RECOMBINANT, ADJUVANTED 50 MCG/0.5
0.5 KIT INTRAMUSCULAR ONCE
Qty: 1 EACH | Refills: 1 | Status: SHIPPED | OUTPATIENT
Start: 2022-02-16 | End: 2022-02-16

## 2022-02-16 RX ORDER — PRAVASTATIN SODIUM 20 MG/1
20 TABLET ORAL DAILY
Qty: 90 TABLET | Refills: 1 | Status: SHIPPED | OUTPATIENT
Start: 2022-02-16 | End: 2022-08-16 | Stop reason: SDUPTHER

## 2022-02-16 NOTE — PATIENT INSTRUCTIONS
"Patient Education       Insomnia   The Basics   Written by the doctors and editors at [facility]   What is insomnia? -- Insomnia is a problem with sleep. People with insomnia have trouble falling or staying asleep, or they do not feel rested when they wake up. Insomnia is not about the number of hours of sleep a person gets. Everyone needs a different amount of sleep.  Short-term insomnia is when a person has trouble sleeping for a few days or weeks. This is usually related to temporary stress and often gets better on its own. Long-term or "chronic" insomnia is when sleep problems last for 3 months or longer.   What are the symptoms of insomnia? -- People with insomnia often:  · Have trouble falling or staying asleep  · Feel tired during the day  · Forget things or have trouble thinking clearly  · Get cranky, anxious, irritable, or depressed  · Have less energy or interest in doing things  · Make mistakes or get into accidents more often than normal  · Worry about their lack of sleep  These symptoms can be so bad that they affect a person's relationships or work life. They can happen even in people who seem to be sleeping enough hours.  Are there tests I should have? -- Probably not. Most people with insomnia need no tests. Your doctor or nurse will probably be able to tell what is wrong just by talking to you. They might also ask you to keep a daily log for 1 to 2 weeks, where you keep track of how you sleep each night.  In some cases, people do need special sleep tests, such as "polysomnography" or "actigraphy."  · Polysomnography - Polysomnography is a test that usually lasts all night. It can be done in a sleep lab or in your home. During the test, monitors are attached to your body to record movement, brain activity, breathing, and other body functions.  · Actigraphy - Actigraphy records activity and movement with a monitor or motion detector that is usually worn on the wrist. The test is done at home, over " "several days and nights. It will record how much you actually sleep and when.  Should I see a doctor or nurse? -- Yes. If you have insomnia, and it is troubling you, see your doctor or nurse. They might have suggestions on how to treat the problem.  How is insomnia treated? -- It depends. If your insomnia is related to stress, pain, or a medical problem, treating that problem can help you sleep better. If you have chronic insomnia, meaning insomnia that lasts longer than 3 months, there are specific treatments that can help. They include:  · Cognitive behavioral therapy - Cognitive behavioral therapy for insomnia, or "CBT-I," involves working with a counselor or therapist over several weeks. You will work on understanding your insomnia, learning ways to build better sleep habits, and changing negative thinking patterns that can make insomnia worse. Your therapist can also teach you relaxation exercises that can help.  Part of CBT-I involves learning about "sleep hygiene." These things can also be helpful for people who don't have chronic insomnia but have trouble sleeping sometimes. Having good sleep hygiene means you:  ? Sleep only long enough to feel rested and then get out of bed  ? Go to bed and get up at the same time every day  ? Do not try to force yourself to sleep. If you can't sleep, get out of bed and try again later.  ? Have coffee, tea, and other foods that have caffeine only in the morning  ? Avoid alcohol in the late afternoon, evening, and bedtime  ? Avoid smoking, especially in the evening  ? Keep your bedroom dark, cool, quiet, and free of reminders of work or other things that cause you stress  ? Solve problems you have before you go to bed  ? Get plenty of physical activity, but avoid heavy exercise right before bed  ? Avoid looking at phones, computer screens, or reading devices ("e-books") that give off light before bed. This can make it harder to fall asleep.  · Medicines - There are also " "medicines that can help with sleep. But doctors usually recommend trying cognitive behavioral therapy first. In some cases, they might recommend starting both at the same time. If your doctor or nurse thinks medicine might help you, they will talk to you about the benefits and risks. Doctors generally do not recommend over-the-counter "sleep aids" for treating chronic insomnia.  If your insomnia is related to problems like depression or anxiety, it can help to treat those problems directly.  Can I use alcohol to help me sleep? -- No, do not use alcohol as a sleep aid. Even though alcohol makes you sleepy at first, it disrupts sleep later in the night.  All topics are updated as new evidence becomes available and our peer review process is complete.  This topic retrieved from [employer] on: Sep 21, 2021.  Topic 46693 Version 12.0  Release: 29.4.2 - C29.263  © 2021 UpToDate, Inc. and/or its affiliates. All rights reserved.  table 1: Consensus sleep diary instructions  General instructions    What is a sleep diary?   A sleep diary is designed to gather information about your daily sleep pattern.   How often and when do I fill out the sleep diary?   It is necessary for you to complete your sleep diary every day. If possible, the sleep diary should be completed within one hour of getting out of bed in the morning.   What should I do if I miss a day?   If you forget to fill in the diary or are unable to finish it, leave the diary blank for that day.   What if something unusual affects my sleep or how I feel in the daytime?   If your sleep or daytime functioning is affected by some unusual event (such as an illness, or an emergency) you may make brief notes on your diary.   What do the words "bed" and "day" mean on the diary?   This diary can be used for people who are awake or asleep at unusual times. In the sleep diary, the word "day" is the time when you choose or are required to be awake. The term "bed" means the place " "where you usually sleep.   Will answering these questions about my sleep keep me awake?   This is not usually a problem. You should not worry about giving exact times, and you should not watch the clock. Just give your best estimate.   Sleep diary item instructions    Use the guide below to clarify what is being asked for each item of the sleep diary.   Date: Write the date of the morning you are filling out the diary.   1. What time did you get into bed?   Write the time that you got into bed. This may not be the time you began "trying" to fall asleep.   2. What time did you try to go to sleep?   Record the time that you began "trying" to fall asleep.   3. How long did it take you to fall asleep?   Beginning at the time you wrote in question 2, how long did it take you to fall asleep?   4. How many times did you wake up, not counting your final awakening?   How many times did you wake up between the time you first fell asleep and your final awakening?   5. In total, how long did these awakenings last?   What was the total time you were awake between the time you first fell asleep and your final awakening? For example, if you woke 3 times for 20 minutes, 35 minutes, and 15 minutes, add them all up (20 + 35 + 15 = 70 min or 1 hr and 10 min).   6a. What time was your final awakening?   Record the last time you woke up in the morning.   6b. After your final awakening, how long did you spend in bed trying to sleep?   After the last time you woke-up (item #6a), how many minutes did you spend in bed trying to sleep? For example, if you woke up at 8 am but continued to try and sleep until 9 am, record 1 hour.   6c. Did you wake up earlier than you planned?   If you woke up or were awakened earlier than you planned, check yes. If you woke up at your planned time, check no.   6d. If yes, how much earlier?   If you answered "yes" to question 6c, write the number of minutes you woke up earlier than you had planned on waking " "up. For example, if you woke up 15 minutes before the alarm went off, record 15 minutes here.   7. What time did you get out of bed for the day?   What time did you get out of bed with no further attempt at sleeping? This may be different from your final awakening time (eg, you may have woken up at 6:35 am but did not get out of bed to start your day until 7:20 am).   8. In total, how long did you sleep?   This should just be your best estimate, based on when you went to bed and woke up, how long it took you to fall asleep, and how long you were awake. You do not need to calculate this by adding and subtracting; just give your best estimate.   9. How would you rate the quality of your sleep?   "Sleep quality" is your sense of whether your sleep was good or poor.   10. How restful or refreshed did you feel when you woke up for the day?   This refers to how you felt after you were done sleeping for the night, during the first few minutes that you were awake.   11a. How many times did you nap or doze?   A nap is a time you decided to sleep during the day, whether in bed or not in bed. "Dozing" is a time you may have nodded off for a few minutes, without meaning to, such as while watching TV. Count all the times you napped or dozed at any time from when you first got out of bed in the morning until you got into bed again at night.   11b. In total, how long did you nap or doze?   Estimate the total amount of time you spent napping or dozing, in hours and minutes. For instance, if you napped twice, once for 30 minutes and once for 60 minutes, and dozed for 10 minutes, you would answer "1 hour 40 minutes." If you did not nap or doze, write "N/A" (not applicable).   12a. How many drinks containing alcohol did you have?   Enter the number of alcoholic drinks you had where 1 drink is defined as one 12 oz beer (can), 5 oz wine, or 1.5 oz liquor (one shot).   12b. What time was your last drink?   If you had an alcoholic drink " "yesterday, enter the time of day in hours and minutes of your last drink. If you did not have a drink, write "N/A" (not applicable).   13a. How many caffeinated drinks (coffee, tea, soda, energy drinks) did you have?   Enter the number of caffeinated drinks (coffee, tea, soda, energy drinks) you had where for coffee and tea, one drink = 6-8 oz; while for caffeinated soda one drink = 12 oz.   13b. What time was your last caffeinated drink?   If you had a caffeinated drink, enter the time of day in hours and minutes of your last drink. If you did not have a caffeinated drink, write "N/A" (not applicable).   14. Did you take any over-the-counter or prescription medication(s) to help you sleep?   If so, list medication(s), dose, and time taken: List the medication name, how much and when you took EACH different medication you took tonight to help you sleep. Include medication available over the counter, prescription medications, and herbals (example: "Sleepwell 50 mg 11 pm"). If every night is the same, write "same" after the first day.   15. Comments:   If you have anything that you would like to say that is relevant to your sleep feel free to write it here.   Graphic 55280 Version 4.0  Consumer Information Use and Disclaimer   This information is not specific medical advice and does not replace information you receive from your health care provider. This is only a brief summary of general information. It does NOT include all information about conditions, illnesses, injuries, tests, procedures, treatments, therapies, discharge instructions or life-style choices that may apply to you. You must talk with your health care provider for complete information about your health and treatment options. This information should not be used to decide whether or not to accept your health care provider's advice, instructions or recommendations. Only your health care provider has the knowledge and training to provide advice that is " right for you. The use of this information is governed by the [employer] End User License Agreement, available at https://www.Insights.TraderTools/en/solutions/[employer]/about/katherine.The use of [employer] content is governed by the [employer] Terms of Use. ©2021 UpToDate, Inc. All rights reserved.  Copyright   © 2021 UpToDate, Inc. and/or its affiliates. All rights reserved.      GET YOUR SHINGLES SHOT FROM THE PHARMACY    GET YOUR COVID BOOSTER AT THE PHARMACY    STOP NYQUIL, DO NOT TAKE ANY MEDICINE FOR INSOMNIA    STOP TV 2 HOURS BEFORE BEDTIME.  NO TV IN BED    START USING YOUR CPAP MACHINE AGAIN.  IF YOU NEED NEW SUPPLIES LIKE FILTERS OR MASK, CALL YOUR INSURANCE DURABLE MEDICAL EQUIPMENT SUPPLIER.

## 2022-04-05 ENCOUNTER — OFFICE VISIT (OUTPATIENT)
Dept: HOME HEALTH SERVICES | Facility: CLINIC | Age: 72
End: 2022-04-05
Payer: MEDICARE

## 2022-04-05 VITALS
TEMPERATURE: 98 F | SYSTOLIC BLOOD PRESSURE: 124 MMHG | WEIGHT: 265 LBS | DIASTOLIC BLOOD PRESSURE: 80 MMHG | BODY MASS INDEX: 40.16 KG/M2 | HEIGHT: 68 IN | HEART RATE: 61 BPM

## 2022-04-05 DIAGNOSIS — Z00.00 ENCOUNTER FOR PREVENTIVE HEALTH EXAMINATION: Primary | ICD-10-CM

## 2022-04-05 DIAGNOSIS — E78.2 MIXED HYPERLIPIDEMIA: ICD-10-CM

## 2022-04-05 DIAGNOSIS — H25.13 NUCLEAR SCLEROSIS OF BOTH EYES: ICD-10-CM

## 2022-04-05 DIAGNOSIS — I10 ESSENTIAL HYPERTENSION: ICD-10-CM

## 2022-04-05 DIAGNOSIS — G47.33 OSA (OBSTRUCTIVE SLEEP APNEA): ICD-10-CM

## 2022-04-05 DIAGNOSIS — N52.9 ERECTILE DYSFUNCTION, UNSPECIFIED ERECTILE DYSFUNCTION TYPE: Chronic | ICD-10-CM

## 2022-04-05 DIAGNOSIS — M10.9 GOUT, ARTHRITIS: Chronic | ICD-10-CM

## 2022-04-05 DIAGNOSIS — R73.03 PREDIABETES: Chronic | ICD-10-CM

## 2022-04-05 DIAGNOSIS — G89.29 OTHER CHRONIC PAIN: ICD-10-CM

## 2022-04-05 DIAGNOSIS — M47.816 LUMBAR SPONDYLOSIS: ICD-10-CM

## 2022-04-05 DIAGNOSIS — R35.0 BENIGN PROSTATIC HYPERPLASIA WITH URINARY FREQUENCY: ICD-10-CM

## 2022-04-05 DIAGNOSIS — N40.1 BENIGN PROSTATIC HYPERPLASIA WITH URINARY FREQUENCY: ICD-10-CM

## 2022-04-05 DIAGNOSIS — E66.01 MORBID OBESITY WITH BMI OF 40.0-44.9, ADULT: Chronic | ICD-10-CM

## 2022-04-05 DIAGNOSIS — H35.341 MACULAR HOLE, RIGHT EYE: ICD-10-CM

## 2022-04-05 DIAGNOSIS — Z74.09 OTHER REDUCED MOBILITY: ICD-10-CM

## 2022-04-05 PROCEDURE — 1159F MED LIST DOCD IN RCRD: CPT | Mod: CPTII,S$GLB,, | Performed by: NURSE PRACTITIONER

## 2022-04-05 PROCEDURE — G0439 PR MEDICARE ANNUAL WELLNESS SUBSEQUENT VISIT: ICD-10-PCS | Mod: S$GLB,,, | Performed by: NURSE PRACTITIONER

## 2022-04-05 PROCEDURE — 4010F PR ACE/ARB THEARPY RXD/TAKEN: ICD-10-PCS | Mod: CPTII,S$GLB,, | Performed by: NURSE PRACTITIONER

## 2022-04-05 PROCEDURE — 99499 RISK ADDL DX/OHS AUDIT: ICD-10-PCS | Mod: S$GLB,,, | Performed by: NURSE PRACTITIONER

## 2022-04-05 PROCEDURE — 1125F AMNT PAIN NOTED PAIN PRSNT: CPT | Mod: CPTII,S$GLB,, | Performed by: NURSE PRACTITIONER

## 2022-04-05 PROCEDURE — 3074F PR MOST RECENT SYSTOLIC BLOOD PRESSURE < 130 MM HG: ICD-10-PCS | Mod: CPTII,S$GLB,, | Performed by: NURSE PRACTITIONER

## 2022-04-05 PROCEDURE — 99499 UNLISTED E&M SERVICE: CPT | Mod: S$GLB,,, | Performed by: NURSE PRACTITIONER

## 2022-04-05 PROCEDURE — 3074F SYST BP LT 130 MM HG: CPT | Mod: CPTII,S$GLB,, | Performed by: NURSE PRACTITIONER

## 2022-04-05 PROCEDURE — 3079F DIAST BP 80-89 MM HG: CPT | Mod: CPTII,S$GLB,, | Performed by: NURSE PRACTITIONER

## 2022-04-05 PROCEDURE — 1101F PT FALLS ASSESS-DOCD LE1/YR: CPT | Mod: CPTII,S$GLB,, | Performed by: NURSE PRACTITIONER

## 2022-04-05 PROCEDURE — 1101F PR PT FALLS ASSESS DOC 0-1 FALLS W/OUT INJ PAST YR: ICD-10-PCS | Mod: CPTII,S$GLB,, | Performed by: NURSE PRACTITIONER

## 2022-04-05 PROCEDURE — 1160F PR REVIEW ALL MEDS BY PRESCRIBER/CLIN PHARMACIST DOCUMENTED: ICD-10-PCS | Mod: CPTII,S$GLB,, | Performed by: NURSE PRACTITIONER

## 2022-04-05 PROCEDURE — 1159F PR MEDICATION LIST DOCUMENTED IN MEDICAL RECORD: ICD-10-PCS | Mod: CPTII,S$GLB,, | Performed by: NURSE PRACTITIONER

## 2022-04-05 PROCEDURE — 3288F FALL RISK ASSESSMENT DOCD: CPT | Mod: CPTII,S$GLB,, | Performed by: NURSE PRACTITIONER

## 2022-04-05 PROCEDURE — 3044F HG A1C LEVEL LT 7.0%: CPT | Mod: CPTII,S$GLB,, | Performed by: NURSE PRACTITIONER

## 2022-04-05 PROCEDURE — G0439 PPPS, SUBSEQ VISIT: HCPCS | Mod: S$GLB,,, | Performed by: NURSE PRACTITIONER

## 2022-04-05 PROCEDURE — 1160F RVW MEDS BY RX/DR IN RCRD: CPT | Mod: CPTII,S$GLB,, | Performed by: NURSE PRACTITIONER

## 2022-04-05 PROCEDURE — 3044F PR MOST RECENT HEMOGLOBIN A1C LEVEL <7.0%: ICD-10-PCS | Mod: CPTII,S$GLB,, | Performed by: NURSE PRACTITIONER

## 2022-04-05 PROCEDURE — 3288F PR FALLS RISK ASSESSMENT DOCUMENTED: ICD-10-PCS | Mod: CPTII,S$GLB,, | Performed by: NURSE PRACTITIONER

## 2022-04-05 PROCEDURE — 3008F PR BODY MASS INDEX (BMI) DOCUMENTED: ICD-10-PCS | Mod: CPTII,S$GLB,, | Performed by: NURSE PRACTITIONER

## 2022-04-05 PROCEDURE — 3079F PR MOST RECENT DIASTOLIC BLOOD PRESSURE 80-89 MM HG: ICD-10-PCS | Mod: CPTII,S$GLB,, | Performed by: NURSE PRACTITIONER

## 2022-04-05 PROCEDURE — 4010F ACE/ARB THERAPY RXD/TAKEN: CPT | Mod: CPTII,S$GLB,, | Performed by: NURSE PRACTITIONER

## 2022-04-05 PROCEDURE — 3008F BODY MASS INDEX DOCD: CPT | Mod: CPTII,S$GLB,, | Performed by: NURSE PRACTITIONER

## 2022-04-05 PROCEDURE — 1125F PR PAIN SEVERITY QUANTIFIED, PAIN PRESENT: ICD-10-PCS | Mod: CPTII,S$GLB,, | Performed by: NURSE PRACTITIONER

## 2022-04-05 NOTE — PATIENT INSTRUCTIONS
Counseling and Referral of Other Preventative  (Italic type indicates deductible and co-insurance are waived)    Patient Name: Armand Painting  Today's Date: 4/5/2022    Health Maintenance       Date Due Completion Date    Shingles Vaccine (2 of 3) 01/12/2016 11/17/2015    COVID-19 Vaccine (3 - Booster for Pfizer series) 07/01/2021 2/1/2021    Lipid Panel 02/14/2023 2/14/2022    Colorectal Cancer Screening 10/31/2023 10/31/2018    Override on 6/7/2012: Done    TETANUS VACCINE 05/23/2026 5/23/2016        No orders of the defined types were placed in this encounter.    The following information is provided to all patients.  This information is to help you find resources for any of the problems found today that may be affecting your health:                Living healthy guide: www.[facility]      Understanding Diabetes: www.diabetes.org      Eating healthy: www.cdc.gov/healthyweight      [facility] home safety checklist: www.cdc.gov/steadi/patient.html      Agency on Aging: www.goea.[place].[facility]      Alcoholics anonymous (AA): www.aa.org      Physical Activity: www.siobhan.nih.gov/zr6wvsj      Tobacco use: www.quitwithusla.org     Counseling and Referral of Other Preventative  (Italic type indicates deductible and co-insurance are waived)    Patient Name: Armand Painitng  Today's Date: 4/5/2022    Health Maintenance       Date Due Completion Date    Shingles Vaccine (2 of 3) 01/12/2016 11/17/2015    COVID-19 Vaccine (3 - Booster for Pfizer series) 07/01/2021 2/1/2021    Lipid Panel 02/14/2023 2/14/2022    Colorectal Cancer Screening 10/31/2023 10/31/2018    Override on 6/7/2012: Done    TETANUS VACCINE 05/23/2026 5/23/2016        No orders of the defined types were placed in this encounter.    The following information is provided to all patients.  This information is to help you find resources for any of the problems found today that may be affecting your health:                Living healthy guide: www.[facility]       Understanding Diabetes: www.diabetes.org      Eating healthy: www.cdc.gov/healthyweight      CDC home safety checklist: www.cdc.gov/steadi/patient.html      Agency on Aging: www.goea.[place].[facility]      Alcoholics anonymous (AA): www.aa.org      Physical Activity: www.siobhan.nih.gov/vl0wglr      Tobacco use: www.quitwithusla.org

## 2022-04-05 NOTE — PROGRESS NOTES
"  Armand Painting presented for a  Medicare AWV and comprehensive Health Risk Assessment today. The following components were reviewed and updated:    · Medical history  · Family History  · Social history  · Allergies and Current Medications  · Health Risk Assessment  · Health Maintenance  · Care Team         ** See Completed Assessments for Annual Wellness Visit within the encounter summary.**         The following assessments were completed:  · Living Situation  · CAGE  · Depression Screening  · Timed Get Up and Go  · Whisper Test  · Cognitive Function Screening  ·   ·   ·   · Nutrition Screening  · ADL Screening  · PAQ Screening        Vitals:    04/05/22 1027   BP: 124/80   Pulse: 61   Temp: 97.5 °F (36.4 °C)   Weight: 120.2 kg (265 lb)   Height: 5' 8" (1.727 m)     Body mass index is 40.29 kg/m².  Physical Exam  Constitutional:       Appearance: He is obese.   HENT:      Head: Normocephalic and atraumatic.      Nose: Nose normal.      Mouth/Throat:      Mouth: Mucous membranes are moist.   Eyes:      Extraocular Movements: Extraocular movements intact.   Cardiovascular:      Rate and Rhythm: Normal rate and regular rhythm.      Pulses: Normal pulses.      Heart sounds: Normal heart sounds.   Pulmonary:      Effort: Pulmonary effort is normal. No respiratory distress.      Breath sounds: Normal breath sounds.   Abdominal:      General: Bowel sounds are normal. There is no distension.      Palpations: Abdomen is soft.   Musculoskeletal:         General: Normal range of motion.      Cervical back: Normal range of motion.   Skin:     General: Skin is warm.   Neurological:      General: No focal deficit present.      Mental Status: He is alert and oriented to person, place, and time.   Psychiatric:         Mood and Affect: Mood normal.         Behavior: Behavior normal.               Diagnoses and health risks identified today and associated recommendations/orders:    1. Encounter for preventive health " examination  Assessments completed. Preventive measures and health maintenance reviewed with patient.     2. Morbid obesity with BMI of 40.0-44.9, adult  Stable, followed by PCP. Patient educated on healthy diets and exercise.     3. Other chronic pain  Stable, followed by PCP.    4. Lumbar spondylosis  Stable, followed by PCP.    5. Essential hypertension no outside checks; 3/2014 exercise stress echo no ischemia LVEF 55-60  Stable, on medication. Followed by PCP.    6. Mixed hyperlipidemia  Stable, on Statin. Followed by PCP.    7. Prediabetes  Stable, on Metformin. Followed by PCP. Last A1C 6.2    8. Nuclear sclerosis of both eyes  Stable, followed by Optometry.    9. Macular hole, right eye  Stable, followed by Optometry.    10. Erectile dysfunction, unspecified erectile dysfunction type  Stable, followed by PCP.    11. Benign prostatic hyperplasia with urinary frequency  Stable, on flomax. Followed by PCP.    12. SIS (obstructive sleep apnea) on sleep study 4/2018 with AHI 55.  CPAP at 14  Stable, followed by PCP.    13. Gout, arthritis  Stable on Allopurinol. Followed by PCP.    14. Other reduced mobility  Stable, followed by PCP.    Provided Armand with a 5-10 year written screening schedule and personal prevention plan. Recommendations were developed using the USPSTF age appropriate recommendations. Education, counseling, and referrals were provided as needed. After Visit Summary printed and given to patient which includes a list of additional screenings\tests needed.    Follow up in about 1 year (around 4/5/2023) for your next annual wellness visit.    Citlaly Caraballo NP  I offered to discuss advanced care planning, including how to pick a person who would make decisions for you if you were unable to make them for yourself, called a health care power of , and what kind of decisions you might make such as use of life sustaining treatments such as ventilators and tube feeding when faced with a life  limiting illness recorded on a living will that they will need to know. (How you want to be cared for as you near the end of your natural life)     X Patient is interested in learning more about how to make advanced directives.  I provided them paperwork and offered to discuss this with them.  I offered to discuss advanced care planning, including how to pick a person who would make decisions for you if you were unable to make them for yourself, called a health care power of , and what kind of decisions you might make such as use of life sustaining treatments such as ventilators and tube feeding when faced with a life limiting illness recorded on a living will that they will need to know. (How you want to be cared for as you near the end of your natural life)     X Patient is interested in learning more about how to make advanced directives.  I provided them paperwork and offered to discuss this with them.

## 2022-07-28 DIAGNOSIS — R35.0 BENIGN PROSTATIC HYPERPLASIA WITH URINARY FREQUENCY: ICD-10-CM

## 2022-07-28 DIAGNOSIS — N40.1 BENIGN PROSTATIC HYPERPLASIA WITH URINARY FREQUENCY: ICD-10-CM

## 2022-07-28 RX ORDER — TAMSULOSIN HYDROCHLORIDE 0.4 MG/1
0.8 CAPSULE ORAL DAILY
Qty: 180 CAPSULE | Refills: 1 | Status: SHIPPED | OUTPATIENT
Start: 2022-07-28 | End: 2022-08-16 | Stop reason: SDUPTHER

## 2022-07-28 NOTE — TELEPHONE ENCOUNTER
Care Due:                  Date            Visit Type   Department     Provider  --------------------------------------------------------------------------------                                [facility]                              PRIMARY      MED/ INTERNAL  Last Visit: 02-      CARE ([facility])   MED/ PAM Vargas                              [facility]                              PRIMARY      MED/ INTERNAL  Next Visit: 08-      CARE ([facility])   MED/ PAM Vargas                                                            Last  Test          Frequency    Reason                     Performed    Due Date  --------------------------------------------------------------------------------    HBA1C.......  6 months...  metFORMIN................  02- 08-    [facility] Embedded Care Gaps. Reference number: [id number]. 7/28/2022   12:17:26 AM CDT

## 2022-07-28 NOTE — TELEPHONE ENCOUNTER
Refill Decision Note   Armand Painting  is requesting a refill authorization.  Brief Assessment and Rationale for Refill:  Approve     Medication Therapy Plan:  FLOS 08/09/22    Medication Reconciliation Completed: No   Comments:     No Care Gaps recommended.     Note composed:5:08 PM 07/28/2022

## 2022-08-09 ENCOUNTER — LAB VISIT (OUTPATIENT)
Dept: LAB | Facility: HOSPITAL | Age: 72
End: 2022-08-09
Attending: INTERNAL MEDICINE
Payer: MEDICARE

## 2022-08-09 DIAGNOSIS — R73.03 PREDIABETES: Chronic | ICD-10-CM

## 2022-08-09 DIAGNOSIS — M10.9 GOUT, ARTHRITIS: Chronic | ICD-10-CM

## 2022-08-09 DIAGNOSIS — I10 ESSENTIAL HYPERTENSION: ICD-10-CM

## 2022-08-09 LAB
ALBUMIN SERPL BCP-MCNC: 3.7 G/DL (ref 3.5–5.2)
ALP SERPL-CCNC: 54 U/L (ref 55–135)
ALT SERPL W/O P-5'-P-CCNC: 15 U/L (ref 10–44)
ANION GAP SERPL CALC-SCNC: 12 MMOL/L (ref 8–16)
AST SERPL-CCNC: 14 U/L (ref 10–40)
BASOPHILS # BLD AUTO: 0.02 K/UL (ref 0–0.2)
BASOPHILS NFR BLD: 0.3 % (ref 0–1.9)
BILIRUB SERPL-MCNC: 0.6 MG/DL (ref 0.1–1)
BUN SERPL-MCNC: 16 MG/DL (ref 8–23)
CALCIUM SERPL-MCNC: 9.4 MG/DL (ref 8.7–10.5)
CHLORIDE SERPL-SCNC: 99 MMOL/L (ref 95–110)
CO2 SERPL-SCNC: 28 MMOL/L (ref 23–29)
CREAT SERPL-MCNC: 1.1 MG/DL (ref 0.5–1.4)
DIFFERENTIAL METHOD: ABNORMAL
EOSINOPHIL # BLD AUTO: 0.2 K/UL (ref 0–0.5)
EOSINOPHIL NFR BLD: 3.2 % (ref 0–8)
ERYTHROCYTE [DISTWIDTH] IN BLOOD BY AUTOMATED COUNT: 14.5 % (ref 11.5–14.5)
EST. GFR  (NO RACE VARIABLE): >60 ML/MIN/1.73 M^2
ESTIMATED AVG GLUCOSE: 126 MG/DL (ref 68–131)
GLUCOSE SERPL-MCNC: 103 MG/DL (ref 70–110)
HBA1C MFR BLD: 6 % (ref 4–5.6)
HCT VFR BLD AUTO: 39.2 % (ref 40–54)
HGB BLD-MCNC: 12.2 G/DL (ref 14–18)
IMM GRANULOCYTES # BLD AUTO: 0 K/UL (ref 0–0.04)
IMM GRANULOCYTES NFR BLD AUTO: 0 % (ref 0–0.5)
LYMPHOCYTES # BLD AUTO: 3.3 K/UL (ref 1–4.8)
LYMPHOCYTES NFR BLD: 50.6 % (ref 18–48)
MCH RBC QN AUTO: 26.2 PG (ref 27–31)
MCHC RBC AUTO-ENTMCNC: 31.1 G/DL (ref 32–36)
MCV RBC AUTO: 84 FL (ref 82–98)
MONOCYTES # BLD AUTO: 0.6 K/UL (ref 0.3–1)
MONOCYTES NFR BLD: 9 % (ref 4–15)
NEUTROPHILS # BLD AUTO: 2.4 K/UL (ref 1.8–7.7)
NEUTROPHILS NFR BLD: 36.9 % (ref 38–73)
NRBC BLD-RTO: 0 /100 WBC
PLATELET # BLD AUTO: 268 K/UL (ref 150–450)
PMV BLD AUTO: 11.6 FL (ref 9.2–12.9)
POTASSIUM SERPL-SCNC: 3.6 MMOL/L (ref 3.5–5.1)
PROT SERPL-MCNC: 7.7 G/DL (ref 6–8.4)
RBC # BLD AUTO: 4.66 M/UL (ref 4.6–6.2)
SODIUM SERPL-SCNC: 139 MMOL/L (ref 136–145)
URATE SERPL-MCNC: 7.3 MG/DL (ref 3.4–7)
WBC # BLD AUTO: 6.52 K/UL (ref 3.9–12.7)

## 2022-08-09 PROCEDURE — 85025 COMPLETE CBC W/AUTO DIFF WBC: CPT | Performed by: INTERNAL MEDICINE

## 2022-08-09 PROCEDURE — 83036 HEMOGLOBIN GLYCOSYLATED A1C: CPT | Performed by: INTERNAL MEDICINE

## 2022-08-09 PROCEDURE — 80053 COMPREHEN METABOLIC PANEL: CPT | Performed by: INTERNAL MEDICINE

## 2022-08-09 PROCEDURE — 36415 COLL VENOUS BLD VENIPUNCTURE: CPT | Mod: PO | Performed by: INTERNAL MEDICINE

## 2022-08-09 PROCEDURE — 84550 ASSAY OF BLOOD/URIC ACID: CPT | Performed by: INTERNAL MEDICINE

## 2022-08-10 DIAGNOSIS — M10.9 GOUT, ARTHRITIS: Chronic | ICD-10-CM

## 2022-08-10 RX ORDER — ALLOPURINOL 100 MG/1
TABLET ORAL
Qty: 90 TABLET | Refills: 1 | Status: SHIPPED | OUTPATIENT
Start: 2022-08-10 | End: 2022-08-16 | Stop reason: SDUPTHER

## 2022-08-10 NOTE — TELEPHONE ENCOUNTER
Refill Decision Note   Armand Painting  is requesting a refill authorization.  Brief Assessment and Rationale for Refill:  Approve     Medication Therapy Plan:       Medication Reconciliation Completed: No   Comments:     No Care Gaps recommended.     Note composed:9:32 AM 08/10/2022

## 2022-08-10 NOTE — TELEPHONE ENCOUNTER
No new care gaps identified.  [facility] Embedded Care Gaps. Reference number: [id number]. 8/10/2022   12:49:12 AM CDT

## 2022-08-15 NOTE — PROGRESS NOTES
This note was created by combination of typed  and M-Modal dictation.  Transcription errors may be present.  If there are any questions, please contact me.    Assessment and Plan:   Dyspnea on exertion  -notes dyspnea with exertion with mowing the lawn.  He attributes this to hot weather of late.  But I would like to make sure no anginal equivalent.  Risk factors of age, hypertension, hyperlipidemia, pre diabetes  Check chest x-ray an EKG   Referral to Cardiology  -     EKG 12-lead  -     X-Ray Chest PA And Lateral; Future; Expected date: 08/16/2022  -     Ambulatory referral/consult to Cardiology; Future; Expected date: 08/23/2022    Prediabetes  Morbid obesity with BMI of 40.0-44.9, adult  -pre visit labs reviewed.  Stable.  On metformin.  No changes.  Continue working on TLC.  -     Comprehensive Metabolic Panel; Future; Expected date: 02/15/2023  -     CBC Auto Differential; Future; Expected date: 02/15/2023  -     Hemoglobin A1C; Future; Expected date: 02/15/2023  -     metFORMIN (GLUCOPHAGE-XR) 500 MG ER 24hr tablet; Take 1 tablet (500 mg total) by mouth once daily.  Dispense: 90 tablet; Refill: 3    Essential hypertension no outside checks; 3/2014 exercise stress echo no ischemia LVEF 55-60  -stable on lisinopril and HCTZ  -     hydroCHLOROthiazide (HYDRODIURIL) 25 MG tablet; Take 1 tablet (25 mg total) by mouth once daily.  Dispense: 90 tablet; Refill: 3  -     lisinopriL 10 MG tablet; Take 1 tablet (10 mg total) by mouth once daily.  Dispense: 90 tablet; Refill: 3    Mixed hyperlipidemia  -check future labs on statin.  -     Comprehensive Metabolic Panel; Future; Expected date: 02/15/2023  -     CBC Auto Differential; Future; Expected date: 02/15/2023  -     Lipid Panel; Future; Expected date: 02/15/2023  -     pravastatin (PRAVACHOL) 20 MG tablet; Take 1 tablet (20 mg total) by mouth once daily.  Dispense: 90 tablet; Refill: 3    Gout, arthritis  -uric acid level went up compared to previous.  No  recent flares.  Stay on current dose of allopurinol.  Check future labs.   -     Uric Acid; Future; Expected date: 02/15/2023  -     allopurinoL (ZYLOPRIM) 100 MG tablet; Take 1 tablet (100 mg total) by mouth once daily.  Dispense: 90 tablet; Refill: 3    Benign prostatic hyperplasia with urinary frequency  -refilled tamsulosin.  He has an upcoming follow-up with Urology for recurring balanitis.  -     tamsulosin (FLOMAX) 0.4 mg Cap; Take 2 capsules (0.8 mg total) by mouth once daily. Increased dose  Dispense: 180 capsule; Refill: 3          Medications Discontinued During This Encounter   Medication Reason    nystatin-triamcinolone (MYCOLOG II) cream Alternate therapy    lisinopriL 10 MG tablet Reorder    pravastatin (PRAVACHOL) 20 MG tablet Reorder    metFORMIN (GLUCOPHAGE-XR) 500 MG ER 24hr tablet Reorder    tamsulosin (FLOMAX) 0.4 mg Cap Reorder    hydroCHLOROthiazide (HYDRODIURIL) 25 MG tablet Reorder    allopurinoL (ZYLOPRIM) 100 MG tablet Reorder       meds sent this encounter:  Medications Ordered This Encounter   Medications    allopurinoL (ZYLOPRIM) 100 MG tablet     Sig: Take 1 tablet (100 mg total) by mouth once daily.     Dispense:  90 tablet     Refill:  3    hydroCHLOROthiazide (HYDRODIURIL) 25 MG tablet     Sig: Take 1 tablet (25 mg total) by mouth once daily.     Dispense:  90 tablet     Refill:  3     DX Code Needed  .    lisinopriL 10 MG tablet     Sig: Take 1 tablet (10 mg total) by mouth once daily.     Dispense:  90 tablet     Refill:  3     .    metFORMIN (GLUCOPHAGE-XR) 500 MG ER 24hr tablet     Sig: Take 1 tablet (500 mg total) by mouth once daily.     Dispense:  90 tablet     Refill:  3     DX Code Needed  .    pravastatin (PRAVACHOL) 20 MG tablet     Sig: Take 1 tablet (20 mg total) by mouth once daily.     Dispense:  90 tablet     Refill:  3    tamsulosin (FLOMAX) 0.4 mg Cap     Sig: Take 2 capsules (0.8 mg total) by mouth once daily. Increased dose     Dispense:  180 capsule  "    Refill:  3       Follow Up: No follow-ups on file. OV 6 months with labs.      Subjective:     Chief Complaint   Patient presents with    Shortness of Breath    Eye Problem    Dizziness    Headache       PARAS Acuna is a 71 y.o. male, last appointment with this clinic was Visit date not found.  Social History     Tobacco Use    Smoking status: Former Smoker     Types: Cigarettes     Quit date: 2009     Years since quittin.6    Smokeless tobacco: Former User   Substance Use Topics    Alcohol use: Yes     Comment: occasional          Social History     Social History Narrative    Not on file       Last visit with me in February for physical   Hypertension stable   Hyperlipidemia triglycerides high.  Stay on statin.  Work on TLC.  Consider atorvastatin  Obesity, prediabetes, metformin  SIS.  Insomnia.  Fatigue with Benadryl.  Needs to work on sleep hygiene.  Had not been using CPAP.  Advised to restart.  If he does not have a machine he may need to repeat sleep study in order to get it covered.  Gout, stable on allopurinol   Alpha-thalassemia silent carrier    Pre visit labs  CBC mild anemia stable  CMP elevated uric acid  A1c 6.0 from 6.2    Feeling short of breath  Started yesterday  No fever  [place] overheated. Was out in the sun yesterday, for about 6-7 hours.  Adequate fluid intake he thinks.  No chest pain.  No cough.  No fever no chills.   Notes runny eyes.    Up until yesterday was doing ok.  Does get short of breath cutting the grass. Not as bad when he did it on Friday.     Foot keeps falling asleep.  The left leg too.  If he sits too long.  If he moves his ankle or foot it helps.  Does get chronic back pain.    If he stands too long gets pain in the "ribs" - pointing to the lateral abd left and right.    Has known issues with chronic back pain.    BMs are normal.   Urination is OK.      No gout flares.     Taking MVI  Has cut down on sweet drinks.      Patient Care Team:  Clark Vargas MD " as PCP - General (Internal Medicine)  Fernando Jacinto MD as Consulting Physician (Orthopedic Surgery)  Venkata Caruso MD as Consulting Physician (Neurosurgery)  Mina Betancourt MA as Care Coordinator  Aureliano Mcneil MD as Consulting Physician (Ophthalmology)  Maricel Cotter OD as Consulting Physician (Optometry)    Patient Active Problem List    Diagnosis Date Noted    Macular hole, right eye 04/20/2021    Chronic pain 08/07/2020    Chronic pain disorder 03/10/2020    Cervical post-laminectomy syndrome 03/10/2020    Lumbar spondylosis 03/10/2020    Alpha thalassemia silent carrier 11/14/2019    Neck pain 09/06/2019    Poor posture 09/06/2019    Tubular adenoma of colon 10/2018 colonoscopy sigmoid tubular adenoma 11/02/2018    Diverticulosis of large intestine without hemorrhage on colonoscopy 10/2018 11/01/2018    SIS (obstructive sleep apnea) on sleep study 4/2018 with AHI 55.  CPAP at 15     Nuclear sclerosis of both eyes 01/16/2018    Refractive error 01/16/2018    Benign prostatic hyperplasia with urinary frequency 01/10/2018    Hemorrhoids 06/01/2016    Cervical stenosis of spinal canal 3/2016 C3-7 laminoplasty 03/31/2016     3/31/2016 pt underwent a C3 through C7 Laminoplasty with posterior cervical approach      Prediabetes 03/29/2016    Essential hypertension no outside checks; 3/2014 exercise stress echo no ischemia LVEF 55-60 03/20/2014     3/11/2014 exercise stress echo negative for ischemia.  Normal LV systolic function LVEF 55-60.  Concentric remodeling.      Mixed hyperlipidemia 03/20/2014    Morbid obesity with BMI of 40.0-44.9, adult 03/06/2014    Back pain 07/11/2013    ED (erectile dysfunction) 06/07/2013    Gout, arthritis 01/07/2013       PAST MEDICAL PROBLEMS, PAST SURGICAL HISTORY: please see relevant portions of the electronic medical record    ALLERGIES AND MEDICATIONS: updated and reviewed.  Review of patient's allergies indicates:   Allergen Reactions     "Codeine Swelling     Codeine with Aspirin caused chest pain       Medication List with Changes/Refills   Current Medications    ALLOPURINOL (ZYLOPRIM) 100 MG TABLET    TAKE 1 TABLET BY MOUTH EVERY DAY    ASPIRIN (ECOTRIN) 81 MG EC TABLET    Take 81 mg by mouth once daily.    CLOTRIMAZOLE-BETAMETHASONE 1-0.05% (LOTRISONE) CREAM    APPLY TO AFFECTED AREA TWICE A DAY    GLUCOSAMINE/CHONDR WILSON A SOD (OSTEO BI-FLEX ORAL)    Take by mouth.    HYDROCHLOROTHIAZIDE (HYDRODIURIL) 25 MG TABLET    TAKE 1 TABLET BY MOUTH EVERY DAY    KETOCONAZOLE (NIZORAL) 2 % CREAM    Apply topically once daily. To the armpits    LISINOPRIL 10 MG TABLET    Take 1 tablet (10 mg total) by mouth once daily.    METFORMIN (GLUCOPHAGE-XR) 500 MG ER 24HR TABLET    Take 1 tablet (500 mg total) by mouth once daily.    MULTIVITAMIN (ONE-A-DAY ESSENTIAL ORAL)    Take by mouth.    NYSTATIN-TRIAMCINOLONE (MYCOLOG II) CREAM    Apply topically 3 (three) times daily.    PRAVASTATIN (PRAVACHOL) 20 MG TABLET    Take 1 tablet (20 mg total) by mouth once daily.    TAMSULOSIN (FLOMAX) 0.4 MG CAP    TAKE 2 CAPSULES (0.8 MG TOTAL) BY MOUTH ONCE DAILY. INCREASED DOSE        Objective:   Physical Exam   Vitals:    08/16/22 1343   BP: 114/68   BP Location: Right arm   Patient Position: Sitting   BP Method: Large (Manual)   Pulse: 80   Temp: 98.5 °F (36.9 °C)   TempSrc: Oral   SpO2: 98%   Weight: 118.8 kg (261 lb 14.5 oz)   Height: 5' 8" (1.727 m)    Body mass index is 39.82 kg/m².  Weight: 118.8 kg (261 lb 14.5 oz)   Height: 5' 8" (172.7 cm)     Physical Exam  Constitutional:       General: He is not in acute distress.     Appearance: He is well-developed.   Cardiovascular:      Rate and Rhythm: Normal rate and regular rhythm.      Heart sounds: Normal heart sounds. No murmur heard.  Pulmonary:      Effort: Pulmonary effort is normal.      Breath sounds: Normal breath sounds.   Abdominal:      General: There is no distension.      Palpations: There is no mass.      " Tenderness: There is no abdominal tenderness. There is no guarding.   Musculoskeletal:         General: Normal range of motion.      Right lower leg: No edema.      Left lower leg: No edema.   Skin:     General: Skin is warm and dry.   Neurological:      Mental Status: He is alert and oriented to person, place, and time.      Coordination: Coordination normal.   Psychiatric:         Behavior: Behavior normal.         Thought Content: Thought content normal.     EKG sinus, L axis deviation. Compared to 2014, L axis deviation more prominent.    Component      Latest Ref Rng & Units 8/9/2022 2/14/2022   WBC      3.90 - 12.70 K/uL 6.52 8.25   RBC      4.60 - 6.20 M/uL 4.66 4.94   Hemoglobin      14.0 - 18.0 g/dL 12.2 (L) 12.9 (L)   Hematocrit      40.0 - 54.0 % 39.2 (L) 43.4   MCV      82 - 98 fL 84 88   MCH      27.0 - 31.0 pg 26.2 (L) 26.1 (L)   MCHC      32.0 - 36.0 g/dL 31.1 (L) 29.7 (L)   RDW      11.5 - 14.5 % 14.5 15.0 (H)   Platelets      150 - 450 K/uL 268 212   MPV      9.2 - 12.9 fL 11.6 12.5   Immature Granulocytes      0.0 - 0.5 % 0.0 0.1   Gran # (ANC)      1.8 - 7.7 K/uL 2.4 3.2   Immature Grans (Abs)      0.00 - 0.04 K/uL 0.00 0.01   Lymph #      1.0 - 4.8 K/uL 3.3 4.2   Mono #      0.3 - 1.0 K/uL 0.6 0.7   Eos #      0.0 - 0.5 K/uL 0.2 0.2   Baso #      0.00 - 0.20 K/uL 0.02 0.03   nRBC      0 /100 WBC 0 0   Gran %      38.0 - 73.0 % 36.9 (L) 38.6   Lymph %      18.0 - 48.0 % 50.6 (H) 50.3 (H)   Mono %      4.0 - 15.0 % 9.0 8.5   Eosinophil %      0.0 - 8.0 % 3.2 2.1   Basophil %      0.0 - 1.9 % 0.3 0.4   Differential Method       Automated Automated   Sodium      136 - 145 mmol/L 139 140   Potassium      3.5 - 5.1 mmol/L 3.6 4.0   Chloride      95 - 110 mmol/L 99 98   CO2      23 - 29 mmol/L 28 33 (H)   Glucose      70 - 110 mg/dL 103 106   BUN      8 - 23 mg/dL 16 14   Creatinine      0.5 - 1.4 mg/dL 1.1 1.0   Calcium      8.7 - 10.5 mg/dL 9.4 10.1   PROTEIN TOTAL      6.0 - 8.4 g/dL 7.7 8.4    Albumin      3.5 - 5.2 g/dL 3.7 3.9   BILIRUBIN TOTAL      0.1 - 1.0 mg/dL 0.6 0.3   Alkaline Phosphatase      55 - 135 U/L 54 (L) 54 (L)   AST      10 - 40 U/L 14 20   ALT      10 - 44 U/L 15 26   Anion Gap      8 - 16 mmol/L 12 9   eGFR if African American      >60 mL/min/1.73 m:2  >60.0   eGFR if non African American      >60 mL/min/1.73 m:2  >60.0   eGFR      >60 mL/min/1.73 m:2 >60.0    Cholesterol      120 - 199 mg/dL  164   Triglycerides      30 - 150 mg/dL  198 (H)   HDL      40 - 75 mg/dL  42   LDL Cholesterol External      63.0 - 159.0 mg/dL  82.4   HDL/Cholesterol Ratio      20.0 - 50.0 %  25.6   Total Cholesterol/HDL Ratio      2.0 - 5.0  3.9   Non-HDL Cholesterol      mg/dL  122   Hemoglobin A1C External      4.0 - 5.6 % 6.0 (H) 6.2 (H)   Estimated Avg Glucose      68 - 131 mg/dL 126 131   Uric Acid      3.4 - 7.0 mg/dL 7.3 (H) 6.3

## 2022-08-16 ENCOUNTER — HOSPITAL ENCOUNTER (OUTPATIENT)
Dept: RADIOLOGY | Facility: HOSPITAL | Age: 72
Discharge: HOME | End: 2022-08-16
Attending: INTERNAL MEDICINE
Payer: MEDICARE

## 2022-08-16 ENCOUNTER — OFFICE VISIT (OUTPATIENT)
Dept: FAMILY MEDICINE | Facility: CLINIC | Age: 72
End: 2022-08-16
Payer: MEDICARE

## 2022-08-16 VITALS
TEMPERATURE: 99 F | OXYGEN SATURATION: 98 % | SYSTOLIC BLOOD PRESSURE: 114 MMHG | HEART RATE: 80 BPM | HEIGHT: 68 IN | WEIGHT: 261.94 LBS | BODY MASS INDEX: 39.7 KG/M2 | DIASTOLIC BLOOD PRESSURE: 68 MMHG

## 2022-08-16 DIAGNOSIS — M10.9 GOUT, ARTHRITIS: ICD-10-CM

## 2022-08-16 DIAGNOSIS — I10 ESSENTIAL HYPERTENSION: ICD-10-CM

## 2022-08-16 DIAGNOSIS — E78.2 MIXED HYPERLIPIDEMIA: ICD-10-CM

## 2022-08-16 DIAGNOSIS — R06.09 DYSPNEA ON EXERTION: Primary | ICD-10-CM

## 2022-08-16 DIAGNOSIS — R35.0 BENIGN PROSTATIC HYPERPLASIA WITH URINARY FREQUENCY: ICD-10-CM

## 2022-08-16 DIAGNOSIS — R73.03 PREDIABETES: Chronic | ICD-10-CM

## 2022-08-16 DIAGNOSIS — R06.09 DYSPNEA ON EXERTION: ICD-10-CM

## 2022-08-16 DIAGNOSIS — N40.1 BENIGN PROSTATIC HYPERPLASIA WITH URINARY FREQUENCY: ICD-10-CM

## 2022-08-16 DIAGNOSIS — E66.01 MORBID OBESITY WITH BMI OF 40.0-44.9, ADULT: Chronic | ICD-10-CM

## 2022-08-16 PROCEDURE — 4010F PR ACE/ARB THEARPY RXD/TAKEN: ICD-10-PCS | Mod: CPTII,S$GLB,, | Performed by: INTERNAL MEDICINE

## 2022-08-16 PROCEDURE — 3074F SYST BP LT 130 MM HG: CPT | Mod: CPTII,S$GLB,, | Performed by: INTERNAL MEDICINE

## 2022-08-16 PROCEDURE — 3078F DIAST BP <80 MM HG: CPT | Mod: CPTII,S$GLB,, | Performed by: INTERNAL MEDICINE

## 2022-08-16 PROCEDURE — 1101F PR PT FALLS ASSESS DOC 0-1 FALLS W/OUT INJ PAST YR: ICD-10-PCS | Mod: CPTII,S$GLB,, | Performed by: INTERNAL MEDICINE

## 2022-08-16 PROCEDURE — 3044F PR MOST RECENT HEMOGLOBIN A1C LEVEL <7.0%: ICD-10-PCS | Mod: CPTII,S$GLB,, | Performed by: INTERNAL MEDICINE

## 2022-08-16 PROCEDURE — 99999 PR PBB SHADOW E&M-EST. PATIENT-LVL IV: CPT | Mod: PBBFAC,,, | Performed by: INTERNAL MEDICINE

## 2022-08-16 PROCEDURE — 71046 XR CHEST PA AND LATERAL: ICD-10-PCS | Mod: 26,,, | Performed by: RADIOLOGY

## 2022-08-16 PROCEDURE — 71046 X-RAY EXAM CHEST 2 VIEWS: CPT | Mod: TC,FY,PO

## 2022-08-16 PROCEDURE — 93005 EKG 12-LEAD: ICD-10-PCS | Mod: S$GLB,,, | Performed by: INTERNAL MEDICINE

## 2022-08-16 PROCEDURE — 3044F HG A1C LEVEL LT 7.0%: CPT | Mod: CPTII,S$GLB,, | Performed by: INTERNAL MEDICINE

## 2022-08-16 PROCEDURE — 99999 PR PBB SHADOW E&M-EST. PATIENT-LVL IV: ICD-10-PCS | Mod: PBBFAC,,, | Performed by: INTERNAL MEDICINE

## 2022-08-16 PROCEDURE — 1101F PT FALLS ASSESS-DOCD LE1/YR: CPT | Mod: CPTII,S$GLB,, | Performed by: INTERNAL MEDICINE

## 2022-08-16 PROCEDURE — 93005 ELECTROCARDIOGRAM TRACING: CPT | Mod: S$GLB,,, | Performed by: INTERNAL MEDICINE

## 2022-08-16 PROCEDURE — 3078F PR MOST RECENT DIASTOLIC BLOOD PRESSURE < 80 MM HG: ICD-10-PCS | Mod: CPTII,S$GLB,, | Performed by: INTERNAL MEDICINE

## 2022-08-16 PROCEDURE — 1160F RVW MEDS BY RX/DR IN RCRD: CPT | Mod: CPTII,S$GLB,, | Performed by: INTERNAL MEDICINE

## 2022-08-16 PROCEDURE — 1126F AMNT PAIN NOTED NONE PRSNT: CPT | Mod: CPTII,S$GLB,, | Performed by: INTERNAL MEDICINE

## 2022-08-16 PROCEDURE — 1159F PR MEDICATION LIST DOCUMENTED IN MEDICAL RECORD: ICD-10-PCS | Mod: CPTII,S$GLB,, | Performed by: INTERNAL MEDICINE

## 2022-08-16 PROCEDURE — 3008F BODY MASS INDEX DOCD: CPT | Mod: CPTII,S$GLB,, | Performed by: INTERNAL MEDICINE

## 2022-08-16 PROCEDURE — 1126F PR PAIN SEVERITY QUANTIFIED, NO PAIN PRESENT: ICD-10-PCS | Mod: CPTII,S$GLB,, | Performed by: INTERNAL MEDICINE

## 2022-08-16 PROCEDURE — 3008F PR BODY MASS INDEX (BMI) DOCUMENTED: ICD-10-PCS | Mod: CPTII,S$GLB,, | Performed by: INTERNAL MEDICINE

## 2022-08-16 PROCEDURE — 3074F PR MOST RECENT SYSTOLIC BLOOD PRESSURE < 130 MM HG: ICD-10-PCS | Mod: CPTII,S$GLB,, | Performed by: INTERNAL MEDICINE

## 2022-08-16 PROCEDURE — 99214 OFFICE O/P EST MOD 30 MIN: CPT | Mod: S$GLB,,, | Performed by: INTERNAL MEDICINE

## 2022-08-16 PROCEDURE — 1159F MED LIST DOCD IN RCRD: CPT | Mod: CPTII,S$GLB,, | Performed by: INTERNAL MEDICINE

## 2022-08-16 PROCEDURE — 4010F ACE/ARB THERAPY RXD/TAKEN: CPT | Mod: CPTII,S$GLB,, | Performed by: INTERNAL MEDICINE

## 2022-08-16 PROCEDURE — 93010 ELECTROCARDIOGRAM REPORT: CPT | Mod: S$GLB,,, | Performed by: INTERNAL MEDICINE

## 2022-08-16 PROCEDURE — 1160F PR REVIEW ALL MEDS BY PRESCRIBER/CLIN PHARMACIST DOCUMENTED: ICD-10-PCS | Mod: CPTII,S$GLB,, | Performed by: INTERNAL MEDICINE

## 2022-08-16 PROCEDURE — 3288F PR FALLS RISK ASSESSMENT DOCUMENTED: ICD-10-PCS | Mod: CPTII,S$GLB,, | Performed by: INTERNAL MEDICINE

## 2022-08-16 PROCEDURE — 71046 X-RAY EXAM CHEST 2 VIEWS: CPT | Mod: 26,,, | Performed by: RADIOLOGY

## 2022-08-16 PROCEDURE — 99214 PR OFFICE/OUTPT VISIT, EST, LEVL IV, 30-39 MIN: ICD-10-PCS | Mod: S$GLB,,, | Performed by: INTERNAL MEDICINE

## 2022-08-16 PROCEDURE — 93010 EKG 12-LEAD: ICD-10-PCS | Mod: S$GLB,,, | Performed by: INTERNAL MEDICINE

## 2022-08-16 PROCEDURE — 3288F FALL RISK ASSESSMENT DOCD: CPT | Mod: CPTII,S$GLB,, | Performed by: INTERNAL MEDICINE

## 2022-08-16 RX ORDER — NYSTATIN AND TRIAMCINOLONE ACETONIDE 100000; 1 [USP'U]/G; MG/G
CREAM TOPICAL 4 TIMES DAILY
Status: CANCELLED | OUTPATIENT
Start: 2022-08-16

## 2022-08-16 RX ORDER — HYDROCHLOROTHIAZIDE 25 MG/1
25 TABLET ORAL DAILY
Qty: 90 TABLET | Refills: 3 | Status: SHIPPED | OUTPATIENT
Start: 2022-08-16 | End: 2023-07-10

## 2022-08-16 RX ORDER — LISINOPRIL 10 MG/1
10 TABLET ORAL DAILY
Qty: 90 TABLET | Refills: 3 | Status: SHIPPED | OUTPATIENT
Start: 2022-08-16 | End: 2023-10-26

## 2022-08-16 RX ORDER — PRAVASTATIN SODIUM 20 MG/1
20 TABLET ORAL DAILY
Qty: 90 TABLET | Refills: 3 | Status: SHIPPED | OUTPATIENT
Start: 2022-08-16 | End: 2023-08-15

## 2022-08-16 RX ORDER — TAMSULOSIN HYDROCHLORIDE 0.4 MG/1
0.8 CAPSULE ORAL DAILY
Qty: 180 CAPSULE | Refills: 3 | Status: SHIPPED | OUTPATIENT
Start: 2022-08-16 | End: 2023-08-22

## 2022-08-16 RX ORDER — ALLOPURINOL 100 MG/1
100 TABLET ORAL DAILY
Qty: 90 TABLET | Refills: 3 | Status: SHIPPED | OUTPATIENT
Start: 2022-08-16 | End: 2023-08-22

## 2022-08-16 RX ORDER — METFORMIN HYDROCHLORIDE 500 MG/1
500 TABLET, EXTENDED RELEASE ORAL DAILY
Qty: 90 TABLET | Refills: 3 | Status: SHIPPED | OUTPATIENT
Start: 2022-08-16 | End: 2023-10-11

## 2022-08-17 ENCOUNTER — TELEPHONE (OUTPATIENT)
Dept: FAMILY MEDICINE | Facility: CLINIC | Age: 72
End: 2022-08-17
Payer: MEDICARE

## 2022-08-17 NOTE — PROGRESS NOTES
CXR normal  Done for c/o dyspnea on exertion  Was referred to cards at [facility]  Could be due to working in the extreme heat

## 2022-08-23 ENCOUNTER — TELEPHONE (OUTPATIENT)
Dept: FAMILY MEDICINE | Facility: CLINIC | Age: 72
End: 2022-08-23
Payer: MEDICARE

## 2022-08-23 NOTE — TELEPHONE ENCOUNTER
----- Message from Divine Pelayo sent at 8/23/2022  9:35 AM CDT -----  Type: Patient Call Back    Who called: self     What is the request in detail: Pt is asking why a referral was put in for cardiology please advise     Can the clinic reply by [facility]?    Would the patient rather a call back or a response via [facility]?     Best call back number: [phone] (home)       Additional Information:

## 2022-08-30 ENCOUNTER — OFFICE VISIT (OUTPATIENT)
Dept: UROLOGY | Facility: CLINIC | Age: 72
End: 2022-08-30
Payer: MEDICARE

## 2022-08-30 VITALS — WEIGHT: 267.88 LBS | BODY MASS INDEX: 40.73 KG/M2

## 2022-08-30 DIAGNOSIS — N48.1 BALANITIS: ICD-10-CM

## 2022-08-30 DIAGNOSIS — R30.0 DYSURIA: ICD-10-CM

## 2022-08-30 DIAGNOSIS — N52.01 ERECTILE DYSFUNCTION DUE TO ARTERIAL INSUFFICIENCY: ICD-10-CM

## 2022-08-30 DIAGNOSIS — N48.9 PENILE LESION: Primary | ICD-10-CM

## 2022-08-30 DIAGNOSIS — R35.1 NOCTURIA: ICD-10-CM

## 2022-08-30 PROCEDURE — 1125F AMNT PAIN NOTED PAIN PRSNT: CPT | Mod: CPTII,S$GLB,, | Performed by: UROLOGY

## 2022-08-30 PROCEDURE — 1160F PR REVIEW ALL MEDS BY PRESCRIBER/CLIN PHARMACIST DOCUMENTED: ICD-10-PCS | Mod: CPTII,S$GLB,, | Performed by: UROLOGY

## 2022-08-30 PROCEDURE — 3044F HG A1C LEVEL LT 7.0%: CPT | Mod: CPTII,S$GLB,, | Performed by: UROLOGY

## 2022-08-30 PROCEDURE — 1125F PR PAIN SEVERITY QUANTIFIED, PAIN PRESENT: ICD-10-PCS | Mod: CPTII,S$GLB,, | Performed by: UROLOGY

## 2022-08-30 PROCEDURE — 99214 PR OFFICE/OUTPT VISIT, EST, LEVL IV, 30-39 MIN: ICD-10-PCS | Mod: S$GLB,,, | Performed by: UROLOGY

## 2022-08-30 PROCEDURE — 1100F PR PT FALLS ASSESS DOC 2+ FALLS/FALL W/INJURY/YR: ICD-10-PCS | Mod: CPTII,S$GLB,, | Performed by: UROLOGY

## 2022-08-30 PROCEDURE — 3044F PR MOST RECENT HEMOGLOBIN A1C LEVEL <7.0%: ICD-10-PCS | Mod: CPTII,S$GLB,, | Performed by: UROLOGY

## 2022-08-30 PROCEDURE — 99999 PR PBB SHADOW E&M-EST. PATIENT-LVL III: ICD-10-PCS | Mod: PBBFAC,,, | Performed by: UROLOGY

## 2022-08-30 PROCEDURE — 99999 PR PBB SHADOW E&M-EST. PATIENT-LVL III: CPT | Mod: PBBFAC,,, | Performed by: UROLOGY

## 2022-08-30 PROCEDURE — 1159F PR MEDICATION LIST DOCUMENTED IN MEDICAL RECORD: ICD-10-PCS | Mod: CPTII,S$GLB,, | Performed by: UROLOGY

## 2022-08-30 PROCEDURE — 1160F RVW MEDS BY RX/DR IN RCRD: CPT | Mod: CPTII,S$GLB,, | Performed by: UROLOGY

## 2022-08-30 PROCEDURE — 4010F PR ACE/ARB THEARPY RXD/TAKEN: ICD-10-PCS | Mod: CPTII,S$GLB,, | Performed by: UROLOGY

## 2022-08-30 PROCEDURE — 1100F PTFALLS ASSESS-DOCD GE2>/YR: CPT | Mod: CPTII,S$GLB,, | Performed by: UROLOGY

## 2022-08-30 PROCEDURE — 99214 OFFICE O/P EST MOD 30 MIN: CPT | Mod: S$GLB,,, | Performed by: UROLOGY

## 2022-08-30 PROCEDURE — 4010F ACE/ARB THERAPY RXD/TAKEN: CPT | Mod: CPTII,S$GLB,, | Performed by: UROLOGY

## 2022-08-30 PROCEDURE — 3008F PR BODY MASS INDEX (BMI) DOCUMENTED: ICD-10-PCS | Mod: CPTII,S$GLB,, | Performed by: UROLOGY

## 2022-08-30 PROCEDURE — 3288F PR FALLS RISK ASSESSMENT DOCUMENTED: ICD-10-PCS | Mod: CPTII,S$GLB,, | Performed by: UROLOGY

## 2022-08-30 PROCEDURE — 1159F MED LIST DOCD IN RCRD: CPT | Mod: CPTII,S$GLB,, | Performed by: UROLOGY

## 2022-08-30 PROCEDURE — 3008F BODY MASS INDEX DOCD: CPT | Mod: CPTII,S$GLB,, | Performed by: UROLOGY

## 2022-08-30 PROCEDURE — 3288F FALL RISK ASSESSMENT DOCD: CPT | Mod: CPTII,S$GLB,, | Performed by: UROLOGY

## 2022-08-30 NOTE — PROGRESS NOTES
"Subjective:       Armand Painting is a 71 y.o. male who is an established patient who was referred by Dr Vargas  for evaluation of dysuria.      He recently saw PCP with c/o dysuria. UCx at that time showed moderate growth 50-99k Enterobacter. He was also increased to Flomax BID - urgency in AM, nocturia x 2-3, strong stream. Does not appear he was treated with antibiotics. He was given cream (nystatin-triamcinolone) for suspected balantis. He notes a small cut on foreskin. He does have some burning externally after voiding but denies any dysuria.     He also reports problems with ED. He was given Cialis by PCP but may not have worked well. He has not tried Viagra.     PVR (bladder scan) today - 45cc (not true PVR)    Last PSA 5/16 - 0.41    11/18/2019  Now reports intermittent dysuria that has returned. He reports this is more external irritation. The lesion has reappeared on foreskin. He is out of cream from PCP.   Also reports Viagra 100mg is not working well, worked only one time. He did not try it with emptying stomach.     3/3/2020  He is using Lotrisone cream. He reports penile lesion is still present but improving. Concerned about cost of cream.     8/4/2020  Still with mild urinary frequency (again unable to give specimen) and still with penile lesion. Lesion will come and go, returns when does not use Lotrisone. Did go away completely but now returned. Feels that is related to being wet due to not being circumcised.     2/4/2021  Still with penile lesion - "on and off". Improves with cream.     8/30/2022  Again concerned about wound on penis. Using cream most days. Can be painful. Lesion with come and go but never 100% resolves.     The following portions of the patient's history were reviewed and updated as appropriate: allergies, current medications, past family history, past medical history, past social history, past surgical history and problem list.    Review of Systems  Constitutional: no fever or " chills  ENT: no nasal congestion or sore throat  Respiratory: no cough or shortness of breath  Cardiovascular: no chest pain or palpitations  Gastrointestinal: no nausea or vomiting, tolerating diet  Genitourinary: as per HPI  Hematologic/Lymphatic: no easy bruising or lymphadenopathy  Musculoskeletal: no arthralgias or myalgias  Skin: no rashes or lesions  Neurological: no seizures or tremors  Behavioral/Psych: no auditory or visual hallucinations       Objective:    Vitals: Wt 121.5 kg (267 lb 13.7 oz)   BMI 40.73 kg/m²     Physical Exam   General: well developed, well nourished in no acute distress  Head: normocephalic, atraumatic  Neck: supple, trachea midline, no obvious enlargement of thyroid  HEENT: EOMI, mucus membranes moist, sclera anicteric, no hearing impairment  Lungs: symmetric expansion, non-labored breathing  Cardiovascular: regular rate and rhythm, normal pulses  Abdomen: soft, non tender, non distended, no palpable masses, no hepatosplenomegaly, no hernias, bladder nonpalpable. No CVA tenderness.  Musculoskeletal: no peripheral edema, normal ROM in bilateral upper and lower extremities  Lymphatics: no cervical or inguinal lymphadenopathy  Skin: no rashes or lesions  Neuro: alert and oriented x 3, no gross deficits  Psych: normal judgment and insight, normal mood/affect and non-anxious  Genitourinary:   Penis - uncircumcised penis without plaques or scarring. SMALL ROUND,FLAT LESION ON FORESKIN (R dorsal) (APPOX 1CM) GRAY, WELL HEALING +smegma. Urethra - orthotopic location without stenosis.  Scrotum  - no lesions or rashes, no hydrocele or hernia.  Testes - descended bilaterally, symmetric without masses, non tender.  Epididymides - no cysts or lesions, no spermatocele, symmetric   JASEN: patient declined exam      Lab Review   Urine analysis today in clinic shows - no urine      Lab Results   Component Value Date    WBC 6.52 08/09/2022    HGB 12.2 (L) 08/09/2022    HCT 39.2 (L) 08/09/2022    MCV 84  08/09/2022     08/09/2022     Lab Results   Component Value Date    CREATININE 1.1 08/09/2022    BUN 16 08/09/2022     Lab Results   Component Value Date    PSA 0.41 05/23/2016     No results found for: PSADIAG    Imaging  Reports and images were personally reviewed by me and discussed with the patient today         Assessment/Plan:      1. Nocturia    - Less bother with Flomax BID, continue   - Last PSA years ago but very low. Likely okay to hold on repeat check.      2. Dysuria    - Intermittent dysuria has returned   - Consider cystoscopy - seems to be more external     3. Penile lesion    - Lotrisone cream refilled   - Unsure etiology though has remained stable   - Discussed excisional biopsy thoroughly - he declines this. Discussed possible circumcision - he declines this. Again discussed - will consider next visit. Concern for infection risk with biopsy due to location of wound.    - Also discussed derm referral - declined but now open to this, referral placed.    - Again discussed circumcision - he will continue to consider this.      4. Erectile dysfunction due to arterial insufficiency    - Cialis did not work well   - Viagra 100mg on empty stomach (to [facility]) - not helpful. Again recommend empty stomach.    - Briefly discussed [facility] or [facility].        Follow up in 4 months

## 2022-10-07 RX ORDER — CLOTRIMAZOLE AND BETAMETHASONE DIPROPIONATE 10; .64 MG/G; MG/G
CREAM TOPICAL
Status: CANCELLED | OUTPATIENT
Start: 2022-10-07

## 2022-10-10 RX ORDER — CLOTRIMAZOLE AND BETAMETHASONE DIPROPIONATE 10; .64 MG/G; MG/G
CREAM TOPICAL
OUTPATIENT
Start: 2022-10-10

## 2022-10-31 ENCOUNTER — TELEPHONE (OUTPATIENT)
Dept: UROLOGY | Facility: CLINIC | Age: 72
End: 2022-10-31
Payer: MEDICARE

## 2022-10-31 NOTE — TELEPHONE ENCOUNTER
Lvm to have pt return call to clarify whether the pt felt that he needed his appointment with Dr. Perez to be seen  prior to his follow up with Dr. Pandey    ----- Message from Koffi Perez MD sent at 10/31/2022  2:24 PM CDT -----  Regarding: follow up  Katherin patient, last appt states follow up Dec 2022 ; does pt need to be seen sooner than previously recommended  follow up

## 2022-12-07 ENCOUNTER — OFFICE VISIT (OUTPATIENT)
Dept: FAMILY MEDICINE | Facility: CLINIC | Age: 72
End: 2022-12-07
Payer: MEDICARE

## 2022-12-07 VITALS
HEIGHT: 68 IN | BODY MASS INDEX: 40.73 KG/M2 | TEMPERATURE: 98 F | OXYGEN SATURATION: 95 % | SYSTOLIC BLOOD PRESSURE: 116 MMHG | WEIGHT: 268.75 LBS | DIASTOLIC BLOOD PRESSURE: 80 MMHG | HEART RATE: 86 BPM

## 2022-12-07 DIAGNOSIS — R60.0 PEDAL EDEMA: ICD-10-CM

## 2022-12-07 DIAGNOSIS — R06.09 DYSPNEA ON EXERTION: Primary | ICD-10-CM

## 2022-12-07 DIAGNOSIS — R73.03 PREDIABETES: Chronic | ICD-10-CM

## 2022-12-07 DIAGNOSIS — I10 ESSENTIAL HYPERTENSION: ICD-10-CM

## 2022-12-07 DIAGNOSIS — E66.01 MORBID OBESITY WITH BMI OF 40.0-44.9, ADULT: Chronic | ICD-10-CM

## 2022-12-07 DIAGNOSIS — N48.9 PENILE LESION: ICD-10-CM

## 2022-12-07 PROCEDURE — 3044F HG A1C LEVEL LT 7.0%: CPT | Mod: CPTII,S$GLB,, | Performed by: INTERNAL MEDICINE

## 2022-12-07 PROCEDURE — 1125F PR PAIN SEVERITY QUANTIFIED, PAIN PRESENT: ICD-10-PCS | Mod: CPTII,S$GLB,, | Performed by: INTERNAL MEDICINE

## 2022-12-07 PROCEDURE — 4010F PR ACE/ARB THEARPY RXD/TAKEN: ICD-10-PCS | Mod: CPTII,S$GLB,, | Performed by: INTERNAL MEDICINE

## 2022-12-07 PROCEDURE — 93010 EKG 12-LEAD: ICD-10-PCS | Mod: S$GLB,,, | Performed by: INTERNAL MEDICINE

## 2022-12-07 PROCEDURE — 1101F PT FALLS ASSESS-DOCD LE1/YR: CPT | Mod: CPTII,S$GLB,, | Performed by: INTERNAL MEDICINE

## 2022-12-07 PROCEDURE — 1160F PR REVIEW ALL MEDS BY PRESCRIBER/CLIN PHARMACIST DOCUMENTED: ICD-10-PCS | Mod: CPTII,S$GLB,, | Performed by: INTERNAL MEDICINE

## 2022-12-07 PROCEDURE — 99999 PR PBB SHADOW E&M-EST. PATIENT-LVL IV: CPT | Mod: PBBFAC,,, | Performed by: INTERNAL MEDICINE

## 2022-12-07 PROCEDURE — 99214 OFFICE O/P EST MOD 30 MIN: CPT | Mod: S$GLB,,, | Performed by: INTERNAL MEDICINE

## 2022-12-07 PROCEDURE — 3288F PR FALLS RISK ASSESSMENT DOCUMENTED: ICD-10-PCS | Mod: CPTII,S$GLB,, | Performed by: INTERNAL MEDICINE

## 2022-12-07 PROCEDURE — 3079F DIAST BP 80-89 MM HG: CPT | Mod: CPTII,S$GLB,, | Performed by: INTERNAL MEDICINE

## 2022-12-07 PROCEDURE — 3008F BODY MASS INDEX DOCD: CPT | Mod: CPTII,S$GLB,, | Performed by: INTERNAL MEDICINE

## 2022-12-07 PROCEDURE — 3074F SYST BP LT 130 MM HG: CPT | Mod: CPTII,S$GLB,, | Performed by: INTERNAL MEDICINE

## 2022-12-07 PROCEDURE — 1159F PR MEDICATION LIST DOCUMENTED IN MEDICAL RECORD: ICD-10-PCS | Mod: CPTII,S$GLB,, | Performed by: INTERNAL MEDICINE

## 2022-12-07 PROCEDURE — 3008F PR BODY MASS INDEX (BMI) DOCUMENTED: ICD-10-PCS | Mod: CPTII,S$GLB,, | Performed by: INTERNAL MEDICINE

## 2022-12-07 PROCEDURE — 1159F MED LIST DOCD IN RCRD: CPT | Mod: CPTII,S$GLB,, | Performed by: INTERNAL MEDICINE

## 2022-12-07 PROCEDURE — 1125F AMNT PAIN NOTED PAIN PRSNT: CPT | Mod: CPTII,S$GLB,, | Performed by: INTERNAL MEDICINE

## 2022-12-07 PROCEDURE — 3079F PR MOST RECENT DIASTOLIC BLOOD PRESSURE 80-89 MM HG: ICD-10-PCS | Mod: CPTII,S$GLB,, | Performed by: INTERNAL MEDICINE

## 2022-12-07 PROCEDURE — 4010F ACE/ARB THERAPY RXD/TAKEN: CPT | Mod: CPTII,S$GLB,, | Performed by: INTERNAL MEDICINE

## 2022-12-07 PROCEDURE — 99999 PR PBB SHADOW E&M-EST. PATIENT-LVL IV: ICD-10-PCS | Mod: PBBFAC,,, | Performed by: INTERNAL MEDICINE

## 2022-12-07 PROCEDURE — 93010 ELECTROCARDIOGRAM REPORT: CPT | Mod: S$GLB,,, | Performed by: INTERNAL MEDICINE

## 2022-12-07 PROCEDURE — 3288F FALL RISK ASSESSMENT DOCD: CPT | Mod: CPTII,S$GLB,, | Performed by: INTERNAL MEDICINE

## 2022-12-07 PROCEDURE — 93005 ELECTROCARDIOGRAM TRACING: CPT | Mod: S$GLB,,, | Performed by: INTERNAL MEDICINE

## 2022-12-07 PROCEDURE — 93005 EKG 12-LEAD: ICD-10-PCS | Mod: S$GLB,,, | Performed by: INTERNAL MEDICINE

## 2022-12-07 PROCEDURE — 3074F PR MOST RECENT SYSTOLIC BLOOD PRESSURE < 130 MM HG: ICD-10-PCS | Mod: CPTII,S$GLB,, | Performed by: INTERNAL MEDICINE

## 2022-12-07 PROCEDURE — 3044F PR MOST RECENT HEMOGLOBIN A1C LEVEL <7.0%: ICD-10-PCS | Mod: CPTII,S$GLB,, | Performed by: INTERNAL MEDICINE

## 2022-12-07 PROCEDURE — 99214 PR OFFICE/OUTPT VISIT, EST, LEVL IV, 30-39 MIN: ICD-10-PCS | Mod: S$GLB,,, | Performed by: INTERNAL MEDICINE

## 2022-12-07 PROCEDURE — 1101F PR PT FALLS ASSESS DOC 0-1 FALLS W/OUT INJ PAST YR: ICD-10-PCS | Mod: CPTII,S$GLB,, | Performed by: INTERNAL MEDICINE

## 2022-12-07 PROCEDURE — 1160F RVW MEDS BY RX/DR IN RCRD: CPT | Mod: CPTII,S$GLB,, | Performed by: INTERNAL MEDICINE

## 2022-12-07 RX ORDER — CLOTRIMAZOLE AND BETAMETHASONE DIPROPIONATE 10; .64 MG/G; MG/G
CREAM TOPICAL
Qty: 45 G | Refills: 0 | Status: SHIPPED | OUTPATIENT
Start: 2022-12-07 | End: 2024-03-11 | Stop reason: SDUPTHER

## 2022-12-07 NOTE — PROGRESS NOTES
This note was created by combination of typed  and M-Modal dictation.  Transcription errors may be present.  If there are any questions, please contact me.    Assessment and Plan:   Dyspnea on exertion  Pedal edema  -  New edema, leg weakness, had complained of dyspnea on exertion last visit, I referred him to Cardiology, he never got that set up.  EKG without acute changes.  Worrisome though for cardiopulmonary etiology.  Last chest x-ray done was negative.  Does not have symptoms consistent with respiratory infection type symptoms.    Check labs, check BNP.  Difficult to visualize JVP  If abnormal, start Lasix and stop HCTZ   Referral to cardiology   Hemodynamically stable.  No indication for referral to ER.  O2 sats are normal.  However discussed with him that if his dyspnea worsens, weakness worsens, if he develops chest pain, report to ED.  -     B-TYPE NATRIURETIC PEPTIDE; Future; Expected date: 12/07/2022  -     Ambulatory referral/consult to Cardiology; Future; Expected date: 12/14/2022  -     EKG 12-lead    Essential hypertension no outside checks; 3/2014 exercise stress echo no ischemia LVEF 55-60  - BP normotensive    Prediabetes  Morbid obesity with BMI of 40.0-44.9, adult  - update A1c    Penile lesion  - refilled Lotrisone.  Seen by Urology who had recommended excisional biopsy but the patient was resistant.  -     clotrimazole-betamethasone 1-0.05% (LOTRISONE) cream; APPLY TO AFFECTED AREA TWICE A DAY  Dispense: 45 g; Refill: 0        Medications Discontinued During This Encounter   Medication Reason    clotrimazole-betamethasone 1-0.05% (LOTRISONE) cream Reorder       meds sent this encounter:  Medications Ordered This Encounter   Medications    clotrimazole-betamethasone 1-0.05% (LOTRISONE) cream     Sig: APPLY TO AFFECTED AREA TWICE A DAY     Dispense:  45 g     Refill:  0       Follow Up: No follow-ups on file.    Subjective:     Chief Complaint   Patient presents with    Shortness of  Breath    Leg Swelling    Fatigue    Dizziness         HPI  Armand is a 72 y.o. male.     Social History     Tobacco Use    Smoking status: Former     Types: Cigarettes     Quit date: 2009     Years since quittin.9    Smokeless tobacco: Former   Substance Use Topics    Alcohol use: Yes     Comment: occasional          Social History     Social History Narrative    Not on file       Last appointment with this clinic was 2022. Last visit with me 2022   CHAO. CXR negative. Referred to cards. Did not get set up.  preDM stable; metformin  HTN stable   Lipid/statin  Gout, uric acid went up. No change in allopurinol.  BPH  Penile lesion. Saw urology. They discussed excisional bx, he refused.   SIS.  Insomnia.  Fatigue with Benadryl.  Needs to work on sleep hygiene.  Had not been using CPAP  Alpha-thalassemia silent carrier     Comes in today complaining of dyspnea with exertion, insidious in onset he estimates over the past 3 months or so.  Notes that his legs are swollen and they are heavy and it is hard for him to get up out of a chair without upper body assistance.  Similarly with getting out of bed.    Gets some pain in his low back.  Last visit with me in August, no pedal edema.  His blood pressure today is good, heart rate is good on oxygen level is okay.  Denies chest pain but does note dyspnea on exertion continued.    Does not recall the details of her last visit.  Had previously wanted him to get evaluated with Cardiology and I am not sure why that did not get set up.    Though I do see that there was a telephone call 2022  regarding inquire about a cardiology consult.    No smothering sensation in his sleep.      Needs refill on his cream.  For his penile lesion.  Discussed that Urology recommended that he pursue biopsy of a nonhealing lesion.  He seems to be more focused on just getting refill on the cream.    Not using CPAP machine.  Just stopped using it.     Patient Care Team:  [place]  PHILLIP Vargas MD as PCP - General (Internal Medicine)  Fernando Jacinto MD as Consulting Physician (Orthopedic Surgery)  Venkata Caruso MD as Consulting Physician (Neurosurgery)  Mina Betancourt MA as Care Coordinator  Aureliano Mcneil MD as Consulting Physician (Ophthalmology)  Maricel Cotter OD as Consulting Physician (Optometry)    Patient Active Problem List    Diagnosis Date Noted    Macular hole, right eye 04/20/2021    Chronic pain 08/07/2020    Chronic pain disorder 03/10/2020    Cervical post-laminectomy syndrome 03/10/2020    Lumbar spondylosis 03/10/2020    Alpha thalassemia silent carrier 11/14/2019    Neck pain 09/06/2019    Poor posture 09/06/2019    Tubular adenoma of colon 10/2018 colonoscopy sigmoid tubular adenoma 11/02/2018    Diverticulosis of large intestine without hemorrhage on colonoscopy 10/2018 11/01/2018    SIS (obstructive sleep apnea) on sleep study 4/2018 with AHI 55.  CPAP at 14     Nuclear sclerosis of both eyes 01/16/2018    Refractive error 01/16/2018    Benign prostatic hyperplasia with urinary frequency 01/10/2018    Hemorrhoids 06/01/2016    Cervical stenosis of spinal canal 3/2016 C3-7 laminoplasty 03/31/2016     3/31/2016 pt underwent a C3 through C7 Laminoplasty with posterior cervical approach      Prediabetes 03/29/2016    Essential hypertension no outside checks; 3/2014 exercise stress echo no ischemia LVEF 55-60 03/20/2014     3/11/2014 exercise stress echo negative for ischemia.  Normal LV systolic function LVEF 55-60.  Concentric remodeling.      Mixed hyperlipidemia 03/20/2014    Morbid obesity with BMI of 40.0-44.9, adult 03/06/2014    Back pain 07/11/2013    ED (erectile dysfunction) 06/07/2013    Gout, arthritis 01/07/2013       PAST MEDICAL PROBLEMS, PAST SURGICAL HISTORY: please see relevant portions of the electronic medical record    ALLERGIES AND MEDICATIONS: updated and reviewed.  Review of patient's allergies indicates:   Allergen Reactions    Codeine Swelling  "    Codeine with Aspirin caused chest pain       Medication List with Changes/Refills   Current Medications    ALLOPURINOL (ZYLOPRIM) 100 MG TABLET    Take 1 tablet (100 mg total) by mouth once daily.    ASPIRIN (ECOTRIN) 81 MG EC TABLET    Take 81 mg by mouth once daily.    CLOTRIMAZOLE-BETAMETHASONE 1-0.05% (LOTRISONE) CREAM    APPLY TO AFFECTED AREA TWICE A DAY    GLUCOSAMINE/CHONDR WILSON A SOD (OSTEO BI-FLEX ORAL)    Take by mouth.    HYDROCHLOROTHIAZIDE (HYDRODIURIL) 25 MG TABLET    Take 1 tablet (25 mg total) by mouth once daily.    KETOCONAZOLE (NIZORAL) 2 % CREAM    Apply topically once daily. To the armpits    LISINOPRIL 10 MG TABLET    Take 1 tablet (10 mg total) by mouth once daily.    METFORMIN (GLUCOPHAGE-XR) 500 MG ER 24HR TABLET    Take 1 tablet (500 mg total) by mouth once daily.    MULTIVITAMIN (ONE-A-DAY ESSENTIAL ORAL)    Take by mouth.    PRAVASTATIN (PRAVACHOL) 20 MG TABLET    Take 1 tablet (20 mg total) by mouth once daily.    TAMSULOSIN (FLOMAX) 0.4 MG CAP    Take 2 capsules (0.8 mg total) by mouth once daily. Increased dose        Objective:   Physical Exam   Vitals:    12/07/22 1508   BP: 116/80   BP Location: Left arm   Patient Position: Sitting   BP Method: Large (Manual)   Pulse: 86   Temp: 98.2 °F (36.8 °C)   TempSrc: Oral   SpO2: 95%   Weight: 121.9 kg (268 lb 11.9 oz)   Height: 5' 8" (1.727 m)    Body mass index is 40.86 kg/m².  Weight: 121.9 kg (268 lb 11.9 oz)   Height: 5' 8" (172.7 cm)     Physical Exam  Constitutional:       General: He is not in acute distress.     Appearance: He is well-developed.   Eyes:      Extraocular Movements: Extraocular movements intact.   Cardiovascular:      Rate and Rhythm: Normal rate and regular rhythm.      Heart sounds: Normal heart sounds. No murmur heard.     Comments: Unable to visualize JVP  No gallop  Pulmonary:      Effort: Pulmonary effort is normal.      Breath sounds: Normal breath sounds.   Abdominal:      General: There is no distension.    "   Tenderness: There is no abdominal tenderness. There is no guarding.   Musculoskeletal:         General: Normal range of motion.      Right lower leg: Edema present.      Left lower leg: Edema present.      Comments: Edema to mid tibia bilaterally   Skin:     General: Skin is warm and dry.   Neurological:      Mental Status: He is alert and oriented to person, place, and time.      Coordination: Coordination normal.   Psychiatric:         Behavior: Behavior normal.         Thought Content: Thought content normal.         EKG   Normal sinus rhythm, left axis deviation, incomplete right bundle-branch block.  No acute ST /T-wave abnormalities

## 2022-12-09 ENCOUNTER — OFFICE VISIT (OUTPATIENT)
Dept: CARDIOLOGY | Facility: CLINIC | Age: 72
End: 2022-12-09
Payer: MEDICARE

## 2022-12-09 VITALS
HEART RATE: 61 BPM | DIASTOLIC BLOOD PRESSURE: 76 MMHG | SYSTOLIC BLOOD PRESSURE: 124 MMHG | HEIGHT: 68 IN | WEIGHT: 272.38 LBS | BODY MASS INDEX: 41.28 KG/M2 | OXYGEN SATURATION: 96 % | RESPIRATION RATE: 18 BRPM

## 2022-12-09 DIAGNOSIS — G47.33 OSA (OBSTRUCTIVE SLEEP APNEA): ICD-10-CM

## 2022-12-09 DIAGNOSIS — E78.2 MIXED HYPERLIPIDEMIA: ICD-10-CM

## 2022-12-09 DIAGNOSIS — R06.09 DYSPNEA ON EXERTION: ICD-10-CM

## 2022-12-09 DIAGNOSIS — I10 ESSENTIAL HYPERTENSION: Primary | ICD-10-CM

## 2022-12-09 DIAGNOSIS — R07.89 CHEST PAIN, ATYPICAL: ICD-10-CM

## 2022-12-09 DIAGNOSIS — R06.09 DOE (DYSPNEA ON EXERTION): ICD-10-CM

## 2022-12-09 PROCEDURE — 99204 OFFICE O/P NEW MOD 45 MIN: CPT | Mod: S$GLB,,, | Performed by: INTERNAL MEDICINE

## 2022-12-09 PROCEDURE — 4010F PR ACE/ARB THEARPY RXD/TAKEN: ICD-10-PCS | Mod: CPTII,S$GLB,, | Performed by: INTERNAL MEDICINE

## 2022-12-09 PROCEDURE — 3078F PR MOST RECENT DIASTOLIC BLOOD PRESSURE < 80 MM HG: ICD-10-PCS | Mod: CPTII,S$GLB,, | Performed by: INTERNAL MEDICINE

## 2022-12-09 PROCEDURE — 4010F ACE/ARB THERAPY RXD/TAKEN: CPT | Mod: CPTII,S$GLB,, | Performed by: INTERNAL MEDICINE

## 2022-12-09 PROCEDURE — 3044F HG A1C LEVEL LT 7.0%: CPT | Mod: CPTII,S$GLB,, | Performed by: INTERNAL MEDICINE

## 2022-12-09 PROCEDURE — 1101F PR PT FALLS ASSESS DOC 0-1 FALLS W/OUT INJ PAST YR: ICD-10-PCS | Mod: CPTII,S$GLB,, | Performed by: INTERNAL MEDICINE

## 2022-12-09 PROCEDURE — 99204 PR OFFICE/OUTPT VISIT, NEW, LEVL IV, 45-59 MIN: ICD-10-PCS | Mod: S$GLB,,, | Performed by: INTERNAL MEDICINE

## 2022-12-09 PROCEDURE — 3288F FALL RISK ASSESSMENT DOCD: CPT | Mod: CPTII,S$GLB,, | Performed by: INTERNAL MEDICINE

## 2022-12-09 PROCEDURE — 1126F AMNT PAIN NOTED NONE PRSNT: CPT | Mod: CPTII,S$GLB,, | Performed by: INTERNAL MEDICINE

## 2022-12-09 PROCEDURE — 99999 PR PBB SHADOW E&M-EST. PATIENT-LVL IV: ICD-10-PCS | Mod: PBBFAC,,, | Performed by: INTERNAL MEDICINE

## 2022-12-09 PROCEDURE — 99499 UNLISTED E&M SERVICE: CPT | Mod: S$GLB,,, | Performed by: INTERNAL MEDICINE

## 2022-12-09 PROCEDURE — 1101F PT FALLS ASSESS-DOCD LE1/YR: CPT | Mod: CPTII,S$GLB,, | Performed by: INTERNAL MEDICINE

## 2022-12-09 PROCEDURE — 3288F PR FALLS RISK ASSESSMENT DOCUMENTED: ICD-10-PCS | Mod: CPTII,S$GLB,, | Performed by: INTERNAL MEDICINE

## 2022-12-09 PROCEDURE — 1159F MED LIST DOCD IN RCRD: CPT | Mod: CPTII,S$GLB,, | Performed by: INTERNAL MEDICINE

## 2022-12-09 PROCEDURE — 3074F PR MOST RECENT SYSTOLIC BLOOD PRESSURE < 130 MM HG: ICD-10-PCS | Mod: CPTII,S$GLB,, | Performed by: INTERNAL MEDICINE

## 2022-12-09 PROCEDURE — 3008F BODY MASS INDEX DOCD: CPT | Mod: CPTII,S$GLB,, | Performed by: INTERNAL MEDICINE

## 2022-12-09 PROCEDURE — 3078F DIAST BP <80 MM HG: CPT | Mod: CPTII,S$GLB,, | Performed by: INTERNAL MEDICINE

## 2022-12-09 PROCEDURE — 3044F PR MOST RECENT HEMOGLOBIN A1C LEVEL <7.0%: ICD-10-PCS | Mod: CPTII,S$GLB,, | Performed by: INTERNAL MEDICINE

## 2022-12-09 PROCEDURE — 99999 PR PBB SHADOW E&M-EST. PATIENT-LVL IV: CPT | Mod: PBBFAC,,, | Performed by: INTERNAL MEDICINE

## 2022-12-09 PROCEDURE — 3074F SYST BP LT 130 MM HG: CPT | Mod: CPTII,S$GLB,, | Performed by: INTERNAL MEDICINE

## 2022-12-09 PROCEDURE — 1159F PR MEDICATION LIST DOCUMENTED IN MEDICAL RECORD: ICD-10-PCS | Mod: CPTII,S$GLB,, | Performed by: INTERNAL MEDICINE

## 2022-12-09 PROCEDURE — 99499 RISK ADDL DX/OHS AUDIT: ICD-10-PCS | Mod: S$GLB,,, | Performed by: INTERNAL MEDICINE

## 2022-12-09 PROCEDURE — 1126F PR PAIN SEVERITY QUANTIFIED, NO PAIN PRESENT: ICD-10-PCS | Mod: CPTII,S$GLB,, | Performed by: INTERNAL MEDICINE

## 2022-12-09 PROCEDURE — 3008F PR BODY MASS INDEX (BMI) DOCUMENTED: ICD-10-PCS | Mod: CPTII,S$GLB,, | Performed by: INTERNAL MEDICINE

## 2022-12-09 NOTE — PROGRESS NOTES
Subjective:    Patient ID:  Armand Painting is a 72 y.o. male who presents for evaluation of Consult      HPI    HTN, DM, HLD, SIS, obesity    Referred by Dr Vargas  Last appointment with this clinic was 8/16/2022. Last visit with me 8/16/2022   CHAO. CXR negative. Referred to cards. Did not get set up.  preDM stable; metformin  HTN stable   Lipid/statin  Gout, uric acid went up. No change in allopurinol.  BPH  Penile lesion. Saw urology. They discussed excisional bx, he refused.   SIS.  Insomnia.  Fatigue with Benadryl.  Needs to work on sleep hygiene.  Had not been using CPAP  Alpha-thalassemia silent carrier      Comes in today complaining of dyspnea with exertion, insidious in onset he estimates over the past 3 months or so.  Notes that his legs are swollen and they are heavy and it is hard for him to get up out of a chair without upper body assistance.  Similarly with getting out of bed.    Gets some pain in his low back.  Last visit with me in August, no pedal edema.  His blood pressure today is good, heart rate is good on oxygen level is okay.  Denies chest pain but does note dyspnea on exertion continued.    Does not recall the details of her last visit.  Had previously wanted him to get evaluated with Cardiology and I am not sure why that did not get set up.    Though I do see that there was a telephone call 08/23/2022  regarding inquire about a cardiology consult.    No smothering sensation in his sleep.    Stress echo 3/11/14    1 - Normal left ventricular systolic function (EF 55-60%).     2 - Concentric remodeling.     3 - Left ventricular diastolic dysfunction.     4 - Trivial mitral regurgitation.     5 - Mild tricuspid regurgitation.     6 - Mild pulmonic regurgitation.   No evidence of stress induced myocardial ischemia.     EKG 12/7/22 NSR LAD IRBBB    12/9/22 Here for worsening CHAO - occasional chest tightness. Has a lawn service - rides on mower  BP controlled    Review of Systems   Constitutional:  Negative for decreased appetite.   HENT:  Negative for ear discharge.    Eyes:  Negative for blurred vision.   Endocrine: Negative for polyphagia.   Skin:  Negative for nail changes.   Genitourinary:  Negative for bladder incontinence.   Neurological:  Negative for aphonia.   Psychiatric/Behavioral:  Negative for hallucinations.    Allergic/Immunologic: Negative for hives.      Objective:    Physical Exam  Constitutional:       Appearance: He is well-developed.   HENT:      Head: Normocephalic and atraumatic.   Eyes:      Conjunctiva/sclera: Conjunctivae normal.      Pupils: Pupils are equal, round, and reactive to light.   Cardiovascular:      Rate and Rhythm: Normal rate.      Pulses: Intact distal pulses.      Heart sounds: Normal heart sounds.   Pulmonary:      Effort: Pulmonary effort is normal.      Breath sounds: Normal breath sounds.   Abdominal:      General: Bowel sounds are normal.      Palpations: Abdomen is soft.   Musculoskeletal:         General: Normal range of motion.      Cervical back: Normal range of motion and neck supple.   Skin:     General: Skin is warm and dry.   Neurological:      Mental Status: He is alert and oriented to person, place, and time.         Assessment:       1. Essential hypertension no outside checks; 3/2014 exercise stress echo no ischemia LVEF 55-60    2. Dyspnea on exertion    3. Mixed hyperlipidemia    4. SIS (obstructive sleep apnea) on sleep study 4/2018 with AHI 55.  CPAP at 15    5. CHAO (dyspnea on exertion)    6. Chest pain, atypical         Plan:       Echo and lexiscan myoview for CHAO and CP - say he cannot walk treadmill  Continue Rx for HTN, DM, HLD, SIS

## 2022-12-16 ENCOUNTER — HOSPITAL ENCOUNTER (OUTPATIENT)
Dept: RADIOLOGY | Facility: HOSPITAL | Age: 72
Discharge: HOME | End: 2022-12-16
Attending: INTERNAL MEDICINE
Payer: MEDICARE

## 2022-12-16 ENCOUNTER — HOSPITAL ENCOUNTER (OUTPATIENT)
Dept: CARDIOLOGY | Facility: HOSPITAL | Age: 72
Discharge: HOME | End: 2022-12-16
Attending: INTERNAL MEDICINE
Payer: MEDICARE

## 2022-12-16 DIAGNOSIS — E78.2 MIXED HYPERLIPIDEMIA: ICD-10-CM

## 2022-12-16 DIAGNOSIS — R06.09 DOE (DYSPNEA ON EXERTION): ICD-10-CM

## 2022-12-16 DIAGNOSIS — G47.33 OSA (OBSTRUCTIVE SLEEP APNEA): ICD-10-CM

## 2022-12-16 DIAGNOSIS — I10 ESSENTIAL HYPERTENSION: ICD-10-CM

## 2022-12-16 DIAGNOSIS — R07.89 CHEST PAIN, ATYPICAL: ICD-10-CM

## 2022-12-16 DIAGNOSIS — R06.09 DYSPNEA ON EXERTION: ICD-10-CM

## 2022-12-16 LAB
ASCENDING AORTA: 3.92 CM
AV PEAK GRADIENT: 7 MMHG
AV VELOCITY RATIO: 0.73
CV ECHO LV RWT: 0.44 CM
CV STRESS BASE HR: 57 BPM
DIASTOLIC BLOOD PRESSURE: 90 MMHG
DOP CALC AO PEAK VEL: 1.28 M/S
DOP CALC LVOT AREA: 3.2 CM2
DOP CALC LVOT DIAMETER: 2.02 CM
DOP CALC LVOT PEAK VEL: 0.93 M/S
DOP CALC LVOT STROKE VOLUME: 69.19 CM3
DOP CALCLVOT PEAK VEL VTI: 21.6 CM
E WAVE DECELERATION TIME: 225.09 MSEC
E/A RATIO: 0.71
E/E' RATIO: 20.25 M/S
ECHO LV POSTERIOR WALL: 1.15 CM (ref 0.6–1.1)
EJECTION FRACTION: 55 %
FRACTIONAL SHORTENING: 28 % (ref 28–44)
INTERVENTRICULAR SEPTUM: 1.11 CM (ref 0.6–1.1)
IVC DIAMETER: 2.1 CM
IVRT: 96.89 MSEC
LA MAJOR: 5.08 CM
LA MINOR: 5.67 CM
LA WIDTH: 5 CM
LEFT ATRIUM SIZE: 4.96 CM
LEFT ATRIUM VOLUME: 112.96 CM3
LEFT INTERNAL DIMENSION IN SYSTOLE: 3.76 CM (ref 2.1–4)
LEFT VENTRICLE DIASTOLIC VOLUME: 130.23 ML
LEFT VENTRICLE SYSTOLIC VOLUME: 60.26 ML
LEFT VENTRICULAR INTERNAL DIMENSION IN DIASTOLE: 5.21 CM (ref 3.5–6)
LEFT VENTRICULAR MASS: 229.74 G
LV LATERAL E/E' RATIO: 16.2 M/S
LV SEPTAL E/E' RATIO: 27 M/S
LVOT MG: 1.77 MMHG
LVOT MV: 0.62 CM/S
MV PEAK A VEL: 1.14 M/S
MV PEAK E VEL: 0.81 M/S
MV STENOSIS PRESSURE HALF TIME: 65.28 MS
MV VALVE AREA P 1/2 METHOD: 3.37 CM2
NUC STRESS EJECTION FRACTION: 64 %
OHS CV CPX 85 PERCENT MAX PREDICTED HEART RATE MALE: 125
OHS CV CPX MAX PREDICTED HEART RATE: 147
OHS CV CPX PATIENT IS FEMALE: 0
OHS CV CPX PATIENT IS MALE: 1
OHS CV CPX PEAK DIASTOLIC BLOOD PRESSURE: 81 MMHG
OHS CV CPX PEAK HEAR RATE: 80 BPM
OHS CV CPX PEAK RATE PRESSURE PRODUCT: 9920
OHS CV CPX PEAK SYSTOLIC BLOOD PRESSURE: 124 MMHG
OHS CV CPX PERCENT MAX PREDICTED HEART RATE ACHIEVED: 54
OHS CV CPX RATE PRESSURE PRODUCT PRESENTING: 7353
PISA TR MAX VEL: 1.34 M/S
PV PEAK VELOCITY: 0.97 CM/S
RA MAJOR: 5.08 CM
RA PRESSURE: 3 MMHG
RA WIDTH: 4.8 CM
RIGHT VENTRICULAR END-DIASTOLIC DIMENSION: 4.13 CM
SINUS: 4.3 CM
STJ: 3.18 CM
SYSTOLIC BLOOD PRESSURE: 129 MMHG
TDI LATERAL: 0.05 M/S
TDI SEPTAL: 0.03 M/S
TDI: 0.04 M/S
TR MAX PG: 7 MMHG
TRICUSPID ANNULAR PLANE SYSTOLIC EXCURSION: 2.4 CM
TV REST PULMONARY ARTERY PRESSURE: 10 MMHG

## 2022-12-16 PROCEDURE — 93018 CV STRESS TEST I&R ONLY: CPT | Mod: ,,, | Performed by: INTERNAL MEDICINE

## 2022-12-16 PROCEDURE — 78452 HT MUSCLE IMAGE SPECT MULT: CPT | Mod: 26,,, | Performed by: INTERNAL MEDICINE

## 2022-12-16 PROCEDURE — 93306 TTE W/DOPPLER COMPLETE: CPT | Mod: 26,,, | Performed by: INTERNAL MEDICINE

## 2022-12-16 PROCEDURE — 93016 NUCLEAR STRESS - CARDIOLOGY INTERPRETED (CUPID ONLY): ICD-10-PCS | Mod: ,,, | Performed by: INTERNAL MEDICINE

## 2022-12-16 PROCEDURE — 93016 CV STRESS TEST SUPVJ ONLY: CPT | Mod: ,,, | Performed by: INTERNAL MEDICINE

## 2022-12-16 PROCEDURE — 93017 CV STRESS TEST TRACING ONLY: CPT

## 2022-12-16 PROCEDURE — 93018 NUCLEAR STRESS - CARDIOLOGY INTERPRETED (CUPID ONLY): ICD-10-PCS | Mod: ,,, | Performed by: INTERNAL MEDICINE

## 2022-12-16 PROCEDURE — 78452 NUCLEAR STRESS - CARDIOLOGY INTERPRETED (CUPID ONLY): ICD-10-PCS | Mod: 26,,, | Performed by: INTERNAL MEDICINE

## 2022-12-16 PROCEDURE — 93306 ECHO (CUPID ONLY): ICD-10-PCS | Mod: 26,,, | Performed by: INTERNAL MEDICINE

## 2022-12-16 PROCEDURE — 93306 TTE W/DOPPLER COMPLETE: CPT

## 2022-12-16 PROCEDURE — 63600175 PHARM REV CODE 636 W HCPCS: Performed by: INTERNAL MEDICINE

## 2022-12-16 PROCEDURE — 78452 HT MUSCLE IMAGE SPECT MULT: CPT

## 2022-12-16 RX ORDER — REGADENOSON 0.08 MG/ML
0.4 INJECTION, SOLUTION INTRAVENOUS ONCE
Status: COMPLETED | OUTPATIENT
Start: 2022-12-16 | End: 2022-12-16

## 2022-12-16 RX ADMIN — REGADENOSON 0.4 MG: 0.08 INJECTION, SOLUTION INTRAVENOUS at 09:12

## 2022-12-30 ENCOUNTER — TELEPHONE (OUTPATIENT)
Dept: UROLOGY | Facility: CLINIC | Age: 72
End: 2022-12-30
Payer: MEDICARE

## 2023-01-03 ENCOUNTER — OFFICE VISIT (OUTPATIENT)
Dept: CARDIOLOGY | Facility: CLINIC | Age: 73
End: 2023-01-03
Payer: MEDICARE

## 2023-01-03 VITALS
OXYGEN SATURATION: 96 % | WEIGHT: 269.19 LBS | HEART RATE: 68 BPM | DIASTOLIC BLOOD PRESSURE: 78 MMHG | RESPIRATION RATE: 20 BRPM | SYSTOLIC BLOOD PRESSURE: 102 MMHG | BODY MASS INDEX: 40.93 KG/M2

## 2023-01-03 DIAGNOSIS — R06.09 DOE (DYSPNEA ON EXERTION): ICD-10-CM

## 2023-01-03 DIAGNOSIS — R07.89 CHEST PAIN, ATYPICAL: ICD-10-CM

## 2023-01-03 DIAGNOSIS — E78.2 MIXED HYPERLIPIDEMIA: ICD-10-CM

## 2023-01-03 DIAGNOSIS — I10 ESSENTIAL HYPERTENSION: Primary | ICD-10-CM

## 2023-01-03 PROCEDURE — 99214 PR OFFICE/OUTPT VISIT, EST, LEVL IV, 30-39 MIN: ICD-10-PCS | Mod: S$GLB,,, | Performed by: INTERNAL MEDICINE

## 2023-01-03 PROCEDURE — 3288F PR FALLS RISK ASSESSMENT DOCUMENTED: ICD-10-PCS | Mod: CPTII,S$GLB,, | Performed by: INTERNAL MEDICINE

## 2023-01-03 PROCEDURE — 99499 UNLISTED E&M SERVICE: CPT | Mod: S$GLB,,, | Performed by: INTERNAL MEDICINE

## 2023-01-03 PROCEDURE — 3008F PR BODY MASS INDEX (BMI) DOCUMENTED: ICD-10-PCS | Mod: CPTII,S$GLB,, | Performed by: INTERNAL MEDICINE

## 2023-01-03 PROCEDURE — 3078F PR MOST RECENT DIASTOLIC BLOOD PRESSURE < 80 MM HG: ICD-10-PCS | Mod: CPTII,S$GLB,, | Performed by: INTERNAL MEDICINE

## 2023-01-03 PROCEDURE — 3288F FALL RISK ASSESSMENT DOCD: CPT | Mod: CPTII,S$GLB,, | Performed by: INTERNAL MEDICINE

## 2023-01-03 PROCEDURE — 3074F SYST BP LT 130 MM HG: CPT | Mod: CPTII,S$GLB,, | Performed by: INTERNAL MEDICINE

## 2023-01-03 PROCEDURE — 1159F MED LIST DOCD IN RCRD: CPT | Mod: CPTII,S$GLB,, | Performed by: INTERNAL MEDICINE

## 2023-01-03 PROCEDURE — 99214 OFFICE O/P EST MOD 30 MIN: CPT | Mod: S$GLB,,, | Performed by: INTERNAL MEDICINE

## 2023-01-03 PROCEDURE — 1101F PT FALLS ASSESS-DOCD LE1/YR: CPT | Mod: CPTII,S$GLB,, | Performed by: INTERNAL MEDICINE

## 2023-01-03 PROCEDURE — 1159F PR MEDICATION LIST DOCUMENTED IN MEDICAL RECORD: ICD-10-PCS | Mod: CPTII,S$GLB,, | Performed by: INTERNAL MEDICINE

## 2023-01-03 PROCEDURE — 3074F PR MOST RECENT SYSTOLIC BLOOD PRESSURE < 130 MM HG: ICD-10-PCS | Mod: CPTII,S$GLB,, | Performed by: INTERNAL MEDICINE

## 2023-01-03 PROCEDURE — 3078F DIAST BP <80 MM HG: CPT | Mod: CPTII,S$GLB,, | Performed by: INTERNAL MEDICINE

## 2023-01-03 PROCEDURE — 3008F BODY MASS INDEX DOCD: CPT | Mod: CPTII,S$GLB,, | Performed by: INTERNAL MEDICINE

## 2023-01-03 PROCEDURE — 99999 PR PBB SHADOW E&M-EST. PATIENT-LVL III: ICD-10-PCS | Mod: PBBFAC,,, | Performed by: INTERNAL MEDICINE

## 2023-01-03 PROCEDURE — 1126F PR PAIN SEVERITY QUANTIFIED, NO PAIN PRESENT: ICD-10-PCS | Mod: CPTII,S$GLB,, | Performed by: INTERNAL MEDICINE

## 2023-01-03 PROCEDURE — 1101F PR PT FALLS ASSESS DOC 0-1 FALLS W/OUT INJ PAST YR: ICD-10-PCS | Mod: CPTII,S$GLB,, | Performed by: INTERNAL MEDICINE

## 2023-01-03 PROCEDURE — 99999 PR PBB SHADOW E&M-EST. PATIENT-LVL III: CPT | Mod: PBBFAC,,, | Performed by: INTERNAL MEDICINE

## 2023-01-03 PROCEDURE — 99499 RISK ADDL DX/OHS AUDIT: ICD-10-PCS | Mod: S$GLB,,, | Performed by: INTERNAL MEDICINE

## 2023-01-03 PROCEDURE — 1126F AMNT PAIN NOTED NONE PRSNT: CPT | Mod: CPTII,S$GLB,, | Performed by: INTERNAL MEDICINE

## 2023-01-03 NOTE — PROGRESS NOTES
Subjective:    Patient ID:  Armand Painting is a 72 y.o. male who presents for follow-up of No chief complaint on file.      HPI    HTN, DM, HLD, SIS, obesity     Referred by Dr Vargas  Last appointment with this clinic was 8/16/2022. Last visit with me 8/16/2022   CHAO. CXR negative. Referred to cards. Did not get set up.  preDM stable; metformin  HTN stable   Lipid/statin  Gout, uric acid went up. No change in allopurinol.  BPH  Penile lesion. Saw urology. They discussed excisional bx, he refused.   SIS.  Insomnia.  Fatigue with Benadryl.  Needs to work on sleep hygiene.  Had not been using CPAP  Alpha-thalassemia silent carrier      Comes in today complaining of dyspnea with exertion, insidious in onset he estimates over the past 3 months or so.  Notes that his legs are swollen and they are heavy and it is hard for him to get up out of a chair without upper body assistance.  Similarly with getting out of bed.    Gets some pain in his low back.  Last visit with me in August, no pedal edema.  His blood pressure today is good, heart rate is good on oxygen level is okay.  Denies chest pain but does note dyspnea on exertion continued.    Does not recall the details of her last visit.  Had previously wanted him to get evaluated with Cardiology and I am not sure why that did not get set up.    Though I do see that there was a telephone call 08/23/2022  regarding inquire about a cardiology consult.    No smothering sensation in his sleep.    Stress test 12/16/22    Normal myocardial perfusion scan. There is no evidence of myocardial ischemia or infarction.    There is a  mild intensity fixed perfusion abnormality in the inferior wall of the left ventricle secondary to diaphragm attenuation.    There is mild apical thinning which is a normal variant.    The gated perfusion images showed an ejection fraction of 64% post stress.    The ECG portion of the study is negative for ischemia.    The patient reported no chest pain during  the stress test.    There were no arrhythmias during stress.    Echo 12/16/22  The left ventricle is normal in size with concentric hypertrophy and normal systolic function.  The estimated ejection fraction is 55%.  Indeterminate left ventricular diastolic function.  Normal right ventricular size with normal right ventricular systolic function.  Mild left atrial enlargement.  Normal central venous pressure (3 mmHg).  The estimated PA systolic pressure is 10 mmHg.     Stress echo 3/11/14    1 - Normal left ventricular systolic function (EF 55-60%).     2 - Concentric remodeling.     3 - Left ventricular diastolic dysfunction.     4 - Trivial mitral regurgitation.     5 - Mild tricuspid regurgitation.     6 - Mild pulmonic regurgitation.   No evidence of stress induced myocardial ischemia.      EKG 12/7/22 NSR LAD IRBBB     12/9/22 Here for worsening CHAO - occasional chest tightness. Has a lawn service - rides on mower  BP controlled  Echo and lexiscan myoview for CHAO and CP - say he cannot walk treadmill  Continue Rx for HTN, DM, HLD, SIS        1/3/23 Denies CP, CHAO improving  BP controlled    Review of Systems   Constitutional: Negative for decreased appetite.   HENT:  Negative for ear discharge.    Eyes:  Negative for blurred vision.   Endocrine: Negative for polyphagia.   Skin:  Negative for nail changes.   Genitourinary:  Negative for bladder incontinence.   Neurological:  Negative for aphonia.   Psychiatric/Behavioral:  Negative for hallucinations.    Allergic/Immunologic: Negative for hives.      Objective:    Physical Exam  Constitutional:       Appearance: He is well-developed.   HENT:      Head: Normocephalic and atraumatic.   Eyes:      Conjunctiva/sclera: Conjunctivae normal.      Pupils: Pupils are equal, round, and reactive to light.   Cardiovascular:      Rate and Rhythm: Normal rate.      Pulses: Intact distal pulses.      Heart sounds: Normal heart sounds.   Pulmonary:      Effort: Pulmonary effort  is normal.      Breath sounds: Normal breath sounds.   Abdominal:      General: Bowel sounds are normal.      Palpations: Abdomen is soft.   Musculoskeletal:         General: Normal range of motion.      Cervical back: Normal range of motion and neck supple.   Skin:     General: Skin is warm and dry.   Neurological:      Mental Status: He is alert and oriented to person, place, and time.         Assessment:       1. Essential hypertension no outside checks; 3/2014 exercise stress echo no ischemia LVEF 55-60    2. Mixed hyperlipidemia    3. CHAO (dyspnea on exertion)    4. Chest pain, atypical         Plan:       Testing ok. Needs more diet and exercise  Continue Rx for HTN, DM, HLD, SIS  OV 6 months

## 2023-02-08 ENCOUNTER — PES CALL (OUTPATIENT)
Dept: ADMINISTRATIVE | Facility: CLINIC | Age: 73
End: 2023-02-08
Payer: MEDICARE

## 2023-03-06 ENCOUNTER — PES CALL (OUTPATIENT)
Dept: ADMINISTRATIVE | Facility: CLINIC | Age: 73
End: 2023-03-06
Payer: MEDICARE

## 2023-03-09 ENCOUNTER — OFFICE VISIT (OUTPATIENT)
Dept: UROLOGY | Facility: CLINIC | Age: 73
End: 2023-03-09
Payer: MEDICARE

## 2023-03-09 VITALS — WEIGHT: 277 LBS | BODY MASS INDEX: 42.12 KG/M2

## 2023-03-09 DIAGNOSIS — N48.1 BALANITIS: ICD-10-CM

## 2023-03-09 DIAGNOSIS — R35.1 NOCTURIA: ICD-10-CM

## 2023-03-09 DIAGNOSIS — N48.9 PENILE LESION: Primary | ICD-10-CM

## 2023-03-09 DIAGNOSIS — R30.0 DYSURIA: ICD-10-CM

## 2023-03-09 DIAGNOSIS — N52.01 ERECTILE DYSFUNCTION DUE TO ARTERIAL INSUFFICIENCY: ICD-10-CM

## 2023-03-09 PROCEDURE — 99999 PR PBB SHADOW E&M-EST. PATIENT-LVL III: CPT | Mod: PBBFAC,,, | Performed by: UROLOGY

## 2023-03-09 PROCEDURE — 3288F PR FALLS RISK ASSESSMENT DOCUMENTED: ICD-10-PCS | Mod: CPTII,S$GLB,, | Performed by: UROLOGY

## 2023-03-09 PROCEDURE — 1159F PR MEDICATION LIST DOCUMENTED IN MEDICAL RECORD: ICD-10-PCS | Mod: CPTII,S$GLB,, | Performed by: UROLOGY

## 2023-03-09 PROCEDURE — 1126F AMNT PAIN NOTED NONE PRSNT: CPT | Mod: CPTII,S$GLB,, | Performed by: UROLOGY

## 2023-03-09 PROCEDURE — 99999 PR PBB SHADOW E&M-EST. PATIENT-LVL III: ICD-10-PCS | Mod: PBBFAC,,, | Performed by: UROLOGY

## 2023-03-09 PROCEDURE — 1126F PR PAIN SEVERITY QUANTIFIED, NO PAIN PRESENT: ICD-10-PCS | Mod: CPTII,S$GLB,, | Performed by: UROLOGY

## 2023-03-09 PROCEDURE — 1160F RVW MEDS BY RX/DR IN RCRD: CPT | Mod: CPTII,S$GLB,, | Performed by: UROLOGY

## 2023-03-09 PROCEDURE — 1101F PT FALLS ASSESS-DOCD LE1/YR: CPT | Mod: CPTII,S$GLB,, | Performed by: UROLOGY

## 2023-03-09 PROCEDURE — 4010F ACE/ARB THERAPY RXD/TAKEN: CPT | Mod: CPTII,S$GLB,, | Performed by: UROLOGY

## 2023-03-09 PROCEDURE — 1159F MED LIST DOCD IN RCRD: CPT | Mod: CPTII,S$GLB,, | Performed by: UROLOGY

## 2023-03-09 PROCEDURE — 3288F FALL RISK ASSESSMENT DOCD: CPT | Mod: CPTII,S$GLB,, | Performed by: UROLOGY

## 2023-03-09 PROCEDURE — 1101F PR PT FALLS ASSESS DOC 0-1 FALLS W/OUT INJ PAST YR: ICD-10-PCS | Mod: CPTII,S$GLB,, | Performed by: UROLOGY

## 2023-03-09 PROCEDURE — 4010F PR ACE/ARB THEARPY RXD/TAKEN: ICD-10-PCS | Mod: CPTII,S$GLB,, | Performed by: UROLOGY

## 2023-03-09 PROCEDURE — 3008F PR BODY MASS INDEX (BMI) DOCUMENTED: ICD-10-PCS | Mod: CPTII,S$GLB,, | Performed by: UROLOGY

## 2023-03-09 PROCEDURE — 1160F PR REVIEW ALL MEDS BY PRESCRIBER/CLIN PHARMACIST DOCUMENTED: ICD-10-PCS | Mod: CPTII,S$GLB,, | Performed by: UROLOGY

## 2023-03-09 PROCEDURE — 3008F BODY MASS INDEX DOCD: CPT | Mod: CPTII,S$GLB,, | Performed by: UROLOGY

## 2023-03-09 PROCEDURE — 99214 PR OFFICE/OUTPT VISIT, EST, LEVL IV, 30-39 MIN: ICD-10-PCS | Mod: S$GLB,,, | Performed by: UROLOGY

## 2023-03-09 PROCEDURE — 99214 OFFICE O/P EST MOD 30 MIN: CPT | Mod: S$GLB,,, | Performed by: UROLOGY

## 2023-03-09 RX ORDER — SILDENAFIL 100 MG/1
100 TABLET, FILM COATED ORAL DAILY PRN
Qty: 30 TABLET | Refills: 11 | Status: ON HOLD | OUTPATIENT
Start: 2023-03-09 | End: 2024-03-07 | Stop reason: CLARIF

## 2023-03-09 NOTE — PROGRESS NOTES
Patient, Armand Painting (MRN #[id number]), presented with a recorded BMI of 42.12 kg/m^2 consistent with the definition of morbid obesity (ICD-10 E66.01). The patient's morbid obesity was monitored, evaluated, addressed and/or treated. This addendum to the medical record is made on 03/09/2023.

## 2023-03-09 NOTE — PROGRESS NOTES
"Subjective:       Armand Painting is a 72 y.o. male who is an established patient who was referred by Dr Vargas  for evaluation of dysuria.      He recently saw PCP with c/o dysuria. UCx at that time showed moderate growth 50-99k Enterobacter. He was also increased to Flomax BID - urgency in AM, nocturia x 2-3, strong stream. Does not appear he was treated with antibiotics. He was given cream (nystatin-triamcinolone) for suspected balantis. He notes a small cut on foreskin. He does have some burning externally after voiding but denies any dysuria.     He also reports problems with ED. He was given Cialis by PCP but may not have worked well. He has not tried Viagra.     PVR (bladder scan) today - 45cc (not true PVR)    Last PSA 5/16 - 0.41    11/18/2019  Now reports intermittent dysuria that has returned. He reports this is more external irritation. The lesion has reappeared on foreskin. He is out of cream from PCP.   Also reports Viagra 100mg is not working well, worked only one time. He did not try it with emptying stomach.     3/3/2020  He is using Lotrisone cream. He reports penile lesion is still present but improving. Concerned about cost of cream.     8/4/2020  Still with mild urinary frequency (again unable to give specimen) and still with penile lesion. Lesion will come and go, returns when does not use Lotrisone. Did go away completely but now returned. Feels that is related to being wet due to not being circumcised.     2/4/2021  Still with penile lesion - "on and off". Improves with cream.     8/30/2022  Again concerned about wound on penis. Using cream most days. Can be painful. Lesion with come and go but never 100% resolves.     3/9/2023  Occasional pain with urge to void. Wound has improved but still remains. Using Lotrisone. Asking about ED meds.       The following portions of the patient's history were reviewed and updated as appropriate: allergies, current medications, past family history, past medical " history, past social history, past surgical history and problem list.    Review of Systems  Constitutional: no fever or chills  ENT: no nasal congestion or sore throat  Respiratory: no cough or shortness of breath  Cardiovascular: no chest pain or palpitations  Gastrointestinal: no nausea or vomiting, tolerating diet  Genitourinary: as per HPI  Hematologic/Lymphatic: no easy bruising or lymphadenopathy  Musculoskeletal: no arthralgias or myalgias  Skin: no rashes or lesions  Neurological: no seizures or tremors  Behavioral/Psych: no auditory or visual hallucinations       Objective:    Vitals: Wt 125.7 kg (277 lb 0.1 oz)   BMI 42.12 kg/m²     Physical Exam   General: well developed, well nourished in no acute distress  Head: normocephalic, atraumatic  Neck: supple, trachea midline, no obvious enlargement of thyroid  HEENT: EOMI, mucus membranes moist, sclera anicteric, no hearing impairment  Lungs: symmetric expansion, non-labored breathing  Cardiovascular: regular rate and rhythm, normal pulses  Abdomen: soft, non tender, non distended, no palpable masses, no hepatosplenomegaly, no hernias, bladder nonpalpable. No CVA tenderness.  Musculoskeletal: no peripheral edema, normal ROM in bilateral upper and lower extremities  Lymphatics: no cervical or inguinal lymphadenopathy  Skin: no rashes or lesions  Neuro: alert and oriented x 3, no gross deficits  Psych: normal judgment and insight, normal mood/affect and non-anxious  Genitourinary:   Penis - uncircumcised penis without plaques or scarring. SMALL ROUND,FLAT LESION ON FORESKIN (R dorsal) (APPOX 1CM) GRAY, WELL HEALING +smegma. Urethra - orthotopic location without stenosis.  Scrotum  - no lesions or rashes, no hydrocele or hernia.  Testes - descended bilaterally, symmetric without masses, non tender.  Epididymides - no cysts or lesions, no spermatocele, symmetric   JASEN: patient declined exam      Lab Review   Urine analysis today in clinic shows - no urine       Lab Results   Component Value Date    WBC 6.52 08/09/2022    HGB 12.2 (L) 08/09/2022    HCT 39.2 (L) 08/09/2022    MCV 84 08/09/2022     08/09/2022     Lab Results   Component Value Date    CREATININE 1.1 08/09/2022    BUN 16 08/09/2022     Lab Results   Component Value Date    PSA 0.41 05/23/2016     No results found for: PSADIAG    Imaging  Reports and images were personally reviewed by me and discussed with the patient today         Assessment/Plan:      1. Nocturia    - Less bother with Flomax BID, continue   - Last PSA years ago but very low. Likely okay to hold on repeat check.      2. Dysuria    - Intermittent dysuria has returned   - Consider cystoscopy - seems to be more external     3. Penile lesion    - Lotrisone cream refilled   - Unsure etiology though has remained stable   - Discussed excisional biopsy thoroughly - he declines this. Discussed possible circumcision - he declines this. Again discussed - will consider next visit. Concern for infection risk with biopsy due to location of wound.    - Also discussed derm referral - declined but now open to this, referral placed.    - Again discussed circumcision - he will continue to consider this.      4. Erectile dysfunction due to arterial insufficiency    - Cialis did not work well   - Viagra 100mg on empty stomach (goodrx coupon) - not helpful previously, wants repeat trial. Again recommend empty stomach.    - Briefly discussed [facility] or [facility].        Follow up in 6 months

## 2023-03-24 ENCOUNTER — TELEPHONE (OUTPATIENT)
Dept: ADMINISTRATIVE | Facility: CLINIC | Age: 73
End: 2023-03-24
Payer: MEDICARE

## 2023-03-27 ENCOUNTER — OFFICE VISIT (OUTPATIENT)
Dept: FAMILY MEDICINE | Facility: CLINIC | Age: 73
End: 2023-03-27
Payer: MEDICARE

## 2023-03-27 VITALS
OXYGEN SATURATION: 96 % | HEIGHT: 68 IN | HEART RATE: 64 BPM | TEMPERATURE: 98 F | BODY MASS INDEX: 42.2 KG/M2 | DIASTOLIC BLOOD PRESSURE: 80 MMHG | WEIGHT: 278.44 LBS | SYSTOLIC BLOOD PRESSURE: 118 MMHG

## 2023-03-27 DIAGNOSIS — I10 ESSENTIAL HYPERTENSION: ICD-10-CM

## 2023-03-27 DIAGNOSIS — E78.2 MIXED HYPERLIPIDEMIA: ICD-10-CM

## 2023-03-27 DIAGNOSIS — R35.0 BENIGN PROSTATIC HYPERPLASIA WITH URINARY FREQUENCY: ICD-10-CM

## 2023-03-27 DIAGNOSIS — M10.9 GOUT, ARTHRITIS: Chronic | ICD-10-CM

## 2023-03-27 DIAGNOSIS — R07.89 CHEST PAIN, ATYPICAL: ICD-10-CM

## 2023-03-27 DIAGNOSIS — N40.1 BENIGN PROSTATIC HYPERPLASIA WITH URINARY FREQUENCY: ICD-10-CM

## 2023-03-27 DIAGNOSIS — R73.03 PREDIABETES: Chronic | ICD-10-CM

## 2023-03-27 DIAGNOSIS — E66.01 MORBID OBESITY WITH BMI OF 40.0-44.9, ADULT: Chronic | ICD-10-CM

## 2023-03-27 DIAGNOSIS — R06.09 DOE (DYSPNEA ON EXERTION): ICD-10-CM

## 2023-03-27 DIAGNOSIS — Z00.00 ENCOUNTER FOR PREVENTIVE HEALTH EXAMINATION: Primary | ICD-10-CM

## 2023-03-27 DIAGNOSIS — N52.01 ERECTILE DYSFUNCTION DUE TO ARTERIAL INSUFFICIENCY: Chronic | ICD-10-CM

## 2023-03-27 DIAGNOSIS — D56.3 ALPHA THALASSEMIA SILENT CARRIER: ICD-10-CM

## 2023-03-27 DIAGNOSIS — G47.33 OSA (OBSTRUCTIVE SLEEP APNEA): ICD-10-CM

## 2023-03-27 DIAGNOSIS — M96.1 CERVICAL POST-LAMINECTOMY SYNDROME: ICD-10-CM

## 2023-03-27 PROCEDURE — 99999 PR PBB SHADOW E&M-EST. PATIENT-LVL IV: ICD-10-PCS | Mod: PBBFAC,,, | Performed by: NURSE PRACTITIONER

## 2023-03-27 PROCEDURE — 4010F PR ACE/ARB THEARPY RXD/TAKEN: ICD-10-PCS | Mod: CPTII,S$GLB,, | Performed by: NURSE PRACTITIONER

## 2023-03-27 PROCEDURE — G0439 PR MEDICARE ANNUAL WELLNESS SUBSEQUENT VISIT: ICD-10-PCS | Mod: S$GLB,,, | Performed by: NURSE PRACTITIONER

## 2023-03-27 PROCEDURE — 1160F RVW MEDS BY RX/DR IN RCRD: CPT | Mod: CPTII,S$GLB,, | Performed by: NURSE PRACTITIONER

## 2023-03-27 PROCEDURE — 1160F PR REVIEW ALL MEDS BY PRESCRIBER/CLIN PHARMACIST DOCUMENTED: ICD-10-PCS | Mod: CPTII,S$GLB,, | Performed by: NURSE PRACTITIONER

## 2023-03-27 PROCEDURE — 3008F PR BODY MASS INDEX (BMI) DOCUMENTED: ICD-10-PCS | Mod: CPTII,S$GLB,, | Performed by: NURSE PRACTITIONER

## 2023-03-27 PROCEDURE — 99999 PR PBB SHADOW E&M-EST. PATIENT-LVL IV: CPT | Mod: PBBFAC,,, | Performed by: NURSE PRACTITIONER

## 2023-03-27 PROCEDURE — 1101F PR PT FALLS ASSESS DOC 0-1 FALLS W/OUT INJ PAST YR: ICD-10-PCS | Mod: CPTII,S$GLB,, | Performed by: NURSE PRACTITIONER

## 2023-03-27 PROCEDURE — 3079F PR MOST RECENT DIASTOLIC BLOOD PRESSURE 80-89 MM HG: ICD-10-PCS | Mod: CPTII,S$GLB,, | Performed by: NURSE PRACTITIONER

## 2023-03-27 PROCEDURE — 3288F FALL RISK ASSESSMENT DOCD: CPT | Mod: CPTII,S$GLB,, | Performed by: NURSE PRACTITIONER

## 2023-03-27 PROCEDURE — 1159F PR MEDICATION LIST DOCUMENTED IN MEDICAL RECORD: ICD-10-PCS | Mod: CPTII,S$GLB,, | Performed by: NURSE PRACTITIONER

## 2023-03-27 PROCEDURE — 1101F PT FALLS ASSESS-DOCD LE1/YR: CPT | Mod: CPTII,S$GLB,, | Performed by: NURSE PRACTITIONER

## 2023-03-27 PROCEDURE — 3288F PR FALLS RISK ASSESSMENT DOCUMENTED: ICD-10-PCS | Mod: CPTII,S$GLB,, | Performed by: NURSE PRACTITIONER

## 2023-03-27 PROCEDURE — 4010F ACE/ARB THERAPY RXD/TAKEN: CPT | Mod: CPTII,S$GLB,, | Performed by: NURSE PRACTITIONER

## 2023-03-27 PROCEDURE — 3079F DIAST BP 80-89 MM HG: CPT | Mod: CPTII,S$GLB,, | Performed by: NURSE PRACTITIONER

## 2023-03-27 PROCEDURE — 1170F FXNL STATUS ASSESSED: CPT | Mod: CPTII,S$GLB,, | Performed by: NURSE PRACTITIONER

## 2023-03-27 PROCEDURE — 3074F SYST BP LT 130 MM HG: CPT | Mod: CPTII,S$GLB,, | Performed by: NURSE PRACTITIONER

## 2023-03-27 PROCEDURE — G0439 PPPS, SUBSEQ VISIT: HCPCS | Mod: S$GLB,,, | Performed by: NURSE PRACTITIONER

## 2023-03-27 PROCEDURE — 3008F BODY MASS INDEX DOCD: CPT | Mod: CPTII,S$GLB,, | Performed by: NURSE PRACTITIONER

## 2023-03-27 PROCEDURE — 3074F PR MOST RECENT SYSTOLIC BLOOD PRESSURE < 130 MM HG: ICD-10-PCS | Mod: CPTII,S$GLB,, | Performed by: NURSE PRACTITIONER

## 2023-03-27 PROCEDURE — 1159F MED LIST DOCD IN RCRD: CPT | Mod: CPTII,S$GLB,, | Performed by: NURSE PRACTITIONER

## 2023-03-27 PROCEDURE — 1170F PR FUNCTIONAL STATUS ASSESSED: ICD-10-PCS | Mod: CPTII,S$GLB,, | Performed by: NURSE PRACTITIONER

## 2023-03-27 NOTE — PATIENT INSTRUCTIONS
Counseling and Referral of Other Preventative  (Italic type indicates deductible and co-insurance are waived)    Patient Name: Armand Painting  Today's Date: 3/27/2023    Health Maintenance         Date Due Completion Date    Shingles Vaccine (2 of 3) 01/12/2016 11/17/2015    COVID-19 Vaccine (3 - Booster for Pfizer series) 03/29/2021 2/1/2021    Lipid Panel 02/14/2023 2/14/2022    Hemoglobin A1c (Prediabetes) 08/09/2023 8/9/2022    Colorectal Cancer Screening 10/31/2023 10/31/2018    Override on 6/7/2012: Done    TETANUS VACCINE 05/23/2026 5/23/2016          No orders of the defined types were placed in this encounter.      The following information is provided to all patients.  This information is to help you find resources for any of the problems found today that may be affecting your health:                Living healthy guide: www.[facility].[place]      Understanding Diabetes: www.diabetes.org      Eating healthy: www.cdc.gov/healthyweight      [facility] home safety checklist: www.cdc.gov/steadi/patient.html      Agency on Aging: www.goea.[place].[facility]      Alcoholics anonymous (AA): www.aa.org      Physical Activity: www.siobhan.nih.gov/cu8pkhr      Tobacco use: www.quitwithusla.org       Please call [employer] at [phone] to receive a rewards card for completing your Annual Wellness Visit. Thanks

## 2023-03-27 NOTE — PROGRESS NOTES
"  Armand Painting presented for a  Medicare AWV and comprehensive Health Risk Assessment today. The following components were reviewed and updated:    Medical history  Family History  Social history  Allergies and Current Medications  Health Risk Assessment  Health Maintenance  Care Team       ** See Completed Assessments for Annual Wellness Visit within the encounter summary.**       The following assessments were completed:  Living Situation  CAGE  Depression Screening  Timed Get Up and Go  Whisper Test  Cognitive Function Screening  Nutrition Screening  ADL Screening  PAQ Screening          Vitals:    03/27/23 0731   BP: 118/80   Pulse: 64   Temp: 98.2 °F (36.8 °C)   TempSrc: Oral   SpO2: 96%   Weight: 126.3 kg (278 lb 7.1 oz)   Height: 5' 8" (1.727 m)     Body mass index is 42.34 kg/m².  Physical Exam  Vitals and nursing note reviewed.   Constitutional:       Appearance: Normal appearance. He is obese.   Cardiovascular:      Rate and Rhythm: Normal rate.      Pulses: Normal pulses.      Heart sounds: Normal heart sounds.   Pulmonary:      Effort: Pulmonary effort is normal.      Breath sounds: Normal breath sounds.   Musculoskeletal:         General: Normal range of motion.   Neurological:      Mental Status: He is alert and oriented to person, place, and time.   Psychiatric:         Mood and Affect: Mood normal.         Behavior: Behavior normal.           Diagnoses and health risks identified today and associated recommendations/orders:    1. Encounter for preventive health examination  Pt was seen today for an Annual Wellness visit. Healthcare maintenance and screening recommendations were discussed and updated as indicated. Return in one year for AWV.    Review current opioid prescriptions:n/a  Screen for potential Substance Use Disorders:n/a    2. Morbid obesity with BMI of 40.0-44.9, adult  The patient is asked to make an attempt to improve diet and exercise patterns to aid in medical management of this " problem.    3. Essential hypertension no outside checks; 3/2014 exercise stress echo no ischemia LVEF 55-60  The current medical regimen is effective;  continue present plan and medications.    4. Mixed hyperlipidemia  The current medical regimen is effective;  continue present plan and medications.    5. Erectile dysfunction due to arterial insufficiency  The current medical regimen is effective;  continue present plan and medications.    6. Gout, arthritis  The current medical regimen is effective;  continue present plan and medications.    7. Cervical post-laminectomy syndrome  The current medical regimen is effective;  continue present plan and medications.    8. SIS (obstructive sleep apnea) on sleep study 4/2018 with AHI 55.  CPAP at 15  The current medical regimen is effective;  continue present plan and medications.    9. Prediabetes  The current medical regimen is effective;  continue present plan and medications.  Hemoglobin A1C   Date Value Ref Range Status   08/09/2022 6.0 (H) 4.0 - 5.6 % Final             02/14/2022 6.2 (H) 4.0 - 5.6 % Final             07/09/2020 6.1 (H) 4.0 - 5.6 % Final             10. CHAO (dyspnea on exertion)  The current medical regimen is effective;  continue present plan and medications.    11. Benign prostatic hyperplasia with urinary frequency  The current medical regimen is effective;  continue present plan and medications.    12. Alpha thalassemia silent carrier  The current medical regimen is effective;  continue present plan and medications.    13. Chest pain, atypical  The current medical regimen is effective;  continue present plan and medications.        Provided Armand with a 5-10 year written screening schedule and personal prevention plan. Recommendations were developed using the USPSTF age appropriate recommendations. Education, counseling, and referrals were provided as needed. After Visit Summary printed and given to patient which includes a list of additional  screenings\tests needed.    Follow up in about 1 year (around 3/27/2024).    SANTOS Breen  I offered to discuss advanced care planning, including how to pick a person who would make decisions for you if you were unable to make them for yourself, called a health care power of , and what kind of decisions you might make such as use of life sustaining treatments such as ventilators and tube feeding when faced with a life limiting illness recorded on a living will that they will need to know. (How you want to be cared for as you near the end of your natural life)     X Patient is interested in learning more about how to make advanced directives.  I provided them paperwork and offered to discuss this with them.

## 2023-06-20 ENCOUNTER — OFFICE VISIT (OUTPATIENT)
Dept: CARDIOLOGY | Facility: CLINIC | Age: 73
End: 2023-06-20
Payer: MEDICARE

## 2023-06-20 VITALS
DIASTOLIC BLOOD PRESSURE: 78 MMHG | OXYGEN SATURATION: 98 % | HEART RATE: 71 BPM | HEIGHT: 68 IN | RESPIRATION RATE: 20 BRPM | SYSTOLIC BLOOD PRESSURE: 130 MMHG | WEIGHT: 278 LBS | BODY MASS INDEX: 42.13 KG/M2

## 2023-06-20 DIAGNOSIS — I10 ESSENTIAL HYPERTENSION: ICD-10-CM

## 2023-06-20 DIAGNOSIS — R07.89 CHEST PAIN, ATYPICAL: ICD-10-CM

## 2023-06-20 DIAGNOSIS — E66.01 MORBID OBESITY WITH BMI OF 40.0-44.9, ADULT: Chronic | ICD-10-CM

## 2023-06-20 DIAGNOSIS — R06.09 DOE (DYSPNEA ON EXERTION): ICD-10-CM

## 2023-06-20 DIAGNOSIS — R53.83 FATIGUE, UNSPECIFIED TYPE: Primary | ICD-10-CM

## 2023-06-20 DIAGNOSIS — E78.2 MIXED HYPERLIPIDEMIA: ICD-10-CM

## 2023-06-20 PROCEDURE — 3075F SYST BP GE 130 - 139MM HG: CPT | Mod: CPTII,S$GLB,, | Performed by: INTERNAL MEDICINE

## 2023-06-20 PROCEDURE — 1101F PR PT FALLS ASSESS DOC 0-1 FALLS W/OUT INJ PAST YR: ICD-10-PCS | Mod: CPTII,S$GLB,, | Performed by: INTERNAL MEDICINE

## 2023-06-20 PROCEDURE — 1126F PR PAIN SEVERITY QUANTIFIED, NO PAIN PRESENT: ICD-10-PCS | Mod: CPTII,S$GLB,, | Performed by: INTERNAL MEDICINE

## 2023-06-20 PROCEDURE — 99214 OFFICE O/P EST MOD 30 MIN: CPT | Mod: S$GLB,,, | Performed by: INTERNAL MEDICINE

## 2023-06-20 PROCEDURE — 1101F PT FALLS ASSESS-DOCD LE1/YR: CPT | Mod: CPTII,S$GLB,, | Performed by: INTERNAL MEDICINE

## 2023-06-20 PROCEDURE — 3288F FALL RISK ASSESSMENT DOCD: CPT | Mod: CPTII,S$GLB,, | Performed by: INTERNAL MEDICINE

## 2023-06-20 PROCEDURE — 3008F BODY MASS INDEX DOCD: CPT | Mod: CPTII,S$GLB,, | Performed by: INTERNAL MEDICINE

## 2023-06-20 PROCEDURE — 93000 ELECTROCARDIOGRAM COMPLETE: CPT | Mod: S$GLB,,, | Performed by: INTERNAL MEDICINE

## 2023-06-20 PROCEDURE — 3008F PR BODY MASS INDEX (BMI) DOCUMENTED: ICD-10-PCS | Mod: CPTII,S$GLB,, | Performed by: INTERNAL MEDICINE

## 2023-06-20 PROCEDURE — 4010F ACE/ARB THERAPY RXD/TAKEN: CPT | Mod: CPTII,S$GLB,, | Performed by: INTERNAL MEDICINE

## 2023-06-20 PROCEDURE — 99214 PR OFFICE/OUTPT VISIT, EST, LEVL IV, 30-39 MIN: ICD-10-PCS | Mod: S$GLB,,, | Performed by: INTERNAL MEDICINE

## 2023-06-20 PROCEDURE — 4010F PR ACE/ARB THEARPY RXD/TAKEN: ICD-10-PCS | Mod: CPTII,S$GLB,, | Performed by: INTERNAL MEDICINE

## 2023-06-20 PROCEDURE — 93000 EKG 12-LEAD: ICD-10-PCS | Mod: S$GLB,,, | Performed by: INTERNAL MEDICINE

## 2023-06-20 PROCEDURE — 3075F PR MOST RECENT SYSTOLIC BLOOD PRESS GE 130-139MM HG: ICD-10-PCS | Mod: CPTII,S$GLB,, | Performed by: INTERNAL MEDICINE

## 2023-06-20 PROCEDURE — 3078F DIAST BP <80 MM HG: CPT | Mod: CPTII,S$GLB,, | Performed by: INTERNAL MEDICINE

## 2023-06-20 PROCEDURE — 3078F PR MOST RECENT DIASTOLIC BLOOD PRESSURE < 80 MM HG: ICD-10-PCS | Mod: CPTII,S$GLB,, | Performed by: INTERNAL MEDICINE

## 2023-06-20 PROCEDURE — 3288F PR FALLS RISK ASSESSMENT DOCUMENTED: ICD-10-PCS | Mod: CPTII,S$GLB,, | Performed by: INTERNAL MEDICINE

## 2023-06-20 PROCEDURE — 99999 PR PBB SHADOW E&M-EST. PATIENT-LVL III: ICD-10-PCS | Mod: PBBFAC,,, | Performed by: INTERNAL MEDICINE

## 2023-06-20 PROCEDURE — 1126F AMNT PAIN NOTED NONE PRSNT: CPT | Mod: CPTII,S$GLB,, | Performed by: INTERNAL MEDICINE

## 2023-06-20 PROCEDURE — 99999 PR PBB SHADOW E&M-EST. PATIENT-LVL III: CPT | Mod: PBBFAC,,, | Performed by: INTERNAL MEDICINE

## 2023-06-20 NOTE — PROGRESS NOTES
Subjective:   Patient ID:  Armand Painting is a 72 y.o. male who presents for follow-up of Follow-up      HPI    HTN, DM, HLD, SIS, obesity     Referred by Dr Vargas  Last appointment with this clinic was 8/16/2022. Last visit with me 8/16/2022   CHAO. CXR negative. Referred to cards. Did not get set up.  preDM stable; metformin  HTN stable   Lipid/statin  Gout, uric acid went up. No change in allopurinol.  BPH  Penile lesion. Saw urology. They discussed excisional bx, he refused.   SIS.  Insomnia.  Fatigue with Benadryl.  Needs to work on sleep hygiene.  Had not been using CPAP  Alpha-thalassemia silent carrier      Comes in today complaining of dyspnea with exertion, insidious in onset he estimates over the past 3 months or so.  Notes that his legs are swollen and they are heavy and it is hard for him to get up out of a chair without upper body assistance.  Similarly with getting out of bed.    Gets some pain in his low back.  Last visit with me in August, no pedal edema.  His blood pressure today is good, heart rate is good on oxygen level is okay.  Denies chest pain but does note dyspnea on exertion continued.    Does not recall the details of her last visit.  Had previously wanted him to get evaluated with Cardiology and I am not sure why that did not get set up.    Though I do see that there was a telephone call 08/23/2022  regarding inquire about a cardiology consult.    No smothering sensation in his sleep.     Stress test 12/16/22    Normal myocardial perfusion scan. There is no evidence of myocardial ischemia or infarction.    There is a  mild intensity fixed perfusion abnormality in the inferior wall of the left ventricle secondary to diaphragm attenuation.    There is mild apical thinning which is a normal variant.    The gated perfusion images showed an ejection fraction of 64% post stress.    The ECG portion of the study is negative for ischemia.    The patient reported no chest pain during the stress test.     There were no arrhythmias during stress.     Echo 12/16/22  The left ventricle is normal in size with concentric hypertrophy and normal systolic function.  The estimated ejection fraction is 55%.  Indeterminate left ventricular diastolic function.  Normal right ventricular size with normal right ventricular systolic function.  Mild left atrial enlargement.  Normal central venous pressure (3 mmHg).  The estimated PA systolic pressure is 10 mmHg.     Stress echo 3/11/14    1 - Normal left ventricular systolic function (EF 55-60%).     2 - Concentric remodeling.     3 - Left ventricular diastolic dysfunction.     4 - Trivial mitral regurgitation.     5 - Mild tricuspid regurgitation.     6 - Mild pulmonic regurgitation.   No evidence of stress induced myocardial ischemia.      EKG 12/7/22 NSR LAD IRBBB     12/9/22 Here for worsening CHAO - occasional chest tightness. 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 Needs more diet and exercise  Continue Rx for HTN, DM, HLD, SIS  OV 6 months    6/20/23 Denies CP, mild stable CHAO. 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   HENT:  Negative for ear discharge.    Eyes:  Negative for blurred vision.   Endocrine: Negative for polyphagia.   Skin:  Negative for nail changes.   Genitourinary:  Negative for bladder incontinence.   Neurological:  Negative for aphonia.   Psychiatric/Behavioral:  Negative for hallucinations.    Allergic/Immunologic: Negative for hives.     Objective:   Physical Exam  Constitutional:       Appearance: He is well-developed.   HENT:      Head: Normocephalic and atraumatic.   Eyes:      Conjunctiva/sclera: Conjunctivae normal.      Pupils: Pupils are equal, round, and reactive to light.    Cardiovascular:      Rate and Rhythm: Normal rate.      Pulses: Intact distal pulses.      Heart sounds: Normal heart sounds.   Pulmonary:      Effort: Pulmonary effort is normal.      Breath sounds: Normal breath sounds.   Abdominal:      General: Bowel sounds are normal.      Palpations: Abdomen is soft.   Musculoskeletal:         General: Normal range of motion.      Cervical back: Normal range of motion and neck supple.   Skin:     General: Skin is warm and dry.   Neurological:      Mental Status: He is alert and oriented to person, place, and time.       Assessment:      1. Fatigue, unspecified type    2. Mixed hyperlipidemia    3. Essential hypertension no outside checks; 3/2014 exercise stress echo no ischemia LVEF 55-60    4. CHAO (dyspnea on exertion)    5. Morbid obesity with BMI of 40.0-44.9, adult    6. Chest pain, atypical        Plan:     Continue Rx for HTN, DM, HLD, SIS  OV 1 year

## 2023-06-28 ENCOUNTER — TELEPHONE (OUTPATIENT)
Dept: OPHTHALMOLOGY | Facility: CLINIC | Age: 73
End: 2023-06-28
Payer: MEDICARE

## 2023-06-28 ENCOUNTER — TELEPHONE (OUTPATIENT)
Dept: OPTOMETRY | Facility: CLINIC | Age: 73
End: 2023-06-28
Payer: MEDICARE

## 2023-06-28 NOTE — TELEPHONE ENCOUNTER
Tried to call pt back again after failed multiple attempts scheduled the patient an appt and left it on his voicemail.

## 2023-06-28 NOTE — TELEPHONE ENCOUNTER
Called pt reguarding him needing an appt for Dr. Mcneil tried to call both numbers listed on his account but was unsuccessful in reaching the patient

## 2023-08-02 ENCOUNTER — OFFICE VISIT (OUTPATIENT)
Dept: OPHTHALMOLOGY | Facility: CLINIC | Age: 73
End: 2023-08-02
Attending: OPHTHALMOLOGY
Payer: MEDICARE

## 2023-08-02 DIAGNOSIS — H35.341 MACULAR HOLE, RIGHT EYE: Primary | ICD-10-CM

## 2023-08-02 DIAGNOSIS — H25.813 MIXED TYPE AGE-RELATED CATARACT, BOTH EYES: ICD-10-CM

## 2023-08-02 PROCEDURE — 3288F PR FALLS RISK ASSESSMENT DOCUMENTED: ICD-10-PCS | Mod: CPTII,S$GLB,, | Performed by: OPHTHALMOLOGY

## 2023-08-02 PROCEDURE — 1160F RVW MEDS BY RX/DR IN RCRD: CPT | Mod: CPTII,S$GLB,, | Performed by: OPHTHALMOLOGY

## 2023-08-02 PROCEDURE — 1160F PR REVIEW ALL MEDS BY PRESCRIBER/CLIN PHARMACIST DOCUMENTED: ICD-10-PCS | Mod: CPTII,S$GLB,, | Performed by: OPHTHALMOLOGY

## 2023-08-02 PROCEDURE — 1101F PT FALLS ASSESS-DOCD LE1/YR: CPT | Mod: CPTII,S$GLB,, | Performed by: OPHTHALMOLOGY

## 2023-08-02 PROCEDURE — 92134 CPTRZ OPH DX IMG PST SGM RTA: CPT | Mod: S$GLB,,, | Performed by: OPHTHALMOLOGY

## 2023-08-02 PROCEDURE — 99999 PR PBB SHADOW E&M-EST. PATIENT-LVL III: CPT | Mod: PBBFAC,,, | Performed by: OPHTHALMOLOGY

## 2023-08-02 PROCEDURE — 1159F MED LIST DOCD IN RCRD: CPT | Mod: CPTII,S$GLB,, | Performed by: OPHTHALMOLOGY

## 2023-08-02 PROCEDURE — 1126F PR PAIN SEVERITY QUANTIFIED, NO PAIN PRESENT: ICD-10-PCS | Mod: CPTII,S$GLB,, | Performed by: OPHTHALMOLOGY

## 2023-08-02 PROCEDURE — 92014 COMPRE OPH EXAM EST PT 1/>: CPT | Mod: S$GLB,,, | Performed by: OPHTHALMOLOGY

## 2023-08-02 PROCEDURE — 92014 PR EYE EXAM, EST PATIENT,COMPREHESV: ICD-10-PCS | Mod: S$GLB,,, | Performed by: OPHTHALMOLOGY

## 2023-08-02 PROCEDURE — 99999 PR PBB SHADOW E&M-EST. PATIENT-LVL III: ICD-10-PCS | Mod: PBBFAC,,, | Performed by: OPHTHALMOLOGY

## 2023-08-02 PROCEDURE — 92134 POSTERIOR SEGMENT OCT RETINA (OCULAR COHERENCE TOMOGRAPHY)-BOTH EYES: ICD-10-PCS | Mod: S$GLB,,, | Performed by: OPHTHALMOLOGY

## 2023-08-02 PROCEDURE — 3288F FALL RISK ASSESSMENT DOCD: CPT | Mod: CPTII,S$GLB,, | Performed by: OPHTHALMOLOGY

## 2023-08-02 PROCEDURE — 1126F AMNT PAIN NOTED NONE PRSNT: CPT | Mod: CPTII,S$GLB,, | Performed by: OPHTHALMOLOGY

## 2023-08-02 PROCEDURE — 1159F PR MEDICATION LIST DOCUMENTED IN MEDICAL RECORD: ICD-10-PCS | Mod: CPTII,S$GLB,, | Performed by: OPHTHALMOLOGY

## 2023-08-02 PROCEDURE — 4010F ACE/ARB THERAPY RXD/TAKEN: CPT | Mod: CPTII,S$GLB,, | Performed by: OPHTHALMOLOGY

## 2023-08-02 PROCEDURE — 1101F PR PT FALLS ASSESS DOC 0-1 FALLS W/OUT INJ PAST YR: ICD-10-PCS | Mod: CPTII,S$GLB,, | Performed by: OPHTHALMOLOGY

## 2023-08-02 PROCEDURE — 4010F PR ACE/ARB THEARPY RXD/TAKEN: ICD-10-PCS | Mod: CPTII,S$GLB,, | Performed by: OPHTHALMOLOGY

## 2023-08-02 NOTE — PROGRESS NOTES
Subjective:       Patient ID: Armand Painting is a 72 y.o. male      Chief Complaint   Patient presents with    Concerns About Ocular Health     History of Present Illness  HPI    Dls: 05/24/2021 Dr. Mcneil     Pt states he feels that the vision is poor.  Feels it's worsening overall   Pt does not have glasses he lost them years ago and never went back to Dr.       He brought his grandaughter in today with him bc he is aware he non   compliant with appts and would like her to be present so he can make sure   he is on track.     Eye meds:   No Gtts     Hemoglobin A1C       Date                     Value               Ref Range             Status                08/09/2022               6.0 (H)             4.0 - 5.6 %           Final                 02/14/2022               6.2 (H)             4.0 - 5.6 %           Final                 07/09/2020               6.1 (H)             4.0 - 5.6 %           Final                    Last edited by Aureliano Mcneil MD on 8/2/2023  1:26 PM.        Imaging:    See report    Assessment/Plan:     1. Macular hole, right eye  Chronic.  Was scheduled for surgery 2021 but did not f/u  Discussed can still attempt closure but decreased closure rate and unknown visual potential    PSC OD will make ILM peel difficult    Recommend pt has CE/IOL OD before PPV/MP/gas.  Discussed face down positioning for short time after surgery and RBA    Pt wants to proceed with process    - Posterior Segment OCT Retina-Both eyes    2. Mixed type age-related cataract, both eyes  Refer to Dr Barrett  Please perform CE/IOL OD to facilitate MH surgery    Please refer pt back to retina in weeks following CE/IOL OD    Follow up in about 2 weeks (around 8/16/2023), or if symptoms worsen or fail to improve, for Cataract evaluation.

## 2023-08-21 DIAGNOSIS — M10.9 GOUT, ARTHRITIS: ICD-10-CM

## 2023-08-21 DIAGNOSIS — N40.1 BENIGN PROSTATIC HYPERPLASIA WITH URINARY FREQUENCY: ICD-10-CM

## 2023-08-21 DIAGNOSIS — R35.0 BENIGN PROSTATIC HYPERPLASIA WITH URINARY FREQUENCY: ICD-10-CM

## 2023-08-21 NOTE — TELEPHONE ENCOUNTER
No care due was identified.  [facility] Embedded Care Due Messages. Reference number: [id number].   8/21/2023 6:55:37 PM CDT

## 2023-08-22 RX ORDER — ALLOPURINOL 100 MG/1
100 TABLET ORAL
Qty: 90 TABLET | Refills: 0 | Status: SHIPPED | OUTPATIENT
Start: 2023-08-22 | End: 2023-11-23

## 2023-08-22 RX ORDER — TAMSULOSIN HYDROCHLORIDE 0.4 MG/1
0.8 CAPSULE ORAL DAILY
Qty: 180 CAPSULE | Refills: 1 | Status: SHIPPED | OUTPATIENT
Start: 2023-08-22 | End: 2024-02-26

## 2023-08-22 NOTE — TELEPHONE ENCOUNTER
----- Message from Mckenzie Celaya sent at 8/22/2023 10:35 AM CDT -----  Regarding: Self/  [phone]  Type: RX Refill Request    Who Called:  Spouse    Refill or New Rx:  Refill    RX Name and Strength:  tamsulosin (FLOMAX) 0.4 mg Cap    Preferred Pharmacy with phone number:  [facility]/pharmacy #[id number] [place], [place] - [address]    Local or Mail Order:  Local    Ordering Provider:  THERESE Vargas    Would the patient rather a call back or a response via [facility]?  Call back    Best Call Back Number: [phone]

## 2023-08-22 NOTE — TELEPHONE ENCOUNTER
Refill Routing Note   Medication(s) are not appropriate for processing by [facility] for the following reason(s):      Required labs outdated    [facility] action(s):  Defer  Approve Care Due:  None identified            Appointments  past 12m or future 3m with PCP    Date Provider   Last Visit   12/7/2022 Clark Vargas MD   Next Visit   Visit date not found Clark Vargas MD   ED visits in past 90 days: 0        Note composed:11:58 AM 08/22/2023

## 2023-10-11 DIAGNOSIS — R73.03 PREDIABETES: Chronic | ICD-10-CM

## 2023-10-11 RX ORDER — METFORMIN HYDROCHLORIDE 500 MG/1
500 TABLET, EXTENDED RELEASE ORAL
Qty: 90 TABLET | Refills: 1 | Status: SHIPPED | OUTPATIENT
Start: 2023-10-11 | End: 2024-01-12

## 2023-10-11 NOTE — TELEPHONE ENCOUNTER
Refill Routing Note     Refill Routing Note   Medication(s) are not appropriate for processing by [facility] for the following reason(s):      Required labs outdated    [facility] action(s):  Defer Care Due:  Labs due            Appointments  past 12m or future 3m with PCP    Date Provider   Last Visit   12/7/2022 Clark Vargas MD   Next Visit   Visit date not found Clark Vargas MD   ED visits in past 90 days: 0        Note composed:6:48 AM 10/11/2023

## 2023-10-11 NOTE — TELEPHONE ENCOUNTER
Care Due:                  Date            Visit Type   Department     Provider  --------------------------------------------------------------------------------                                [facility]                              PRIMARY      MED/ INTERNAL  Last Visit: 12-      CARE ([facility])   MED/ PAM Vargas  Next Visit: None Scheduled  None         None Found                                                            Last  Test          Frequency    Reason                     Performed    Due Date  --------------------------------------------------------------------------------    CBC.........  12 months..  allopurinoL..............  08- 08-    CMP.........  12 months..  allopurinoL,               08- 08-                             hydroCHLOROthiazide,                             lisinopriL, metFORMIN,                             pravastatin..............    HBA1C.......  6 months...  metFORMIN................  08- 02-    Lipid Panel.  12 months..  pravastatin..............  02- 02-    Uric Acid...  12 months..  allopurinoL..............  08- 08-    [facility] Embedded Care Due Messages. Reference number: [id number].   10/11/2023 12:24:49 AM CDT

## 2023-10-12 ENCOUNTER — TELEPHONE (OUTPATIENT)
Dept: UROLOGY | Facility: CLINIC | Age: 73
End: 2023-10-12
Payer: MEDICARE

## 2023-10-12 DIAGNOSIS — I10 ESSENTIAL HYPERTENSION: ICD-10-CM

## 2023-10-12 RX ORDER — HYDROCHLOROTHIAZIDE 25 MG/1
TABLET ORAL
Qty: 90 TABLET | Refills: 0 | Status: SHIPPED | OUTPATIENT
Start: 2023-10-12 | End: 2024-02-26

## 2023-10-12 NOTE — TELEPHONE ENCOUNTER
Refill Routing Note   Medication(s) are not appropriate for processing by [facility] for the following reason(s):      Required labs outdated    [facility] action(s):  Defer Care Due:  None identified            Appointments  past 12m or future 3m with PCP    Date Provider   Last Visit   12/7/2022 Clark Vargas MD   Next Visit   Visit date not found Clark Vargas MD   ED visits in past 90 days: 0        Note composed:8:48 AM 10/12/2023

## 2023-10-12 NOTE — TELEPHONE ENCOUNTER
----- Message from Lou Vallejo sent at 10/12/2023  9:22 AM CDT -----  Regarding: [phone]  Type:  Sooner Appointment Request    Patient is requesting a sooner appointment.  Patient declined first available appointment listed as well as another facility and provider .  Patient will not accept being placed on the waitlist and is requesting a message be sent to doctor.    Name of Caller: self    When is the first available appointment? 12/07    Symptoms: medication update, follow up    Would the patient rather a call back or a response via My [facility]? Call back     Best Call Back Number: [phone]      Additional Information: pt states the cream that he has been using is no longer working

## 2023-10-12 NOTE — TELEPHONE ENCOUNTER
No care due was identified.  [facility] Embedded Care Due Messages. Reference number: [id number].   10/12/2023 12:23:51 AM CDT

## 2023-10-26 DIAGNOSIS — R73.03 PREDIABETES: Chronic | ICD-10-CM

## 2023-10-26 DIAGNOSIS — E78.2 MIXED HYPERLIPIDEMIA: Primary | ICD-10-CM

## 2023-10-26 DIAGNOSIS — I10 ESSENTIAL HYPERTENSION: ICD-10-CM

## 2023-10-26 RX ORDER — LISINOPRIL 10 MG/1
10 TABLET ORAL
Qty: 90 TABLET | Refills: 0 | Status: SHIPPED | OUTPATIENT
Start: 2023-10-26 | End: 2024-01-29

## 2023-10-26 NOTE — TELEPHONE ENCOUNTER
Refill Routing Note   Medication(s) are not appropriate for processing by [facility] for the following reason(s):      Required labs outdated    [facility] action(s):  Defer Care Due:  None identified              Appointments  past 12m or future 3m with PCP    Date Provider   Last Visit   12/7/2022 Clark Vargas MD   Next Visit   Visit date not found Clark Vargas MD   ED visits in past 90 days: 0        Note composed:1:30 PM 10/26/2023

## 2023-10-26 NOTE — TELEPHONE ENCOUNTER
No care due was identified.  [facility] Embedded Care Due Messages. Reference number: [id number].   10/26/2023 1:17:25 AM CDT

## 2023-11-23 DIAGNOSIS — M10.9 GOUT, ARTHRITIS: ICD-10-CM

## 2023-11-23 RX ORDER — ALLOPURINOL 100 MG/1
100 TABLET ORAL
Qty: 90 TABLET | Refills: 0 | Status: SHIPPED | OUTPATIENT
Start: 2023-11-23

## 2023-11-23 NOTE — TELEPHONE ENCOUNTER
Refill Routing Note   Medication(s) are not appropriate for processing by [facility] for the following reason(s):        Required labs outdated    [facility] action(s):  Defer               Appointments  past 12m or future 3m with PCP    Date Provider   Last Visit   12/7/2022 Clark Vargas MD   Next Visit   Visit date not found Clark Vargas MD   ED visits in past 90 days: 0        Note composed:7:51 AM 11/23/2023

## 2023-11-23 NOTE — TELEPHONE ENCOUNTER
No care due was identified.  [facility] Embedded Care Due Messages. Reference number: [id number].   11/23/2023 12:20:06 AM CST

## 2023-12-04 ENCOUNTER — OFFICE VISIT (OUTPATIENT)
Dept: UROLOGY | Facility: CLINIC | Age: 73
End: 2023-12-04
Payer: MEDICARE

## 2023-12-04 VITALS — WEIGHT: 289.56 LBS | BODY MASS INDEX: 44.03 KG/M2

## 2023-12-04 DIAGNOSIS — R30.0 DYSURIA: ICD-10-CM

## 2023-12-04 DIAGNOSIS — N48.1 BALANITIS: ICD-10-CM

## 2023-12-04 DIAGNOSIS — R35.1 NOCTURIA: ICD-10-CM

## 2023-12-04 DIAGNOSIS — N52.01 ERECTILE DYSFUNCTION DUE TO ARTERIAL INSUFFICIENCY: ICD-10-CM

## 2023-12-04 DIAGNOSIS — N48.9 PENILE LESION: Primary | ICD-10-CM

## 2023-12-04 PROCEDURE — 3008F PR BODY MASS INDEX (BMI) DOCUMENTED: ICD-10-PCS | Mod: CPTII,S$GLB,, | Performed by: UROLOGY

## 2023-12-04 PROCEDURE — 3008F BODY MASS INDEX DOCD: CPT | Mod: CPTII,S$GLB,, | Performed by: UROLOGY

## 2023-12-04 PROCEDURE — 3288F FALL RISK ASSESSMENT DOCD: CPT | Mod: CPTII,S$GLB,, | Performed by: UROLOGY

## 2023-12-04 PROCEDURE — 99999 PR PBB SHADOW E&M-EST. PATIENT-LVL III: ICD-10-PCS | Mod: PBBFAC,,, | Performed by: UROLOGY

## 2023-12-04 PROCEDURE — 1159F PR MEDICATION LIST DOCUMENTED IN MEDICAL RECORD: ICD-10-PCS | Mod: CPTII,S$GLB,, | Performed by: UROLOGY

## 2023-12-04 PROCEDURE — 99214 PR OFFICE/OUTPT VISIT, EST, LEVL IV, 30-39 MIN: ICD-10-PCS | Mod: S$GLB,,, | Performed by: UROLOGY

## 2023-12-04 PROCEDURE — 1101F PR PT FALLS ASSESS DOC 0-1 FALLS W/OUT INJ PAST YR: ICD-10-PCS | Mod: CPTII,S$GLB,, | Performed by: UROLOGY

## 2023-12-04 PROCEDURE — 1160F PR REVIEW ALL MEDS BY PRESCRIBER/CLIN PHARMACIST DOCUMENTED: ICD-10-PCS | Mod: CPTII,S$GLB,, | Performed by: UROLOGY

## 2023-12-04 PROCEDURE — 1101F PT FALLS ASSESS-DOCD LE1/YR: CPT | Mod: CPTII,S$GLB,, | Performed by: UROLOGY

## 2023-12-04 PROCEDURE — 1160F RVW MEDS BY RX/DR IN RCRD: CPT | Mod: CPTII,S$GLB,, | Performed by: UROLOGY

## 2023-12-04 PROCEDURE — 4010F ACE/ARB THERAPY RXD/TAKEN: CPT | Mod: CPTII,S$GLB,, | Performed by: UROLOGY

## 2023-12-04 PROCEDURE — 99999 PR PBB SHADOW E&M-EST. PATIENT-LVL III: CPT | Mod: PBBFAC,,, | Performed by: UROLOGY

## 2023-12-04 PROCEDURE — 4010F PR ACE/ARB THEARPY RXD/TAKEN: ICD-10-PCS | Mod: CPTII,S$GLB,, | Performed by: UROLOGY

## 2023-12-04 PROCEDURE — 1159F MED LIST DOCD IN RCRD: CPT | Mod: CPTII,S$GLB,, | Performed by: UROLOGY

## 2023-12-04 PROCEDURE — 3288F PR FALLS RISK ASSESSMENT DOCUMENTED: ICD-10-PCS | Mod: CPTII,S$GLB,, | Performed by: UROLOGY

## 2023-12-04 PROCEDURE — 99214 OFFICE O/P EST MOD 30 MIN: CPT | Mod: S$GLB,,, | Performed by: UROLOGY

## 2023-12-04 NOTE — PROGRESS NOTES
"Subjective:       Armand Painting is a 73 y.o. male who is an established patient who was referred by Dr Vargas  for evaluation of dysuria.      He recently saw PCP with c/o dysuria. UCx at that time showed moderate growth 50-99k Enterobacter. He was also increased to Flomax BID - urgency in AM, nocturia x 2-3, strong stream. Does not appear he was treated with antibiotics. He was given cream (nystatin-triamcinolone) for suspected balantis. He notes a small cut on foreskin. He does have some burning externally after voiding but denies any dysuria.     He also reports problems with ED. He was given Cialis by PCP but may not have worked well. He has not tried Viagra.     PVR (bladder scan) today - 45cc (not true PVR)    Last PSA 5/16 - 0.41    11/18/2019  Now reports intermittent dysuria that has returned. He reports this is more external irritation. The lesion has reappeared on foreskin. He is out of cream from PCP.   Also reports Viagra 100mg is not working well, worked only one time. He did not try it with emptying stomach.     3/3/2020  He is using Lotrisone cream. He reports penile lesion is still present but improving. Concerned about cost of cream.     8/4/2020  Still with mild urinary frequency (again unable to give specimen) and still with penile lesion. Lesion will come and go, returns when does not use Lotrisone. Did go away completely but now returned. Feels that is related to being wet due to not being circumcised.     2/4/2021  Still with penile lesion - "on and off". Improves with cream.     8/30/2022  Again concerned about wound on penis. Using cream most days. Can be painful. Lesion with come and go but never 100% resolves.     3/9/2023  Occasional pain with urge to void. Wound has improved but still remains. Using Lotrisone. Asking about ED meds.     12/4/2023  Again with penile skin issue. Using Lotrisone without improvement. Has not seen derm (previously referred).       The following portions of the " patient's history were reviewed and updated as appropriate: allergies, current medications, past family history, past medical history, past social history, past surgical history and problem list.    Review of Systems  Constitutional: no fever or chills  ENT: no nasal congestion or sore throat  Respiratory: no cough or shortness of breath  Cardiovascular: no chest pain or palpitations  Gastrointestinal: no nausea or vomiting, tolerating diet  Genitourinary: as per HPI  Hematologic/Lymphatic: no easy bruising or lymphadenopathy  Musculoskeletal: no arthralgias or myalgias  Skin: no rashes or lesions  Neurological: no seizures or tremors  Behavioral/Psych: no auditory or visual hallucinations       Objective:    Vitals: Wt 131.4 kg (289 lb 9.2 oz)   BMI 44.03 kg/m²     Physical Exam   General: well developed, well nourished in no acute distress  Head: normocephalic, atraumatic  Neck: supple, trachea midline, no obvious enlargement of thyroid  HEENT: EOMI, mucus membranes moist, sclera anicteric, no hearing impairment  Lungs: symmetric expansion, non-labored breathing  Neuro: alert and oriented x 3, no gross deficits  Psych: normal judgment and insight, normal mood/affect and non-anxious  Genitourinary:   Penis - uncircumcised penis without plaques or scarring. Wound in dorsal R penile shaft. Urethra - orthotopic location without stenosis.  Scrotum  - no lesions or rashes, no hydrocele or hernia.  Testes - descended bilaterally, symmetric without masses, non tender.  Epididymides - no cysts or lesions, no spermatocele, symmetric   JASEN: patient declined exam      Lab Review   Urine analysis today in clinic shows - no urine      Lab Results   Component Value Date    WBC 6.52 08/09/2022    HGB 12.2 (L) 08/09/2022    HCT 39.2 (L) 08/09/2022    MCV 84 08/09/2022     08/09/2022     Lab Results   Component Value Date    CREATININE 1.1 08/09/2022    BUN 16 08/09/2022     Lab Results   Component Value Date    PSA 0.41  "05/23/2016     No results found for: "PSADIAG"    Imaging  Reports and images were personally reviewed by me and discussed with the patient today         Assessment/Plan:      1. Nocturia    - Less bother with Flomax BID, continue   - Last PSA years ago but very low. Likely okay to hold on repeat check.      2. Dysuria    - Intermittent dysuria has returned   - Consider cystoscopy - seems to be more external     3. Penile lesion    - Lotrisone cream refilled   - Unsure etiology though has remained stable   - Discussed excisional biopsy thoroughly - he declines this. Discussed possible circumcision - he declines this. Again discussed - will consider next visit. Concern for infection risk with biopsy due to location of wound.    - Again discussed derm referral - declined initially, referred but he did not see, referral placed again.    - Again discussed circumcision - he will continue to consider this. I recommend circumcision for complete removal of abnormal skin but he declines at this time.    - He would like to see derm prior to consider circumcision.      4. Erectile dysfunction due to arterial insufficiency    - Cialis did not work well   - Viagra 100mg on empty stomach (goodrx coupon) - not helpful previously, wants repeat trial. Again recommend empty stomach.    - Briefly discussed [facility]ESH[facility] or [facility].        Follow up in 3-4 months             "

## 2023-12-06 ENCOUNTER — OFFICE VISIT (OUTPATIENT)
Dept: DERMATOLOGY | Facility: CLINIC | Age: 73
End: 2023-12-06
Payer: MEDICARE

## 2023-12-06 DIAGNOSIS — B37.42 CANDIDAL BALANITIS: ICD-10-CM

## 2023-12-06 DIAGNOSIS — B35.6 TINEA CRURIS: Primary | ICD-10-CM

## 2023-12-06 PROCEDURE — 4010F ACE/ARB THERAPY RXD/TAKEN: CPT | Mod: CPTII,S$GLB,, | Performed by: STUDENT IN AN ORGANIZED HEALTH CARE EDUCATION/TRAINING PROGRAM

## 2023-12-06 PROCEDURE — 1159F MED LIST DOCD IN RCRD: CPT | Mod: CPTII,S$GLB,, | Performed by: STUDENT IN AN ORGANIZED HEALTH CARE EDUCATION/TRAINING PROGRAM

## 2023-12-06 PROCEDURE — 1160F PR REVIEW ALL MEDS BY PRESCRIBER/CLIN PHARMACIST DOCUMENTED: ICD-10-PCS | Mod: CPTII,S$GLB,, | Performed by: STUDENT IN AN ORGANIZED HEALTH CARE EDUCATION/TRAINING PROGRAM

## 2023-12-06 PROCEDURE — 99203 PR OFFICE/OUTPT VISIT, NEW, LEVL III, 30-44 MIN: ICD-10-PCS | Mod: S$GLB,,, | Performed by: STUDENT IN AN ORGANIZED HEALTH CARE EDUCATION/TRAINING PROGRAM

## 2023-12-06 PROCEDURE — 99999 PR PBB SHADOW E&M-EST. PATIENT-LVL III: ICD-10-PCS | Mod: PBBFAC,,, | Performed by: STUDENT IN AN ORGANIZED HEALTH CARE EDUCATION/TRAINING PROGRAM

## 2023-12-06 PROCEDURE — 4010F PR ACE/ARB THEARPY RXD/TAKEN: ICD-10-PCS | Mod: CPTII,S$GLB,, | Performed by: STUDENT IN AN ORGANIZED HEALTH CARE EDUCATION/TRAINING PROGRAM

## 2023-12-06 PROCEDURE — 99203 OFFICE O/P NEW LOW 30 MIN: CPT | Mod: S$GLB,,, | Performed by: STUDENT IN AN ORGANIZED HEALTH CARE EDUCATION/TRAINING PROGRAM

## 2023-12-06 PROCEDURE — 1160F RVW MEDS BY RX/DR IN RCRD: CPT | Mod: CPTII,S$GLB,, | Performed by: STUDENT IN AN ORGANIZED HEALTH CARE EDUCATION/TRAINING PROGRAM

## 2023-12-06 PROCEDURE — 1159F PR MEDICATION LIST DOCUMENTED IN MEDICAL RECORD: ICD-10-PCS | Mod: CPTII,S$GLB,, | Performed by: STUDENT IN AN ORGANIZED HEALTH CARE EDUCATION/TRAINING PROGRAM

## 2023-12-06 PROCEDURE — 99999 PR PBB SHADOW E&M-EST. PATIENT-LVL III: CPT | Mod: PBBFAC,,, | Performed by: STUDENT IN AN ORGANIZED HEALTH CARE EDUCATION/TRAINING PROGRAM

## 2023-12-06 NOTE — PROGRESS NOTES
Subjective:      Patient ID:  Armand Painting is a 73 y.o. male who presents for   Chief Complaint   Patient presents with    Lesion     penis     Referred by urology    Patient with new complaint of lesion(s)  Location: penis  Duration: many years  Symptoms: non healing, recurring  Relieving factors/Previous treatments: lotrisone (betamethasone-clotrimazole) and     Pt referred to derm by urology regarding penile lesion that has not healed.          Review of Systems    Objective:   Physical Exam   Constitutional: He appears well-developed and well-nourished. No distress.   Neurological: He is alert and oriented to person, place, and time. He is not disoriented.   Psychiatric: He has a normal mood and affect.   Skin:   Areas Examined (abnormalities noted in diagram):   Genitals / Buttocks / Groin Inspection Performed       Diagram Legend     Erythematous scaling macule/papule c/w actinic keratosis       Vascular papule c/w angioma      Pigmented verrucoid papule/plaque c/w seborrheic keratosis      Yellow umbilicated papule c/w sebaceous hyperplasia      Irregularly shaped tan macule c/w lentigo     1-2 mm smooth white papules consistent with Milia      Movable subcutaneous cyst with punctum c/w epidermal inclusion cyst      Subcutaneous movable cyst c/w pilar cyst      Firm pink to brown papule c/w dermatofibroma      Pedunculated fleshy papule(s) c/w skin tag(s)      Evenly pigmented macule c/w junctional nevus     Mildly variegated pigmented, slightly irregular-bordered macule c/w mildly atypical nevus      Flesh colored to evenly pigmented papule c/w intradermal nevus       Pink pearly papule/plaque c/w basal cell carcinoma      Erythematous hyperkeratotic cursted plaque c/w SCC      Surgical scar with no sign of skin cancer recurrence      Open and closed comedones      Inflammatory papules and pustules      Verrucoid papule consistent consistent with wart     Erythematous eczematous patches and plaques      Dystrophic onycholytic nail with subungual debris c/w onychomycosis     Umbilicated papule    Erythematous-base heme-crusted tan verrucoid plaque consistent with inflamed seborrheic keratosis     Erythematous Silvery Scaling Plaque c/w Psoriasis     See annotation      Assessment / Plan:        Tinea cruris  Candidal balanitis  Pt with erythematous scaly patches over BL inguinal folds and scrotum consistent with tinea cruris  Also on the dorsal penile shaft close to glans there is a white-red plaque with whitish discharge, the skin here has many folds and he is uncircumcised     I discussed I favor this penile lesion is due to fungal etiology, the differential would be lichen sclerosus and biopsy would be needed to determine this but favor fungus today so do not want to treat empirically with steroids for LS. Pt does not want biopsy at this time- I would prefer if urology performed biopsy if warranted. Additionally if they perform circumcision this would provide tissue sample for definitive diagnosis     He definitely has fungal rash of groin which may be spreading to shaft so recommend treating that as well    Plan:  -Start ketoconazole cream twice daily to lesion on penis as well as inguinal folds and scrotum for 3 weeks, restart as needed for recurrence  -Discontinue lotrisone- do not recommend creams that combine topical steroid with antifungal as it can keep the fungal infection from clearing  -I do agree with urology that circumcision would likely greatly improve his recurring penile lesion/rash as the excess skin/moisture is worsening things. Discussed this with patient that he may want to consider circumcision for curative and diagnostic purposes.    RTC prn if condition worsens or fails to improve

## 2024-01-06 DIAGNOSIS — E78.2 MIXED HYPERLIPIDEMIA: ICD-10-CM

## 2024-01-06 DIAGNOSIS — M10.9 GOUT, ARTHRITIS: Primary | Chronic | ICD-10-CM

## 2024-01-06 NOTE — TELEPHONE ENCOUNTER
Care Due:                  Date            Visit Type   Department     Provider  --------------------------------------------------------------------------------                                [facility]                              PRIMARY      MED/ INTERNAL  Last Visit: 12-      CARE ([facility])   MED/ EILEENS      Clark Vargas  Next Visit: None Scheduled  None         None Found                                                            Last  Test          Frequency    Reason                     Performed    Due Date  --------------------------------------------------------------------------------    Office Visit  15 months..  allopurinoL,               12- 03-                             hydroCHLOROthiazide,                             lisinopriL, metFORMIN,                             pravastatin, tamsulosin..    CBC.........  12 months..  allopurinoL..............  08- 08-    CMP.........  12 months..  allopurinoL,               08- 08-                             hydroCHLOROthiazide,                             lisinopriL, metFORMIN,                             pravastatin..............    HBA1C.......  6 months...  metFORMIN................  08- 02-    Lipid Panel.  12 months..  pravastatin..............  02- 02-    Uric Acid...  12 months..  allopurinoL..............  08- 08-    Health [facility] Embedded Care Due Messages. Reference number: [id number].   1/06/2024 6:57:18 AM CST

## 2024-01-07 DIAGNOSIS — I10 ESSENTIAL HYPERTENSION: ICD-10-CM

## 2024-01-07 NOTE — TELEPHONE ENCOUNTER
No care due was identified.  Health [facility] Embedded Care Due Messages. Reference number: [id number].   1/07/2024 6:54:48 AM CST

## 2024-01-08 RX ORDER — HYDROCHLOROTHIAZIDE 25 MG/1
TABLET ORAL
Qty: 90 TABLET | Refills: 0 | OUTPATIENT
Start: 2024-01-08

## 2024-01-08 RX ORDER — PRAVASTATIN SODIUM 20 MG/1
20 TABLET ORAL DAILY
Qty: 90 TABLET | Refills: 0 | OUTPATIENT
Start: 2024-01-08

## 2024-01-08 NOTE — TELEPHONE ENCOUNTER
Last OV 1 year needs OV with pre visit labs. If he schedules these I can send in a temporary refill.

## 2024-01-08 NOTE — TELEPHONE ENCOUNTER
Refill Routing Note   Medication(s) are not appropriate for processing by [facility] for the following reason(s):        Required labs outdated    [facility] action(s):  Defer               Appointments  past 12m or future 3m with PCP    Date Provider   Last Visit   12/7/2022 Clark Vargas MD   Next Visit   Visit date not found Clark Vargas MD   ED visits in past 90 days: 0        Note composed:2:22 PM 01/08/2024

## 2024-01-08 NOTE — TELEPHONE ENCOUNTER
Refill Routing Note   Medication(s) are not appropriate for processing by [facility] for the following reason(s):        Required labs outdated: CMP, Lipid panel    [facility] action(s):  Defer     Requires labs : Yes    Medication Therapy Plan:  Due for annual with PCP, Labs (CBC, CMP, a1c, Lipid panel, uric acid) Pended labs utilizing BestPracticeAdvisor (BPA) tab due to extra training from LPN      Appointments  past 12m or future 3m with PCP    Date Provider   Last Visit   12/7/2022 Clark Vargas MD   Next Visit   Visit date not found Clark Vargas MD   ED visits in past 90 days: 0        Note composed:4:32 AM 01/08/2024

## 2024-01-11 DIAGNOSIS — I10 ESSENTIAL HYPERTENSION: ICD-10-CM

## 2024-01-11 DIAGNOSIS — M10.9 GOUT, ARTHRITIS: ICD-10-CM

## 2024-01-11 NOTE — TELEPHONE ENCOUNTER
No care due was identified.  Health [facility] Embedded Care Due Messages. Reference number: [id number].   1/11/2024 2:09:20 PM CST

## 2024-01-11 NOTE — TELEPHONE ENCOUNTER
No care due was identified.  Health [facility] Embedded Care Due Messages. Reference number: [id number].   1/11/2024 2:09:44 PM CST

## 2024-01-12 ENCOUNTER — OFFICE VISIT (OUTPATIENT)
Dept: OPHTHALMOLOGY | Facility: CLINIC | Age: 74
End: 2024-01-12
Payer: MEDICARE

## 2024-01-12 DIAGNOSIS — E11.9 DM TYPE 2 WITHOUT RETINOPATHY: ICD-10-CM

## 2024-01-12 DIAGNOSIS — H25.13 NUCLEAR SCLEROSIS OF BOTH EYES: Primary | ICD-10-CM

## 2024-01-12 DIAGNOSIS — H26.9 CORTICAL CATARACT OF BOTH EYES: ICD-10-CM

## 2024-01-12 DIAGNOSIS — I10 ESSENTIAL HYPERTENSION: ICD-10-CM

## 2024-01-12 DIAGNOSIS — R73.03 PREDIABETES: Chronic | ICD-10-CM

## 2024-01-12 DIAGNOSIS — H35.341 MACULAR HOLE, RIGHT EYE: ICD-10-CM

## 2024-01-12 DIAGNOSIS — H52.7 REFRACTIVE ERROR: ICD-10-CM

## 2024-01-12 PROCEDURE — 92136 OPHTHALMIC BIOMETRY: CPT | Mod: RT,S$GLB,, | Performed by: OPHTHALMOLOGY

## 2024-01-12 PROCEDURE — 92014 COMPRE OPH EXAM EST PT 1/>: CPT | Mod: S$GLB,,, | Performed by: OPHTHALMOLOGY

## 2024-01-12 PROCEDURE — 99999 PR PBB SHADOW E&M-EST. PATIENT-LVL III: CPT | Mod: PBBFAC,,, | Performed by: OPHTHALMOLOGY

## 2024-01-12 RX ORDER — HYDROCHLOROTHIAZIDE 25 MG/1
TABLET ORAL
Qty: 90 TABLET | Refills: 0 | OUTPATIENT
Start: 2024-01-12

## 2024-01-12 RX ORDER — ALLOPURINOL 100 MG/1
100 TABLET ORAL
Qty: 90 TABLET | Refills: 0 | OUTPATIENT
Start: 2024-01-12

## 2024-01-12 RX ORDER — METFORMIN HYDROCHLORIDE 500 MG/1
500 TABLET, EXTENDED RELEASE ORAL DAILY
Qty: 30 TABLET | Refills: 0 | Status: SHIPPED | OUTPATIENT
Start: 2024-01-12

## 2024-01-12 NOTE — TELEPHONE ENCOUNTER
No care due was identified.  Health [facility] Embedded Care Due Messages. Reference number: [id number].   1/12/2024 10:57:04 AM CST

## 2024-01-12 NOTE — TELEPHONE ENCOUNTER
Refill Decision Note   Armand Painting  is requesting a refill authorization.  Brief Assessment and Rationale for Refill:  Quick Discontinue     Medication Therapy Plan:  Previously denied by PCP on 01/08/24(patient due for OV); [facility]      Comments:     Note composed:5:50 AM 01/12/2024

## 2024-01-12 NOTE — PROGRESS NOTES
Subjective:       Patient ID: Armand Painting is a 73 y.o. male.    Chief Complaint: Cataract    HPI    73 y.o male is here for Cataract Evaluation. Difficulty seeing especially   in bright lights,no pain or discomfort, no flashes of light, ocassional   floaters.  Eye Meds;No gtts  Last edited by Kyaw Lomax on 1/12/2024  2:04 PM.             Assessment:       1. Nuclear sclerosis of both eyes    2. Cortical cataract of both eyes    3. Macular hole, right eye    4. DM type 2 without retinopathy    5. Refractive error        Plan:       Visually significant cataract OU -Pt. Wants Sx OD.    Macular hole OD-Pt understands that MH is main source of decreased Va OD. Pt also knows that he will need MH repair after CE OD to improve his vision.  DM-No NPDR OU.  RE      Cataract Surgery Consent: Patient with a visually significant cataract with difficulties of ADLs, reading, driving, night vision, glare (any and all).  Discussed with Patient/Family/Caregiver: options, risks and benefits, expectations of cataract surgery, utilized an eye model with questions and answers to facilitate discussion.  Discussed lens options and patient understands that glasses may be required for optimal vision for distance and/or near vision after cataract surgery.  The Patient/Family/Caregiver  voice good understanding and patient wishes to proceed with surgery.  The patient will likely benefit from surgery and patient signed consent for Right Eye.  Will call Pt to schedule CE OD CNAOTO 20.5.  Control DM.

## 2024-01-12 NOTE — TELEPHONE ENCOUNTER
Dr. Clark Francisco MD declined refill due to appointment requirement. Patient may need staff assistance to satisfy request and fulfill lab requirements prior to more refills being issued.  Thank you.

## 2024-01-12 NOTE — TELEPHONE ENCOUNTER
Refill Routing Note   Medication(s) are not appropriate for processing by [facility] for the following reason(s):        Required labs outdated  No active prescription written by provider    [facility] action(s):  Defer               Appointments  past 12m or future 3m with PCP    Date Provider   Last Visit   12/7/2022 Clark Vargas MD   Next Visit   Visit date not found Clark Vargas MD   ED visits in past 90 days: 0        Note composed:5:49 AM 01/12/2024

## 2024-01-12 NOTE — TELEPHONE ENCOUNTER
Refill Decision Note  Ludy ROYAL. Previously Denied      Armand Painting  is requesting a refill authorization.  Brief Assessment and Rationale for Refill:  Quick Discontinue     Medication Therapy Plan:         Comments:     Note composed:11:15 AM 01/12/2024

## 2024-01-12 NOTE — TELEPHONE ENCOUNTER
Refill Routing Note   Medication(s) are not appropriate for processing by [facility] for the following reason(s):        Required labs outdated  Responsible provider unclear    [facility] action(s):  Defer               Appointments  past 12m or future 3m with PCP    Date Provider   Last Visit   12/7/2022 Clark Vargas MD   Next Visit   Visit date not found Clark Vargas MD   ED visits in past 90 days: 0        Note composed:12:26 PM 01/12/2024

## 2024-01-16 ENCOUNTER — TELEPHONE (OUTPATIENT)
Dept: OPHTHALMOLOGY | Facility: CLINIC | Age: 74
End: 2024-01-16
Payer: MEDICARE

## 2024-01-17 ENCOUNTER — TELEPHONE (OUTPATIENT)
Dept: OPHTHALMOLOGY | Facility: CLINIC | Age: 74
End: 2024-01-17
Payer: MEDICARE

## 2024-01-17 DIAGNOSIS — H25.11 NS (NUCLEAR SCLEROSIS), RIGHT: Primary | ICD-10-CM

## 2024-01-17 NOTE — TELEPHONE ENCOUNTER
"----- Message from Attila Meadows sent at 1/17/2024  8:38 AM CST -----  Consult/Advisory:      Name Of Caller: Self      Contact Preference?:  [phone]      What is the nature of the call?: Returning call to Olimpia about scheduling cataract sx      Additional Notes:  "Thank you for all that you do for our patients"         "

## 2024-01-17 NOTE — TELEPHONE ENCOUNTER
----- Message from Mariusz Barnes sent at 1/17/2024 12:17 PM CST -----  Contact: [phone] Gwendolyn Petersen  Pt daughter is returning a call to schedule her fathers sx. Please call back to further assist.

## 2024-01-18 DIAGNOSIS — H25.13 NUCLEAR SCLEROTIC CATARACT OF BOTH EYES: Primary | ICD-10-CM

## 2024-01-18 RX ORDER — PREDNISOLONE/MOXIFLOX/BROMF/PF 1 %-0.5 %
1 DROPS OPHTHALMIC (EYE) 3 TIMES DAILY
Qty: 5 ML | Refills: 2 | Status: SHIPPED | OUTPATIENT
Start: 2024-03-04 | End: 2024-04-03

## 2024-01-29 DIAGNOSIS — I10 ESSENTIAL HYPERTENSION: ICD-10-CM

## 2024-01-29 RX ORDER — LISINOPRIL 10 MG/1
10 TABLET ORAL
Qty: 30 TABLET | Refills: 0 | Status: SHIPPED | OUTPATIENT
Start: 2024-01-29 | End: 2024-02-07

## 2024-01-29 NOTE — TELEPHONE ENCOUNTER
Refill Routing Note   Medication(s) are not appropriate for processing by [facility] for the following reason(s):        Required labs outdated    [facility] action(s):  Defer             Appointments  past 12m or future 3m with PCP    Date Provider   Last Visit   12/7/2022 Clark Vargas MD   Next Visit   Visit date not found Clark Vargas MD   ED visits in past 90 days: 0        Note composed:11:41 AM 01/29/2024

## 2024-01-29 NOTE — TELEPHONE ENCOUNTER
No care due was identified.  Health [facility] Embedded Care Due Messages. Reference number: [id number].   1/29/2024 12:13:13 AM CST

## 2024-02-01 ENCOUNTER — PATIENT OUTREACH (OUTPATIENT)
Dept: ADMINISTRATIVE | Facility: HOSPITAL | Age: 74
End: 2024-02-01
Payer: MEDICARE

## 2024-02-07 DIAGNOSIS — I10 ESSENTIAL HYPERTENSION: ICD-10-CM

## 2024-02-07 RX ORDER — LISINOPRIL 10 MG/1
10 TABLET ORAL DAILY
Qty: 30 TABLET | Refills: 0 | Status: SHIPPED | OUTPATIENT
Start: 2024-02-07 | End: 2024-02-23

## 2024-02-07 NOTE — TELEPHONE ENCOUNTER
Refill Routing Note   Medication(s) are not appropriate for processing by [facility] for the following reason(s):        Required labs outdated    [facility] action(s):  Defer               Appointments  past 12m or future 3m with PCP    Date Provider   Last Visit   12/7/2022 Clark Vargas MD   Next Visit   Visit date not found Clark Vargas MD   ED visits in past 90 days: 0        Note composed:3:06 PM 02/07/2024

## 2024-02-07 NOTE — TELEPHONE ENCOUNTER
No care due was identified.  [facility] Embedded Care Due Messages. Reference number: [id number].   2/07/2024 1:29:30 PM CST

## 2024-02-26 ENCOUNTER — HOSPITAL ENCOUNTER (OUTPATIENT)
Dept: PREADMISSION TESTING | Facility: HOSPITAL | Age: 74
Discharge: HOME | End: 2024-02-26
Attending: OPHTHALMOLOGY
Payer: MEDICARE

## 2024-02-26 DIAGNOSIS — N40.1 BENIGN PROSTATIC HYPERPLASIA WITH URINARY FREQUENCY: ICD-10-CM

## 2024-02-26 DIAGNOSIS — E78.2 MIXED HYPERLIPIDEMIA: ICD-10-CM

## 2024-02-26 DIAGNOSIS — R35.0 BENIGN PROSTATIC HYPERPLASIA WITH URINARY FREQUENCY: ICD-10-CM

## 2024-02-26 RX ORDER — TAMSULOSIN HYDROCHLORIDE 0.4 MG/1
0.8 CAPSULE ORAL DAILY
Qty: 180 CAPSULE | Refills: 0 | Status: SHIPPED | OUTPATIENT
Start: 2024-02-26

## 2024-02-26 NOTE — TELEPHONE ENCOUNTER
No care due was identified.  [facility] Embedded Care Due Messages. Reference number: [id number].   2/26/2024 3:57:04 PM CST

## 2024-02-27 RX ORDER — PRAVASTATIN SODIUM 20 MG/1
20 TABLET ORAL DAILY
Qty: 90 TABLET | Refills: 1 | Status: SHIPPED | OUTPATIENT
Start: 2024-02-27 | End: 2024-03-13

## 2024-02-27 NOTE — TELEPHONE ENCOUNTER
Refill Routing Note   Medication(s) are not appropriate for processing by [facility] for the following reason(s):        Required labs outdated    [facility] action(s):  Defer  Approve               Appointments  past 12m or future 3m with PCP    Date Provider   Last Visit   12/7/2022 Clark Vargas MD   Next Visit   8/6/2024 Clark Vargas MD   ED visits in past 90 days: 0        Note composed:9:26 PM 02/26/2024

## 2024-03-03 NOTE — H&P
[facility] [place]  History & Physical    Subjective:      Chief Complaint/Reason for Admission:     Armand Painting is a 73 y.o. male.    Past Medical History:   Diagnosis Date    Arthritis     Erectile dysfunction     Gout     Hyperlipidemia     Hypertension     Morbid obesity     Nuclear sclerosis of both eyes 1/16/2018    Screening for AAA (abdominal aortic aneurysm)     6/2016 AAA US no aneurysm     Past Surgical History:   Procedure Laterality Date    COLONOSCOPY N/A 10/31/2018    Procedure: COLONOSCOPY;  Surgeon: Clementine Batista MD;  Location: [facility] ENDO;  Service: Endoscopy;  Laterality: N/A;    EPIDURAL STEROID INJECTION N/A 8/7/2020    Procedure: INJECTION, STEROID, EPIDURAL  C7-T1;  Surgeon: Stan Brunner MD;  Location: [facility] PAIN MGT;  Service: Pain Management;  Laterality: N/A;  C7-T1 Epidural Steroid Injection  consent needed    SPINE SURGERY  3/31/16    C3-C7 laminoplasty,Dr Caruso    tooth implant       Social History     Tobacco Use    Smoking status: Former     Current packs/day: 0.00     Types: Cigarettes     Quit date: 1/7/2009     Years since quitting: 15.1     Passive exposure: Past    Smokeless tobacco: Former   Substance Use Topics    Alcohol use: Yes     Alcohol/week: 12.0 standard drinks of alcohol     Types: 12 Cans of beer per week    Drug use: No       No medications prior to admission.     Review of patient's allergies indicates:   Allergen Reactions    Codeine Swelling     Codeine with Aspirin caused chest pain        Review of Systems   Eyes:  Positive for blurred vision.   All other systems reviewed and are negative.      Objective:      Vital Signs (Most Recent)       Vital Signs Range (Last 24H):       Physical Exam  Constitutional:       Appearance: He is well-developed.   HENT:      Head: Normocephalic.   Eyes:      Conjunctiva/sclera: Conjunctivae normal.      Pupils: Pupils are equal, round, and reactive to light.   Cardiovascular:      Rate and Rhythm: Normal rate.   Pulmonary:       Effort: Pulmonary effort is normal.      Breath sounds: Normal breath sounds.   Abdominal:      General: Bowel sounds are normal.      Palpations: Abdomen is soft.   Musculoskeletal:         General: Normal range of motion.      Cervical back: Normal range of motion and neck supple.   Skin:     General: Skin is warm.   Neurological:      Mental Status: He is alert and oriented to person, place, and time.         Data Review:     ECG:     Assessment:      Cataract OD.    Plan:    CE OD.

## 2024-03-06 ENCOUNTER — TELEPHONE (OUTPATIENT)
Dept: OPHTHALMOLOGY | Facility: CLINIC | Age: 74
End: 2024-03-06
Payer: MEDICARE

## 2024-03-06 ENCOUNTER — ANESTHESIA EVENT (OUTPATIENT)
Dept: SURGERY | Facility: HOSPITAL | Age: 74
End: 2024-03-06
Payer: MEDICARE

## 2024-03-06 NOTE — PLAN OF CARE
Late entry for 2/26/2024---Pre-operative instructions, medication directives and pain scales reviewed with patient. All questions the patient had were answered. Re-assurance about surgical procedure and day of surgery routine given as needed, patient verbalized understanding of the pre-op instructions.  Patient instructed to report to [facility] for surgery.    Phone preop done.  Arrival time 530 am

## 2024-03-06 NOTE — TELEPHONE ENCOUNTER
----- Message from Akua Duncan sent at 3/6/2024  8:22 AM CST -----  Regarding: Surgery R/s  Schedule show you out today but just want to let know. Pt Armand Painting [id number] daughter called that pt does not have surgery eye drops for 3/7 surgery. Daughter wants to r/s surgery sent msg to pool

## 2024-03-07 ENCOUNTER — ANESTHESIA (OUTPATIENT)
Dept: SURGERY | Facility: HOSPITAL | Age: 74
End: 2024-03-07
Payer: MEDICARE

## 2024-03-07 ENCOUNTER — HOSPITAL ENCOUNTER (OUTPATIENT)
Facility: HOSPITAL | Age: 74
Discharge: HOME | End: 2024-03-07
Attending: OPHTHALMOLOGY | Admitting: OPHTHALMOLOGY
Payer: MEDICARE

## 2024-03-07 VITALS
TEMPERATURE: 98 F | RESPIRATION RATE: 15 BRPM | OXYGEN SATURATION: 98 % | BODY MASS INDEX: 43.8 KG/M2 | WEIGHT: 289 LBS | HEART RATE: 61 BPM | SYSTOLIC BLOOD PRESSURE: 127 MMHG | DIASTOLIC BLOOD PRESSURE: 83 MMHG | HEIGHT: 68 IN

## 2024-03-07 DIAGNOSIS — Z01.818 PREOPERATIVE TESTING: ICD-10-CM

## 2024-03-07 DIAGNOSIS — H25.11 NUCLEAR SCLEROTIC CATARACT OF RIGHT EYE: Primary | ICD-10-CM

## 2024-03-07 LAB — POCT GLUCOSE: 112 MG/DL (ref 70–110)

## 2024-03-07 PROCEDURE — D9220A PRA ANESTHESIA: Mod: ANES,,, | Performed by: ANESTHESIOLOGY

## 2024-03-07 PROCEDURE — 25000003 PHARM REV CODE 250: Performed by: OPHTHALMOLOGY

## 2024-03-07 PROCEDURE — 71000015 HC POSTOP RECOV 1ST HR: Performed by: OPHTHALMOLOGY

## 2024-03-07 PROCEDURE — V2632 POST CHMBR INTRAOCULAR LENS: HCPCS | Performed by: OPHTHALMOLOGY

## 2024-03-07 PROCEDURE — 66984 XCAPSL CTRC RMVL W/O ECP: CPT | Mod: RT,,, | Performed by: OPHTHALMOLOGY

## 2024-03-07 PROCEDURE — 36000706: Performed by: OPHTHALMOLOGY

## 2024-03-07 PROCEDURE — 63600175 PHARM REV CODE 636 W HCPCS: Performed by: OPHTHALMOLOGY

## 2024-03-07 PROCEDURE — 37000008 HC ANESTHESIA 1ST 15 MINUTES: Performed by: OPHTHALMOLOGY

## 2024-03-07 PROCEDURE — 36000707: Performed by: OPHTHALMOLOGY

## 2024-03-07 PROCEDURE — 37000009 HC ANESTHESIA EA ADD 15 MINS: Performed by: OPHTHALMOLOGY

## 2024-03-07 PROCEDURE — 63600175 PHARM REV CODE 636 W HCPCS: Performed by: NURSE ANESTHETIST, CERTIFIED REGISTERED

## 2024-03-07 PROCEDURE — 82962 GLUCOSE BLOOD TEST: CPT | Performed by: OPHTHALMOLOGY

## 2024-03-07 PROCEDURE — D9220A PRA ANESTHESIA: Mod: CRNA,,, | Performed by: NURSE ANESTHETIST, CERTIFIED REGISTERED

## 2024-03-07 DEVICE — LENS CLAREON AUTONOME 20.5D: Type: IMPLANTABLE DEVICE | Site: EYE | Status: FUNCTIONAL

## 2024-03-07 RX ORDER — CYCLOP/TROP/PROPA/PHEN/KET/WAT 1-1-0.1%
1 DROPS (EA) OPHTHALMIC (EYE)
Status: COMPLETED | OUTPATIENT
Start: 2024-03-07 | End: 2024-03-07

## 2024-03-07 RX ORDER — LIDOCAINE HYDROCHLORIDE 20 MG/ML
INJECTION, SOLUTION INFILTRATION; PERINEURAL
Status: DISCONTINUED | OUTPATIENT
Start: 2024-03-07 | End: 2024-03-07 | Stop reason: HOSPADM

## 2024-03-07 RX ORDER — SODIUM CHLORIDE 9 MG/ML
INJECTION, SOLUTION INTRAVENOUS CONTINUOUS
Status: DISCONTINUED | OUTPATIENT
Start: 2024-03-07 | End: 2024-03-07 | Stop reason: HOSPADM

## 2024-03-07 RX ORDER — FENTANYL CITRATE 50 UG/ML
INJECTION, SOLUTION INTRAMUSCULAR; INTRAVENOUS
Status: DISCONTINUED | OUTPATIENT
Start: 2024-03-07 | End: 2024-03-07

## 2024-03-07 RX ORDER — ACETAMINOPHEN 325 MG/1
650 TABLET ORAL EVERY 4 HOURS PRN
Status: DISCONTINUED | OUTPATIENT
Start: 2024-03-07 | End: 2024-03-07 | Stop reason: HOSPADM

## 2024-03-07 RX ORDER — LIDOCAINE HYDROCHLORIDE 10 MG/ML
1 INJECTION, SOLUTION EPIDURAL; INFILTRATION; INTRACAUDAL; PERINEURAL ONCE
Status: DISCONTINUED | OUTPATIENT
Start: 2024-03-07 | End: 2024-03-07 | Stop reason: HOSPADM

## 2024-03-07 RX ORDER — PREDNISOLONE ACETATE 10 MG/ML
SUSPENSION/ DROPS OPHTHALMIC
Status: DISCONTINUED | OUTPATIENT
Start: 2024-03-07 | End: 2024-03-07 | Stop reason: HOSPADM

## 2024-03-07 RX ORDER — POVIDONE-IODINE 5 %
SOLUTION, NON-ORAL OPHTHALMIC (EYE)
Status: DISCONTINUED | OUTPATIENT
Start: 2024-03-07 | End: 2024-03-07 | Stop reason: HOSPADM

## 2024-03-07 RX ORDER — OFLOXACIN 3 MG/ML
SOLUTION/ DROPS OPHTHALMIC
Status: DISCONTINUED | OUTPATIENT
Start: 2024-03-07 | End: 2024-03-07 | Stop reason: HOSPADM

## 2024-03-07 RX ORDER — OFLOXACIN 3 MG/ML
1 SOLUTION/ DROPS OPHTHALMIC
Status: COMPLETED | OUTPATIENT
Start: 2024-03-07 | End: 2024-03-07

## 2024-03-07 RX ORDER — SODIUM CHLORIDE 9 MG/ML
INJECTION, SOLUTION INTRAVENOUS CONTINUOUS
Status: ACTIVE | OUTPATIENT
Start: 2024-03-07

## 2024-03-07 RX ORDER — HYDROCODONE BITARTRATE AND ACETAMINOPHEN 5; 325 MG/1; MG/1
1 TABLET ORAL EVERY 4 HOURS PRN
Status: DISCONTINUED | OUTPATIENT
Start: 2024-03-07 | End: 2024-03-07 | Stop reason: HOSPADM

## 2024-03-07 RX ADMIN — FENTANYL CITRATE 25 MCG: 0.05 INJECTION, SOLUTION INTRAMUSCULAR; INTRAVENOUS at 07:03

## 2024-03-07 RX ADMIN — OFLOXACIN 1 DROP: 3 SOLUTION/ DROPS OPHTHALMIC at 06:03

## 2024-03-07 RX ADMIN — Medication 1 DROP: at 06:03

## 2024-03-07 RX ADMIN — SODIUM CHLORIDE, SODIUM LACTATE, POTASSIUM CHLORIDE, AND CALCIUM CHLORIDE: .6; .31; .03; .02 INJECTION, SOLUTION INTRAVENOUS at 07:03

## 2024-03-07 NOTE — BRIEF OP NOTE
[facility] - Surgery  Brief Operative Note    Surgery Date: 3/7/2024     Surgeon(s) and Role:     * Bishnu Bojorquez MD - Primary    Assisting Surgeon: None    Pre-op Diagnosis:  NS (nuclear sclerosis), right [H25.11]    Post-op Diagnosis:  Post-Op Diagnosis Codes:     * NS (nuclear sclerosis), right [H25.11]    Procedure(s) (LRB):  PHACOEMULSIFICATION, CATARACT (Right)  INSERTION, IOL PROSTHESIS (Right)    Anesthesia: Local MAC    Operative Findings:     Estimated Blood Loss: * No values recorded between 3/7/2024  7:32 AM and 3/7/2024  7:59 AM *         Specimens:   Specimen (24h ago, onward)      None              Discharge Note    OUTCOME: Patient tolerated treatment/procedure well without complication and is now ready for discharge.    DISPOSITION: Home or Self Care    FINAL DIAGNOSIS:  Nuclear sclerotic cataract of right eye    FOLLOWUP: In clinic    DISCHARGE INSTRUCTIONS:    Discharge Procedure Orders   Other restrictions (specify):   Order Comments: No heavy lifting or bending for 1 week.        Clinical Reference Documents Added to Patient Instructions         Document    CATARACT REMOVAL DISCHARGE INSTRUCTIONS (ENGLISH)

## 2024-03-07 NOTE — OP NOTE
Operative Date:  03/07/2024    Discharge Date:  03/07/2024    Discharge Patient Home    Report Title: Operative Note      SURGEON: Bishnu Bojorquez MD     ASSISTANT:     PREOPERATIVE DIAGNOSIS: Visually significant NSC cataract,  Right Eye.    POSTOPERATIVE DIAGNOSIS: Visually-significant NSC cataract,  Right Eye.    PROCEDURE PERFORMED: Phacoemulsification of the cataract with posterior chamber intraocular lens Right Eye.    ANESTHESIA: Topical with MAC     COMPLICATIONS: None    ESTIMATED BLOOD LOSS: Minimal    INDICATIONS FOR PROCEDURE:   The patient is a pleasant 73 year old gentleman with increasing difficulties with activities of daily living secondary to a dense visually significant cataract in the Right Eye.  Discussions have been carried out with this patient concerning the options to surgery, risks, benefits and expectations.  The patient voiced good understanding and wished to proceed with the above procedure.    PROCEDURE IN DETAIL: The patient was brought to the operating room and received topical anesthetic to the eye and then was prepped and draped in the usual sterile fashion.  Using the King ring and the guarded andrea blade set at 0.37 mm, a partial thickness clear cornea incision was made temporally.  The paracentesis site was made at the twelve o'clock position.  Omidria was injected into the anterior chamber through the paracentesis.  Viscoat was then injected into the anterior chamber.  The eye was then reentered at the primary surgical site with a 2.4 mm keratome followed by continuous capsulotomy, hydrodissection, hydrodelineation and phacoemulsification of the cataract.  Residual cortical material was removed using automated irrigation-aspiration technique.  Healon was injected into the posterior chamber and a CNAOTO 20.5 diopter lens was placed in the bag without difficulty. Residual viscoelastic was removed using automated irrigation-aspiration technique. The eye was  re-pressurized using BSS solution and both the paracentesis site and the primary surgical site were demonstrated to be watertight at the end of the case with Weck--Chelsea manipulation.  One drop of Ofloxacin and one drop of Pred acetate 1% was applied to the Right Eye .The eye was closed, patched and a Billy shield placed.  The patient was taken to the recovery room in good and stable condition.  The patient tolerated the procedure well.  The patient was instructed to refrain from any heavy lifting, bending, stooping or straining activities, discharged home  and to follow-up in the morning for routine postoperative care with Bishnu Bojorquez MD.

## 2024-03-07 NOTE — ANESTHESIA POSTPROCEDURE EVALUATION
Anesthesia Post Evaluation    Patient: Armand Painting    Procedure(s) Performed: Procedure(s) (LRB):  PHACOEMULSIFICATION, CATARACT (Right)  INSERTION, IOL PROSTHESIS (Right)    Final Anesthesia Type: MAC      Patient location during evaluation: [facility]  Patient participation: Yes- Able to Participate  Level of consciousness: awake and alert  Post-procedure vital signs: reviewed and stable  Pain management: adequate  Airway patency: patent    PONV status at discharge: No PONV  Anesthetic complications: no      Cardiovascular status: hemodynamically stable  Respiratory status: unassisted and spontaneous ventilation  Hydration status: euvolemic  Follow-up not needed.              Vitals Value Taken Time   /83 03/07/24 0803   Temp 36.6 °C (97.8 °F) 03/07/24 0803   Pulse 61 03/07/24 0803   Resp 15 03/07/24 0803   SpO2 98 % 03/07/24 0803         No case tracking events are documented in the log.      Pain/Jas Score: Jas Score: 10 (3/7/2024  8:35 AM)  Modified Jas Score: 20 (3/7/2024  8:35 AM)

## 2024-03-07 NOTE — DISCHARGE INSTRUCTIONS
Post Operative Instructions for   Cataract Surgery- [facility] [place]      HARISH ALMONTE M.D.   OPHTHALMOLOGY               STARTING THE SAME DAY OF SURGERY:   Place one (1) drop of Prednisolone-Moxifloxacin-Bromfenac (shake bottle), into the operative eye three (3) time a day (Morning, Noon, and Nighttime).       You will use this drop for four (4) weeks following surgery.             1 DAY POST OPERATIVE APPOINTMENT:          Date:  _________________________/ Time: __________________________      Location:  [facility]- [address] [place] [address]            RESTRICTIONS FOR SEVEN (7) DAYS FOLLOWING SURGERY:   DO NOT lift anything over 10 pounds.   DO NOT bend at the waist, only at the knees (keep head in upright position).    DO NOT rub your eye.    DO NOT get any water into the eye.    DO NOT wear any makeup, lotions, or creams on/around the eye.   Wear the protective eye shield you were given after surgery anytime you go to sleep.  You may remove the shield while awake.                PLEASE NOTE:       You may take over the counter pain medication such as Tylenol or Ibuprofen as directed, if needed for pain.    ***If you experience severe pain, loss of vision, sudden onset of flashes and/or floaters,     IMMEDIATELY CALL Dr. Goodwin Office: [phone], AFTER HOURs: [phone] OR proceed to the EMERGENCY ROOM***.

## 2024-03-07 NOTE — ANESTHESIA PREPROCEDURE EVALUATION
03/07/2024  Armand Painting is a 73 y.o., male.      Pre-op Assessment    I have reviewed the Patient Summary Reports.     I have reviewed the Nursing Notes.       Review of Systems  Anesthesia Hx:             Denies Family Hx of Anesthesia complications.    Denies Personal Hx of Anesthesia complications.                    Social:  Former Smoker       EENT/Dental:   cataracts          Cardiovascular:     Hypertension   Denies MI.      Denies Dysrhythmias.       hyperlipidemia    2022 stress:  Normal myocardial perfusion scan. There is no evidence of myocardial ischemia or infarction.  ·  There is a  mild intensity fixed perfusion abnormality in the inferior wall of the left ventricle secondary to diaphragm attenuation.  ·  There is mild apical thinning which is a normal variant.  ·  The gated perfusion images showed an ejection fraction of 64% post stress.  ·  The ECG portion of the study is negative for ischemia.  ·  The patient reported no chest pain during the stress test.  ·  There were no arrhythmias during stress.                           Pulmonary:      Shortness of breath  Sleep Apnea                Renal/:  Renal/ Normal                 Hepatic/GI:  Hepatic/GI Normal                 Musculoskeletal:  Arthritis   H/o neck surgery            Neurological:  Neurology Normal                                      Endocrine:        Morbid Obesity / BMI > 40      Physical Exam  General: Well nourished, Cooperative, Alert and Oriented    Airway:  Mallampati: II   Mouth Opening: Normal  TM Distance: 4 - 6 cm  Tongue: Large  Neck ROM: Extension Decreased    Dental:  Upper dentures  Chest/Lungs:  Normal Respiratory Rate, Clear to auscultation    Heart:  Rate: Normal  Rhythm: Regular Rhythm      Wt Readings from Last 3 Encounters:   03/05/24 131.1 kg (289 lb)   12/04/23 131.4 kg (289 lb 9.2 oz)   06/20/23 126.1  kg (278 lb)     Temp Readings from Last 3 Encounters:   03/07/24 36.7 °C (98 °F) (Oral)   03/27/23 36.8 °C (98.2 °F) (Oral)   12/07/22 36.8 °C (98.2 °F) (Oral)     BP Readings from Last 3 Encounters:   03/07/24 (!) 151/90   06/20/23 130/78   03/27/23 118/80     Pulse Readings from Last 3 Encounters:   03/07/24 70   06/20/23 71   03/27/23 64         Anesthesia Plan  Type of Anesthesia, risks & benefits discussed:    Anesthesia Type: Gen Natural Airway, MAC, Gen ETT  Intra-op Monitoring Plan: Standard ASA Monitors  Post Op Pain Control Plan: multimodal analgesia  Induction:  IV  Informed Consent: Informed consent signed with the Patient and all parties understand the risks and agree with anesthesia plan.  All questions answered.   ASA Score: 3  Day of Surgery Review of History & Physical: H&P Update referred to the surgeon/provider.    Ready For Surgery From Anesthesia Perspective.     .

## 2024-03-07 NOTE — TRANSFER OF CARE
"Anesthesia Transfer of Care Note    Patient: Armand Painting    Procedure(s) Performed: Procedure(s) (LRB):  PHACOEMULSIFICATION, CATARACT (Right)  INSERTION, IOL PROSTHESIS (Right)    Patient location: OPS    Anesthesia Type: MAC    Transport from OR: Transported from OR on room air with adequate spontaneous ventilation    Post pain: adequate analgesia    Post assessment: no apparent anesthetic complications    Post vital signs: stable    Level of consciousness: awake and alert    Nausea/Vomiting: no nausea/vomiting    Complications: none    Transfer of care protocol was followed      Last vitals: Visit Vitals  /83   Pulse 61   Temp 36.6 °C (97.8 °F)   Resp 15   Ht 5' 8" (1.727 m)   Wt 131.1 kg (289 lb)   SpO2 98%   BMI 43.94 kg/m²     "

## 2024-03-08 ENCOUNTER — LAB VISIT (OUTPATIENT)
Dept: LAB | Facility: HOSPITAL | Age: 74
End: 2024-03-08
Attending: INTERNAL MEDICINE
Payer: MEDICARE

## 2024-03-08 ENCOUNTER — OFFICE VISIT (OUTPATIENT)
Dept: OPHTHALMOLOGY | Facility: CLINIC | Age: 74
End: 2024-03-08
Payer: MEDICARE

## 2024-03-08 DIAGNOSIS — R73.03 PREDIABETES: Chronic | ICD-10-CM

## 2024-03-08 DIAGNOSIS — Z98.890 POST-OPERATIVE STATE: Primary | ICD-10-CM

## 2024-03-08 DIAGNOSIS — E78.2 MIXED HYPERLIPIDEMIA: ICD-10-CM

## 2024-03-08 DIAGNOSIS — M10.9 GOUT, ARTHRITIS: Chronic | ICD-10-CM

## 2024-03-08 DIAGNOSIS — H35.341 MACULAR HOLE, RIGHT EYE: ICD-10-CM

## 2024-03-08 LAB
ALBUMIN SERPL BCP-MCNC: 3.7 G/DL (ref 3.5–5.2)
ALP SERPL-CCNC: 60 U/L (ref 55–135)
ALT SERPL W/O P-5'-P-CCNC: 22 U/L (ref 10–44)
ANION GAP SERPL CALC-SCNC: 8 MMOL/L (ref 8–16)
AST SERPL-CCNC: 18 U/L (ref 10–40)
BILIRUB SERPL-MCNC: 0.3 MG/DL (ref 0.1–1)
BUN SERPL-MCNC: 19 MG/DL (ref 8–23)
CALCIUM SERPL-MCNC: 10 MG/DL (ref 8.7–10.5)
CHLORIDE SERPL-SCNC: 99 MMOL/L (ref 95–110)
CHOLEST SERPL-MCNC: 171 MG/DL (ref 120–199)
CHOLEST/HDLC SERPL: 4.1 {RATIO} (ref 2–5)
CO2 SERPL-SCNC: 33 MMOL/L (ref 23–29)
CREAT SERPL-MCNC: 1.1 MG/DL (ref 0.5–1.4)
ERYTHROCYTE [DISTWIDTH] IN BLOOD BY AUTOMATED COUNT: 15 % (ref 11.5–14.5)
EST. GFR  (NO RACE VARIABLE): >60 ML/MIN/1.73 M^2
ESTIMATED AVG GLUCOSE: 137 MG/DL (ref 68–131)
GLUCOSE SERPL-MCNC: 99 MG/DL (ref 70–110)
HBA1C MFR BLD: 6.4 % (ref 4–5.6)
HCT VFR BLD AUTO: 41.7 % (ref 40–54)
HDLC SERPL-MCNC: 42 MG/DL (ref 40–75)
HDLC SERPL: 24.6 % (ref 20–50)
HGB BLD-MCNC: 12.7 G/DL (ref 14–18)
LDLC SERPL CALC-MCNC: 101.6 MG/DL (ref 63–159)
MCH RBC QN AUTO: 26.3 PG (ref 27–31)
MCHC RBC AUTO-ENTMCNC: 30.5 G/DL (ref 32–36)
MCV RBC AUTO: 86 FL (ref 82–98)
NONHDLC SERPL-MCNC: 129 MG/DL
PLATELET # BLD AUTO: 240 K/UL (ref 150–450)
PMV BLD AUTO: 12.1 FL (ref 9.2–12.9)
POTASSIUM SERPL-SCNC: 4 MMOL/L (ref 3.5–5.1)
PROT SERPL-MCNC: 8 G/DL (ref 6–8.4)
RBC # BLD AUTO: 4.83 M/UL (ref 4.6–6.2)
SODIUM SERPL-SCNC: 140 MMOL/L (ref 136–145)
TRIGL SERPL-MCNC: 137 MG/DL (ref 30–150)
URATE SERPL-MCNC: 6.9 MG/DL (ref 3.4–7)
WBC # BLD AUTO: 7.19 K/UL (ref 3.9–12.7)

## 2024-03-08 PROCEDURE — 36415 COLL VENOUS BLD VENIPUNCTURE: CPT | Mod: PO | Performed by: INTERNAL MEDICINE

## 2024-03-08 PROCEDURE — 83036 HEMOGLOBIN GLYCOSYLATED A1C: CPT | Performed by: INTERNAL MEDICINE

## 2024-03-08 PROCEDURE — 99024 POSTOP FOLLOW-UP VISIT: CPT | Mod: S$GLB,,, | Performed by: OPHTHALMOLOGY

## 2024-03-08 PROCEDURE — 84550 ASSAY OF BLOOD/URIC ACID: CPT | Performed by: INTERNAL MEDICINE

## 2024-03-08 PROCEDURE — 80053 COMPREHEN METABOLIC PANEL: CPT | Performed by: INTERNAL MEDICINE

## 2024-03-08 PROCEDURE — 99999 PR PBB SHADOW E&M-EST. PATIENT-LVL II: CPT | Mod: PBBFAC,,, | Performed by: OPHTHALMOLOGY

## 2024-03-08 PROCEDURE — 80061 LIPID PANEL: CPT | Performed by: INTERNAL MEDICINE

## 2024-03-08 PROCEDURE — 85027 COMPLETE CBC AUTOMATED: CPT | Performed by: INTERNAL MEDICINE

## 2024-03-10 NOTE — PROGRESS NOTES
Subjective:       Patient ID: Armand Painting is a 73 y.o. male.    Chief Complaint: Post-op Evaluation (Pt states OD did okay yesterday and denies any pain or other eye symptoms this morning )    HPI     Post-op Evaluation     Additional comments: Pt states OD did okay yesterday and denies any pain   or other eye symptoms this morning            Comments    1 day po Phaco IOL OD 3/7/24    1. PCIOL OD  2. NS OS  3. Mac Hole OD  4. Type 2 DM no   5. RE    MEDS:  PMB TID OD          Last edited by Teresa Gutierrez MA on 3/8/2024  9:56 AM.             Assessment:       1. Post-operative state    2. Macular hole, right eye        Plan:       S/p CE OD- Doing well.    Mac hole OD-Main source of decreased Va.        CPM OD.  RTC 1 wk.

## 2024-03-11 ENCOUNTER — HOSPITAL ENCOUNTER (OUTPATIENT)
Dept: RADIOLOGY | Facility: HOSPITAL | Age: 74
Discharge: HOME | End: 2024-03-11
Payer: MEDICARE

## 2024-03-11 ENCOUNTER — OFFICE VISIT (OUTPATIENT)
Dept: FAMILY MEDICINE | Facility: CLINIC | Age: 74
End: 2024-03-11
Payer: MEDICARE

## 2024-03-11 VITALS
HEIGHT: 68 IN | TEMPERATURE: 98 F | DIASTOLIC BLOOD PRESSURE: 80 MMHG | OXYGEN SATURATION: 96 % | WEIGHT: 292.88 LBS | SYSTOLIC BLOOD PRESSURE: 130 MMHG | BODY MASS INDEX: 44.39 KG/M2 | HEART RATE: 73 BPM

## 2024-03-11 DIAGNOSIS — G47.33 OSA (OBSTRUCTIVE SLEEP APNEA): ICD-10-CM

## 2024-03-11 DIAGNOSIS — E66.01 MORBID OBESITY WITH BMI OF 40.0-44.9, ADULT: ICD-10-CM

## 2024-03-11 DIAGNOSIS — N48.9 PENILE LESION: ICD-10-CM

## 2024-03-11 DIAGNOSIS — R06.09 DOE (DYSPNEA ON EXERTION): ICD-10-CM

## 2024-03-11 DIAGNOSIS — Z00.00 VISIT FOR ANNUAL HEALTH EXAMINATION: Primary | ICD-10-CM

## 2024-03-11 DIAGNOSIS — Z12.11 COLON CANCER SCREENING: ICD-10-CM

## 2024-03-11 DIAGNOSIS — R73.03 PREDIABETES: Chronic | ICD-10-CM

## 2024-03-11 DIAGNOSIS — M10.9 GOUT, ARTHRITIS: Chronic | ICD-10-CM

## 2024-03-11 DIAGNOSIS — Z11.4 ENCOUNTER FOR SCREENING FOR HIV: ICD-10-CM

## 2024-03-11 DIAGNOSIS — I10 ESSENTIAL HYPERTENSION: ICD-10-CM

## 2024-03-11 PROCEDURE — 71046 X-RAY EXAM CHEST 2 VIEWS: CPT | Mod: TC,FY,PO

## 2024-03-11 PROCEDURE — 99397 PER PM REEVAL EST PAT 65+ YR: CPT | Mod: S$GLB,,,

## 2024-03-11 PROCEDURE — 99999 PR PBB SHADOW E&M-EST. PATIENT-LVL V: CPT | Mod: PBBFAC,,,

## 2024-03-11 PROCEDURE — 71046 X-RAY EXAM CHEST 2 VIEWS: CPT | Mod: 26,,, | Performed by: RADIOLOGY

## 2024-03-11 PROCEDURE — 99213 OFFICE O/P EST LOW 20 MIN: CPT | Mod: 25,S$GLB,,

## 2024-03-11 RX ORDER — CLOTRIMAZOLE AND BETAMETHASONE DIPROPIONATE 10; .64 MG/G; MG/G
CREAM TOPICAL
Qty: 45 G | Refills: 0 | Status: SHIPPED | OUTPATIENT
Start: 2024-03-11 | End: 2024-04-04

## 2024-03-11 RX ORDER — ACETAMINOPHEN 500 MG
TABLET ORAL
Qty: 1 EACH | Refills: 0 | Status: SHIPPED | OUTPATIENT
Start: 2024-03-11 | End: 2024-05-22 | Stop reason: CLARIF

## 2024-03-11 NOTE — ASSESSMENT & PLAN NOTE
Daughter inquired about rybelsus. Advised pt to reach out to insurance see which medication they approve with BMI of 44 and prediabetes.

## 2024-03-11 NOTE — ASSESSMENT & PLAN NOTE
Hctz 25mg, lisinopril 10mg. Ordered BP machine. The current medical regimen is effective;  continue present plan and medications. DASH diet

## 2024-03-11 NOTE — ASSESSMENT & PLAN NOTE
Allopurinol. The current medical regimen is effective;  continue present plan and medications. Denies recent flares.

## 2024-03-11 NOTE — ASSESSMENT & PLAN NOTE
Lab Results   Component Value Date    HGBA1C 6.4 (H) 03/08/2024     Metformin. Discussed diet changes. The current medical regimen is effective;  continue present plan and medications.

## 2024-03-11 NOTE — PATIENT INSTRUCTIONS
Dash diet: eat vegetables, fruits, and whole grains. Including fat-free or low-fat dairy products, fish, poultry, beans, nuts, and vegetable oils. Limit foods that are high in saturated fat, such as fatty meats, full-fat dairy products, and tropical oils such as coconut, palm kernel, and palm oils. Limit sugar-sweetened beverages and sweets. Eat less than 2.3g sodium in 24 hour period. Choose foods low saturated and trans fats; rich in potassium, calcium, magnesium, fiber and protein; and low in sodium.     Due to see cardiology in June, please schedule.     Eat low-sodium diet. Wear compression stockings and elevate legs.     Weight loss - Reach out to insurance and ask them what medication they cover for weight loss with BMI greater than 40. Semaglutide (Ozempic) as well other some other injectable medications you may have heard of including tirzepatide (Mounjaro) and dulaglutide (Trulicity) are currently only FDA-approved medications for the treatment of Type 2 Diabetes Mellitus. There is one formulation of semaglutide (Wegovy and Zepbound) that is approved for the treatment of adults with clinical obesity and/or excess weight (overweight) and another (Zepbound) that is newer. None of these medications are currently approved for the treatment of controlled diabetes.

## 2024-03-11 NOTE — PROGRESS NOTES
"  HPI     Chief Complaint:  Chief Complaint   Patient presents with    Annual Exam       Armand Painting is a 73 y.o. male with multiple medical diagnoses as listed in the medical history and problem list that presents for annual.  Pt is new to me. In clinic with daughter. Concerned for leg swelling and CHAO.       HPI    Due for annual  CHAO - worse past 2 months. Denies night smothering or orthopnea. Pt does report increased abd tightness and swelling below knees bilaterally.   Edema - non pitting. No skin breakdown or weepage of fluid.   Prediabetes - compliant with daily Metformin. Poor adherence to diet.   HTN - at goal in clinic. Does not check BP at home but compliant with medications.   Cardiology - last visit 6/20/2023.   Gout - uric acid. Allopurinol. Denies recent flares.   SIS - requesting new cpap. Has not been using for >1 year.     Social Factors  Tobacco use: No  Ready to Quit: quit smoking 10 years ago  Alcohol: Yes once per month  Intimate partner violence screening  "Do you feel safe in your current relationship?" Yes lives with wife at home. Daughter lives half block away.   "Have you ever been in a relationship in which your partner frightened you or hurt you?" No  Regular Exercise: Unable to walk a block but does dumbells at home at home    Depression  Over the past two weeks, have you felt down, depressed, or hopeless? No  Over the past two weeks, have you felt little interest or pleasure in doing things? No    Reproductive Health  STD screening in last year: declines  HIV screening: agrees. Order placed.     Screen for Chronic Disease  CHD Risk Factors: diabetes mellitus, dyslipidemia, hypertension, male gender, and obesity (BMI >= 30 kg/m2)  Estimated body mass index is 44.53 kg/m² as calculated from the following:    Height as of this encounter: 5' 8" (1.727 m).    Weight as of this encounter: 132.9 kg (292 lb 14.1 oz).  Dyslipidemia screening needed: reviewed  T2DM screening needed: " reviewed  Colonoscopy needed: order placed  PSA needed: Sees Dr. Pandey  AAA screening needed:NEgative in 2018  Screen men 35 years and older, and men 20 to 34 years of age who have cardiovascular risk factors for dyslipidemia  Begin screening colonoscopies at 50 years of age in men of average risk, and continue until 75 years of age; offer fecal occult blood testing every year, flexible sigmoidoscopy every five years combined with fecal occult blood testing every three years, or colonoscopy every 10 years   The American Urological Association recommends offering PSA testing and digital rectal examination to well-informed men beginning at 40 years of age and continuing until life expectancy is less than 10 years  Screen once with ultrasonography in men 65 to 75 years of age if they have a family history or have smoked at [id number] cigarettes in their lifetime  Screen men with a sustained blood pressure greater than 135/80 mm Hg for T2DM    Separate E/M Code for acute conditions discussed during today's routine physical examination have been documented in the assessment and plan below.     Assessment & Plan       Problem List Items Addressed This Visit          Cardiac/Vascular    Essential hypertension no outside checks; 3/2014 exercise stress echo no ischemia LVEF 55-60    Overview     12/16/2022 TTE LV normal size with concentric hypertrophy and normal systolic function LVEF 55%.  Indeterminate diastolic function.         Current Assessment & Plan     Hctz 25mg, lisinopril 10mg. Ordered BP machine. The current medical regimen is effective;  continue present plan and medications. DASH diet         CHAO (dyspnea on exertion)  Denies chest pain. Will complete labs abd xray today. Schedule with cardiology for yearly exam in June. DASH diet. Compression. Elevation.     Relevant Orders    B-TYPE NATRIURETIC PEPTIDE    X-Ray Chest PA And Lateral (Completed)       Endocrine    Morbid obesity with BMI of 40.0-44.9, adult  (Chronic)    Current Assessment & Plan     Daughter inquired about rybelsus. Advised pt to reach out to insurance see which medication they approve with BMI of 44 and prediabetes.          Prediabetes (Chronic)    Current Assessment & Plan     Lab Results   Component Value Date    HGBA1C 6.4 (H) 03/08/2024   Metformin. Discussed diet changes. The current medical regimen is effective;  continue present plan and medications.              Orthopedic    Gout, arthritis (Chronic)    Current Assessment & Plan     Allopurinol. The current medical regimen is effective;  continue present plan and medications. Denies recent flares.             Other    SIS (obstructive sleep apnea) on sleep study 4/2018 with AHI 55.  CPAP at 15    Current Assessment & Plan     Requesting new supplies.          Relevant Orders    CPAP FOR HOME USE     Other Visit Diagnoses       Visit for annual health examination    -  Primary  Counseled on age appropriate medical preventative services including age appropriate cancer screenings, age appropriate eye and dental exams, over all nutritional health, need for a consistent exercise regimen, and an over all push towards maintaining a vigorous and active lifestyle.  Counseled on age appropriate vaccines and discussed upcoming health care needs based on age/gender. Discussed good sleep hygiene and stress management.      Colon cancer screening        Relevant Orders    Ambulatory referral/consult to Endo Procedure     Encounter for screening for HIV        Relevant Orders    HIV 1/2 Ag/Ab (4th Gen)    Penile lesion      F/u with urology. Appt 4/4. Pt denies worsening s/s but requesting refill.     Relevant Medications    clotrimazole-betamethasone 1-0.05% (LOTRISONE) cream            --------------------------------------------      Health Maintenance:  Health Maintenance         Date Due Completion Date    RSV Vaccine (Age 60+ and Pregnant patients) (1 - 1-dose 60+ series) Never done ---     Shingles Vaccine (2 of 3) 2016    Colorectal Cancer Screening 10/31/2023 10/31/2018    Override on 2012: Done    Hemoglobin A1c (Prediabetes) 2025 3/8/2024    Lipid Panel 2025 3/8/2024    TETANUS VACCINE 2026            Health maintenance reviewed and HIV screening ordered    Follow Up:  Follow up in about 6 months (around 2024) for appt with PCP.    Discussed DDx, condition, and treatment.   Education sent to patient portal/included in after visit summary.  ED precautions given.   Notify provider if symptoms do not resolve or increase in severity.   Patient verbalizes understanding and agrees with plan of care.    Exam     Review of Systems:  (as noted above)  Review of Systems    Physical Exam:   Physical Exam  Constitutional:       General: He is not in acute distress.     Appearance: Normal appearance. He is normal weight. He is not toxic-appearing.   HENT:      Head: Normocephalic and atraumatic.      Mouth/Throat:      Mouth: Mucous membranes are moist.   Cardiovascular:      Rate and Rhythm: Normal rate and regular rhythm.      Pulses: Normal pulses.      Heart sounds: No murmur heard.  Pulmonary:      Effort: Pulmonary effort is normal. No respiratory distress.      Breath sounds: Normal breath sounds. No wheezing.   Abdominal:      General: Abdomen is protuberant. Bowel sounds are normal. There is no abdominal bruit.      Palpations: Abdomen is soft. There is no shifting dullness, fluid wave, mass or pulsatile mass.   Musculoskeletal:         General: Normal range of motion.      Cervical back: Normal range of motion and neck supple. No rigidity.      Right lower le+ Edema present.      Left lower le+ Edema present.      Comments: Non-pitting below the knee swelling    Skin:     General: Skin is warm and dry.      Capillary Refill: Capillary refill takes less than 2 seconds.      Findings: No lesion or rash.   Neurological:      General: No focal  "deficit present.      Mental Status: He is alert and oriented to person, place, and time.       Vitals:    03/11/24 0911   BP: 130/80   Pulse: 73   Temp: 97.5 °F (36.4 °C)   TempSrc: Oral   SpO2: 96%   Weight: 132.9 kg (292 lb 14.1 oz)   Height: 5' 8" (1.727 m)      Body mass index is 44.53 kg/m².        History     Past Medical History:  Past Medical History:   Diagnosis Date    Arthritis     Erectile dysfunction     Gout     Hyperlipidemia     Hypertension     Morbid obesity     Nuclear sclerosis of both eyes 1/16/2018    Screening for AAA (abdominal aortic aneurysm)     6/2016 AAA US no aneurysm       Past Surgical History:  Past Surgical History:   Procedure Laterality Date    COLONOSCOPY N/A 10/31/2018    Procedure: COLONOSCOPY;  Surgeon: Clementine Batista MD;  Location: [facility] ENDO;  Service: Endoscopy;  Laterality: N/A;    EPIDURAL STEROID INJECTION N/A 08/07/2020    Procedure: INJECTION, STEROID, EPIDURAL  C7-T1;  Surgeon: Stan Brunner MD;  Location: [facility] PAIN MGT;  Service: Pain Management;  Laterality: N/A;  C7-T1 Epidural Steroid Injection  consent needed    EYE SURGERY Right 03/07/2024    INTRAOCULAR PROSTHESES INSERTION Right 03/07/2024    Procedure: INSERTION, IOL PROSTHESIS;  Surgeon: Bishnu Bojorquez MD;  Location: [facility] OR;  Service: Ophthalmology;  Laterality: Right;    PHACOEMULSIFICATION OF CATARACT Right 03/07/2024    Procedure: PHACOEMULSIFICATION, CATARACT;  Surgeon: Bishnu Bojorquez MD;  Location: [facility] OR;  Service: Ophthalmology;  Laterality: Right;  RN PHONE PREOP 2/26/2024   ARRIVAL 530 AM    SPINE SURGERY  03/31/2016    C3-C7 laminoplasty,Dr Caruso    tooth implant         Social History:  Social History     Socioeconomic History    Marital status:    Tobacco Use    Smoking status: Former     Current packs/day: 0.00     Types: Cigarettes     Quit date: 1/7/2009     Years since quitting: 15.1     Passive exposure: Past    Smokeless tobacco: Former   Substance and Sexual " Activity    Alcohol use: Yes     Alcohol/week: 12.0 standard drinks of alcohol     Types: 12 Cans of beer per week    Drug use: No    Sexual activity: Not Currently     Partners: Female     Comment: , children.     Social Determinants of Health     Financial Resource Strain: Low Risk  (4/5/2022)    Overall Financial Resource Strain (CARDIA)     Difficulty of Paying Living Expenses: Not hard at all   Food Insecurity: No Food Insecurity (4/5/2022)    Hunger Vital Sign     Worried About Running Out of Food in the Last Year: Never true     Ran Out of Food in the Last Year: Never true   Transportation Needs: No Transportation Needs (4/5/2022)    PRAPARE - Transportation     Lack of Transportation (Medical): No     Lack of Transportation (Non-Medical): No   Physical Activity: Insufficiently Active (4/5/2022)    Exercise Vital Sign     Days of Exercise per Week: 1 day     Minutes of Exercise per Session: 10 min   Stress: No Stress Concern Present (4/5/2022)    [employer] [place] [employer] - Occupational Stress Questionnaire     Feeling of Stress : Not at all   Social Connections: Socially Integrated (4/5/2022)    Social Connection and Isolation Panel [NHANES]     Frequency of Communication with Friends and Family: More than three times a week     Frequency of Social Gatherings with Friends and Family: More than three times a week     Attends Taoist Services: More than 4 times per year     Active Member of Clubs or Organizations: Yes     Attends Club or Organization Meetings: 1 to 4 times per year     Marital Status:    Housing Stability: Low Risk  (4/5/2022)    Housing Stability Vital Sign     Unable to Pay for Housing in the Last Year: No     Number of Places Lived in the Last Year: 1     Unstable Housing in the Last Year: No       Family History:  Family History   Problem Relation Age of Onset    COPD Mother     No Known Problems Father     No Known Problems Sister     Cataracts Brother      Glaucoma Brother     No Known Problems Maternal Aunt     No Known Problems Maternal Uncle     No Known Problems Paternal Aunt     No Known Problems Paternal Uncle     No Known Problems Maternal Grandmother     No Known Problems Maternal Grandfather     No Known Problems Paternal Grandmother     No Known Problems Paternal Grandfather     Amblyopia Neg Hx     Blindness Neg Hx     Cancer Neg Hx     Diabetes Neg Hx     Hypertension Neg Hx     Macular degeneration Neg Hx     Retinal detachment Neg Hx     Strabismus Neg Hx     Stroke Neg Hx     Thyroid disease Neg Hx        Allergies and Medications: (updated and reviewed)  Review of patient's allergies indicates:   Allergen Reactions    Codeine Swelling     Codeine with Aspirin caused chest pain     Current Outpatient Medications   Medication Sig Dispense Refill    allopurinoL (ZYLOPRIM) 100 MG tablet TAKE 1 TABLET BY MOUTH EVERY DAY 90 tablet 0    aspirin (ECOTRIN) 81 MG EC tablet Take 81 mg by mouth once daily.      glucosamine/chondr gtz A sod (OSTEO BI-FLEX ORAL) Take by mouth.      hydroCHLOROthiazide (HYDRODIURIL) 25 MG tablet TAKE 1 TABLET BY MOUTH EVERY DAY 90 tablet 0    lisinopriL 10 MG tablet TAKE 1 TABLET BY MOUTH EVERY DAY 90 tablet 0    metFORMIN (GLUCOPHAGE-XR) 500 MG ER 24hr tablet Take 1 tablet (500 mg total) by mouth once daily. Needs office visit for further refills 30 tablet 0    multivitamin (ONE-A-DAY ESSENTIAL ORAL) Take by mouth.      pravastatin (PRAVACHOL) 20 MG tablet Take 1 tablet (20 mg total) by mouth once daily. 90 tablet 1    prednisolone/moxiflox/bromf/PF (MHFMYJOIDLN-CAIVKPMK-UCACK,PF,) 1-0.5-0.09 % Drop Place 1 drop into the right eye 3 (three) times daily. 5 mL 2    tamsulosin (FLOMAX) 0.4 mg Cap Take 2 capsules (0.8 mg total) by mouth once daily. 180 capsule 0    blood pressure monitor (BLOOD PRESSURE KIT) Kit Check BP once daily 1 each 0    clotrimazole-betamethasone 1-0.05% (LOTRISONE) cream APPLY TO AFFECTED AREA TWICE A DAY 45 g 0      No current facility-administered medications for this visit.     Facility-Administered Medications Ordered in Other Visits   Medication Dose Route Frequency Provider Last Rate Last Admin    0.9%  NaCl infusion   Intravenous Continuous Bishnu Bojorquez MD           Patient Care Team:  Clark Vargas MD as PCP - General (Internal Medicine)  Fernando Jacinto MD as Consulting Physician (Orthopedic Surgery)  Venkata Caruso MD as Consulting Physician (Neurosurgery)  Aureliano Mcneil MD as Consulting Physician (Ophthalmology)  Maricel Cotter OD as Consulting Physician (Optometry)  Phillip Gonsales MA as Care Coordinator         - The patient is given an After Visit Summary that lists all medications with directions, allergies, education, orders placed during this encounter and follow-up instructions.      - I have reviewed the patient's medical information including past medical, family, and social history sections including the medications and allergies.      - We discussed the patient's current medications.     This note was created by combination of typed  and MModal dictation.  Transcription errors may be present.  If there are any questions, please contact me.

## 2024-03-12 ENCOUNTER — TELEPHONE (OUTPATIENT)
Dept: FAMILY MEDICINE | Facility: CLINIC | Age: 74
End: 2024-03-12
Payer: MEDICARE

## 2024-03-12 NOTE — TELEPHONE ENCOUNTER
Left message for patient on answering machine to call clinic back    Patient labs and Xray were normal , Sonja Arshad NP , advises patient to follow up with Vascular.

## 2024-03-12 NOTE — TELEPHONE ENCOUNTER
Called pt to discuss normal/stable lab results and xray. No answer.     Keep appt with Dr. Ann for tomorrow. Consider f/u with vascular.

## 2024-03-12 NOTE — TELEPHONE ENCOUNTER
----- Message from oSnja Arshad NP sent at 3/12/2024  2:37 PM CDT -----  Please contact pt. I was unable to reach. Called pt to discuss normal/stable lab results and xray. Keep appt with Dr. Ann for tomorrow. Consider f/u with vascular.

## 2024-03-13 ENCOUNTER — OFFICE VISIT (OUTPATIENT)
Dept: CARDIOLOGY | Facility: CLINIC | Age: 74
End: 2024-03-13
Payer: MEDICARE

## 2024-03-13 VITALS
WEIGHT: 291.25 LBS | HEART RATE: 75 BPM | BODY MASS INDEX: 44.14 KG/M2 | RESPIRATION RATE: 18 BRPM | HEIGHT: 68 IN | OXYGEN SATURATION: 97 % | SYSTOLIC BLOOD PRESSURE: 126 MMHG | DIASTOLIC BLOOD PRESSURE: 96 MMHG

## 2024-03-13 DIAGNOSIS — I15.2 HYPERTENSION ASSOCIATED WITH DIABETES: ICD-10-CM

## 2024-03-13 DIAGNOSIS — I71.41 PARARENAL ABDOMINAL AORTIC ANEURYSM (AAA) WITHOUT RUPTURE: ICD-10-CM

## 2024-03-13 DIAGNOSIS — G47.33 OSA (OBSTRUCTIVE SLEEP APNEA): ICD-10-CM

## 2024-03-13 DIAGNOSIS — E78.5 DYSLIPIDEMIA ASSOCIATED WITH TYPE 2 DIABETES MELLITUS: ICD-10-CM

## 2024-03-13 DIAGNOSIS — I10 ESSENTIAL HYPERTENSION: Primary | ICD-10-CM

## 2024-03-13 DIAGNOSIS — I77.810 AORTIC ROOT DILATATION: ICD-10-CM

## 2024-03-13 DIAGNOSIS — E78.2 MIXED HYPERLIPIDEMIA: ICD-10-CM

## 2024-03-13 DIAGNOSIS — E11.59 HYPERTENSION ASSOCIATED WITH DIABETES: ICD-10-CM

## 2024-03-13 DIAGNOSIS — E11.69 DYSLIPIDEMIA ASSOCIATED WITH TYPE 2 DIABETES MELLITUS: ICD-10-CM

## 2024-03-13 DIAGNOSIS — E66.01 MORBID OBESITY, UNSPECIFIED OBESITY TYPE: ICD-10-CM

## 2024-03-13 DIAGNOSIS — R60.0 BILATERAL LOWER EXTREMITY EDEMA: ICD-10-CM

## 2024-03-13 LAB
OHS QRS DURATION: 88 MS
OHS QTC CALCULATION: 451 MS

## 2024-03-13 PROCEDURE — 99215 OFFICE O/P EST HI 40 MIN: CPT | Mod: S$GLB,,, | Performed by: INTERNAL MEDICINE

## 2024-03-13 PROCEDURE — 93000 ELECTROCARDIOGRAM COMPLETE: CPT | Mod: S$GLB,,, | Performed by: INTERNAL MEDICINE

## 2024-03-13 PROCEDURE — 99999 PR PBB SHADOW E&M-EST. PATIENT-LVL IV: CPT | Mod: PBBFAC,,, | Performed by: INTERNAL MEDICINE

## 2024-03-13 RX ORDER — ATORVASTATIN CALCIUM 40 MG/1
40 TABLET, FILM COATED ORAL NIGHTLY
Qty: 90 TABLET | Refills: 3 | Status: SHIPPED | OUTPATIENT
Start: 2024-03-13

## 2024-03-13 RX ORDER — FUROSEMIDE 40 MG/1
40 TABLET ORAL DAILY
Qty: 90 TABLET | Refills: 3 | Status: SHIPPED | OUTPATIENT
Start: 2024-03-13

## 2024-03-13 NOTE — PROGRESS NOTES
CARDIOVASCULAR PROGRESS NOTE    REASON FOR CONSULT:   Armand Painting is a 73 y.o. male who presents for follow up of LE edema.    PCP: Dair  HISTORY OF PRESENT ILLNESS:   Last seen by Dr. Vizcarra June 2023.    The patient is seen today at his urgent request for evaluation of lower extremity edema.  He tells me this has been ongoing for the past several months.  He also describes dyspnea on exertion without angina.  There has been no palpitations or syncope.  Denies PND, orthopnea, melena, hematuria, or claudication symptoms.  He does also describe fatigue.  Review of his past history notes a small AAA by aortic ultrasound in 2015 as well as aortic root dilatation on his past echocardiogram from December 2022.  He also has sleep apnea, but is not currently using CPAP.  This would likely be a good overall diagnosis for the cause of his current presentation (dyspnea on exertion with normal BNP and evidence of right heart failure).  I will get him back into sleep Medicine for re-initiation of CPAP.    CARDIOVASCULAR HISTORY:     AAA, 3.5cm (US 1/2015)  Ao root dil, 4.3cm (echo 12/2022)  SIS    PAST MEDICAL HISTORY:     Past Medical History:   Diagnosis Date    Arthritis     Erectile dysfunction     Gout     Hyperlipidemia     Hypertension     Morbid obesity     Nuclear sclerosis of both eyes 1/16/2018    Screening for AAA (abdominal aortic aneurysm)     6/2016 AAA US no aneurysm       PAST SURGICAL HISTORY:     Past Surgical History:   Procedure Laterality Date    COLONOSCOPY N/A 10/31/2018    Procedure: COLONOSCOPY;  Surgeon: Clementine Batista MD;  Location: [facility] ENDO;  Service: Endoscopy;  Laterality: N/A;    EPIDURAL STEROID INJECTION N/A 08/07/2020    Procedure: INJECTION, STEROID, EPIDURAL  C7-T1;  Surgeon: Stan Brunner MD;  Location: [facility] PAIN MGT;  Service: Pain Management;  Laterality: N/A;  C7-T1 Epidural Steroid Injection  consent needed    EYE SURGERY Right 03/07/2024    INTRAOCULAR PROSTHESES INSERTION Right  03/07/2024    Procedure: INSERTION, IOL PROSTHESIS;  Surgeon: Bishnu Bojorquez MD;  Location: [facility] OR;  Service: Ophthalmology;  Laterality: Right;    PHACOEMULSIFICATION OF CATARACT Right 03/07/2024    Procedure: PHACOEMULSIFICATION, CATARACT;  Surgeon: Bishnu Bojorquez MD;  Location: [facility] OR;  Service: Ophthalmology;  Laterality: Right;  RN PHONE PREOP 2/26/2024   ARRIVAL 530 AM    SPINE SURGERY  03/31/2016    C3-C7 laminoplasty,Dr Caruso    tooth implant         ALLERGIES AND MEDICATION:     Review of patient's allergies indicates:   Allergen Reactions    Codeine Swelling     Codeine with Aspirin caused chest pain        Medication List            Accurate as of March 13, 2024  1:46 PM. If you have any questions, ask your nurse or doctor.                CONTINUE taking these medications      allopurinoL 100 MG tablet  Commonly known as: ZYLOPRIM  TAKE 1 TABLET BY MOUTH EVERY DAY     aspirin 81 MG EC tablet  Commonly known as: ECOTRIN     blood pressure monitor Kit  Commonly known as: BLOOD PRESSURE KIT  Check BP once daily     clotrimazole-betamethasone 1-0.05% cream  Commonly known as: LOTRISONE  APPLY TO AFFECTED AREA TWICE A DAY     hydroCHLOROthiazide 25 MG tablet  Commonly known as: HYDRODIURIL  TAKE 1 TABLET BY MOUTH EVERY DAY     lisinopriL 10 MG tablet  TAKE 1 TABLET BY MOUTH EVERY DAY     metFORMIN 500 MG ER 24hr tablet  Commonly known as: GLUCOPHAGE-XR  Take 1 tablet (500 mg total) by mouth once daily. Needs office visit for further refills     ONE-A-DAY ESSENTIAL ORAL     OSTEO BI-FLEX ORAL     pravastatin 20 MG tablet  Commonly known as: PRAVACHOL  Take 1 tablet (20 mg total) by mouth once daily.     ibkpbtfrjpo-fjzndbyu-gurde(PF) 1-0.5-0.09 % Drop  Place 1 drop into the right eye 3 (three) times daily.     tamsulosin 0.4 mg Cap  Commonly known as: FLOMAX  Take 2 capsules (0.8 mg total) by mouth once daily.              SOCIAL HISTORY:     Social History     Socioeconomic History     Marital status:    Tobacco Use    Smoking status: Former     Current packs/day: 0.00     Types: Cigarettes     Quit date: 1/7/2009     Years since quitting: 15.1     Passive exposure: Past    Smokeless tobacco: Former   Substance and Sexual Activity    Alcohol use: Yes     Alcohol/week: 12.0 standard drinks of alcohol     Types: 12 Cans of beer per week    Drug use: No    Sexual activity: Not Currently     Partners: Female     Comment: , children.     Social Determinants of Health     Financial Resource Strain: Low Risk  (4/5/2022)    Overall Financial Resource Strain (CARDIA)     Difficulty of Paying Living Expenses: Not hard at all   Food Insecurity: No Food Insecurity (4/5/2022)    Hunger Vital Sign     Worried About Running Out of Food in the Last Year: Never true     Ran Out of Food in the Last Year: Never true   Transportation Needs: No Transportation Needs (4/5/2022)    PRAPARE - Transportation     Lack of Transportation (Medical): No     Lack of Transportation (Non-Medical): No   Physical Activity: Insufficiently Active (4/5/2022)    Exercise Vital Sign     Days of Exercise per Week: 1 day     Minutes of Exercise per Session: 10 min   Stress: No Stress Concern Present (4/5/2022)    [employer] - Occupational Stress Questionnaire     Feeling of Stress : Not at all   Social Connections: Socially Integrated (4/5/2022)    Social Connection and Isolation Panel [NHANES]     Frequency of Communication with Friends and Family: More than three times a week     Frequency of Social Gatherings with Friends and Family: More than three times a week     Attends Oriental orthodox Services: More than 4 times per year     Active Member of Clubs or Organizations: Yes     Attends Club or Organization Meetings: 1 to 4 times per year     Marital Status:    Housing Stability: Low Risk  (4/5/2022)    Housing Stability Vital Sign     Unable to Pay for Housing in the Last Year: No     Number  of Places Lived in the Last Year: 1     Unstable Housing in the Last Year: No       FAMILY HISTORY:     Family History   Problem Relation Age of Onset    COPD Mother     No Known Problems Father     No Known Problems Sister     Cataracts Brother     Glaucoma Brother     No Known Problems Maternal Aunt     No Known Problems Maternal Uncle     No Known Problems Paternal Aunt     No Known Problems Paternal Uncle     No Known Problems Maternal Grandmother     No Known Problems Maternal Grandfather     No Known Problems Paternal Grandmother     No Known Problems Paternal Grandfather     Amblyopia Neg Hx     Blindness Neg Hx     Cancer Neg Hx     Diabetes Neg Hx     Hypertension Neg Hx     Macular degeneration Neg Hx     Retinal detachment Neg Hx     Strabismus Neg Hx     Stroke Neg Hx     Thyroid disease Neg Hx        REVIEW OF SYSTEMS:   Review of Systems   Constitutional:  Positive for malaise/fatigue. Negative for chills, diaphoresis and fever.   HENT:  Negative for nosebleeds.    Eyes:  Negative for blurred vision, double vision and photophobia.   Respiratory:  Positive for shortness of breath. Negative for hemoptysis and wheezing.    Cardiovascular:  Positive for leg swelling. Negative for chest pain, palpitations, orthopnea, claudication and PND.   Gastrointestinal:  Negative for abdominal pain, blood in stool, heartburn, melena, nausea and vomiting.   Genitourinary:  Negative for flank pain and hematuria.   Musculoskeletal:  Negative for falls, myalgias and neck pain.   Skin:  Negative for rash.   Neurological:  Negative for dizziness, seizures, loss of consciousness, weakness and headaches.   Endo/Heme/Allergies:  Negative for polydipsia. Does not bruise/bleed easily.   Psychiatric/Behavioral:  Negative for depression and memory loss. The patient is not nervous/anxious.        PHYSICAL EXAM:     Vitals:    03/13/24 1343   BP: (!) 126/96   Pulse: 75   Resp: 18    Body mass index is 44.28 kg/m².  Weight: 132.1 kg  "(291 lb 3.6 oz)   Height: 5' 8" (172.7 cm)     Physical Exam  Vitals reviewed.   Constitutional:       General: He is not in acute distress.     Appearance: He is well-developed. He is obese. He is not ill-appearing, toxic-appearing or diaphoretic.   HENT:      Head: Normocephalic and atraumatic.   Eyes:      General: No scleral icterus.     Extraocular Movements: Extraocular movements intact.      Conjunctiva/sclera: Conjunctivae normal.      Pupils: Pupils are equal, round, and reactive to light.   Neck:      Thyroid: No thyromegaly.      Vascular: Normal carotid pulses. No JVD.      Trachea: Trachea normal.   Cardiovascular:      Rate and Rhythm: Normal rate and regular rhythm.      Heart sounds: S1 normal and S2 normal. Heart sounds are distant. No murmur heard.     No friction rub. No gallop.   Pulmonary:      Effort: Pulmonary effort is normal. No respiratory distress.      Breath sounds: Normal breath sounds. No stridor. No wheezing, rhonchi or rales.   Chest:      Chest wall: No tenderness.   Abdominal:      General: There is no distension.      Palpations: Abdomen is soft.   Musculoskeletal:         General: No tenderness. Normal range of motion.      Cervical back: Normal range of motion and neck supple. No edema or rigidity.      Right lower leg: 3+ Edema present.      Left lower leg: 3+ Edema present.   Feet:      Right foot:      Skin integrity: No ulcer.      Left foot:      Skin integrity: No ulcer.   Skin:     General: Skin is warm and dry.      Coloration: Skin is not jaundiced.   Neurological:      General: No focal deficit present.      Mental Status: He is alert and oriented to person, place, and time.      Cranial Nerves: No cranial nerve deficit.   Psychiatric:         Mood and Affect: Mood normal.         Speech: Speech normal.         Behavior: Behavior normal. Behavior is cooperative.         DATA:   EKG: (personally reviewed tracing)  3/13/24 SR 73, IRBBB/DIA    Laboratory:  CBC:  Recent " Labs   Lab 02/14/22  1026 08/09/22  0825 03/08/24  0903   WBC 8.25 6.52 7.19   Hemoglobin 12.9 L 12.2 L 12.7 L   Hematocrit 43.4 39.2 L 41.7   Platelets 212 268 240       CHEMISTRIES:  Recent Labs   Lab 02/14/22  1026 08/09/22  0825 03/08/24  0903   Glucose 106 103 99   Sodium 140 139 140   Potassium 4.0 3.6 4.0   BUN 14 16 19   Creatinine 1.0 1.1 1.1   eGFR if non  >60.0  --   --    eGFR  --  >60.0 >60.0   Calcium 10.1 9.4 10.0       CARDIAC BIOMARKERS:        COAGS:        LIPIDS/LFTS:  Recent Labs   Lab 02/14/22  1026 08/09/22  0825 03/08/24  0903   Cholesterol 164  --  171   Triglycerides 198 H  --  137   HDL 42  --  42   LDL Cholesterol 82.4  --  101.6   Non-HDL Cholesterol 122  --  129   AST 20 14 18   ALT 26 15 22     BNP 3/11/24: <10    Lab Results   Component Value Date    TSH 2.037 03/06/2014         Cardiovascular Testing:  L MPI 12/16/22    Normal myocardial perfusion scan. There is no evidence of myocardial ischemia or infarction.    There is a  mild intensity fixed perfusion abnormality in the inferior wall of the left ventricle secondary to diaphragm attenuation.    There is mild apical thinning which is a normal variant.    The gated perfusion images showed an ejection fraction of 64% post stress.    The ECG portion of the study is negative for ischemia.    The patient reported no chest pain during the stress test.    There were no arrhythmias during stress.    Echo 12/16/22 (Ao root 4.3cm)  The left ventricle is normal in size with concentric hypertrophy and normal systolic function.  The estimated ejection fraction is 55%.  Indeterminate left ventricular diastolic function.  Normal right ventricular size with normal right ventricular systolic function.  Mild left atrial enlargement.  Normal central venous pressure (3 mmHg).  The estimated PA systolic pressure is 10 mmHg.    Aortic US 1/13/15  Borderline aneurysmal dilatation of the proximal abdominal aorta (3.5cm).     ASSESSMENT:    # CHAO, appears vol overloaded with ?HFpEF but normal BNP this week.  # LE edema, bilat  # HTN, uncontrolled  # SIS, not using CPAP  # DM  # HLP on prava 20mg  # BMI 45  # AAA, 3.5cm (US 1/2015)  # Ao root dil, 4.3cm (echo 12/2022)    PLAN:   Cont med rx  Change HCTZ to lasix 40mg qd  Check BMP 1 week  Change prava to atorva 40mg qhs  Echo  Aortic US  LE venous US  Sleep eval to resume CPAP  RTC 1 month with Dr. Vizcarra (Apr 2024).  ?needs cardiac PET if sxs persist and above eval unremarkable.  Check lipids/LFT 6 months (Sept 2024).      Matti Ann MD, FACC

## 2024-03-14 ENCOUNTER — OFFICE VISIT (OUTPATIENT)
Dept: OPHTHALMOLOGY | Facility: CLINIC | Age: 74
End: 2024-03-14
Payer: MEDICARE

## 2024-03-14 DIAGNOSIS — H35.341 MACULAR HOLE, RIGHT EYE: ICD-10-CM

## 2024-03-14 DIAGNOSIS — Z98.890 POST-OPERATIVE STATE: Primary | ICD-10-CM

## 2024-03-14 PROCEDURE — 99999 PR PBB SHADOW E&M-EST. PATIENT-LVL III: CPT | Mod: PBBFAC,,, | Performed by: OPHTHALMOLOGY

## 2024-03-14 PROCEDURE — 99024 POSTOP FOLLOW-UP VISIT: CPT | Mod: S$GLB,,, | Performed by: OPHTHALMOLOGY

## 2024-03-14 NOTE — PROGRESS NOTES
Subjective:       Patient ID: Armand Painting is a 73 y.o. male.    Chief Complaint: Post-op Evaluation    HPI    1 week po Phaco IOL OD 3/7/24    Patient states his vision has been doing well with no complaints.     1. PCIOL OD  2. NS OS  3. Mac Hole OD  4. Type 2 DM no DR  5. RE    MEDS:  PMB TID OD  Last edited by Rebeca Whitney on 3/14/2024  2:57 PM.             Assessment:       1. Post-operative state    2. Macular hole, right eye        Plan:       S/p CE OD- Doing well.    Macular hole OD-Main surce of poor Va. Pt wants to see Dr Mcneil to re-evaluate.        CPM OD.  RTC me in 3 wks.  Refer back to Dr Mcneil.

## 2024-03-15 ENCOUNTER — PATIENT OUTREACH (OUTPATIENT)
Dept: ADMINISTRATIVE | Facility: HOSPITAL | Age: 74
End: 2024-03-15
Payer: MEDICARE

## 2024-03-15 DIAGNOSIS — R73.03 PREDIABETES: Primary | Chronic | ICD-10-CM

## 2024-03-18 ENCOUNTER — TELEPHONE (OUTPATIENT)
Dept: ENDOSCOPY | Facility: HOSPITAL | Age: 74
End: 2024-03-18

## 2024-04-04 ENCOUNTER — OFFICE VISIT (OUTPATIENT)
Dept: UROLOGY | Facility: CLINIC | Age: 74
End: 2024-04-04
Payer: MEDICARE

## 2024-04-04 VITALS — BODY MASS INDEX: 44.78 KG/M2 | WEIGHT: 294.56 LBS

## 2024-04-04 DIAGNOSIS — R35.1 NOCTURIA: ICD-10-CM

## 2024-04-04 DIAGNOSIS — N48.1 BALANITIS: ICD-10-CM

## 2024-04-04 DIAGNOSIS — R30.0 DYSURIA: ICD-10-CM

## 2024-04-04 DIAGNOSIS — N48.9 PENILE LESION: ICD-10-CM

## 2024-04-04 DIAGNOSIS — B35.4 TINEA CORPORIS: Primary | ICD-10-CM

## 2024-04-04 DIAGNOSIS — N52.01 ERECTILE DYSFUNCTION DUE TO ARTERIAL INSUFFICIENCY: ICD-10-CM

## 2024-04-04 PROCEDURE — 4010F ACE/ARB THERAPY RXD/TAKEN: CPT | Mod: CPTII,S$GLB,, | Performed by: UROLOGY

## 2024-04-04 PROCEDURE — 99999 PR PBB SHADOW E&M-EST. PATIENT-LVL III: CPT | Mod: PBBFAC,,, | Performed by: UROLOGY

## 2024-04-04 PROCEDURE — 1159F MED LIST DOCD IN RCRD: CPT | Mod: CPTII,S$GLB,, | Performed by: UROLOGY

## 2024-04-04 PROCEDURE — 99214 OFFICE O/P EST MOD 30 MIN: CPT | Mod: S$GLB,,, | Performed by: UROLOGY

## 2024-04-04 PROCEDURE — 1101F PT FALLS ASSESS-DOCD LE1/YR: CPT | Mod: CPTII,S$GLB,, | Performed by: UROLOGY

## 2024-04-04 PROCEDURE — 3288F FALL RISK ASSESSMENT DOCD: CPT | Mod: CPTII,S$GLB,, | Performed by: UROLOGY

## 2024-04-04 PROCEDURE — 3044F HG A1C LEVEL LT 7.0%: CPT | Mod: CPTII,S$GLB,, | Performed by: UROLOGY

## 2024-04-04 PROCEDURE — 3008F BODY MASS INDEX DOCD: CPT | Mod: CPTII,S$GLB,, | Performed by: UROLOGY

## 2024-04-04 PROCEDURE — 1125F AMNT PAIN NOTED PAIN PRSNT: CPT | Mod: CPTII,S$GLB,, | Performed by: UROLOGY

## 2024-04-04 PROCEDURE — 1160F RVW MEDS BY RX/DR IN RCRD: CPT | Mod: CPTII,S$GLB,, | Performed by: UROLOGY

## 2024-04-04 RX ORDER — KETOCONAZOLE 20 MG/G
CREAM TOPICAL DAILY
Qty: 60 G | Refills: 3 | Status: SHIPPED | OUTPATIENT
Start: 2024-04-04

## 2024-04-04 NOTE — PROGRESS NOTES
"Subjective:       Armand Painting is a 73 y.o. male who is an established patient who was referred by Dr Vargas  for evaluation of dysuria.      He recently saw PCP with c/o dysuria. UCx at that time showed moderate growth 50-99k Enterobacter. He was also increased to Flomax BID - urgency in AM, nocturia x 2-3, strong stream. Does not appear he was treated with antibiotics. He was given cream (nystatin-triamcinolone) for suspected balantis. He notes a small cut on foreskin. He does have some burning externally after voiding but denies any dysuria.     He also reports problems with ED. He was given Cialis by PCP but may not have worked well. He has not tried Viagra.     PVR (bladder scan) today - 45cc (not true PVR)    Last PSA 5/16 - 0.41    11/18/2019  Now reports intermittent dysuria that has returned. He reports this is more external irritation. The lesion has reappeared on foreskin. He is out of cream from PCP.   Also reports Viagra 100mg is not working well, worked only one time. He did not try it with emptying stomach.     3/3/2020  He is using Lotrisone cream. He reports penile lesion is still present but improving. Concerned about cost of cream.     8/4/2020  Still with mild urinary frequency (again unable to give specimen) and still with penile lesion. Lesion will come and go, returns when does not use Lotrisone. Did go away completely but now returned. Feels that is related to being wet due to not being circumcised.     2/4/2021  Still with penile lesion - "on and off". Improves with cream.     8/30/2022  Again concerned about wound on penis. Using cream most days. Can be painful. Lesion with come and go but never 100% resolves.     3/9/2023  Occasional pain with urge to void. Wound has improved but still remains. Using Lotrisone. Asking about ED meds.     12/4/2023  Again with penile skin issue. Using Lotrisone without improvement. Has not seen derm (previously referred).     4/4/2024  Has now seen derm, given " ketoconazole. He is unsure if this is helping but he is unsure what cream he is using. It looks like he most recently got Lotrisone, which was recommended not to be used by derm. A1c - 6.4.      The following portions of the patient's history were reviewed and updated as appropriate: allergies, current medications, past family history, past medical history, past social history, past surgical history and problem list.    Review of Systems  Constitutional: no fever or chills  ENT: no nasal congestion or sore throat  Respiratory: no cough or shortness of breath  Cardiovascular: no chest pain or palpitations  Gastrointestinal: no nausea or vomiting, tolerating diet  Genitourinary: as per HPI  Hematologic/Lymphatic: no easy bruising or lymphadenopathy  Musculoskeletal: no arthralgias or myalgias  Skin: no rashes or lesions  Neurological: no seizures or tremors  Behavioral/Psych: no auditory or visual hallucinations       Objective:    Vitals: Wt 133.6 kg (294 lb 8.6 oz)   BMI 44.78 kg/m²     Physical Exam   General: well developed, well nourished in no acute distress  Head: normocephalic, atraumatic  Neck: supple, trachea midline, no obvious enlargement of thyroid  HEENT: EOMI, mucus membranes moist, sclera anicteric, no hearing impairment  Lungs: symmetric expansion, non-labored breathing  Neuro: alert and oriented x 3, no gross deficits  Psych: normal judgment and insight, normal mood/affect and non-anxious  Genitourinary: (last visit)  Penis - uncircumcised penis without plaques or scarring. Wound in dorsal R penile shaft. Urethra - orthotopic location without stenosis.  Scrotum  - no lesions or rashes, no hydrocele or hernia.  Testes - descended bilaterally, symmetric without masses, non tender.  Epididymides - no cysts or lesions, no spermatocele, symmetric   JASEN: patient declined exam      Lab Review   Urine analysis today in clinic shows - no urine      Lab Results   Component Value Date    WBC 7.19 03/08/2024     "HGB 12.7 (L) 03/08/2024    HCT 41.7 03/08/2024    MCV 86 03/08/2024     03/08/2024     Lab Results   Component Value Date    CREATININE 1.1 03/08/2024    BUN 19 03/08/2024     Lab Results   Component Value Date    PSA 0.41 05/23/2016     No results found for: "PSADIAG"    Imaging  Reports and images were personally reviewed by me and discussed with the patient today         Assessment/Plan:      1. Nocturia    - Less bother with Flomax BID, continue   - Last PSA years ago but very low. Likely okay to hold on repeat check.      2. Dysuria    - Intermittent dysuria has returned   - Consider cystoscopy - seems to be more external     3. Penile lesion    - Lotrisone cream refilled   - Unsure etiology though has remained stable   - Discussed excisional biopsy thoroughly - he declines this. Discussed possible circumcision - he declines this. Again discussed - will consider next visit. Concern for infection risk with biopsy due to location of wound.    - Again discussed derm referral - declined initially, referred but he did not see, referral placed again.    - Again discussed circumcision - he will continue to consider this. I recommend circumcision for complete removal of abnormal skin but he declines at this time.    - He would like to see derm prior to consider circumcision. Rec for ketoconazole.  Reordered keto today as seems he was given Lotrisone recently (rec against this by derm).      4. Erectile dysfunction due to arterial insufficiency    - Cialis did not work well   - Viagra 100mg on empty stomach (goodrx coupon) - not helpful previously, wants repeat trial. Again recommend empty stomach.    - Briefly discussed KESHIA or ICI.        Follow up in 4 months             "

## 2024-04-05 ENCOUNTER — OFFICE VISIT (OUTPATIENT)
Dept: OPHTHALMOLOGY | Facility: CLINIC | Age: 74
End: 2024-04-05
Payer: MEDICARE

## 2024-04-05 DIAGNOSIS — H35.341 MACULAR HOLE, RIGHT EYE: ICD-10-CM

## 2024-04-05 DIAGNOSIS — Z98.890 POST-OPERATIVE STATE: Primary | ICD-10-CM

## 2024-04-05 PROCEDURE — 1160F RVW MEDS BY RX/DR IN RCRD: CPT | Mod: CPTII,S$GLB,, | Performed by: OPHTHALMOLOGY

## 2024-04-05 PROCEDURE — 1159F MED LIST DOCD IN RCRD: CPT | Mod: CPTII,S$GLB,, | Performed by: OPHTHALMOLOGY

## 2024-04-05 PROCEDURE — 99024 POSTOP FOLLOW-UP VISIT: CPT | Mod: S$GLB,,, | Performed by: OPHTHALMOLOGY

## 2024-04-05 PROCEDURE — 99999 PR PBB SHADOW E&M-EST. PATIENT-LVL II: CPT | Mod: PBBFAC,,, | Performed by: OPHTHALMOLOGY

## 2024-04-05 PROCEDURE — 4010F ACE/ARB THERAPY RXD/TAKEN: CPT | Mod: CPTII,S$GLB,, | Performed by: OPHTHALMOLOGY

## 2024-04-05 PROCEDURE — 3044F HG A1C LEVEL LT 7.0%: CPT | Mod: CPTII,S$GLB,, | Performed by: OPHTHALMOLOGY

## 2024-04-05 NOTE — PROGRESS NOTES
Subjective:       Patient ID: Armand Painting is a 73 y.o. male.    Chief Complaint: No chief complaint on file.    HPI    3 week po Phaco IOL OD 3/7/24    Patient states vision improving.    1. PCIOL OD  2. NS OS  3. Mac Hole OD  4. Type 2 DM no DR  5. RE    MEDS:  Combo TID OD  Last edited by Ginna Stapleton MA on 4/5/2024  2:36 PM.             Assessment:       1. Post-operative state    2. Macular hole, right eye        Plan:       S/p CE OD- Doing well.    Macular hole OD-Main source of poor Va OD. Pt wants to see Dr Mcneil to re-evaluate.        Refer back to Dr Mcneli for MH OD.  RTC Dr Cotter in 6 mos.

## 2024-04-26 DIAGNOSIS — R73.03 PREDIABETES: Chronic | ICD-10-CM

## 2024-04-26 RX ORDER — METFORMIN HYDROCHLORIDE 500 MG/1
500 TABLET, EXTENDED RELEASE ORAL DAILY
Qty: 90 TABLET | Refills: 1 | Status: SHIPPED | OUTPATIENT
Start: 2024-04-26

## 2024-04-26 NOTE — TELEPHONE ENCOUNTER
Care Due:                  Date            Visit Type   Department     Provider  --------------------------------------------------------------------------------                                [facility]                              PRIMARY      MED/ INTERNAL  Last Visit: 12-      CARE ([facility])   MED/ PAM Vargas                              [facility]                              PRIMARY      MED/ INTERNAL  Next Visit: 08-      CARE ([facility])   MED/ [facility]      Clark Vargas                                                            Last  Test          Frequency    Reason                     Performed    Due Date  --------------------------------------------------------------------------------    Office Visit  15 months..  allopurinoL, lisinopriL,   12- 03-                             metFORMIN, tamsulosin....    [employer] Embedded Care Due Messages. Reference number: [id number].   4/26/2024 11:35:02 AM CDT

## 2024-05-01 ENCOUNTER — OFFICE VISIT (OUTPATIENT)
Dept: OPHTHALMOLOGY | Facility: CLINIC | Age: 74
End: 2024-05-01
Attending: OPHTHALMOLOGY
Payer: MEDICARE

## 2024-05-01 DIAGNOSIS — H35.341 MACULAR HOLE, RIGHT EYE: Primary | ICD-10-CM

## 2024-05-01 PROCEDURE — 1126F AMNT PAIN NOTED NONE PRSNT: CPT | Mod: CPTII,S$GLB,, | Performed by: OPHTHALMOLOGY

## 2024-05-01 PROCEDURE — 92134 CPTRZ OPH DX IMG PST SGM RTA: CPT | Mod: S$GLB,,, | Performed by: OPHTHALMOLOGY

## 2024-05-01 PROCEDURE — 92012 INTRM OPH EXAM EST PATIENT: CPT | Mod: 24,S$GLB,, | Performed by: OPHTHALMOLOGY

## 2024-05-01 PROCEDURE — 1159F MED LIST DOCD IN RCRD: CPT | Mod: CPTII,S$GLB,, | Performed by: OPHTHALMOLOGY

## 2024-05-01 PROCEDURE — 1101F PT FALLS ASSESS-DOCD LE1/YR: CPT | Mod: CPTII,S$GLB,, | Performed by: OPHTHALMOLOGY

## 2024-05-01 PROCEDURE — 4010F ACE/ARB THERAPY RXD/TAKEN: CPT | Mod: CPTII,S$GLB,, | Performed by: OPHTHALMOLOGY

## 2024-05-01 PROCEDURE — 99999 PR PBB SHADOW E&M-EST. PATIENT-LVL II: CPT | Mod: PBBFAC,,, | Performed by: OPHTHALMOLOGY

## 2024-05-01 PROCEDURE — 1160F RVW MEDS BY RX/DR IN RCRD: CPT | Mod: CPTII,S$GLB,, | Performed by: OPHTHALMOLOGY

## 2024-05-01 PROCEDURE — 3288F FALL RISK ASSESSMENT DOCD: CPT | Mod: CPTII,S$GLB,, | Performed by: OPHTHALMOLOGY

## 2024-05-01 PROCEDURE — 3044F HG A1C LEVEL LT 7.0%: CPT | Mod: CPTII,S$GLB,, | Performed by: OPHTHALMOLOGY

## 2024-05-01 NOTE — PROGRESS NOTES
Subjective:       Patient ID: Armand Painting is a 73 y.o. male      No chief complaint on file.    History of Present Illness  HPI      2wk  OCT//DFE OD    Cc: pt states vision is blurred OD    ++blurred OD  -diplopia  -eye pain   -flashes/floaters  ++headaches at times  --curtain/shadows/veils    Eye Meds: NONE    POHx:  PCIOL with posterior chamber intraocular lens Right Eye. (03/07/2024 )  Last edited by Renee Bell MA on 5/1/2024  2:41 PM.        Imaging:    See report    Assessment/Plan:     1. Macular hole, right eye  Chronic.  Was scheduled for surgery 2021 but did not f/u  Discussed can still attempt closure but decreased closure rate and unknown visual potential    Pt not interested in surgery now.  Will call or return if changes his mind      - Posterior Segment OCT Retina-Both eyes    Excellent result from CE/IOL OD    Follow up if symptoms worsen or fail to improve.

## 2024-05-02 ENCOUNTER — LAB VISIT (OUTPATIENT)
Dept: LAB | Facility: HOSPITAL | Age: 74
End: 2024-05-02
Payer: MEDICARE

## 2024-05-02 DIAGNOSIS — I10 ESSENTIAL HYPERTENSION: ICD-10-CM

## 2024-05-02 LAB
ANION GAP SERPL CALC-SCNC: 9 MMOL/L (ref 8–16)
BUN SERPL-MCNC: 15 MG/DL (ref 8–23)
CALCIUM SERPL-MCNC: 9.8 MG/DL (ref 8.7–10.5)
CHLORIDE SERPL-SCNC: 102 MMOL/L (ref 95–110)
CO2 SERPL-SCNC: 29 MMOL/L (ref 23–29)
CREAT SERPL-MCNC: 1.1 MG/DL (ref 0.5–1.4)
EST. GFR  (NO RACE VARIABLE): >60 ML/MIN/1.73 M^2
GLUCOSE SERPL-MCNC: 98 MG/DL (ref 70–110)
POTASSIUM SERPL-SCNC: 3.9 MMOL/L (ref 3.5–5.1)
SODIUM SERPL-SCNC: 140 MMOL/L (ref 136–145)

## 2024-05-02 PROCEDURE — 36415 COLL VENOUS BLD VENIPUNCTURE: CPT | Mod: PO | Performed by: INTERNAL MEDICINE

## 2024-05-02 PROCEDURE — 80048 BASIC METABOLIC PNL TOTAL CA: CPT | Performed by: INTERNAL MEDICINE

## 2024-05-08 ENCOUNTER — TELEPHONE (OUTPATIENT)
Dept: UROLOGY | Facility: HOSPITAL | Age: 74
End: 2024-05-08
Payer: MEDICARE

## 2024-05-08 RX ORDER — CLOTRIMAZOLE AND BETAMETHASONE DIPROPIONATE 10; .64 MG/G; MG/G
CREAM TOPICAL
Qty: 45 G | Refills: 3 | OUTPATIENT
Start: 2024-05-08

## 2024-05-08 NOTE — TELEPHONE ENCOUNTER
If he is having further skin concerns, recommend for him to f/u with dermatologist as well. Last saw Dr. Nathalie Mchugh 12/2023.    Appt made to see me 5/20/24. Please inform pt.

## 2024-05-08 NOTE — TELEPHONE ENCOUNTER
----- Message from Andrea Gomez sent at 5/8/2024  9:51 AM CDT -----  Consult/Advisory    Name Of Caller:Armand       Contact Preference:[phone]    Nature of call: Ptn called stating he can't wait until oct to see the dr he stated he needs to be seen soon he is in pain and the meds not working please call to assist

## 2024-05-20 ENCOUNTER — OFFICE VISIT (OUTPATIENT)
Dept: UROLOGY | Facility: CLINIC | Age: 74
End: 2024-05-20
Payer: MEDICARE

## 2024-05-20 VITALS — WEIGHT: 292 LBS | BODY MASS INDEX: 44.4 KG/M2

## 2024-05-20 DIAGNOSIS — N52.01 ERECTILE DYSFUNCTION DUE TO ARTERIAL INSUFFICIENCY: ICD-10-CM

## 2024-05-20 DIAGNOSIS — R35.1 NOCTURIA: ICD-10-CM

## 2024-05-20 DIAGNOSIS — N48.9 PENILE LESION: ICD-10-CM

## 2024-05-20 DIAGNOSIS — N48.1 BALANITIS: Primary | ICD-10-CM

## 2024-05-20 DIAGNOSIS — B35.4 TINEA CORPORIS: ICD-10-CM

## 2024-05-20 DIAGNOSIS — R30.0 DYSURIA: ICD-10-CM

## 2024-05-20 PROCEDURE — 4010F ACE/ARB THERAPY RXD/TAKEN: CPT | Mod: CPTII,S$GLB,, | Performed by: UROLOGY

## 2024-05-20 PROCEDURE — 1159F MED LIST DOCD IN RCRD: CPT | Mod: CPTII,S$GLB,, | Performed by: UROLOGY

## 2024-05-20 PROCEDURE — 1101F PT FALLS ASSESS-DOCD LE1/YR: CPT | Mod: CPTII,S$GLB,, | Performed by: UROLOGY

## 2024-05-20 PROCEDURE — 3288F FALL RISK ASSESSMENT DOCD: CPT | Mod: CPTII,S$GLB,, | Performed by: UROLOGY

## 2024-05-20 PROCEDURE — 99214 OFFICE O/P EST MOD 30 MIN: CPT | Mod: S$GLB,,, | Performed by: UROLOGY

## 2024-05-20 PROCEDURE — 3044F HG A1C LEVEL LT 7.0%: CPT | Mod: CPTII,S$GLB,, | Performed by: UROLOGY

## 2024-05-20 PROCEDURE — 1160F RVW MEDS BY RX/DR IN RCRD: CPT | Mod: CPTII,S$GLB,, | Performed by: UROLOGY

## 2024-05-20 PROCEDURE — 1125F AMNT PAIN NOTED PAIN PRSNT: CPT | Mod: CPTII,S$GLB,, | Performed by: UROLOGY

## 2024-05-20 PROCEDURE — 99999 PR PBB SHADOW E&M-EST. PATIENT-LVL III: CPT | Mod: PBBFAC,,, | Performed by: UROLOGY

## 2024-05-20 RX ORDER — CEFAZOLIN SODIUM 2 G/50ML
2 SOLUTION INTRAVENOUS
Status: CANCELLED | OUTPATIENT
Start: 2024-05-20

## 2024-05-20 NOTE — PROGRESS NOTES
"Subjective:       Armand Painting is a 73 y.o. male who is an established patient who was referred by Dr Vargas  for evaluation of dysuria.      He recently saw PCP with c/o dysuria. UCx at that time showed moderate growth 50-99k Enterobacter. He was also increased to Flomax BID - urgency in AM, nocturia x 2-3, strong stream. Does not appear he was treated with antibiotics. He was given cream (nystatin-triamcinolone) for suspected balantis. He notes a small cut on foreskin. He does have some burning externally after voiding but denies any dysuria.     He also reports problems with ED. He was given Cialis by PCP but may not have worked well. He has not tried Viagra.     PVR (bladder scan) today - 45cc (not true PVR)    Last PSA 5/16 - 0.41    11/18/2019  Now reports intermittent dysuria that has returned. He reports this is more external irritation. The lesion has reappeared on foreskin. He is out of cream from PCP.   Also reports Viagra 100mg is not working well, worked only one time. He did not try it with emptying stomach.     3/3/2020  He is using Lotrisone cream. He reports penile lesion is still present but improving. Concerned about cost of cream.     8/4/2020  Still with mild urinary frequency (again unable to give specimen) and still with penile lesion. Lesion will come and go, returns when does not use Lotrisone. Did go away completely but now returned. Feels that is related to being wet due to not being circumcised.     2/4/2021  Still with penile lesion - "on and off". Improves with cream.     8/30/2022  Again concerned about wound on penis. Using cream most days. Can be painful. Lesion with come and go but never 100% resolves.     3/9/2023  Occasional pain with urge to void. Wound has improved but still remains. Using Lotrisone. Asking about ED meds.     12/4/2023  Again with penile skin issue. Using Lotrisone without improvement. Has not seen derm (previously referred).     4/4/2024  Has now seen derm, given " ketoconazole. He is unsure if this is helping but he is unsure what cream he is using. It looks like he most recently got Lotrisone, which was recommended not to be used by derm. A1c - 6.4.    5/20/2024  Still with penile skin irritation - has become more painful and will more ulcerative lesions. Difficulty retracting foreskin due to pain. +edema, seeing cardiology.     The following portions of the patient's history were reviewed and updated as appropriate: allergies, current medications, past family history, past medical history, past social history, past surgical history and problem list.    Review of Systems  Constitutional: no fever or chills  ENT: no nasal congestion or sore throat  Respiratory: no cough or shortness of breath  Cardiovascular: no chest pain or palpitations  Gastrointestinal: no nausea or vomiting, tolerating diet  Genitourinary: as per HPI  Hematologic/Lymphatic: no easy bruising or lymphadenopathy  Musculoskeletal: no arthralgias or myalgias  Skin: no rashes or lesions  Neurological: no seizures or tremors  Behavioral/Psych: no auditory or visual hallucinations       Objective:    Vitals: Wt 132.5 kg (292 lb 0.3 oz)   BMI 44.40 kg/m²     Physical Exam   General: well developed, well nourished in no acute distress  Head: normocephalic, atraumatic  Neck: supple, trachea midline, no obvious enlargement of thyroid  HEENT: EOMI, mucus membranes moist, sclera anicteric, no hearing impairment  Lungs: symmetric expansion, non-labored breathing  Neuro: alert and oriented x 3, no gross deficits  Psych: normal judgment and insight, normal mood/affect and non-anxious  Genitourinary:   Penis - uncircumcised penis with ulcerative lesions on ventral foreskin, no mass. ++TTP. Urethra - orthotopic location without stenosis.  Scrotum  - no lesions or rashes, no hydrocele or hernia.  Testes - descended bilaterally, symmetric without masses, non tender.  Epididymides - no cysts or lesions, no spermatocele,  "symmetric   JASEN: patient declined exam      Lab Review   Urine analysis today in clinic shows - no urine      Lab Results   Component Value Date    WBC 7.19 03/08/2024    HGB 12.7 (L) 03/08/2024    HCT 41.7 03/08/2024    MCV 86 03/08/2024     03/08/2024     Lab Results   Component Value Date    CREATININE 1.1 05/02/2024    BUN 15 05/02/2024     Lab Results   Component Value Date    PSA 0.41 05/23/2016     No results found for: "PSADIAG"    Imaging  Reports and images were personally reviewed by me and discussed with the patient today         Assessment/Plan:      1. Nocturia    - Less bother with Flomax BID, continue   - Last PSA years ago but very low. Likely okay to hold on repeat check.      2. Dysuria    - Intermittent dysuria has returned   - Consider cystoscopy - seems to be more external     3. Penile lesion    - Lotrisone cream refilled   - Unsure etiology though has remained stable   - Discussed excisional biopsy thoroughly - he declines this. Discussed possible circumcision - he declines this. Again discussed - will consider next visit. Concern for infection risk with biopsy due to location of wound.    - Again discussed derm referral - declined initially, referred but he did not see, referral placed again.    - Again discussed circumcision - he will continue to consider this. I recommend circumcision for complete removal of abnormal skin but he declines at this time.    - He would like to see derm prior to consider circumcision. Rec for ketoconazole.  Reordered keto today as seems he was given Lotrisone recently (rec against this by derm).    - Recommend circumcision. Will proceed. Discussed r/b/a. OR 5/31/24.     - Discussed circumcision including details of procedure and post-operative recovery including no intercourse/masturbation x 6 weeks.    - Risks, benefits, alternatives discussed today. Risks include but are not limited to bleeding, infection, breakdown of incision, sensation of penile " shortening, penile numbness, ED, glans hypersensitivity, or not ideal cosmetic results. He understands this.         4. Erectile dysfunction due to arterial insufficiency    - Cialis did not work well   - Viagra 100mg on empty stomach (goodrx coupon) - not helpful previously, wants repeat trial. Again recommend empty stomach.    - Briefly discussed KESHIA or ICI.        Follow up in 2 weeks post-op

## 2024-05-21 ENCOUNTER — ANESTHESIA EVENT (OUTPATIENT)
Dept: SURGERY | Facility: HOSPITAL | Age: 74
End: 2024-05-21
Payer: MEDICARE

## 2024-05-22 ENCOUNTER — HOSPITAL ENCOUNTER (OUTPATIENT)
Dept: PREADMISSION TESTING | Facility: HOSPITAL | Age: 74
Discharge: HOME | End: 2024-05-22
Attending: UROLOGY
Payer: MEDICARE

## 2024-05-22 ENCOUNTER — TELEPHONE (OUTPATIENT)
Dept: CARDIOLOGY | Facility: CLINIC | Age: 74
End: 2024-05-22
Payer: MEDICARE

## 2024-05-22 VITALS
BODY MASS INDEX: 43.85 KG/M2 | TEMPERATURE: 97 F | RESPIRATION RATE: 18 BRPM | OXYGEN SATURATION: 96 % | HEART RATE: 81 BPM | DIASTOLIC BLOOD PRESSURE: 82 MMHG | SYSTOLIC BLOOD PRESSURE: 116 MMHG | HEIGHT: 68 IN | WEIGHT: 289.31 LBS

## 2024-05-22 DIAGNOSIS — Z86.39 H/O: OBESITY: ICD-10-CM

## 2024-05-22 DIAGNOSIS — Z01.818 PREOPERATIVE TESTING: Primary | ICD-10-CM

## 2024-05-22 LAB
ALBUMIN SERPL BCP-MCNC: 3.5 G/DL (ref 3.5–5.2)
ALP SERPL-CCNC: 68 U/L (ref 55–135)
ALT SERPL W/O P-5'-P-CCNC: 17 U/L (ref 10–44)
ANION GAP SERPL CALC-SCNC: 11 MMOL/L (ref 8–16)
AST SERPL-CCNC: 17 U/L (ref 10–40)
BASOPHILS # BLD AUTO: 0.02 K/UL (ref 0–0.2)
BASOPHILS NFR BLD: 0.3 % (ref 0–1.9)
BILIRUB SERPL-MCNC: 0.4 MG/DL (ref 0.1–1)
BUN SERPL-MCNC: 14 MG/DL (ref 8–23)
CALCIUM SERPL-MCNC: 9.7 MG/DL (ref 8.7–10.5)
CHLORIDE SERPL-SCNC: 102 MMOL/L (ref 95–110)
CO2 SERPL-SCNC: 28 MMOL/L (ref 23–29)
CREAT SERPL-MCNC: 1.1 MG/DL (ref 0.5–1.4)
DIFFERENTIAL METHOD BLD: ABNORMAL
EOSINOPHIL # BLD AUTO: 0.2 K/UL (ref 0–0.5)
EOSINOPHIL NFR BLD: 2.4 % (ref 0–8)
ERYTHROCYTE [DISTWIDTH] IN BLOOD BY AUTOMATED COUNT: 14.5 % (ref 11.5–14.5)
EST. GFR  (NO RACE VARIABLE): >60 ML/MIN/1.73 M^2
ESTIMATED AVG GLUCOSE: 134 MG/DL (ref 68–131)
GLUCOSE SERPL-MCNC: 169 MG/DL (ref 70–110)
HBA1C MFR BLD: 6.3 % (ref 4–5.6)
HCT VFR BLD AUTO: 39 % (ref 40–54)
HGB BLD-MCNC: 12 G/DL (ref 14–18)
IMM GRANULOCYTES # BLD AUTO: 0.01 K/UL (ref 0–0.04)
IMM GRANULOCYTES NFR BLD AUTO: 0.1 % (ref 0–0.5)
LYMPHOCYTES # BLD AUTO: 3 K/UL (ref 1–4.8)
LYMPHOCYTES NFR BLD: 41.8 % (ref 18–48)
MCH RBC QN AUTO: 25.9 PG (ref 27–31)
MCHC RBC AUTO-ENTMCNC: 30.8 G/DL (ref 32–36)
MCV RBC AUTO: 84 FL (ref 82–98)
MONOCYTES # BLD AUTO: 0.5 K/UL (ref 0.3–1)
MONOCYTES NFR BLD: 6.9 % (ref 4–15)
NEUTROPHILS # BLD AUTO: 3.4 K/UL (ref 1.8–7.7)
NEUTROPHILS NFR BLD: 48.5 % (ref 38–73)
NRBC BLD-RTO: 0 /100 WBC
PLATELET # BLD AUTO: 202 K/UL (ref 150–450)
PMV BLD AUTO: 11.1 FL (ref 9.2–12.9)
POTASSIUM SERPL-SCNC: 3.7 MMOL/L (ref 3.5–5.1)
PROT SERPL-MCNC: 7.7 G/DL (ref 6–8.4)
RBC # BLD AUTO: 4.63 M/UL (ref 4.6–6.2)
SODIUM SERPL-SCNC: 141 MMOL/L (ref 136–145)
WBC # BLD AUTO: 7.08 K/UL (ref 3.9–12.7)

## 2024-05-22 PROCEDURE — 85025 COMPLETE CBC W/AUTO DIFF WBC: CPT | Performed by: UROLOGY

## 2024-05-22 PROCEDURE — 83036 HEMOGLOBIN GLYCOSYLATED A1C: CPT | Performed by: UROLOGY

## 2024-05-22 PROCEDURE — 80053 COMPREHEN METABOLIC PANEL: CPT | Performed by: UROLOGY

## 2024-05-22 NOTE — ANESTHESIA PREPROCEDURE EVALUATION
05/22/2024  Armand Painting is a 73 y.o., male scheduled for : CIRCUMCISION (Penis) on 5/31/2024.      Past Medical History:   Diagnosis Date    Arthritis     Erectile dysfunction     Gout     Hyperlipidemia     Hypertension     Morbid obesity     Nuclear sclerosis of both eyes 1/16/2018    Screening for AAA (abdominal aortic aneurysm)     6/2016 AAA US no aneurysm       VITALS  Vitals:    05/31/24 0635   BP: (!) 151/95   Pulse: 63   Resp: 18   Temp: 36.5 °C (97.7 °F)       LABS  CBC  Lab Results   Component Value Date    WBC 7.08 05/22/2024    HGB 12.0 (L) 05/22/2024    HCT 39.0 (L) 05/22/2024    MCV 84 05/22/2024     05/22/2024       BMP  Lab Results   Component Value Date     05/22/2024    K 3.7 05/22/2024     05/22/2024    CO2 28 05/22/2024    BUN 14 05/22/2024    CREATININE 1.1 05/22/2024    CALCIUM 9.7 05/22/2024    ANIONGAP 11 05/22/2024    EGFRNORACEVR >60 05/22/2024           Pre-op Assessment    I have reviewed the Patient Summary Reports.     I have reviewed the Nursing Notes. I have reviewed the NPO Status.   I have reviewed the Medications.     Review of Systems  Anesthesia Hx:  No problems with previous Anesthesia             Denies Family Hx of Anesthesia complications.    Denies Personal Hx of Anesthesia complications.                    Social:  No Alcohol Use, Former Smoker       Hematology/Oncology:  Hematology Normal   Oncology Normal                                   EENT/Dental:  EENT/Dental Normal           Cardiovascular:  Exercise tolerance: good   Hypertension           hyperlipidemia CHAO    Functional Capacity good / => 4 METS                         Pulmonary:      Shortness of breath  Sleep Apnea, CPAP           Education provided regarding risk of obstructive sleep apnea            Hepatic/GI:  Hepatic/GI Normal                 Musculoskeletal:  Arthritis                Neurological:  Neurology Normal                                      Endocrine:  Diabetes         Morbid Obesity / BMI > 40  Dermatological:  Skin Normal    Psych:  Psychiatric Normal                    Physical Exam  General: Well nourished, Cooperative, Alert and Oriented    Airway:  Mallampati: II   Mouth Opening: Normal  TM Distance: 4 - 6 cm  Tongue: Large  Neck ROM: Extension Decreased    Dental:  Upper dentures  Chest/Lungs:  Normal Respiratory Rate    Heart:  Rate: Normal        Anesthesia Plan  Type of Anesthesia, risks & benefits discussed:    Anesthesia Type: Gen Supraglottic Airway, Gen ETT  Intra-op Monitoring Plan: Standard ASA Monitors  Induction:  IV  Informed Consent: Informed consent signed with the Patient and all parties understand the risks and agree with anesthesia plan.  All questions answered.   ASA Score: 3  Anesthesia Plan Notes: PMHx significant for CHAO, Bilat LE edema, HTN, SIS not using CPAP, DM, HLP, AAA, 3.5 cm, Ao root dil, 4.3cm   Cleared for  surgery at moderate cardiac risk by Dr. Vizcarra        Ready For Surgery From Anesthesia Perspective.     .

## 2024-05-22 NOTE — DISCHARGE INSTRUCTIONS
YOUR PROCEDURE WILL BE AT [facility] at [address] [place] [address]                  Before 7 AM, enter through the Emergency Entrance..   After 7 AM enter through the Main Entrance.                 Report to the Same Day Surgery Registration Desk in the hallway.(Just beside the Same Day Surgery Unit)      Your procedure  is scheduled for _5/31/2024_________.    Call [phone] between 2pm and 5pm on _5/30/2024______to find out your arrival time for the day of surgery.    You may have two visitors.  No children under 12 years old.     You will be going to the Same Day Surgery Unit on the 2nd floor of the hospital.    Important instructions:  Do not eat anything after midnight.  You may have plain water, non carbonated.  You may also have Gatorade or Powerade after midnight.    Stop all fluids 2 hours before your surgery.    It is okay to brush your teeth.  Do not have gum, candy or mints.    SEE MEDICATION SHEET.   TAKE MEDICATIONS AS DIRECTED WITH SIPS OF WATER.      Do not take any diabetic medication on the morning of surgery unless instructed to do so by your doctor or pre op nurse.      All GLP-1 weekly diabetic/weight loss medications must not be taken for one week before your surgery, or your surgery could be canceled.      STOP taking Aspirin, Ibuprofen,  Advil, Motrin, Mobic(meloxicam), Aleve (naproxen), Fish oil, and Vitamin E for at least 7 days before your surgery.     You may take Tylenol if needed which is not a blood thinner.    Please shower the night before and the morning of your surgery.      Use Chlorhexidine soap as instructed by your pre op nurse.   Please place clean linens on your bed the night before surgery. Please wear fresh clean clothing after each shower.    No shaving of procedural area at least 4-5 days before surgery due to increased risk of skin irritation and/or possible infection.    Contact lenses and removable denture work may not be  worn during your procedure.    You may wear deodorant only. If you are having breast surgery, do not wear deodorant on the operative side.    Do not wear powder, body lotion, perfume/cologne or make-up.    Do not wear any jewelry or have any metal on your body.    You will be asked to remove any dentures or partials for the procedure.    If you are going home on the same day of surgery, you must arrange for a family member or a friend to drive you home.  Public transportation is prohibited.  You will not be able to drive home if you were given anesthesia or sedation.    Patients who want to have their Post-op prescriptions filled from our in-house [facility], bring a Credit/Debit Card or cash with you. A co-pay may be required.  The pharmacy closes at 5:30 pm.    Wear loose fitting clothes allowing for bandages.    Please leave money and valuables home.      You may bring your cell phone.    Call the doctor if fever or illness should occur before your surgery.    Call [phone] to contact us here if needed.                            CLOTHES ON DAY OF SURGERY    SHOULDER surgery:  you must have a very oversized shirt.  Very, Very large.  You will probably have a large sling on with your arm strapped to your chest.  You will not be able to put the arm of the operated shoulder into a sleeve.  You can put the arm of the un-operated shoulder into the sleeve, but the shirt will need to be draped over the operated shoulder.       ARM or HAND surgery:  make sure that your sleeves are large and loose enough to pass over large dressings or cast.      BREAST or UNDERARM surgery:  wear a loose, button down shirt so that you can dress without raising your arms over your head.    ABDOMINAL surgery:  wear loose, comfortable clothing.  Nothing tight around the abdomen.  NO JEANS    PENIS or SCROTAL surgery:  loose comfortable clothing.  Large sweat pants, pajama pants or a robe.  ABSOLUTELY NO JEANS      LEG or FOOT surgery:   wear large loose pants that are able to pass over any large dressings or casts.  You could also wear loose shorts or a skirt.

## 2024-05-22 NOTE — TELEPHONE ENCOUNTER
----- Message from Grace Grey NP sent at 5/22/2024 10:46 AM CDT -----  Regarding: CIRCUMCISION 5/31  Hey Dr. Vizcarra,    Just wanted to reach out about Mr. Painting for his upcoming procedure scheduled with Dr. Pandey on 5/31. He is scheduled for a Circumcision due to penile lesion.     Not sure if you can answer this because the patient did not see you for his last visit in March.  Looks like he was supposed to follow up in April.  The patient has an ECHO scheduled in June with a follow up with you at the end of the month.  Does the patient need to get this ECHO prior to surgery?  He states his SOB is the same and the swelling in his legs is about the same.  I reached out to Dr. Pandey to see how urgent this is and states he patient is in pain.      Thanks so much,    Grace Grey, SANTOS  Anesthesia  Pre-Admit

## 2024-05-30 ENCOUNTER — TELEPHONE (OUTPATIENT)
Dept: SURGERY | Facility: HOSPITAL | Age: 74
End: 2024-05-30
Payer: MEDICARE

## 2024-05-31 ENCOUNTER — HOSPITAL ENCOUNTER (OUTPATIENT)
Facility: HOSPITAL | Age: 74
Discharge: HOME | End: 2024-05-31
Attending: UROLOGY | Admitting: UROLOGY
Payer: MEDICARE

## 2024-05-31 ENCOUNTER — ANESTHESIA (OUTPATIENT)
Dept: SURGERY | Facility: HOSPITAL | Age: 74
End: 2024-05-31
Payer: MEDICARE

## 2024-05-31 DIAGNOSIS — Z01.818 PREOPERATIVE TESTING: ICD-10-CM

## 2024-05-31 DIAGNOSIS — B35.4 TINEA CORPORIS: ICD-10-CM

## 2024-05-31 DIAGNOSIS — N48.9 PENILE LESION: ICD-10-CM

## 2024-05-31 DIAGNOSIS — N48.1 BALANITIS: Primary | ICD-10-CM

## 2024-05-31 LAB — POCT GLUCOSE: 116 MG/DL (ref 70–110)

## 2024-05-31 PROCEDURE — 63600175 PHARM REV CODE 636 W HCPCS: Mod: JG | Performed by: UROLOGY

## 2024-05-31 PROCEDURE — 36000707: Performed by: UROLOGY

## 2024-05-31 PROCEDURE — 88304 TISSUE EXAM BY PATHOLOGIST: CPT | Mod: 26,,, | Performed by: PATHOLOGY

## 2024-05-31 PROCEDURE — 36000706: Performed by: UROLOGY

## 2024-05-31 PROCEDURE — 63600175 PHARM REV CODE 636 W HCPCS: Performed by: NURSE ANESTHETIST, CERTIFIED REGISTERED

## 2024-05-31 PROCEDURE — 71000015 HC POSTOP RECOV 1ST HR: Performed by: UROLOGY

## 2024-05-31 PROCEDURE — 82962 GLUCOSE BLOOD TEST: CPT | Performed by: UROLOGY

## 2024-05-31 PROCEDURE — 71000033 HC RECOVERY, INTIAL HOUR: Performed by: UROLOGY

## 2024-05-31 PROCEDURE — 37000008 HC ANESTHESIA 1ST 15 MINUTES: Performed by: UROLOGY

## 2024-05-31 PROCEDURE — 37000009 HC ANESTHESIA EA ADD 15 MINS: Performed by: UROLOGY

## 2024-05-31 PROCEDURE — 54161 CIRCUM 28 DAYS OR OLDER: CPT | Mod: ,,, | Performed by: UROLOGY

## 2024-05-31 PROCEDURE — 63600175 PHARM REV CODE 636 W HCPCS: Performed by: UROLOGY

## 2024-05-31 PROCEDURE — 88304 TISSUE EXAM BY PATHOLOGIST: CPT | Performed by: PATHOLOGY

## 2024-05-31 PROCEDURE — 88342 IMHCHEM/IMCYTCHM 1ST ANTB: CPT | Performed by: PATHOLOGY

## 2024-05-31 PROCEDURE — 88342 IMHCHEM/IMCYTCHM 1ST ANTB: CPT | Mod: 26,,, | Performed by: PATHOLOGY

## 2024-05-31 PROCEDURE — 25000003 PHARM REV CODE 250: Performed by: NURSE ANESTHETIST, CERTIFIED REGISTERED

## 2024-05-31 PROCEDURE — 25000003 PHARM REV CODE 250: Performed by: UROLOGY

## 2024-05-31 PROCEDURE — D9220A PRA ANESTHESIA: Mod: ANES,,, | Performed by: ANESTHESIOLOGY

## 2024-05-31 PROCEDURE — 71000016 HC POSTOP RECOV ADDL HR: Performed by: UROLOGY

## 2024-05-31 PROCEDURE — D9220A PRA ANESTHESIA: Mod: CRNA,,, | Performed by: NURSE ANESTHETIST, CERTIFIED REGISTERED

## 2024-05-31 RX ORDER — SODIUM CHLORIDE, SODIUM LACTATE, POTASSIUM CHLORIDE, CALCIUM CHLORIDE 600; 310; 30; 20 MG/100ML; MG/100ML; MG/100ML; MG/100ML
INJECTION, SOLUTION INTRAVENOUS CONTINUOUS
Status: DISCONTINUED | OUTPATIENT
Start: 2024-05-31 | End: 2024-05-31 | Stop reason: HOSPADM

## 2024-05-31 RX ORDER — PROPOFOL 10 MG/ML
VIAL (ML) INTRAVENOUS
Status: DISCONTINUED | OUTPATIENT
Start: 2024-05-31 | End: 2024-05-31

## 2024-05-31 RX ORDER — LIDOCAINE HYDROCHLORIDE 10 MG/ML
1 INJECTION, SOLUTION EPIDURAL; INFILTRATION; INTRACAUDAL; PERINEURAL ONCE
Status: DISCONTINUED | OUTPATIENT
Start: 2024-05-31 | End: 2024-05-31 | Stop reason: HOSPADM

## 2024-05-31 RX ORDER — EPHEDRINE SULFATE 50 MG/ML
INJECTION, SOLUTION INTRAVENOUS
Status: DISCONTINUED | OUTPATIENT
Start: 2024-05-31 | End: 2024-05-31

## 2024-05-31 RX ORDER — HYDROCODONE BITARTRATE AND ACETAMINOPHEN 5; 325 MG/1; MG/1
1 TABLET ORAL EVERY 4 HOURS PRN
Status: DISCONTINUED | OUTPATIENT
Start: 2024-05-31 | End: 2024-05-31 | Stop reason: HOSPADM

## 2024-05-31 RX ORDER — LIDOCAINE HYDROCHLORIDE 20 MG/ML
INJECTION INTRAVENOUS
Status: DISCONTINUED | OUTPATIENT
Start: 2024-05-31 | End: 2024-05-31

## 2024-05-31 RX ORDER — HYDROCODONE BITARTRATE AND ACETAMINOPHEN 5; 325 MG/1; MG/1
1 TABLET ORAL EVERY 6 HOURS PRN
Qty: 10 TABLET | Refills: 0 | Status: SHIPPED | OUTPATIENT
Start: 2024-05-31

## 2024-05-31 RX ORDER — FENTANYL CITRATE 50 UG/ML
INJECTION, SOLUTION INTRAMUSCULAR; INTRAVENOUS
Status: DISCONTINUED | OUTPATIENT
Start: 2024-05-31 | End: 2024-05-31

## 2024-05-31 RX ORDER — DEXAMETHASONE SODIUM PHOSPHATE 4 MG/ML
INJECTION, SOLUTION INTRA-ARTICULAR; INTRALESIONAL; INTRAMUSCULAR; INTRAVENOUS; SOFT TISSUE
Status: DISCONTINUED | OUTPATIENT
Start: 2024-05-31 | End: 2024-05-31

## 2024-05-31 RX ORDER — ONDANSETRON HYDROCHLORIDE 2 MG/ML
INJECTION, SOLUTION INTRAVENOUS
Status: DISCONTINUED | OUTPATIENT
Start: 2024-05-31 | End: 2024-05-31

## 2024-05-31 RX ORDER — BUPIVACAINE HYDROCHLORIDE 5 MG/ML
INJECTION, SOLUTION PERINEURAL
Status: DISCONTINUED | OUTPATIENT
Start: 2024-05-31 | End: 2024-05-31 | Stop reason: HOSPADM

## 2024-05-31 RX ORDER — FENTANYL CITRATE 50 UG/ML
25 INJECTION, SOLUTION INTRAMUSCULAR; INTRAVENOUS EVERY 5 MIN PRN
Status: DISCONTINUED | OUTPATIENT
Start: 2024-05-31 | End: 2024-05-31 | Stop reason: HOSPADM

## 2024-05-31 RX ORDER — SODIUM CHLORIDE 0.9 % (FLUSH) 0.9 %
10 SYRINGE (ML) INJECTION
Status: DISCONTINUED | OUTPATIENT
Start: 2024-05-31 | End: 2024-05-31 | Stop reason: HOSPADM

## 2024-05-31 RX ORDER — LIDOCAINE HYDROCHLORIDE 10 MG/ML
INJECTION INFILTRATION; PERINEURAL
Status: DISCONTINUED | OUTPATIENT
Start: 2024-05-31 | End: 2024-05-31 | Stop reason: HOSPADM

## 2024-05-31 RX ORDER — SUCCINYLCHOLINE CHLORIDE 20 MG/ML
INJECTION INTRAMUSCULAR; INTRAVENOUS
Status: DISCONTINUED | OUTPATIENT
Start: 2024-05-31 | End: 2024-05-31

## 2024-05-31 RX ORDER — ACETAMINOPHEN 325 MG/1
650 TABLET ORAL EVERY 4 HOURS PRN
Status: DISCONTINUED | OUTPATIENT
Start: 2024-05-31 | End: 2024-05-31 | Stop reason: HOSPADM

## 2024-05-31 RX ADMIN — ONDANSETRON 4 MG: 2 INJECTION, SOLUTION INTRAMUSCULAR; INTRAVENOUS at 07:05

## 2024-05-31 RX ADMIN — FENTANYL CITRATE 100 MCG: 50 INJECTION, SOLUTION INTRAMUSCULAR; INTRAVENOUS at 07:05

## 2024-05-31 RX ADMIN — CEFAZOLIN 3 G: 2 INJECTION, POWDER, FOR SOLUTION INTRAMUSCULAR; INTRAVENOUS at 07:05

## 2024-05-31 RX ADMIN — SUCCINYLCHOLINE CHLORIDE 160 MG: 20 INJECTION, SOLUTION INTRAMUSCULAR; INTRAVENOUS at 07:05

## 2024-05-31 RX ADMIN — EPHEDRINE SULFATE 25 MG: 50 INJECTION INTRAVENOUS at 07:05

## 2024-05-31 RX ADMIN — PROPOFOL 300 MG: 10 INJECTION, EMULSION INTRAVENOUS at 07:05

## 2024-05-31 RX ADMIN — DEXAMETHASONE SODIUM PHOSPHATE 4 MG: 4 INJECTION, SOLUTION INTRAMUSCULAR; INTRAVENOUS at 07:05

## 2024-05-31 RX ADMIN — SODIUM CHLORIDE, SODIUM LACTATE, POTASSIUM CHLORIDE, AND CALCIUM CHLORIDE: .6; .31; .03; .02 INJECTION, SOLUTION INTRAVENOUS at 06:05

## 2024-05-31 RX ADMIN — LIDOCAINE HYDROCHLORIDE 100 MG: 20 INJECTION, SOLUTION INTRAVENOUS at 07:05

## 2024-05-31 NOTE — ANESTHESIA POSTPROCEDURE EVALUATION
Anesthesia Post Evaluation    Patient: Armand Painting    Procedure(s) Performed: Procedure(s) (LRB):  CIRCUMCISION (N/A)    Final Anesthesia Type: general      Patient location during evaluation: GI PACU  Patient participation: Yes- Able to Participate  Level of consciousness: awake and alert and oriented  Post-procedure vital signs: reviewed and stable  Pain management: adequate  Airway patency: patent    PONV status at discharge: No PONV  Anesthetic complications: no      Cardiovascular status: blood pressure returned to baseline and hemodynamically stable  Respiratory status: unassisted, spontaneous ventilation and room air  Hydration status: euvolemic  Follow-up not needed.              Vitals Value Taken Time   /87 05/31/24 0932   Temp 36.4 °C (97.6 °F) 05/31/24 0902   Pulse 66 05/31/24 0945   Resp 15 05/31/24 0945   SpO2 93 % 05/31/24 0945   Vitals shown include unfiled device data.      No case tracking events are documented in the log.      Pain/Jas Score: Jas Score: 8 (5/31/2024  9:30 AM)

## 2024-05-31 NOTE — ANESTHESIA PROCEDURE NOTES
Intubation    Date/Time: 5/31/2024 7:15 AM    Performed by: Dony Hodges CRNA  Authorized by: Hanna Allan MD    Intubation:     Induction:  Intravenous    Intubated:  Postinduction    Mask Ventilation:  Not attempted    Attempts:  1    Attempted By:  CRNA    Method of Intubation:  Video laryngoscopy    Blade:  Zuniga 3    Laryngeal View Grade: Grade I - full view of cords      Difficult Airway Encountered?: No      Complications:  None    Airway Device:  Oral endotracheal tube    Airway Device Size:  7.5    Style/Cuff Inflation:  Cuffed    Inflation Amount (mL):  7    Tube secured:  23    Secured at:  The lips    Placement Verified By:  Capnometry    Complicating Factors:  Large/floppy epiglottis, obesity, short neck and poor neck/head extension    Findings Post-Intubation:  BS equal bilateral

## 2024-05-31 NOTE — TRANSFER OF CARE
Anesthesia Transfer of Care Note    Patient: Armand Painting    Procedure(s) Performed: Procedure(s) (LRB):  CIRCUMCISION (N/A)    Patient location: OPS    Anesthesia Type: general    Transport from OR: Transported from OR on 100% O2 by closed face mask with adequate spontaneous ventilation    Post pain: adequate analgesia    Post assessment: no apparent anesthetic complications    Post vital signs: stable    Level of consciousness: sedated    Nausea/Vomiting: no nausea/vomiting    Complications: none    Transfer of care protocol was followed      Last vitals: Visit Vitals  /71 (BP Location: Right arm, Patient Position: Lying)   Pulse 78   Temp 36.4 °C (97.6 °F) (Tympanic)   Resp 18   Wt 131.3 kg (289 lb 6.4 oz)   SpO2 100%   BMI 44.00 kg/m²

## 2024-05-31 NOTE — OP NOTE
[facility] [place]  Surgery Department  Urology Operative Note    SUMMARY     Date of Procedure: 5/31/2024     Surgeons and Role:     * Gricelda Pandey MD - Primary    Assisting Surgeon: None    Pre-Operative Diagnosis: Balanitis [N48.1]  Tinea corporis [B35.4]  Penile lesion [N48.9]    Post-Operative Diagnosis: Post-Op Diagnosis Codes:     * Balanitis [N48.1]     * Tinea corporis [B35.4]     * Penile lesion [N48.9]    Procedure: Circumcision    Anesthesia: Choice    Indication for Procedure: 72yo M with chronically inflammed foreskin/balanitis. He has seen dermatology and did not respond to topical treatments. He has elected to proceed with circumcision. Full consultation reviewed r/b/a and expected post-operative recovery was done pre-operatively.     Description of Procedure: The patient was brought to operating room and placed under general anesthesia. Full time out procedures were performed identifying correct patient and procedure. Appropriate antibiotics with Ancef were given prior to commencement of surgery. The patient was placed in supine position and prepped and draped in the usual sterile fashion.    The circumcising incision was first marked on the distal aspect approximately 1cm proximal to the glans. The proximal incision isai was made just proximal to the glans with the foreskin reduced. Care was taken to avoid ventral shortening. Penile lesions noted predominately on R side foreskin. A 15 blade scalpel was used to incision skin to [place]'s fascia circumferentially on both proximal and distal incisions. The incisions were then connected with Bovie electrocautery. Electrocautery was then used to release the foreskin from the underlying tissue. The entire area was then carefully inspected and point electrocautery was used for hemostasis.     Interrupted 3-0 Monocryl sutures were then placed 12 o'clock and 6 o'clock positions. A U-stitch was used on the ventral aspect. Additional interrupted  sutures were placed using 3-0 Monocryl approximately every 3-4mm between the initial sutures until the complete incisions were approximated.  10 cc of local anesthetic was injected for postoperative analgesia.  Xeroform and gauze dressing was placed around penis followed by Coban dressing. Coban will be removed one hour post-operatively. The patient was then awaken from general anesthesia and transferred to the PACU in stable condition.    Findings: Uncomplicated circumcision    Complications: No    Estimated Blood Loss (EBL): <5cc    Drains: none           Implants: none    Specimens: Foreskin           Condition: Good    Disposition: PACU - hemodynamically stable.    Attestation: I was present and scrubbed for the entire procedure.    Discharge Note    SUMMARY     Admit Date: 5/31/2024    Discharge Date and Time:  05/31/2024 8:45 AM    Hospital Course (synopsis of major diagnoses, care, treatment, and services provided during the course of the hospital stay): Uncomplicated circumcision.     Final Diagnosis: Post-Op Diagnosis Codes:     * Balanitis [N48.1]     * Tinea corporis [B35.4]     * Penile lesion [N48.9]    Disposition: Home or Self Care    Follow Up/Patient Instructions:     Medications:  Reconciled Home Medications:      Medication List        START taking these medications      HYDROcodone-acetaminophen 5-325 mg per tablet  Commonly known as: NORCO  Take 1 tablet by mouth every 6 (six) hours as needed for Pain.            CONTINUE taking these medications      allopurinoL 100 MG tablet  Commonly known as: ZYLOPRIM  TAKE 1 TABLET BY MOUTH EVERY DAY     aspirin 81 MG EC tablet  Commonly known as: ECOTRIN  Take 81 mg by mouth once daily.     atorvastatin 40 MG tablet  Commonly known as: LIPITOR  Take 1 tablet (40 mg total) by mouth every evening.     furosemide 40 MG tablet  Commonly known as: LASIX  Take 1 tablet (40 mg total) by mouth once daily.     ketoconazole 2 % cream  Commonly known as:  NIZORAL  Apply topically once daily. To affected area on penis/inguinal area     lisinopriL 10 MG tablet  TAKE 1 TABLET BY MOUTH EVERY DAY     metFORMIN 500 MG ER 24hr tablet  Commonly known as: GLUCOPHAGE-XR  TAKE 1 TABLET (500 MG TOTAL) BY MOUTH ONCE DAILY. NEEDS OFFICE VISIT FOR FURTHER REFILLS     ONE-A-DAY ESSENTIAL ORAL  Take by mouth.     OSTEO BI-FLEX ORAL  Take by mouth.     tamsulosin 0.4 mg Cap  Commonly known as: FLOMAX  Take 2 capsules (0.8 mg total) by mouth once daily.            Discharge Procedure Orders   Diet general     Call MD for:  temperature >100.4     Call MD for:  persistent nausea and vomiting     Call MD for:  severe uncontrolled pain     Call MD for:  difficulty breathing, headache or visual disturbances     Call MD for:  redness, tenderness, or signs of infection (pain, swelling, redness, odor or green/yellow discharge around incision site)     Lifting restrictions   Order Comments: Light activity only for 2 weeks. No intercourse/masturbation for 6 weeks.     Remove dressing in 24 hours   Order Comments: Remove gauze dressing Saturday morning.     Shower on day dressing removed (No bath)      Follow-up Information       Gricelda Pandey MD Follow up in 2 week(s).    Specialty: Urology  Contact information:  [address]  [address]  [facility] [address] [phone]

## 2024-06-04 VITALS
SYSTOLIC BLOOD PRESSURE: 127 MMHG | WEIGHT: 289.38 LBS | OXYGEN SATURATION: 95 % | TEMPERATURE: 98 F | HEART RATE: 61 BPM | DIASTOLIC BLOOD PRESSURE: 79 MMHG | RESPIRATION RATE: 18 BRPM | BODY MASS INDEX: 44 KG/M2

## 2024-06-12 ENCOUNTER — HOSPITAL ENCOUNTER (OUTPATIENT)
Dept: CARDIOLOGY | Facility: HOSPITAL | Age: 74
Discharge: HOME | End: 2024-06-12
Attending: INTERNAL MEDICINE
Payer: MEDICARE

## 2024-06-12 DIAGNOSIS — I77.810 AORTIC ROOT DILATATION: ICD-10-CM

## 2024-06-12 DIAGNOSIS — R60.0 BILATERAL LOWER EXTREMITY EDEMA: ICD-10-CM

## 2024-06-12 DIAGNOSIS — I71.41 PARARENAL ABDOMINAL AORTIC ANEURYSM (AAA) WITHOUT RUPTURE: ICD-10-CM

## 2024-06-12 LAB
ABDOMINAL IMA AP: 2 CM
ABDOMINAL IMA PS VEL: 81 CM/S
ABDOMINAL IMA TRANS: 2 CM
ABDOMINAL INFRARENAL AORTA AP: 2.3 CM
ABDOMINAL INFRARENAL AORTA PS VEL: 73 CM/S
ABDOMINAL INFRARENAL AORTA TRANS: 2.6 CM
ABDOMINAL JUXTARENAL AORTA AP: 2.3 CM
ABDOMINAL JUXTARENAL AORTA PS VEL: 67 CM/S
ABDOMINAL JUXTARENAL AORTA TRANS: 2.8 CM
ABDOMINAL LT COM ILIAC AP: 1.5 CM
ABDOMINAL LT COM ILIAC TRANS: 1.3 CM
ABDOMINAL LT COM ILIAC VEL: 181 CM/S
ABDOMINAL RT COM ILIAC AP: 1.1 CM
ABDOMINAL RT COM ILIAC TRANS: 1.4 CM
ABDOMINAL RT COM ILIAC VEL: 47 CM/S
ABDOMINAL SUPRARENAL AORTA AP: 2.9 CM
ABDOMINAL SUPRARENAL AORTA PS VEL: 63 CM/S
ABDOMINAL SUPRARENAL AORTA TRANS: 3 CM
AORTIC ROOT ANNULUS: 4.07 CM
AORTIC VALVE CUSP SEPERATION: 2.5 CM
ASCENDING AORTA: 4.11 CM
AV INDEX (PROSTH): 0.79
AV MEAN GRADIENT: 5 MMHG
AV PEAK GRADIENT: 10 MMHG
AV VALVE AREA BY VELOCITY RATIO: 2.98 CM²
AV VALVE AREA: 2.9 CM²
AV VELOCITY RATIO: 0.81
CV ECHO LV RWT: 0.43 CM
DOP CALC AO PEAK VEL: 1.55 M/S
DOP CALC AO VTI: 36.4 CM
DOP CALC LVOT AREA: 3.7 CM2
DOP CALC LVOT DIAMETER: 2.16 CM
DOP CALC LVOT PEAK VEL: 1.26 M/S
DOP CALC LVOT STROKE VOLUME: 105.48 CM3
DOP CALC MV VTI: 41.8 CM
DOP CALCLVOT PEAK VEL VTI: 28.8 CM
E WAVE DECELERATION TIME: 186.26 MSEC
E/A RATIO: 0.91
E/E' RATIO: 15.69 M/S
ECHO LV POSTERIOR WALL: 1.15 CM (ref 0.6–1.1)
FINAL PATHOLOGIC DIAGNOSIS: NORMAL
FRACTIONAL SHORTENING: 37 % (ref 28–44)
GROSS: NORMAL
INTERVENTRICULAR SEPTUM: 1.14 CM (ref 0.6–1.1)
IVC DIAMETER: 2.74 CM
IVRT: 131.3 MSEC
LA MAJOR: 5.37 CM
LA MINOR: 5.46 CM
LA WIDTH: 4.8 CM
LEFT ATRIUM SIZE: 3.93 CM
LEFT ATRIUM VOLUME: 86.82 CM3
LEFT INTERNAL DIMENSION IN SYSTOLE: 3.4 CM (ref 2.1–4)
LEFT VENTRICLE DIASTOLIC VOLUME: 140.41 ML
LEFT VENTRICLE SYSTOLIC VOLUME: 47.58 ML
LEFT VENTRICULAR INTERNAL DIMENSION IN DIASTOLE: 5.39 CM (ref 3.5–6)
LEFT VENTRICULAR MASS: 247.2 G
LV LATERAL E/E' RATIO: 12.75 M/S
LV SEPTAL E/E' RATIO: 20.4 M/S
LVOT MG: 3.12 MMHG
LVOT MV: 0.81 CM/S
Lab: NORMAL
MV MEAN GRADIENT: 2 MMHG
MV PEAK A VEL: 1.12 M/S
MV PEAK E VEL: 1.02 M/S
MV PEAK GRADIENT: 6 MMHG
MV STENOSIS PRESSURE HALF TIME: 54.02 MS
MV VALVE AREA BY CONTINUITY EQUATION: 2.52 CM2
MV VALVE AREA P 1/2 METHOD: 4.07 CM2
OHS CV RV/LV RATIO: 0.95 CM
OHS CV US ABDOMINAL AORTA PSV HIGHEST VALUE: 3 CM/S
PISA TR MAX VEL: 2.65 M/S
PULM VEIN S/D RATIO: 1.22
PV PEAK D VEL: 0.41 M/S
PV PEAK GRADIENT: 6 MMHG
PV PEAK S VEL: 0.5 M/S
PV PEAK VELOCITY: 1.22 M/S
RA MAJOR: 5.33 CM
RA PRESSURE ESTIMATED: 8 MMHG
RA WIDTH: 4.1 CM
RIGHT VENTRICULAR END-DIASTOLIC DIMENSION: 5.12 CM
RV TB RVSP: 11 MMHG
RV TISSUE DOPPLER FREE WALL SYSTOLIC VELOCITY 1 (APICAL 4 CHAMBER VIEW): 13.72 CM/S
SINUS: 4.06 CM
STJ: 3.57 CM
TDI LATERAL: 0.08 M/S
TDI SEPTAL: 0.05 M/S
TDI: 0.07 M/S
TR MAX PG: 28 MMHG
TRICUSPID ANNULAR PLANE SYSTOLIC EXCURSION: 2.7 CM
TV REST PULMONARY ARTERY PRESSURE: 36 MMHG

## 2024-06-12 PROCEDURE — 93970 EXTREMITY STUDY: CPT

## 2024-06-12 PROCEDURE — 93978 VASCULAR STUDY: CPT

## 2024-06-12 PROCEDURE — 93306 TTE W/DOPPLER COMPLETE: CPT | Mod: 26,,, | Performed by: INTERNAL MEDICINE

## 2024-06-12 PROCEDURE — 93978 VASCULAR STUDY: CPT | Mod: 26,,, | Performed by: INTERNAL MEDICINE

## 2024-06-12 PROCEDURE — 93970 EXTREMITY STUDY: CPT | Mod: 26,,, | Performed by: INTERNAL MEDICINE

## 2024-06-12 PROCEDURE — 93306 TTE W/DOPPLER COMPLETE: CPT

## 2024-06-14 NOTE — PROGRESS NOTES
"Subjective:       Armand Painting is a 73 y.o. male who is an established patient who was referred by Dr Vargas  for evaluation of dysuria.      He recently saw PCP with c/o dysuria. UCx at that time showed moderate growth 50-99k Enterobacter. He was also increased to Flomax BID - urgency in AM, nocturia x 2-3, strong stream. Does not appear he was treated with antibiotics. He was given cream (nystatin-triamcinolone) for suspected balantis. He notes a small cut on foreskin. He does have some burning externally after voiding but denies any dysuria.     He also reports problems with ED. He was given Cialis by PCP but may not have worked well. He has not tried Viagra.     PVR (bladder scan) today - 45cc (not true PVR)    Last PSA 5/16 - 0.41    11/18/2019  Now reports intermittent dysuria that has returned. He reports this is more external irritation. The lesion has reappeared on foreskin. He is out of cream from PCP.   Also reports Viagra 100mg is not working well, worked only one time. He did not try it with emptying stomach.     3/3/2020  He is using Lotrisone cream. He reports penile lesion is still present but improving. Concerned about cost of cream.     8/4/2020  Still with mild urinary frequency (again unable to give specimen) and still with penile lesion. Lesion will come and go, returns when does not use Lotrisone. Did go away completely but now returned. Feels that is related to being wet due to not being circumcised.     2/4/2021  Still with penile lesion - "on and off". Improves with cream.     8/30/2022  Again concerned about wound on penis. Using cream most days. Can be painful. Lesion with come and go but never 100% resolves.     3/9/2023  Occasional pain with urge to void. Wound has improved but still remains. Using Lotrisone. Asking about ED meds.     12/4/2023  Again with penile skin issue. Using Lotrisone without improvement. Has not seen derm (previously referred).     4/4/2024  Has now seen derm, given " ketoconazole. He is unsure if this is helping but he is unsure what cream he is using. It looks like he most recently got Lotrisone, which was recommended not to be used by derm. A1c - 6.4.    5/20/2024  Still with penile skin irritation - has become more painful and will more ulcerative lesions. Difficulty retracting foreskin due to pain. +edema, seeing cardiology.     He is now s/p circumcision 5/31/24 for nonhealing ulcerative penile lesion. 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                   The following portions of the patient's history were reviewed and updated as appropriate: allergies, current medications, past family history, past medical history, past social history, past surgical history and problem list.    Review of Systems  Constitutional: no fever or chills  ENT: no nasal congestion or sore throat  Respiratory: no cough or shortness of breath  Cardiovascular: no chest pain or palpitations  Gastrointestinal: no nausea or vomiting, tolerating diet  Genitourinary: as per HPI  Hematologic/Lymphatic: no easy bruising or lymphadenopathy  Musculoskeletal: no arthralgias or myalgias  Skin: no rashes or lesions  Neurological: no seizures or tremors  Behavioral/Psych: no auditory or visual hallucinations       Objective:    Vitals: There were no vitals taken for this visit.    Physical Exam   General: well developed, well nourished in no acute distress  Head: normocephalic, atraumatic  Neck: supple, trachea midline, no obvious enlargement of thyroid  HEENT: EOMI, mucus membranes moist, sclera anicteric, no hearing impairment  Lungs: symmetric expansion, non-labored breathing  Neuro: alert and oriented x 3, no gross deficits  Psych: normal judgment and insight, normal mood/affect and non-anxious  Genitourinary:   Well healing incision, sutures remain in place. Skin  "edges intact.   No inguinal LAD.   JASEN: deferred      Lab Review   Urine analysis today in clinic shows - no urine      Lab Results   Component Value Date    WBC 7.08 05/22/2024    HGB 12.0 (L) 05/22/2024    HCT 39.0 (L) 05/22/2024    MCV 84 05/22/2024     05/22/2024     Lab Results   Component Value Date    CREATININE 1.1 05/22/2024    BUN 14 05/22/2024     Lab Results   Component Value Date    PSA 0.41 05/23/2016     No results found for: "PSADIAG"    Imaging  Reports and images were personally reviewed by me and discussed with the patient today         Assessment/Plan:      1. Nocturia    - Less bother with Flomax BID, continue   - Last PSA years ago but very low. Likely okay to hold on repeat check.      2. Dysuria    - Intermittent dysuria has returned   - Consider cystoscopy - seems to be more external     3. Penile lesion    - Lotrisone cream refilled   - Unsure etiology though has remained stable   - Discussed excisional biopsy thoroughly - he declines this. Discussed possible circumcision - he declines this. Again discussed - will consider next visit. Concern for infection risk with biopsy due to location of wound.    - Again discussed derm referral - declined initially, referred but he did not see, referral placed again.    - Again discussed circumcision - he will continue to consider this. I recommend circumcision for complete removal of abnormal skin but he declines at this time.    - He would like to see derm prior to consider circumcision. Rec for ketoconazole.  Reordered keto today as seems he was given Lotrisone recently (rec against this by derm).    - Recommend circumcision. Will proceed. Discussed r/b/a. OR 5/31/24.     - Discussed circumcision including details of procedure and post-operative recovery including no intercourse/masturbation x 6 weeks.    - Risks, benefits, alternatives discussed today. Risks include but are not limited to bleeding, infection, breakdown of incision, sensation " of penile shortening, penile numbness, ED, glans hypersensitivity, or not ideal cosmetic results. He understands this.         4. Erectile dysfunction due to arterial insufficiency    - Cialis did not work well   - Viagra 100mg on empty stomach (goodrx coupon) - not helpful previously, wants repeat trial. Again recommend empty stomach.    - Briefly discussed KESHIA or ICI.      5. Penile SCC   - Discussed at length with pt today   - Will refer to  onc at [facility] to consider further excision vs topical treatments vs observation. No staging scans performed thus far.       Follow up in 3 months

## 2024-06-17 ENCOUNTER — OFFICE VISIT (OUTPATIENT)
Dept: UROLOGY | Facility: CLINIC | Age: 74
End: 2024-06-17
Payer: MEDICARE

## 2024-06-17 VITALS — BODY MASS INDEX: 44.55 KG/M2 | WEIGHT: 293 LBS

## 2024-06-17 DIAGNOSIS — N48.9 PENILE LESION: ICD-10-CM

## 2024-06-17 DIAGNOSIS — C60.9 PENILE CANCER: Primary | ICD-10-CM

## 2024-06-17 DIAGNOSIS — N52.01 ERECTILE DYSFUNCTION DUE TO ARTERIAL INSUFFICIENCY: ICD-10-CM

## 2024-06-17 DIAGNOSIS — R35.1 NOCTURIA: ICD-10-CM

## 2024-06-17 PROCEDURE — 99214 OFFICE O/P EST MOD 30 MIN: CPT | Mod: S$GLB,,, | Performed by: UROLOGY

## 2024-06-17 PROCEDURE — 99999 PR PBB SHADOW E&M-EST. PATIENT-LVL III: CPT | Mod: PBBFAC,,, | Performed by: UROLOGY

## 2024-06-17 PROCEDURE — 1126F AMNT PAIN NOTED NONE PRSNT: CPT | Mod: CPTII,S$GLB,, | Performed by: UROLOGY

## 2024-06-17 PROCEDURE — 1160F RVW MEDS BY RX/DR IN RCRD: CPT | Mod: CPTII,S$GLB,, | Performed by: UROLOGY

## 2024-06-17 PROCEDURE — 3288F FALL RISK ASSESSMENT DOCD: CPT | Mod: CPTII,S$GLB,, | Performed by: UROLOGY

## 2024-06-17 PROCEDURE — 1101F PT FALLS ASSESS-DOCD LE1/YR: CPT | Mod: CPTII,S$GLB,, | Performed by: UROLOGY

## 2024-06-17 PROCEDURE — 4010F ACE/ARB THERAPY RXD/TAKEN: CPT | Mod: CPTII,S$GLB,, | Performed by: UROLOGY

## 2024-06-17 PROCEDURE — 3044F HG A1C LEVEL LT 7.0%: CPT | Mod: CPTII,S$GLB,, | Performed by: UROLOGY

## 2024-06-17 PROCEDURE — 3008F BODY MASS INDEX DOCD: CPT | Mod: CPTII,S$GLB,, | Performed by: UROLOGY

## 2024-06-17 PROCEDURE — 1159F MED LIST DOCD IN RCRD: CPT | Mod: CPTII,S$GLB,, | Performed by: UROLOGY

## 2024-06-18 ENCOUNTER — TELEPHONE (OUTPATIENT)
Dept: UROLOGY | Facility: CLINIC | Age: 74
End: 2024-06-18
Payer: MEDICARE

## 2024-06-18 NOTE — TELEPHONE ENCOUNTER
----- Message from Melony Muñoz sent at 6/18/2024  9:38 AM CDT -----  Regarding: daughter    Type: Patient Call Back     Who called:daughter     What is the request in detail:pt daughter is calling in regards to the pt appt on 6/17. The daughter feels the pt isnt relaying the correct info and results and would like some clarity     Can the clinic reply by [facility]? No     Would the patient rather a call back or a response via [facility]? Call back     Best call back number: [phone] Gwendolyn Petersen     Additional Information:     Thank you.

## 2024-06-19 ENCOUNTER — TELEPHONE (OUTPATIENT)
Dept: UROLOGY | Facility: CLINIC | Age: 74
End: 2024-06-19
Payer: MEDICARE

## 2024-06-19 NOTE — TELEPHONE ENCOUNTER
----- Message from Olivia Osorio MA sent at 6/19/2024 10:37 AM CDT -----  Regarding: FW: Return call    ----- Message -----  From: Kelli Perkins  Sent: 6/19/2024  10:05 AM CDT  To: Katherin Madison Staff  Subject: Return call                                      .Type:  Patient Returning Call    Who Called: Daughter-Gwendolyn     Who Left Message for Patient: Lidya Viera MA    Does the patient know what this is regarding?:yes     Would the patient rather a call back or a response via [facility]? call    Best Call Back Number:.[phone]-Cell phone-Gwendolyn       Additional Information:

## 2024-06-20 ENCOUNTER — TELEPHONE (OUTPATIENT)
Dept: HEMATOLOGY/ONCOLOGY | Facility: CLINIC | Age: 74
End: 2024-06-20
Payer: MEDICARE

## 2024-06-20 ENCOUNTER — TELEPHONE (OUTPATIENT)
Dept: UROLOGY | Facility: CLINIC | Age: 74
End: 2024-06-20
Payer: MEDICARE

## 2024-06-20 NOTE — TELEPHONE ENCOUNTER
----- Message from Lidya Viera MA sent at 6/20/2024  9:15 AM CDT -----  Pt daughter states that the pt went home after his office visit and told his wife and daughter that he has cancer but doesn't know what the plan is to take care of it or what's next. The daughter would like some clarity as to what's going on.  ----- Message -----  From: Zainab Rosa  Sent: 6/20/2024   8:43 AM CDT  To: Katherin Madison Staff    .Type: Patient Call Back    Who called: Gwendolyn - daughter     What is the request in detail: would like to speak with someone about last appointment visit     Can the clinic reply by [facility]? No    Would the patient rather a call back or a response via [facility]? Call Back     Best call back number: [phone]      Additional Information:

## 2024-06-20 NOTE — TELEPHONE ENCOUNTER
Pt is schedule to see Dr Jarquin with  oncology at [facility] next week. The nurse navigator there has reached out to his daughter as well to clarify appt time and location.

## 2024-06-21 NOTE — NURSING
Oncology nurse navigator contacted patient for scheduling referral to Dr. Jarquin placed by Dr. Pandey. Date, time, and location discussed with patient. All questions/concerns addressed. Patient verbalized understanding. Contact information provided for further assistance.     Navigator also called patient's daughter to communicate appointment details as requested by Dr. Pandey.

## 2024-06-26 ENCOUNTER — OFFICE VISIT (OUTPATIENT)
Dept: UROLOGY | Facility: CLINIC | Age: 74
End: 2024-06-26
Payer: MEDICARE

## 2024-06-26 VITALS
SYSTOLIC BLOOD PRESSURE: 135 MMHG | BODY MASS INDEX: 43.64 KG/M2 | WEIGHT: 287.94 LBS | HEIGHT: 68 IN | DIASTOLIC BLOOD PRESSURE: 86 MMHG | HEART RATE: 57 BPM

## 2024-06-26 DIAGNOSIS — C60.9 PENILE CANCER: ICD-10-CM

## 2024-06-26 PROCEDURE — 3044F HG A1C LEVEL LT 7.0%: CPT | Mod: CPTII,S$GLB,, | Performed by: UROLOGY

## 2024-06-26 PROCEDURE — 4010F ACE/ARB THERAPY RXD/TAKEN: CPT | Mod: CPTII,S$GLB,, | Performed by: UROLOGY

## 2024-06-26 PROCEDURE — 1126F AMNT PAIN NOTED NONE PRSNT: CPT | Mod: CPTII,S$GLB,, | Performed by: UROLOGY

## 2024-06-26 PROCEDURE — 3079F DIAST BP 80-89 MM HG: CPT | Mod: CPTII,S$GLB,, | Performed by: UROLOGY

## 2024-06-26 PROCEDURE — 99999 PR PBB SHADOW E&M-EST. PATIENT-LVL III: CPT | Mod: PBBFAC,,, | Performed by: UROLOGY

## 2024-06-26 PROCEDURE — 3008F BODY MASS INDEX DOCD: CPT | Mod: CPTII,S$GLB,, | Performed by: UROLOGY

## 2024-06-26 PROCEDURE — 3075F SYST BP GE 130 - 139MM HG: CPT | Mod: CPTII,S$GLB,, | Performed by: UROLOGY

## 2024-06-26 PROCEDURE — 99214 OFFICE O/P EST MOD 30 MIN: CPT | Mod: S$GLB,,, | Performed by: UROLOGY

## 2024-06-26 PROCEDURE — 3288F FALL RISK ASSESSMENT DOCD: CPT | Mod: CPTII,S$GLB,, | Performed by: UROLOGY

## 2024-06-26 PROCEDURE — 1159F MED LIST DOCD IN RCRD: CPT | Mod: CPTII,S$GLB,, | Performed by: UROLOGY

## 2024-06-26 PROCEDURE — 1101F PT FALLS ASSESS-DOCD LE1/YR: CPT | Mod: CPTII,S$GLB,, | Performed by: UROLOGY

## 2024-06-26 RX ORDER — PRAVASTATIN SODIUM 20 MG/1
20 TABLET ORAL
COMMUNITY
Start: 2024-05-02

## 2024-06-27 ENCOUNTER — OFFICE VISIT (OUTPATIENT)
Dept: CARDIOLOGY | Facility: CLINIC | Age: 74
End: 2024-06-27
Payer: MEDICARE

## 2024-06-27 VITALS
HEART RATE: 62 BPM | HEIGHT: 68 IN | WEIGHT: 287.69 LBS | DIASTOLIC BLOOD PRESSURE: 68 MMHG | BODY MASS INDEX: 43.6 KG/M2 | SYSTOLIC BLOOD PRESSURE: 122 MMHG | OXYGEN SATURATION: 98 %

## 2024-06-27 DIAGNOSIS — R06.09 DOE (DYSPNEA ON EXERTION): ICD-10-CM

## 2024-06-27 DIAGNOSIS — R07.89 CHEST PAIN, ATYPICAL: ICD-10-CM

## 2024-06-27 DIAGNOSIS — E78.2 MIXED HYPERLIPIDEMIA: ICD-10-CM

## 2024-06-27 DIAGNOSIS — I10 ESSENTIAL HYPERTENSION: Primary | ICD-10-CM

## 2024-06-27 DIAGNOSIS — I87.2 VENOUS INSUFFICIENCY OF BOTH LOWER EXTREMITIES: ICD-10-CM

## 2024-06-27 LAB
OHS QRS DURATION: 96 MS
OHS QTC CALCULATION: 431 MS

## 2024-06-27 PROCEDURE — 99999 PR PBB SHADOW E&M-EST. PATIENT-LVL IV: CPT | Mod: PBBFAC,,, | Performed by: INTERNAL MEDICINE

## 2024-06-27 PROCEDURE — 4010F ACE/ARB THERAPY RXD/TAKEN: CPT | Mod: CPTII,S$GLB,, | Performed by: INTERNAL MEDICINE

## 2024-06-27 PROCEDURE — 1124F ACP DISCUSS-NO DSCNMKR DOCD: CPT | Mod: CPTII,S$GLB,, | Performed by: INTERNAL MEDICINE

## 2024-06-27 PROCEDURE — 1101F PT FALLS ASSESS-DOCD LE1/YR: CPT | Mod: CPTII,S$GLB,, | Performed by: INTERNAL MEDICINE

## 2024-06-27 PROCEDURE — 1159F MED LIST DOCD IN RCRD: CPT | Mod: CPTII,S$GLB,, | Performed by: INTERNAL MEDICINE

## 2024-06-27 PROCEDURE — 3078F DIAST BP <80 MM HG: CPT | Mod: CPTII,S$GLB,, | Performed by: INTERNAL MEDICINE

## 2024-06-27 PROCEDURE — 3044F HG A1C LEVEL LT 7.0%: CPT | Mod: CPTII,S$GLB,, | Performed by: INTERNAL MEDICINE

## 2024-06-27 PROCEDURE — 1126F AMNT PAIN NOTED NONE PRSNT: CPT | Mod: CPTII,S$GLB,, | Performed by: INTERNAL MEDICINE

## 2024-06-27 PROCEDURE — 3074F SYST BP LT 130 MM HG: CPT | Mod: CPTII,S$GLB,, | Performed by: INTERNAL MEDICINE

## 2024-06-27 PROCEDURE — 93000 ELECTROCARDIOGRAM COMPLETE: CPT | Mod: S$GLB,,, | Performed by: INTERNAL MEDICINE

## 2024-06-27 PROCEDURE — 99214 OFFICE O/P EST MOD 30 MIN: CPT | Mod: S$GLB,,, | Performed by: INTERNAL MEDICINE

## 2024-06-27 PROCEDURE — 3008F BODY MASS INDEX DOCD: CPT | Mod: CPTII,S$GLB,, | Performed by: INTERNAL MEDICINE

## 2024-06-27 PROCEDURE — 3288F FALL RISK ASSESSMENT DOCD: CPT | Mod: CPTII,S$GLB,, | Performed by: INTERNAL MEDICINE

## 2024-06-27 RX ORDER — FLUOROURACIL 50 MG/G
CREAM TOPICAL 2 TIMES DAILY
Qty: 40 G | Refills: 2 | Status: SHIPPED | OUTPATIENT
Start: 2024-06-27

## 2024-06-27 NOTE — PROGRESS NOTES
Subjective   Patient ID:  Armand Painting is a 73 y.o. male who presents for follow-up of No chief complaint on file.      HPI      HTN, DM, HLD, SIS, obesity, venous insufficiency     Referred by Dr Vargas  Last appointment with this clinic was 8/16/2022. Last visit with me 8/16/2022   CHAO. CXR negative. Referred to cards. Did not get set up.  preDM stable; metformin  HTN stable   Lipid/statin  Gout, uric acid went up. No change in allopurinol.  BPH  Penile lesion. Saw urology. They discussed excisional bx, he refused.   SIS.  Insomnia.  Fatigue with Benadryl.  Needs to work on sleep hygiene.  Had not been using CPAP  Alpha-thalassemia silent carrier      Comes in today complaining of dyspnea with exertion, insidious in onset he estimates over the past 3 months or so.  Notes that his legs are swollen and they are heavy and it is hard for him to get up out of a chair without upper body assistance.  Similarly with getting out of bed.    Gets some pain in his low back.  Last visit with me in August, no pedal edema.  His blood pressure today is good, heart rate is good on oxygen level is okay.  Denies chest pain but does note dyspnea on exertion continued.    Does not recall the details of her last visit.  Had previously wanted him to get evaluated with Cardiology and I am not sure why that did not get set up.    Though I do see that there was a telephone call 08/23/2022  regarding inquire about a cardiology consult.    No smothering sensation in his sleep.     Stress test 12/16/22    Normal myocardial perfusion scan. There is no evidence of myocardial ischemia or infarction.    There is a  mild intensity fixed perfusion abnormality in the inferior wall of the left ventricle secondary to diaphragm attenuation.    There is mild apical thinning which is a normal variant.    The gated perfusion images showed an ejection fraction of 64% post stress.    The ECG portion of the study is negative for ischemia.    The patient  reported no chest pain during the stress test.    There were no arrhythmias during stress.     Echo 6/12/24    Left Ventricle: The left ventricle is normal in size. There is mild concentric hypertrophy. There is normal systolic function with a visually estimated ejection fraction of 60 - 65%.    Right Ventricle: Normal right ventricular cavity size. Systolic function is normal.    Left Atrium: Left atrium is mildly dilated.    Aortic Valve: There is mild aortic valve sclerosis.    Aorta: Aortic root is mildly dilated measuring 4.06 cm. Ascending aorta is mildly dilated measuring 4.11 cm.    Pulmonary Artery: The estimated pulmonary artery systolic pressure is 36 mmHg.    IVC/SVC: Intermediate venous pressure at 8 mmHg.     Stress echo 3/11/14    1 - Normal left ventricular systolic function (EF 55-60%).     2 - Concentric remodeling.     3 - Left ventricular diastolic dysfunction.     4 - Trivial mitral regurgitation.     5 - Mild tricuspid regurgitation.     6 - Mild pulmonic regurgitation.   No evidence of stress induced myocardial ischemia.     AAA US 6/12/24  Suprarenal AAA, maximum diameter 3.1 cm.    Increased flow velocity across left CECIL suggesting >50% stenosis.  Similar AAA findings noted on prior report dated 01/13/2015.    LE venous US 6/12/24    There is no evidence of a right lower extremity DVT.    There is no evidence of a left lower extremity DVT.     EKG 12/7/22 NSR LAD IRBBB     12/9/22 Here for worsening CHAO - occasional chest tightness. Has a lawn service - rides on mower  BP controlled  Echo and lexiscan myoview for CHAO and CP - say he cannot walk treadmill  Continue Rx for HTN, DM, HLD, SIS         1/3/23 Denies CP, CHAO improving  BP controlled  Testing ok. Needs more diet and exercise  Continue Rx for HTN, DM, HLD, SIS  OV 6 months     6/20/23 Denies CP, mild stable CHAO. 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   HENT:  Negative for ear discharge.    Eyes:  Negative for blurred vision.   Endocrine: Negative for polyphagia.   Skin:  Negative for nail changes.   Genitourinary:  Negative for bladder incontinence.   Neurological:  Negative for aphonia.   Psychiatric/Behavioral:  Negative for hallucinations.    Allergic/Immunologic: Negative for hives.          Objective     Physical Exam  Constitutional:       Appearance: He is well-developed.   HENT:      Head: Normocephalic and atraumatic.   Eyes:      Conjunctiva/sclera: Conjunctivae normal.      Pupils: Pupils are equal, round, and reactive to light.   Cardiovascular:      Rate and Rhythm: Normal rate.      Pulses: Intact distal pulses.      Heart sounds: Normal heart sounds.   Pulmonary:      Effort: Pulmonary effort is normal.      Breath sounds: Normal breath sounds.   Abdominal:      General: Bowel sounds are normal.      Palpations: Abdomen is soft.   Musculoskeletal:         General: Normal range of motion.      Cervical back: Normal range of motion and neck supple.      Right lower leg: Edema present.      Left lower leg: Edema present.   Skin:     General: Skin is warm and dry.   Neurological:      Mental Status: He is alert and oriented to person, place, and time.            Assessment and Plan     1. Essential hypertension no outside checks; 3/2014 exercise stress echo no ischemia LVEF 55-60    2. Mixed hyperlipidemia    3. CHAO (dyspnea on exertion)    4. Chest pain, atypical    5. Venous insufficiency of both lower extremities        Plan:     LE edema likely venous insufficiency - will refer to vascular surgery  Continue Rx for HTN, DM, HLD, SIS  OV 6 months    Advance Care Planning     Date: 06/27/2024  Patient did not wish or was not able to name a surrogate decision maker or provide an Advance Care  Plan.

## 2024-06-27 NOTE — PROGRESS NOTES
[facility]  Urologic Oncology      Date of Service: 06/27/2024    Chief Complaint/Reason for Consultation: Penile Cancer    Requesting Provider:   Gricelda Pandey MD  [address]  [address]  [place] [address]    Urologic Oncology Problem List:  HPV associated pT1a penile squamous cell carcinoma s/p circumcision on 5/31/24, positive CIS margins    History of Present Illness:   History of Present Illness    Mr. Painting presents today for follow up after recent surgery for penile cancer.    He is status post penile cancer surgery with superficial penile cancer and carcinoma in situ at the surgical margin. He reports no issues with wound healing or other post-operative complications. He is amenable to starting topical chemotherapy treatment with 5-fluorouracil cream applied circumferentially around the penile surgical site daily Monday through Friday for 6 weeks, to be initiated approximately 6 weeks post-operatively. He understands that the chemotherapy cream may cause expected side effects of local skin irritation, redness, peeling, and erosions. He denies any other concerns at this time.     He presents with his daughter who denies other ongoing medical issues since his last visit         Current Problem List:  Patient Active Problem List    Diagnosis Date Noted    Balanitis 05/31/2024    Tinea corporis 05/31/2024    Penile lesion 05/31/2024    Nuclear sclerotic cataract of right eye 03/07/2024    CHAO (dyspnea on exertion) 12/09/2022    Chest pain, atypical 12/09/2022    Macular hole, right eye 04/20/2021    Chronic pain 08/07/2020    Chronic pain disorder 03/10/2020    Cervical post-laminectomy syndrome 03/10/2020    Lumbar spondylosis 03/10/2020    Alpha thalassemia silent carrier 11/14/2019    Neck pain 09/06/2019    Poor posture 09/06/2019    Tubular adenoma of colon 10/2018 colonoscopy sigmoid tubular adenoma 11/02/2018    Diverticulosis of large intestine without hemorrhage on colonoscopy  10/2018 11/01/2018    SIS (obstructive sleep apnea) on sleep study 4/2018 with AHI 55.  CPAP at 14     Nuclear sclerosis of both eyes 01/16/2018    Refractive error 01/16/2018    Benign prostatic hyperplasia with urinary frequency 01/10/2018    Hemorrhoids 06/01/2016    Cervical stenosis of spinal canal 3/2016 C3-7 laminoplasty 03/31/2016    Prediabetes 03/29/2016    Essential hypertension no outside checks; 3/2014 exercise stress echo no ischemia LVEF 55-60 03/20/2014    Mixed hyperlipidemia 03/20/2014    Morbid obesity with BMI of 40.0-44.9, adult 03/06/2014    Back pain 07/11/2013    ED (erectile dysfunction) 06/07/2013    Gout, arthritis 01/07/2013        Allergies:  Review of patient's allergies indicates:   Allergen Reactions    Codeine Swelling     Codeine with Aspirin caused chest pain        Medications per EMR:  (Not in a hospital admission)      Past Medical History:  Past Medical History:   Diagnosis Date    Arthritis     Erectile dysfunction     Gout     Hyperlipidemia     Hypertension     Morbid obesity     Nuclear sclerosis of both eyes 01/16/2018    Peripheral edema     Prediabetes     Screening for AAA (abdominal aortic aneurysm)     6/2016 AAA US no aneurysm    Sleep apnea         Past Surgical History:  Past Surgical History:   Procedure Laterality Date    CARPAL TUNNEL RELEASE Right     CIRCUMCISION N/A 5/31/2024    Procedure: CIRCUMCISION;  Surgeon: Gricelda Pandey MD;  Location: [facility] OR;  Service: Urology;  Laterality: N/A;  RN PREOP 5/22/2024---CLEARED BY CARDS    COLONOSCOPY N/A 10/31/2018    Procedure: COLONOSCOPY;  Surgeon: Clementine Batista MD;  Location: [facility] ENDO;  Service: Endoscopy;  Laterality: N/A;    EPIDURAL STEROID INJECTION N/A 08/07/2020    Procedure: INJECTION, STEROID, EPIDURAL  C7-T1;  Surgeon: Stan Brunner MD;  Location: [facility] PAIN MGT;  Service: Pain Management;  Laterality: N/A;  C7-T1 Epidural Steroid Injection  consent needed    EYE SURGERY Right 03/07/2024     "INTRAOCULAR PROSTHESES INSERTION Right 03/07/2024    Procedure: INSERTION, IOL PROSTHESIS;  Surgeon: Bishnu Bojorquez MD;  Location: [facility] OR;  Service: Ophthalmology;  Laterality: Right;    PHACOEMULSIFICATION OF CATARACT Right 03/07/2024    Procedure: PHACOEMULSIFICATION, CATARACT;  Surgeon: Bishnu Bojorquez MD;  Location: [facility] OR;  Service: Ophthalmology;  Laterality: Right;  RN PHONE PREOP 2/26/2024   ARRIVAL 530 AM    SPINE SURGERY  03/31/2016    C3-C7 laminoplasty,Dr Caruso    tooth implant          Family History:  Family History   Problem Relation Name Age of Onset    COPD Mother      No Known Problems Father      No Known Problems Sister x1     Cataracts Brother x2     Glaucoma Brother x2     No Known Problems Maternal Aunt      No Known Problems Maternal Uncle      No Known Problems Paternal Aunt      No Known Problems Paternal Uncle      No Known Problems Maternal Grandmother      No Known Problems Maternal Grandfather      No Known Problems Paternal Grandmother      No Known Problems Paternal Grandfather      Amblyopia Neg Hx      Blindness Neg Hx      Cancer Neg Hx      Diabetes Neg Hx      Hypertension Neg Hx      Macular degeneration Neg Hx      Retinal detachment Neg Hx      Strabismus Neg Hx      Stroke Neg Hx      Thyroid disease Neg Hx          Social History:  Social History     Tobacco Use    Smoking status: Former     Current packs/day: 0.00     Types: Cigarettes     Quit date: 1/7/2009     Years since quitting: 15.4     Passive exposure: Past    Smokeless tobacco: Former   Substance Use Topics    Alcohol use: Yes     Alcohol/week: 12.0 standard drinks of alcohol     Types: 12 Cans of beer per week          OBJECTIVE:     Vitals:    06/26/24 1111   BP: 135/86   Pulse: (!) 57   Weight: 130.6 kg (287 lb 14.7 oz)   Height: 5' 8" (1.727 m)        Physical Exam    General: No acute distress. Nontoxic appearing.  HENT: Normocephalic. Atraumatic.  Respiratory: Normal respiratory effort. No " conversational dyspnea. No audible wheezing.  Abdomen: No obvious distension.  Skin: No visible abnormalities.  Extremities: No edema upper extremities. No edema lower extremities.  Neurological: Alert and oriented x3. Normal speech.  Psychiatric: Normal mood. Normal affect. No evidence of SI.  Male Genitourinary: Healing up nicely.       -surgical scars present, circumcision well healed, monocryl sutures still in place, no evidence of dehiscence    LAB:    CBC:  Lab Results   Component Value Date    WBC 7.08 05/22/2024    HGB 12.0 (L) 05/22/2024    HCT 39.0 (L) 05/22/2024    MCV 84 05/22/2024     05/22/2024         BMP:  Lab Results   Component Value Date     05/22/2024    K 3.7 05/22/2024     05/22/2024    CO2 28 05/22/2024    BUN 14 05/22/2024    CREATININE 1.1 05/22/2024    CALCIUM 9.7 05/22/2024    ANIONGAP 11 05/22/2024    EGFRNORACEVR >60 05/22/2024         ASSESSMENT/PLAN:   Assessment & Plan      Assessment: 73F s/p circumcision for pT1a SqCC with +CIS at the margin    SUPERFICIAL PENILE CANCER:  - Discussed 2 options: close monitoring vs topical chemotherapy cream to prevent recurrence.  - Proceeded with topical chemotherapy cream to be applied around circumcision incision for 6 weeks, starting in 2-3 weeks when healing further progressed.  - Prescribed topical chemotherapy cream to start applying in 2 weeks, daily Monday through Friday for 6 weeks.  - Instructed to apply cream around circumcision incision, holding skin back, covering about 1/2 inch width around entire incision.  - 5FU cream to be applied as above, then removed   - Follow up in office in 3 months after starting topical chemotherapy cream to assess wound.    SIDE EFFECTS OF TOPICAL CHEMOTHERAPY CREAM:  - Educated on potential side effects of topical chemotherapy cream including irritation, redness, skin peeling and mild pain, which are expected and indicate the cream is working to kill cancer cells.  - Explained that  severe pain is not expected from the chemotherapy cream.  - Instructed to continue using cream even if mild discomfort occurs, but to contact office if pain becomes severe or unbearable.  GENERAL INSTRUCTIONS:  - Contact office with any questions or concerns in the meantime.         I spent a total of 30 minutes on the day of the visit.This includes face to face time and non-face to face time preparing to see the patient (eg, review of tests), obtaining and/or reviewing separately obtained history, documenting clinical information in the electronic or other health record, independently interpreting results and communicating results to the patient/family/caregiver, or care coordinator  This encounter was dictated and transcribed using DeepScribe and [employer], please excuse any typographical or grammatical errors.

## 2024-07-05 PROBLEM — C60.9: Status: ACTIVE | Noted: 2024-05-31

## 2024-07-10 ENCOUNTER — TELEPHONE (OUTPATIENT)
Dept: ADMINISTRATIVE | Facility: CLINIC | Age: 74
End: 2024-07-10
Payer: MEDICARE

## 2024-07-10 NOTE — TELEPHONE ENCOUNTER
Called pt; no answer; could not leave message due to line kept ringing; I was calling to inform pt we need to cancel his 7/11/24 eawv appt due to already completing the visit with [facility] on 4/30/24.

## 2024-07-12 ENCOUNTER — OFFICE VISIT (OUTPATIENT)
Dept: PULMONOLOGY | Facility: CLINIC | Age: 74
End: 2024-07-12
Payer: MEDICARE

## 2024-07-12 ENCOUNTER — LAB VISIT (OUTPATIENT)
Dept: LAB | Facility: HOSPITAL | Age: 74
End: 2024-07-12
Attending: INTERNAL MEDICINE
Payer: MEDICARE

## 2024-07-12 VITALS
WEIGHT: 291 LBS | DIASTOLIC BLOOD PRESSURE: 93 MMHG | SYSTOLIC BLOOD PRESSURE: 148 MMHG | HEIGHT: 68 IN | HEART RATE: 69 BPM | BODY MASS INDEX: 44.1 KG/M2 | OXYGEN SATURATION: 96 %

## 2024-07-12 DIAGNOSIS — G47.33 OSA (OBSTRUCTIVE SLEEP APNEA): ICD-10-CM

## 2024-07-12 DIAGNOSIS — R60.1 ANASARCA: Primary | ICD-10-CM

## 2024-07-12 DIAGNOSIS — R60.1 ANASARCA: ICD-10-CM

## 2024-07-12 LAB
ALBUMIN SERPL BCP-MCNC: 3.7 G/DL (ref 3.5–5.2)
ALP SERPL-CCNC: 76 U/L (ref 55–135)
ALT SERPL W/O P-5'-P-CCNC: 22 U/L (ref 10–44)
ANION GAP SERPL CALC-SCNC: 11 MMOL/L (ref 8–16)
AST SERPL-CCNC: 17 U/L (ref 10–40)
BILIRUB SERPL-MCNC: 0.5 MG/DL (ref 0.1–1)
BNP SERPL-MCNC: <10 PG/ML (ref 0–99)
BUN SERPL-MCNC: 12 MG/DL (ref 8–23)
CALCIUM SERPL-MCNC: 9.8 MG/DL (ref 8.7–10.5)
CHLORIDE SERPL-SCNC: 99 MMOL/L (ref 95–110)
CO2 SERPL-SCNC: 31 MMOL/L (ref 23–29)
CREAT SERPL-MCNC: 1 MG/DL (ref 0.5–1.4)
EST. GFR  (NO RACE VARIABLE): >60 ML/MIN/1.73 M^2
GLUCOSE SERPL-MCNC: 107 MG/DL (ref 70–110)
POTASSIUM SERPL-SCNC: 3.7 MMOL/L (ref 3.5–5.1)
PROT SERPL-MCNC: 8 G/DL (ref 6–8.4)
SODIUM SERPL-SCNC: 141 MMOL/L (ref 136–145)
TSH SERPL DL<=0.005 MIU/L-ACNC: 2.55 UIU/ML (ref 0.4–4)

## 2024-07-12 PROCEDURE — 83880 ASSAY OF NATRIURETIC PEPTIDE: CPT | Performed by: INTERNAL MEDICINE

## 2024-07-12 PROCEDURE — 80053 COMPREHEN METABOLIC PANEL: CPT | Performed by: INTERNAL MEDICINE

## 2024-07-12 PROCEDURE — 36415 COLL VENOUS BLD VENIPUNCTURE: CPT | Performed by: INTERNAL MEDICINE

## 2024-07-12 PROCEDURE — 84443 ASSAY THYROID STIM HORMONE: CPT | Performed by: INTERNAL MEDICINE

## 2024-07-12 PROCEDURE — 99999 PR PBB SHADOW E&M-EST. PATIENT-LVL IV: CPT | Mod: PBBFAC,,, | Performed by: INTERNAL MEDICINE

## 2024-07-12 NOTE — PROGRESS NOTES
"Armand Painting  was seen as a new patient at the request of  Matti Ann MD for the evaluation of  matthias.    CHIEF COMPLAINT:    Chief Complaint   Patient presents with    Apnea       HISTORY OF PRESENT ILLNESS: Armand Painting is a 73 y.o. male is here for sleep evaluation.   Patient with chronic le edema.  Patient was seen by Dr. Ann.  Echo and bnp normal.  Vascular surgery referral pending.      Patient with h/o matthias in 2018 with ahi of 55.  Patient was set up with cpap.  Have not used cpap x several months.  No issue with cpap.  No dry mouth.  Sleep is better with cpapMain issue is "I don't feel like I needed it."    STOPBANG during initial visit:  Snore:  yes  Tire:  daily  Observed apnea:  yes  Pressure (HTN):  yes  BMI:  Body mass index is 44.25 kg/m².   Age:  73 y.o.  Neck (inch):  20  Gender:  male    Other sleep ROS:  no parasomnia. No cataplexy.  Chronic lower extremities edema.      [place] Sleepiness Scale score during initial sleep evaluation was 12.    SLEEP ROUTINE:  Activity the hour prior to sleep: watch tv in living room     Bed partner:  wife   Time to bed:  11 pm - 12 am   Lights off:  off   Sleep onset latency:  30 minutes        Disruptions or awakenings:    3 times     Wakeup time:      5:30 am   Perceived sleep quality:  tire       Daytime naps:      frequent napping  Weekend sleep routine:      same  Caffeine use: denied  exercise habit:   limited exertion due to lower extremities edema      PAST MEDICAL HISTORY:    Active Ambulatory Problems     Diagnosis Date Noted    Gout, arthritis 01/07/2013    ED (erectile dysfunction) 06/07/2013    Back pain 07/11/2013    Morbid obesity with BMI of 40.0-44.9, adult 03/06/2014    Essential hypertension no outside checks; 3/2014 exercise stress echo no ischemia LVEF 55-60 03/20/2014    Mixed hyperlipidemia 03/20/2014    Prediabetes 03/29/2016    Anasarca 03/29/2016    Cervical stenosis of spinal canal 3/2016 C3-7 laminoplasty 03/31/2016    " Hemorrhoids 06/01/2016    Benign prostatic hyperplasia with urinary frequency 01/10/2018    Nuclear sclerosis of both eyes 01/16/2018    Refractive error 01/16/2018    SIS (obstructive sleep apnea) on sleep study 4/2018 with AHI 55.  CPAP at 15     Diverticulosis of large intestine without hemorrhage on colonoscopy 10/2018 11/01/2018    Tubular adenoma of colon 10/2018 colonoscopy sigmoid tubular adenoma 11/02/2018    Neck pain 09/06/2019    Poor posture 09/06/2019    Alpha thalassemia silent carrier 11/14/2019    Chronic pain disorder 03/10/2020    Cervical post-laminectomy syndrome 03/10/2020    Lumbar spondylosis 03/10/2020    Chronic pain 08/07/2020    Macular hole, right eye 04/20/2021    CHAO (dyspnea on exertion) 12/09/2022    Chest pain, atypical 12/09/2022    Nuclear sclerotic cataract of right eye 03/07/2024    Balanitis 05/31/2024    Tinea corporis 05/31/2024    Squamous cell carcinoma, penis s/p excision; 5FU 05/31/2024    Venous insufficiency of both lower extremities 06/27/2024     Resolved Ambulatory Problems     Diagnosis Date Noted    Rt breast  swelling 06/07/2013    AAA (abdominal aortic aneurysm) 01/13/2015    Screen for colon cancer 10/31/2018    Normocytic anemia 10/18 colonoscopy tubular adenoma; 6/2019 Hb electrophoresis WNL 06/20/2019    Decreased ROM of neck 09/06/2019    Decreased strength, endurance, and mobility 09/06/2019    Decreased range of motion of right shoulder 11/15/2019     Past Medical History:   Diagnosis Date    Arthritis     Erectile dysfunction     Gout     Hyperlipidemia     Hypertension     Morbid obesity     Peripheral edema     Screening for AAA (abdominal aortic aneurysm)     Sleep apnea                 PAST SURGICAL HISTORY:    Past Surgical History:   Procedure Laterality Date    CARPAL TUNNEL RELEASE Right     CIRCUMCISION N/A 5/31/2024    Procedure: CIRCUMCISION;  Surgeon: Gricelda Pandey MD;  Location: [facility] OR;  Service: Urology;  Laterality: N/A;  RN PREOP  5/22/2024---CLEARED BY CARDS    COLONOSCOPY N/A 10/31/2018    Procedure: COLONOSCOPY;  Surgeon: Clementine Batista MD;  Location: [facility] ENDO;  Service: Endoscopy;  Laterality: N/A;    EPIDURAL STEROID INJECTION N/A 08/07/2020    Procedure: INJECTION, STEROID, EPIDURAL  C7-T1;  Surgeon: Stan Brunner MD;  Location: [facility] PAIN MGT;  Service: Pain Management;  Laterality: N/A;  C7-T1 Epidural Steroid Injection  consent needed    EYE SURGERY Right 03/07/2024    INTRAOCULAR PROSTHESES INSERTION Right 03/07/2024    Procedure: INSERTION, IOL PROSTHESIS;  Surgeon: Bishnu Bojorquez MD;  Location: [facility] OR;  Service: Ophthalmology;  Laterality: Right;    PHACOEMULSIFICATION OF CATARACT Right 03/07/2024    Procedure: PHACOEMULSIFICATION, CATARACT;  Surgeon: Bishnu Bojorquez MD;  Location: [facility] OR;  Service: Ophthalmology;  Laterality: Right;  RN PHONE PREOP 2/26/2024   ARRIVAL 530 AM    SPINE SURGERY  03/31/2016    C3-C7 laminoplasty,Dr Caruso    tooth implant           FAMILY HISTORY:                Family History   Problem Relation Name Age of Onset    COPD Mother      No Known Problems Father      No Known Problems Sister x1     Cataracts Brother x2     Glaucoma Brother x2     No Known Problems Maternal Aunt      No Known Problems Maternal Uncle      No Known Problems Paternal Aunt      No Known Problems Paternal Uncle      No Known Problems Maternal Grandmother      No Known Problems Maternal Grandfather      No Known Problems Paternal Grandmother      No Known Problems Paternal Grandfather      Amblyopia Neg Hx      Blindness Neg Hx      Cancer Neg Hx      Diabetes Neg Hx      Hypertension Neg Hx      Macular degeneration Neg Hx      Retinal detachment Neg Hx      Strabismus Neg Hx      Stroke Neg Hx      Thyroid disease Neg Hx         SOCIAL HISTORY:          Tobacco:   Social History     Tobacco Use   Smoking Status Former    Current packs/day: 0.00    Types: Cigarettes    Quit date: 1/7/2009    Years since  quitting: 15.5    Passive exposure: Past   Smokeless Tobacco Former       alcohol use:    Social History     Substance and Sexual Activity   Alcohol Use Yes    Alcohol/week: 12.0 standard drinks of alcohol    Types: 12 Cans of beer per week                 Occupation:retire    ALLERGIES:    Review of patient's allergies indicates:   Allergen Reactions    Codeine Swelling     Codeine with Aspirin caused chest pain       CURRENT MEDICATIONS:    Current Outpatient Medications   Medication Sig Dispense Refill    allopurinoL (ZYLOPRIM) 100 MG tablet TAKE 1 TABLET BY MOUTH EVERY DAY (Patient taking differently: Take 100 mg by mouth Daily.) 90 tablet 0    aspirin (ECOTRIN) 81 MG EC tablet Take 81 mg by mouth once daily.      atorvastatin (LIPITOR) 40 MG tablet Take 1 tablet (40 mg total) by mouth every evening. 90 tablet 3    fluorouraciL (EFUDEX) 5 % cream Apply topically 2 (two) times daily. Apply to penile skin once daily Monday-Friday for 6 weeks, remove cream from skin after 2 hours of contact time 40 g 2    furosemide (LASIX) 40 MG tablet Take 1 tablet (40 mg total) by mouth once daily. 90 tablet 3    glucosamine/chondr gtz A sod (OSTEO BI-FLEX ORAL) Take by mouth.      HYDROcodone-acetaminophen (NORCO) 5-325 mg per tablet Take 1 tablet by mouth every 6 (six) hours as needed for Pain. 10 tablet 0    ketoconazole (NIZORAL) 2 % cream Apply topically once daily. To affected area on penis/inguinal area 60 g 3    lisinopriL 10 MG tablet TAKE 1 TABLET BY MOUTH EVERY DAY 90 tablet 0    metFORMIN (GLUCOPHAGE-XR) 500 MG ER 24hr tablet TAKE 1 TABLET (500 MG TOTAL) BY MOUTH ONCE DAILY. NEEDS OFFICE VISIT FOR FURTHER REFILLS 90 tablet 1    multivitamin (ONE-A-DAY ESSENTIAL ORAL) Take by mouth.      pravastatin (PRAVACHOL) 20 MG tablet Take 20 mg by mouth.      tamsulosin (FLOMAX) 0.4 mg Cap Take 2 capsules (0.8 mg total) by mouth once daily. 180 capsule 0     No current facility-administered medications for this visit.  "    Facility-Administered Medications Ordered in Other Visits   Medication Dose Route Frequency Provider Last Rate Last Admin    0.9%  NaCl infusion   Intravenous Continuous Bishnu Bojorquez MD                      REVIEW OF SYSTEMS:     Sleep related symptoms as per HPI.  CONST:Denies weight gain    HEENT: Denies sinus congestion  PULM: Denies dyspnea  CARD:  Denies palpitations   GI:  Denies acid reflux  : Denies polyuria  NEURO: Denies headaches  PSYCH: Denies mood disturbance  HEME: Denies anemia   Otherwise, a balance of systems reviewed is negative.          PHYSICAL EXAM:  Vitals:    07/12/24 0922   BP: (!) 148/93   Pulse: 69   SpO2: 96%   Weight: 132 kg (291 lb 0.1 oz)   Height: 5' 8" (1.727 m)   PainSc:   4   PainLoc: Back     Body mass index is 44.25 kg/m².     GENERAL: Normal development, well groomed  HEENT:  Conjunctivae are non-erythematous; Pupils equal, round, and reactive to light; Nose is symmetrical; Nasal mucosa is pink and moist; Septum is midline; Inferior turbinates are normal; Nasal airflow is normal; Posterior pharynx is pink; Modified Mallampati: 4; Posterior palate is normal; Tonsils +1; Uvula is normal and pink;Tongue is normal; Dentition is fair; No TMJ tenderness; Jaw opening and protrusion without click and without discomfort.  NECK: Supple. Neck circumference is 20 inches. No thyromegaly. No palpable nodes.     SKIN: On face and neck: No abrasions, no rashes, no lesions.  No subcutaneous nodules are palpable.  RESPIRATORY: Chest is clear to auscultation.  Normal chest expansion and non-labored breathing at rest.  CARDIOVASCULAR: Normal S1, S2.  No murmurs, gallops or rubs. No carotid bruits bilaterally.  EXTREMITIES: No edema. No clubbing. No cyanosis. Station normal. Gait normal.        NEURO/PSYCH: Oriented to time, place and person. Normal attention span and concentration. Affect is full. Mood is normal.                                              DATA   Psg 4/18/18 ahi " of 55    Pcxr (3/11/24) poor inspiratory effort.      Echo 3/13/24    Left Ventricle: The left ventricle is normal in size. There is mild concentric hypertrophy. There is normal systolic function with a visually estimated ejection fraction of 60 - 65%.    Right Ventricle: Normal right ventricular cavity size. Systolic function is normal.    Left Atrium: Left atrium is mildly dilated.    Aortic Valve: There is mild aortic valve sclerosis.    Aorta: Aortic root is mildly dilated measuring 4.06 cm. Ascending aorta is mildly dilated measuring 4.11 cm.    Pulmonary Artery: The estimated pulmonary artery systolic pressure is 36 mmHg.    IVC/SVC: Intermediate venous pressure at 8 mmHg.    Lab Results   Component Value Date    TSH 2.037 03/06/2014       ASSESSMENT/PLAN    Problem List Items Addressed This Visit       Anasarca - Primary    Overview     Generalized swelling.    S/p card evaluation.  Vascular pending  Will send for urine.  Repeat chemistry and bnp.           Relevant Orders    Comprehensive Metabolic Panel    BNP    Urinalysis    TSH    SIS (obstructive sleep apnea) on sleep study 4/2018 with AHI 55.  CPAP at 15    Overview     Ahi of 55  Patient denied issue with cpap.  Main barrier is motivation.  Will renew supplies.  Advise patient to bring cpap to clinic next visit.          Relevant Orders    CPAP/BIPAP SUPPLIES         Education: During our discussion today, we talked about the etiology of obstructive sleep apnea as well as the potential ramifications of untreated sleep apnea, which could include daytime sleepiness, hypertension, heart disease and/or stroke.     Precautions: The patient was advised to abstain from driving should they feel sleepy or drowsy.       Thank you for allowing me the opportunity to participate in the care of your patient.    Patient will No follow-ups on file.    Please cc note to  Matti Ann MD.    30 minutes of total time spent on the encounter, which includes face  to face time and non-face to face time preparing to see the patient (eg, review of tests), Obtaining and/or reviewing separately obtained history, documenting clinical information in the electronic or other health record, independently interpreting results (not separately reported) and communicating results to the patient/family/caregiver, or Care coordination (not separately reported).

## 2024-08-02 ENCOUNTER — TELEPHONE (OUTPATIENT)
Dept: VASCULAR SURGERY | Facility: CLINIC | Age: 74
End: 2024-08-02
Payer: MEDICARE

## 2024-08-05 ENCOUNTER — INITIAL CONSULT (OUTPATIENT)
Dept: VASCULAR SURGERY | Facility: CLINIC | Age: 74
End: 2024-08-05
Payer: MEDICARE

## 2024-08-05 ENCOUNTER — TELEPHONE (OUTPATIENT)
Dept: VASCULAR SURGERY | Facility: CLINIC | Age: 74
End: 2024-08-05

## 2024-08-05 ENCOUNTER — TELEPHONE (OUTPATIENT)
Dept: BARIATRICS | Facility: CLINIC | Age: 74
End: 2024-08-05
Payer: MEDICARE

## 2024-08-05 VITALS
BODY MASS INDEX: 43.2 KG/M2 | HEIGHT: 68 IN | HEART RATE: 66 BPM | WEIGHT: 285.06 LBS | SYSTOLIC BLOOD PRESSURE: 123 MMHG | DIASTOLIC BLOOD PRESSURE: 85 MMHG

## 2024-08-05 DIAGNOSIS — I89.0 LYMPHEDEMA: Primary | ICD-10-CM

## 2024-08-05 DIAGNOSIS — I87.303 VENOUS HYPERTENSION OF BOTH LOWER EXTREMITIES: ICD-10-CM

## 2024-08-05 DIAGNOSIS — E66.01 MORBID OBESITY WITH BMI OF 40.0-44.9, ADULT: ICD-10-CM

## 2024-08-05 DIAGNOSIS — E66.01 MORBID OBESITY WITH BMI OF 40.0-44.9, ADULT: Chronic | ICD-10-CM

## 2024-08-05 PROCEDURE — 99999 PR PBB SHADOW E&M-EST. PATIENT-LVL III: CPT | Mod: PBBFAC,,, | Performed by: SURGERY

## 2024-08-05 PROCEDURE — 99204 OFFICE O/P NEW MOD 45 MIN: CPT | Mod: S$GLB,,, | Performed by: SURGERY

## 2024-08-06 ENCOUNTER — OFFICE VISIT (OUTPATIENT)
Dept: FAMILY MEDICINE | Facility: CLINIC | Age: 74
End: 2024-08-06
Payer: MEDICARE

## 2024-08-06 VITALS
DIASTOLIC BLOOD PRESSURE: 80 MMHG | OXYGEN SATURATION: 96 % | SYSTOLIC BLOOD PRESSURE: 124 MMHG | HEIGHT: 68 IN | WEIGHT: 286.81 LBS | HEART RATE: 73 BPM | TEMPERATURE: 98 F | BODY MASS INDEX: 43.47 KG/M2

## 2024-08-06 DIAGNOSIS — E78.2 MIXED HYPERLIPIDEMIA: ICD-10-CM

## 2024-08-06 DIAGNOSIS — D56.3 ALPHA THALASSEMIA SILENT CARRIER: ICD-10-CM

## 2024-08-06 DIAGNOSIS — I71.41 PARARENAL ABDOMINAL AORTIC ANEURYSM (AAA) WITHOUT RUPTURE: ICD-10-CM

## 2024-08-06 DIAGNOSIS — D12.6 TUBULAR ADENOMA OF COLON: ICD-10-CM

## 2024-08-06 DIAGNOSIS — I87.2 VENOUS INSUFFICIENCY OF BOTH LOWER EXTREMITIES: ICD-10-CM

## 2024-08-06 DIAGNOSIS — M1A.00X0 IDIOPATHIC CHRONIC GOUT WITHOUT TOPHUS, UNSPECIFIED SITE: ICD-10-CM

## 2024-08-06 DIAGNOSIS — I10 ESSENTIAL HYPERTENSION: ICD-10-CM

## 2024-08-06 DIAGNOSIS — R73.03 PREDIABETES: Chronic | ICD-10-CM

## 2024-08-06 DIAGNOSIS — C60.9 SQUAMOUS CELL CARCINOMA, PENIS: ICD-10-CM

## 2024-08-06 DIAGNOSIS — E66.01 MORBID OBESITY WITH BMI OF 40.0-44.9, ADULT: Primary | Chronic | ICD-10-CM

## 2024-08-06 PROCEDURE — 3079F DIAST BP 80-89 MM HG: CPT | Mod: CPTII,S$GLB,, | Performed by: INTERNAL MEDICINE

## 2024-08-06 PROCEDURE — 4010F ACE/ARB THERAPY RXD/TAKEN: CPT | Mod: CPTII,S$GLB,, | Performed by: INTERNAL MEDICINE

## 2024-08-06 PROCEDURE — 99214 OFFICE O/P EST MOD 30 MIN: CPT | Mod: S$GLB,,, | Performed by: INTERNAL MEDICINE

## 2024-08-06 PROCEDURE — 3008F BODY MASS INDEX DOCD: CPT | Mod: CPTII,S$GLB,, | Performed by: INTERNAL MEDICINE

## 2024-08-06 PROCEDURE — 3074F SYST BP LT 130 MM HG: CPT | Mod: CPTII,S$GLB,, | Performed by: INTERNAL MEDICINE

## 2024-08-06 PROCEDURE — 3288F FALL RISK ASSESSMENT DOCD: CPT | Mod: CPTII,S$GLB,, | Performed by: INTERNAL MEDICINE

## 2024-08-06 PROCEDURE — 3044F HG A1C LEVEL LT 7.0%: CPT | Mod: CPTII,S$GLB,, | Performed by: INTERNAL MEDICINE

## 2024-08-06 PROCEDURE — G2211 COMPLEX E/M VISIT ADD ON: HCPCS | Mod: S$GLB,,, | Performed by: INTERNAL MEDICINE

## 2024-08-06 PROCEDURE — 1159F MED LIST DOCD IN RCRD: CPT | Mod: CPTII,S$GLB,, | Performed by: INTERNAL MEDICINE

## 2024-08-06 PROCEDURE — 99999 PR PBB SHADOW E&M-EST. PATIENT-LVL IV: CPT | Mod: PBBFAC,,, | Performed by: INTERNAL MEDICINE

## 2024-08-06 PROCEDURE — 1101F PT FALLS ASSESS-DOCD LE1/YR: CPT | Mod: CPTII,S$GLB,, | Performed by: INTERNAL MEDICINE

## 2024-08-06 PROCEDURE — 1160F RVW MEDS BY RX/DR IN RCRD: CPT | Mod: CPTII,S$GLB,, | Performed by: INTERNAL MEDICINE

## 2024-08-06 PROCEDURE — 1126F AMNT PAIN NOTED NONE PRSNT: CPT | Mod: CPTII,S$GLB,, | Performed by: INTERNAL MEDICINE

## 2024-08-06 RX ORDER — ALLOPURINOL 100 MG/1
100 TABLET ORAL DAILY
Qty: 90 TABLET | Refills: 3 | Status: SHIPPED | OUTPATIENT
Start: 2024-08-06

## 2024-08-12 ENCOUNTER — CLINICAL SUPPORT (OUTPATIENT)
Dept: REHABILITATION | Facility: HOSPITAL | Age: 74
End: 2024-08-12
Payer: MEDICARE

## 2024-08-12 DIAGNOSIS — I10 ESSENTIAL HYPERTENSION: ICD-10-CM

## 2024-08-12 DIAGNOSIS — I87.303 VENOUS HYPERTENSION OF BOTH LOWER EXTREMITIES: ICD-10-CM

## 2024-08-12 DIAGNOSIS — I89.0 LYMPHEDEMA: ICD-10-CM

## 2024-08-12 PROCEDURE — 97166 OT EVAL MOD COMPLEX 45 MIN: CPT

## 2024-08-12 PROCEDURE — 97535 SELF CARE MNGMENT TRAINING: CPT

## 2024-08-12 NOTE — TELEPHONE ENCOUNTER
No care due was identified.  [facility] Embedded Care Due Messages. Reference number: [id number].   8/12/2024 6:42:49 PM CDT

## 2024-08-13 DIAGNOSIS — I87.2 VENOUS INSUFFICIENCY OF BOTH LOWER EXTREMITIES: Primary | ICD-10-CM

## 2024-08-13 DIAGNOSIS — R60.0 BILATERAL LOWER EXTREMITY EDEMA: ICD-10-CM

## 2024-08-13 RX ORDER — LISINOPRIL 10 MG/1
10 TABLET ORAL
Qty: 90 TABLET | Refills: 3 | Status: SHIPPED | OUTPATIENT
Start: 2024-08-13

## 2024-08-13 RX ORDER — CLOTRIMAZOLE AND BETAMETHASONE DIPROPIONATE 10; .64 MG/G; MG/G
CREAM TOPICAL 2 TIMES DAILY
Qty: 45 G | Refills: 3 | Status: SHIPPED | OUTPATIENT
Start: 2024-08-13

## 2024-08-13 NOTE — TELEPHONE ENCOUNTER
Refill Decision Note   Armand Painting  is requesting a refill authorization.  Brief Assessment and Rationale for Refill:  Approve     Medication Therapy Plan:         Comments:     Note composed:11:24 AM 08/13/2024

## 2024-08-15 DIAGNOSIS — R35.0 BENIGN PROSTATIC HYPERPLASIA WITH URINARY FREQUENCY: ICD-10-CM

## 2024-08-15 DIAGNOSIS — I10 ESSENTIAL HYPERTENSION: ICD-10-CM

## 2024-08-15 DIAGNOSIS — N40.1 BENIGN PROSTATIC HYPERPLASIA WITH URINARY FREQUENCY: ICD-10-CM

## 2024-08-15 PROBLEM — I89.0 LYMPHEDEMA: Status: ACTIVE | Noted: 2024-08-15

## 2024-08-15 RX ORDER — TAMSULOSIN HYDROCHLORIDE 0.4 MG/1
2 CAPSULE ORAL
Qty: 180 CAPSULE | Refills: 3 | Status: SHIPPED | OUTPATIENT
Start: 2024-08-15

## 2024-08-15 NOTE — PLAN OF CARE
"[facility]  Occupational Therapy Initial Evaluation  Lymphedema      Name: Armand Painting  Clinic Number: [id number]    Therapy Diagnosis:   Encounter Diagnoses   Name Primary?    Lymphedema     Venous hypertension of both lower extremities      Physician: Farooq Huerta MD    Physician Orders: Eval and Treat  Medical Diagnosis: I89.0 (ICD-10-CM) - [id number] (ICD-10-CM) - Venous hypertension of both lower extremities  Evaluation Date: 8/12/2024  Insurance Authorization Period Expiration: 8/5/2025  Plan of Care Certification Period: 11/3/2024  Visit # / Visits authorized: 1 / 1  FOTO: see media, 1 / 3    Precautions:  Standard    Time In:12:30  Time Out: 1:25  Total Appointment Time (timed & untimed codes): 55 minutes    Subjective      Date of onset: 3-4 months ago  History of current condition - Armand reports: Swelling developed in BLE 3-4 months ago. He was referred to vascular surgery by his PCP. He has never worn compression stockings due to donning difficulties.       Previous Lymphedema Therapy: no  Wears Compression:  no      Imaging: scheduled- November       Pain:  Functional Pain Scale Rating 0-10:   8/10 on average  7/10 at best  8/10 at worst  Location: neck  Description: Tight  Aggravating Factors: denies  Easing Factors: denies    Occupation:  retired    Functional Limitations/Social History:    Previous functional status includes: Independent with all ADLs.   Recent falls: no    Current Functional Status   Home/Living environment: lives with their spouse          Limitation of Functional Status as follows:   ADLs/IADLs:     - Feeding: none    - Bathing: washing back    - Dressing/Grooming: "sometimes" suspenders,     - Home Management: none    - Driving: none      Patient's Goals for Therapy: "get the swelling out, be able to put my shoes back on."    Past Medical History/Physical Systems Review:   Armand Painting  has a past medical history of Arthritis, Erectile " dysfunction, Gout, Hyperlipidemia, Hypertension, Morbid obesity, Nuclear sclerosis of both eyes, Peripheral edema, Prediabetes, Screening for AAA (abdominal aortic aneurysm), and Sleep apnea.    Armand Painting  has a past surgical history that includes tooth implant; Spine surgery (03/31/2016); Colonoscopy (N/A, 10/31/2018); Epidural steroid injection (N/A, 08/07/2020); Phacoemulsification of cataract (Right, 03/07/2024); Intraocular prosthesis insertion (Right, 03/07/2024); Eye surgery (Right, 03/07/2024); Carpal tunnel release (Right); and Circumcision (N/A, 5/31/2024).    Armand has a current medication list which includes the following prescription(s): allopurinol, aspirin, atorvastatin, clotrimazole-betamethasone 1-0.05%, fluorouracil, furosemide, glucosamine/chondr gtz a sod, ketoconazole, lisinopril, metformin, multivitamin, naltrexone-bupropion, and tamsulosin.    Review of patient's allergies indicates:   Allergen Reactions    Codeine Swelling     Codeine with Aspirin caused chest pain        Pt denies: CHF, CKD, DVT, CA, infection, severe PAD  Pt admits medically managed/treated: N/A    Objective      Patient received from waiting room.   Mental status: alert, oriented to person, place, and time  Appearance: Well groomed  Behavior:  calm and cooperative  Attention Span and Concentration:  Normal    Affected Areas: RLE and LLE  Refill: Day and Night   Stemmer Sign: positive  Shape: increased girth across BLE's   Tissue Texture: soft, pliable, pitting  Skin Integrity: hyperpigmentation  Sensation: intact  Circulation: intact      LE Girth Measurements: taken in supine  LANDMARK RIGHT LE  8/12/24 LEFT LE  8/12/24   SBP - -   10 below SBP 42.0 cm 46.0 cm   20 below SBP 46.0 cm 46.0 cm   30 below SBP 39.5 cm 40.5 cm   35 below SBP 36.0 cm 34.5 cm   Ankle 34.0 cm 34.5 cm   Forefoot 28.0 cm 29.5 cm     Picture of BLE in supine; taken via [employer] on therapist's cell phone with verbal consent from  patient:        Limitation/Restriction for FOTO Lower Quadrant Edema Survey    Therapist reviewed FOTO scores for Armand Painting on 8/12/2024.   FOTO documents entered into [employer] - see Media section.    Limitation Score: see media         Treatment      Total Treatment time (time-based codes) separate from Evaluation: 15 minutes    Armand received the treatments listed below:        Self-care/home management techniques to improve functional performance for 15  minutes, including:    Pt was educated in potential compression needs.  Demo of products including socks, garments, and Inelastic Velcro wraps.   Discussed cost/coverage and authorization per insurance with Durable Medical Equipment(DME) provider.  Compression require orders from referring provider and coverage or purchase of products from DME or self order.      Discussed wear schedule, don/doff, wash and management of products.  Size and compression class and AM/PM needs.    Consideration for Edema Wear as form of temporary compression use until securing compression needs.   Consideration for shorts/tights or capris style compression to compliment lower leg compression to support size/shape of LE.   Product information provided.   Vendor list provided.    Informed insurance coverage of compression is per DME provider and typically Medicare and Medicare group plans may not cover cost beyond pair of standard sized knee high garments.   Commitment to attendance as well as commitment to securing compression needs is critical to edema management.     Patient Education and Home Exercises      Education provided:   1. Educated on definition of lymphedema.  2. Explained the Complete Decongestive Therapy protocol in depth  3. Educated on Phase 1 and 2 of protocol.  4. Reviewed treatment frequency and likely duration of weeks  5.Contraindications for treatment.  6. Plan of care and goals.  7. Educated on home management protocols.   8. Role of OT, goals for OT,  scheduling/cancellations, insurance limitations.  9. Provided lymphedema educational pamphlet        Assessment      Armand is a 73 y.o. male referred to outpatient Occupational Therapy with a medical diagnosis of lymphedema. This patient presents with stage 2 lymphedema due to CVI.  Patient's deficits include swelling of the BLE, increased pain, increased stiffness in the BLE, as well as difficulty performing ADLs , and placing the pt at higher risk of infection. Therapist's recommendation includes elevation, exercise, manual lymphatic drainage, pneumatic compression pump, multi-layer bandaging, and compression garments for 6 weeks to reduce swelling. Armand would benefit from complete decongestive therapy to reduce size of BLE, reduce risk of wounds and poor skin integrity as well as education to self-maintain reduction with use of compression garments.    Anticipated barriers to occupational therapy: none    Plan of care discussed with patient: Yes  Patient's spiritual, cultural and educational needs considered and patient is agreeable to the plan of care and goals as stated below:     Medical Necessity is demonstrated by the following  Occupational Profile/History  Co-morbidities and personal factors that may impact the plan of care [] LOW: Brief chart review  [x] MODERATE: Expanded chart review   [] HIGH: Extensive chart review    Moderate / High Support Documentation: see pmh     Examination  Performance deficits relating to physical, cognitive or psychosocial skills that result in activity limitations and/or participation restrictions  [] LOW: addressing 1-3 Performance deficits  [x] MODERATE: 3-5 Performance deficits  [] HIGH: 5+ Performance deficits (please support below)    Moderate / High Support Documentation:    Physical:  Joint Mobility  Skin Integrity/Scar Formation  Edema    Cognitive:  No Deficits    Psychosocial:    No Deficits     Treatment Options [] LOW: Limited options  [x] MODERATE: Several  options  [] HIGH: Multiple options      Decision Making/ Complexity Score: moderate       The following goals were discussed with the patient and patient is in agreement with them as to be addressed in the treatment plan.     Goals:     Short Term Goals: (3 weeks)  Goals: Progressing / MET   1. Patient will demonstrate 100% knowledge of lymphedema precautions and signs of infection to allow for reduced lymphedema risk, infection risk, and/or exacerbation of condition.  NOT MET   2. Patient will tolerate daily activities wearing multilayered bandaging to allow for lymphatic drainage support, skin elasticity, and reduction in shape and size of limb.  NOT MET   3. Patient and/or caregiver will order/obtain appropriate compression garments to maintain lymphatic and venous support.  NOT MET   4. Patient will show decreased total girth measurement in BLE by up to 3 cm to allow for lower extremity symmetry, shoe and clothing choice, and ability to apply needed compression. NOT MET     Long Term Goals: (6 weeks)  Goals: Progressing / MET   1. Patient and/or caregiver to shayne/doff compression garment independently and with daily compliance to assist in lymphedema management, skin elasticity, and tissue density NOT MET   2. Patient and/or caregiver will be independent in use of pneumatic compression pump or self manual lymphatic drainage techniques to areas within reach to enhance lymphatic drainage and skin condition.  NOT MET   3. Patient to be independent and compliant with home exercise program to allow for increased function in affected limb. NOT MET   4. Patient will show decreased total girth measurement in BLE by up to 5 cm  to allow for lower extremity symmetry, shoe and clothing choice, and ability to apply needed compression daily. NOT MET        Plan     Plan of Care Certification: 8/12/2024 to 11/3/2024.     Therapy Track: COMPLETE DECONGESTIVE THERAPY  Interventions: manual lymphatic drainage, multi-layer  bandaging, compression garments, therapeutic exercise, self bandaging and manual lymphatic drainage training, home exercise programming.      Outpatient Occupational Therapy 2 times weekly for 6 weeks to include the following interventions: Manual therapy/joint mobilizations, Therapeutic exercises/activities., and Edema Control.    Carleen Osei, OT, CLWT      I CERTIFY THE NEED FOR THESE SERVICES FURNISHED UNDER THIS PLAN OF TREATMENT AND WHILE UNDER MY CARE   Physician's comments:     Physician's Signature: ___________________________________________________

## 2024-08-15 NOTE — TELEPHONE ENCOUNTER
No care due was identified.  [facility] Embedded Care Due Messages. Reference number: [id number].   8/15/2024 4:27:02 PM CDT

## 2024-08-16 RX ORDER — HYDROCHLOROTHIAZIDE 25 MG/1
TABLET ORAL
Qty: 90 TABLET | Refills: 0 | OUTPATIENT
Start: 2024-08-16

## 2024-08-16 NOTE — TELEPHONE ENCOUNTER
Refill Routing Note   Medication(s) are not appropriate for processing by [facility] for the following reason(s):        No active prescription written by provider    [facility] action(s):  Approve  Defer      Medication Therapy Plan: HCTZ  prescription was discontinued on 3/13/2024 by Matti Ann MD.      Appointments  past 12m or future 3m with PCP    Date Provider   Last Visit   8/6/2024 Clark Vargas MD   Next Visit   2/6/2025 Clark Vargas MD   ED visits in past 90 days: 0        Note composed:11:12 PM 08/15/2024

## 2024-08-19 ENCOUNTER — CLINICAL SUPPORT (OUTPATIENT)
Dept: REHABILITATION | Facility: HOSPITAL | Age: 74
End: 2024-08-19
Payer: MEDICARE

## 2024-08-19 ENCOUNTER — TELEPHONE (OUTPATIENT)
Dept: FAMILY MEDICINE | Facility: CLINIC | Age: 74
End: 2024-08-19
Payer: MEDICARE

## 2024-08-19 DIAGNOSIS — I89.0 LYMPHEDEMA: Primary | ICD-10-CM

## 2024-08-19 PROCEDURE — 97535 SELF CARE MNGMENT TRAINING: CPT

## 2024-08-19 PROCEDURE — 97016 VASOPNEUMATIC DEVICE THERAPY: CPT

## 2024-08-19 PROCEDURE — 97140 MANUAL THERAPY 1/> REGIONS: CPT

## 2024-08-19 NOTE — TELEPHONE ENCOUNTER
----- Message from Lidya Jacinto sent at 8/19/2024  4:48 PM CDT -----  Regarding: Return Call  .Type:  Patient Returning Call    Who Called: Gwendolyn Petersen- daughter     Who Left Message for Patient: JAGUAR    Does the patient know what this is regarding?: yes     Would the patient rather a call back or a response via [facility]? Call     Best Call Back Number:  [phone]

## 2024-08-19 NOTE — TELEPHONE ENCOUNTER
----- Message from Dari Brady sent at 8/19/2024  9:22 AM CDT -----  Regarding: medication list  Name of caller: praveen  ( daughter )       What is the requesting detail: requesting a call back from  the nurse. State she needs a copy of the pt's medication list. Please give her a call back to further discuss.       Can the clinic reply by [facility]:       What number to call back:[phone]

## 2024-08-19 NOTE — PROGRESS NOTES
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    Lymphedema Yes     Physician: Farooq Huerta MD    Physician Orders: Eval and Treat  Medical Diagnosis: I89.0 (ICD-10-CM) - PoulozcaleI15.303 (ICD-10-CM) - Venous hypertension of both lower extremities  Evaluation Date: 8/12/2024  Insurance Authorization Period Expiration: 8/5/2025  Plan of Care Certification Period: 11/3/2024  Visit # / Visits authorized: 1 / 20  FOTO: see media, 1 / 3     Precautions:  Standard    Time In: 2:30  Time Out: 3:25  Total Billable Time: 55 minutes    SUBJECTIVE     Pt reports: Wore edema wear stockings one day. Did not put back on due to donning difficulty. He has not heard from S regarding Velcro wraps.  Armand reported wearing compression outside of therapy visits: No  He was compliant with home exercise program given last session: N/A  Response to previous treatment:no change  Functional change: none    Pain: 0/10        OBJECTIVE     Pt arrived out of compression, in NAD.    Compression garment status: not purchased  Compression Rx status: received from MD, sent to [facility]  Pneumatic pump status: not referred    Objective Measures updated at progress report unless specified.    Treatment     Armand received the treatments listed below:       Manual therapy techniques were applied for 10 minutes, including:      SEQUENTIAL COMPRESSION PUMP:   Full leg sleeve applied to BLE  Airos 6 with distal pressures starting at 45mmHg entire LE   -Simultaneous with education    MULTILAYERED BANDAGING:    Issued supplies and bandaged BLE   Use of Cavilon moisturizer for dry skin  Cotton stockinet: size 9  Cellona padding from dorsum of foot to knee,   2-6cm 2-8cm and 2-10cm Rosidal K rolls foot to distal knee  Elastomull to toes    Pt to leave intact 24-48 hrs as  tolerated, discontinue with any problems, return rolled bandages next session. Wash and wear schedules confirmed.     Self-care/home management techniques to improve functional performance for 10  minutes, including:    Pt was educated in:  -potential compression needs including knee or thigh high stockings and Velcro wraps.  -size and compression class (20-30 mm hg), AM/PM needs, wear schedule.  -wash and management of products.  -skin integrity and circulation self assessment.  -consideration for Edema Wear as temporary compression until securing garment.  -consideration for Bioflect leggings/capris, compressive biker shorts, or Edema wear to support size/shape of proximal LE's.   -insurance coverage for compression is per individual insurance plan and many payers do not cover. To determine coverage, Rx is required from physician and brought to DME store. Local vendor list provided.  -commitment to attendance as well as securing compression garments is critical to lymphedema management. Outpatient therapy is a temporary step to long term home management.      Patient Education and Home Exercises      Education provided:   - Progress towards goals          Assessment     Initiated bandaging on BLE. Per [facility], pt does not require prior authorization and they will contact the pt to be fitted for compression.     Pt would continue to benefit from skilled OT.      Armand is progressing well towards his goals and there are no updates to goals at this time. Pt prognosis is Good.     Pt will continue to benefit from skilled outpatient occupational therapy to address the deficits listed in the problem list on initial evaluation provide pt/family education and to maximize pt's level of independence in the home and community environment.     Pt's spiritual, cultural and educational needs considered and pt agreeable to plan of care and goals.    Anticipated barriers to occupational therapy: none    Goals:    Short Term Goals: (3  weeks)  Goals: Progressing / MET   1. Patient will demonstrate 100% knowledge of lymphedema precautions and signs of infection to allow for reduced lymphedema risk, infection risk, and/or exacerbation of condition.  NOT MET   2. Patient will tolerate daily activities wearing multilayered bandaging to allow for lymphatic drainage support, skin elasticity, and reduction in shape and size of limb.  NOT MET   3. Patient and/or caregiver will order/obtain appropriate compression garments to maintain lymphatic and venous support.  NOT MET   4. Patient will show decreased total girth measurement in BLE by up to 3 cm to allow for lower extremity symmetry, shoe and clothing choice, and ability to apply needed compression. NOT MET      Long Term Goals: (6 weeks)  Goals: Progressing / MET   1. Patient and/or caregiver to shayne/doff compression garment independently and with daily compliance to assist in lymphedema management, skin elasticity, and tissue density NOT MET   2. Patient and/or caregiver will be independent in use of pneumatic compression pump or self manual lymphatic drainage techniques to areas within reach to enhance lymphatic drainage and skin condition.  NOT MET   3. Patient to be independent and compliant with home exercise program to allow for increased function in affected limb. NOT MET   4. Patient will show decreased total girth measurement in BLE by up to 5 cm  to allow for lower extremity symmetry, shoe and clothing choice, and ability to apply needed compression daily. NOT MET             PLAN     Updates/Grading for next session: none    Carleen Osei, OT, CLWT

## 2024-08-20 ENCOUNTER — TELEPHONE (OUTPATIENT)
Dept: FAMILY MEDICINE | Facility: CLINIC | Age: 74
End: 2024-08-20
Payer: MEDICARE

## 2024-08-20 NOTE — TELEPHONE ENCOUNTER
----- Message from Lidya Jacinto sent at 8/20/2024  1:46 PM CDT -----  Regarding: patient call back  Type: Patient Call Back    Who called: Antonina Petersen     What is the request in detail: asked for a copy of medicine     Can the clinic reply by [facility]? no    Would the patient rather a call back or a response via [facility]?     Best call back number: [phone]

## 2024-08-21 ENCOUNTER — TELEPHONE (OUTPATIENT)
Dept: FAMILY MEDICINE | Facility: CLINIC | Age: 74
End: 2024-08-21
Payer: MEDICARE

## 2024-08-21 ENCOUNTER — CLINICAL SUPPORT (OUTPATIENT)
Dept: REHABILITATION | Facility: HOSPITAL | Age: 74
End: 2024-08-21
Payer: MEDICARE

## 2024-08-21 DIAGNOSIS — I89.0 LYMPHEDEMA: Primary | ICD-10-CM

## 2024-08-21 PROCEDURE — 97140 MANUAL THERAPY 1/> REGIONS: CPT

## 2024-08-21 PROCEDURE — 97016 VASOPNEUMATIC DEVICE THERAPY: CPT

## 2024-08-21 NOTE — PROGRESS NOTES
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    Lymphedema Yes     Physician: Farooq Huerta MD    Physician Orders: Eval and Treat  Medical Diagnosis: I89.0 (ICD-10-CM) - [id number] (ICD-10-CM) - Venous hypertension of both lower extremities  Evaluation Date: 8/12/2024  Insurance Authorization Period Expiration: 8/5/2025  Plan of Care Certification Period: 11/3/2024  Visit # / Visits authorized: 2 / 20  FOTO: see media, 1 / 3     Precautions:  Standard    Time In: 12:30  Time Out: 1:30  Total Billable Time: 45 minutes (fire drill)    SUBJECTIVE     Pt reports: Appt Friday at [facility] to be fitted for Velcro wraps.   Armand reported wearing compression outside of therapy visits: No  He was compliant with home exercise program given last session: N/A  Response to previous treatment:no change  Functional change: none    Pain: 0/10        OBJECTIVE     Pt arrived out of compression, in NAD.    Compression garment status: not purchased  Compression Rx status: received from MD, sent to [facility]  Pneumatic pump status: not referred    Objective Measures updated at progress report unless specified.    Treatment     Armand received the treatments listed below:       Manual therapy techniques were applied for 10 minutes, including:      SEQUENTIAL COMPRESSION PUMP:   Full leg sleeve applied to BLE  Airos 6 with distal pressures starting at 45mmHg entire LE   -Simultaneous with education    MULTILAYERED BANDAGING:    Issued supplies and bandaged BLE   Use of Cavilon moisturizer for dry skin  Cotton stockinet: size 9  Cellona padding from dorsum of foot to knee,   2-6cm 2-8cm and 2-10cm Rosidal K rolls foot to distal knee  Elastomull to toes    Pt to leave intact 24-48 hrs as tolerated, discontinue with any problems, return rolled bandages next  session. Wash and wear schedules confirmed.           Patient Education and Home Exercises      Education provided:   - Progress towards goals          Assessment     Velcro wrap fitting this Friday! Anticipate short rehab course.     Pt would continue to benefit from skilled OT.      Armand is progressing well towards his goals and there are no updates to goals at this time. Pt prognosis is Good.     Pt will continue to benefit from skilled outpatient occupational therapy to address the deficits listed in the problem list on initial evaluation provide pt/family education and to maximize pt's level of independence in the home and community environment.     Pt's spiritual, cultural and educational needs considered and pt agreeable to plan of care and goals.    Anticipated barriers to occupational therapy: none    Goals:    Short Term Goals: (3 weeks)  Goals: Progressing / MET   1. Patient will demonstrate 100% knowledge of lymphedema precautions and signs of infection to allow for reduced lymphedema risk, infection risk, and/or exacerbation of condition.  NOT MET   2. Patient will tolerate daily activities wearing multilayered bandaging to allow for lymphatic drainage support, skin elasticity, and reduction in shape and size of limb.  NOT MET   3. Patient and/or caregiver will order/obtain appropriate compression garments to maintain lymphatic and venous support.  NOT MET   4. Patient will show decreased total girth measurement in BLE by up to 3 cm to allow for lower extremity symmetry, shoe and clothing choice, and ability to apply needed compression. NOT MET      Long Term Goals: (6 weeks)  Goals: Progressing / MET   1. Patient and/or caregiver to shayne/doff compression garment independently and with daily compliance to assist in lymphedema management, skin elasticity, and tissue density NOT MET   2. Patient and/or caregiver will be independent in use of pneumatic compression pump or self manual lymphatic drainage  techniques to areas within reach to enhance lymphatic drainage and skin condition.  NOT MET   3. Patient to be independent and compliant with home exercise program to allow for increased function in affected limb. NOT MET   4. Patient will show decreased total girth measurement in BLE by up to 5 cm  to allow for lower extremity symmetry, shoe and clothing choice, and ability to apply needed compression daily. NOT MET             PLAN     Updates/Grading for next session: none    Carleen Osei, OT, CLWT

## 2024-08-21 NOTE — TELEPHONE ENCOUNTER
----- Message from Jim Marvin sent at 8/20/2024  4:47 PM CDT -----  Name of Who is Calling:TEMI JOAQUIN [[id number]]                   What is the request in detail:pt missed your call and wants a call back please assist                    Can the clinic reply by [facility]: No                   What Number to Call Back [facility] [place] [facility]:[phone]

## 2024-08-22 ENCOUNTER — TELEPHONE (OUTPATIENT)
Dept: FAMILY MEDICINE | Facility: CLINIC | Age: 74
End: 2024-08-22
Payer: MEDICARE

## 2024-08-22 NOTE — TELEPHONE ENCOUNTER
----- Message from Pola Blair sent at 8/22/2024 10:22 AM CDT -----  Regarding: daughter  Type: Patient Call Back    Who called:daughter    What is the request in detail:was calling to get the email of the pt medication list, spoke to the nurse yesterday and stated she was going to doit. Haven't received it as of yet           Can the clinic reply by [facility]?no    Would the patient rather a call back or a response via [facility]? callback    Best call back number:[phone]    Additional Information:Email [email or web address]

## 2024-08-26 ENCOUNTER — CLINICAL SUPPORT (OUTPATIENT)
Dept: REHABILITATION | Facility: HOSPITAL | Age: 74
End: 2024-08-26
Payer: MEDICARE

## 2024-08-26 DIAGNOSIS — I89.0 LYMPHEDEMA: Primary | ICD-10-CM

## 2024-08-26 PROCEDURE — 97016 VASOPNEUMATIC DEVICE THERAPY: CPT

## 2024-08-26 PROCEDURE — 97140 MANUAL THERAPY 1/> REGIONS: CPT

## 2024-08-26 NOTE — PROGRESS NOTES
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    Lymphedema Yes       Physician: Farooq Huerta MD    Physician Orders: Eval and Treat  Medical Diagnosis: I89.0 (ICD-10-CM) - [id number] (ICD-10-CM) - Venous hypertension of both lower extremities  Evaluation Date: 8/12/2024  Insurance Authorization Period Expiration: 8/5/2025  Plan of Care Certification Period: 11/3/2024  Visit # / Visits authorized: 3 / 20  FOTO: see media, 1 / 3     Precautions:  Standard    Time In: 2:20  Time Out: 3:13  Total Billable Time: 53 minutes     SUBJECTIVE     Pt reports: Velcro wraps ordered at [facility]. Should arrive in 1-2 weeks. Noticed fluid leaking from his leg without injury. Suspects a blister popped.   Armand reported wearing compression outside of therapy visits: No  He was compliant with home exercise program given last session: N/A  Response to previous treatment:no change  Functional change: none    Pain: 0/10        OBJECTIVE     Pt arrived out of compression, in NAD.    Compression garment status: fitted at [facility]  Compression Rx status: received from MD, sent to [facility]  Pneumatic pump status: not referred            Small skin abrasion on anterior aspect of lower LLE  Cleansed with wound cleanser, patted dry gauze 4x4, bandaid applied.    Treatment     Armand received the treatments listed below:       Manual therapy techniques were applied for 45 minutes, including:    MANUAL LYMPHATIC DRAINAGE (MLD):    While supine with LEs elevated stimulation at terminus, along GI region, B inguinal regions, drainage of entire L LE rita lower leg, ankle, and foot with return proximally.    SEQUENTIAL COMPRESSION PUMP:   Full leg sleeve applied to RLE  Airos 6 with distal pressures starting at 45mmHg entire LE   -Simultaneous with mld    MULTILAYERED  BANDAGING:    Issued supplies and bandaged BLE   Use of Cavilon moisturizer for dry skin  Cotton stockinet: size 9  Cellona padding from dorsum of foot to knee,   2-6cm 2-8cm and 2-10cm Rosidal K rolls foot to distal knee  Elastomull to toes    Pt to leave intact 24-48 hrs as tolerated, discontinue with any problems, return rolled bandages next session. Wash and wear schedules confirmed.           Patient Education and Home Exercises      Education provided:   - Progress towards goals          Assessment     Anticipate discharge once Velcro wraps are delivered.     Pt would continue to benefit from skilled OT.      Armand is progressing well towards his goals and there are no updates to goals at this time. Pt prognosis is Good.     Pt will continue to benefit from skilled outpatient occupational therapy to address the deficits listed in the problem list on initial evaluation provide pt/family education and to maximize pt's level of independence in the home and community environment.     Pt's spiritual, cultural and educational needs considered and pt agreeable to plan of care and goals.    Anticipated barriers to occupational therapy: none    Goals:    Short Term Goals: (3 weeks)  Goals: Progressing / MET   1. Patient will demonstrate 100% knowledge of lymphedema precautions and signs of infection to allow for reduced lymphedema risk, infection risk, and/or exacerbation of condition.  NOT MET   2. Patient will tolerate daily activities wearing multilayered bandaging to allow for lymphatic drainage support, skin elasticity, and reduction in shape and size of limb.  NOT MET   3. Patient and/or caregiver will order/obtain appropriate compression garments to maintain lymphatic and venous support.  NOT MET   4. Patient will show decreased total girth measurement in BLE by up to 3 cm to allow for lower extremity symmetry, shoe and clothing choice, and ability to apply needed compression. NOT MET      Long Term Goals: (6  weeks)  Goals: Progressing / MET   1. Patient and/or caregiver to shayne/doff compression garment independently and with daily compliance to assist in lymphedema management, skin elasticity, and tissue density NOT MET   2. Patient and/or caregiver will be independent in use of pneumatic compression pump or self manual lymphatic drainage techniques to areas within reach to enhance lymphatic drainage and skin condition.  NOT MET   3. Patient to be independent and compliant with home exercise program to allow for increased function in affected limb. NOT MET   4. Patient will show decreased total girth measurement in BLE by up to 5 cm  to allow for lower extremity symmetry, shoe and clothing choice, and ability to apply needed compression daily. NOT MET             PLAN     Updates/Grading for next session: none    Carleen Osei, OT, CLWT

## 2024-08-28 ENCOUNTER — CLINICAL SUPPORT (OUTPATIENT)
Dept: REHABILITATION | Facility: HOSPITAL | Age: 74
End: 2024-08-28
Payer: MEDICARE

## 2024-08-28 DIAGNOSIS — I89.0 LYMPHEDEMA: Primary | ICD-10-CM

## 2024-08-28 PROCEDURE — 97016 VASOPNEUMATIC DEVICE THERAPY: CPT

## 2024-08-28 PROCEDURE — 97535 SELF CARE MNGMENT TRAINING: CPT

## 2024-08-28 PROCEDURE — 97140 MANUAL THERAPY 1/> REGIONS: CPT

## 2024-08-28 NOTE — PROGRESS NOTES
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    Lymphedema Yes       Physician: Farooq Huerta MD    Physician Orders: Eval and Treat  Medical Diagnosis: I89.0 (ICD-10-CM) - [id number] (ICD-10-CM) - Venous hypertension of both lower extremities  Evaluation Date: 8/12/2024  Insurance Authorization Period Expiration: 8/5/2025  Plan of Care Certification Period: 11/3/2024  Visit # / Visits authorized: 4 / 20  FOTO: see media, 1 / 3     Precautions:  Standard    Time In: 1:42  Time Out: 2:27  Total Billable Time: 45 minutes     SUBJECTIVE     Pt reports: No concerns. Velcro wraps have not arrived yet.   Armand reported wearing compression outside of therapy visits: No  He was compliant with home exercise program given last session: N/A  Response to previous treatment:no change  Functional change: none    Pain: 0/10        OBJECTIVE     Pt arrived out of compression, in NAD.    Compression garment status: fitted at \[facility]\""  Compression Rx status: received from MD, sent to [facility]  Pneumatic pump status: not referred    Skin abrasion on LLE covered with bandaid.     Treatment     Armand received the treatments listed below:       Manual therapy techniques were applied for 10 minutes, including:      SEQUENTIAL COMPRESSION PUMP:   Full leg sleeve applied to RLE  Airos 6 with distal pressures starting at 45mmHg entire LE   -Simultaneous with education     MULTILAYERED BANDAGING:    Issued supplies and bandaged BLE   Use of Cavilon moisturizer for dry skin  Cotton stockinet: size 9  Cellona padding from dorsum of foot to knee,   2-6cm 2-8cm and 2-10cm Rosidal K rolls foot to distal knee  Elastomull to toes    Pt to leave intact 24-48 hrs as tolerated, discontinue with any problems, return rolled bandages next session. Wash and wear schedules  confirmed.     Self-care/home management techniques were completed for 10 minutes, including:    -no significant edema reductions, discharge once Velcro wraps arrive.  -home management plan: wearing schedule, donning/doffing techniques.       Patient Education and Home Exercises      Education provided:   - Progress towards goals          Assessment     No change this session. Discharge once Velcro wraps arrive.     Pt would continue to benefit from skilled OT.      Armand is progressing well towards his goals and there are no updates to goals at this time. Pt prognosis is Good.     Pt will continue to benefit from skilled outpatient occupational therapy to address the deficits listed in the problem list on initial evaluation provide pt/family education and to maximize pt's level of independence in the home and community environment.     Pt's spiritual, cultural and educational needs considered and pt agreeable to plan of care and goals.    Anticipated barriers to occupational therapy: none    Goals:    Short Term Goals: (3 weeks)  Goals: Progressing / MET   1. Patient will demonstrate 100% knowledge of lymphedema precautions and signs of infection to allow for reduced lymphedema risk, infection risk, and/or exacerbation of condition.  NOT MET   2. Patient will tolerate daily activities wearing multilayered bandaging to allow for lymphatic drainage support, skin elasticity, and reduction in shape and size of limb.  NOT MET   3. Patient and/or caregiver will order/obtain appropriate compression garments to maintain lymphatic and venous support.  NOT MET   4. Patient will show decreased total girth measurement in BLE by up to 3 cm to allow for lower extremity symmetry, shoe and clothing choice, and ability to apply needed compression. NOT MET      Long Term Goals: (6 weeks)  Goals: Progressing / MET   1. Patient and/or caregiver to shayne/doff compression garment independently and with daily compliance to assist in  lymphedema management, skin elasticity, and tissue density NOT MET   2. Patient and/or caregiver will be independent in use of pneumatic compression pump or self manual lymphatic drainage techniques to areas within reach to enhance lymphatic drainage and skin condition.  NOT MET   3. Patient to be independent and compliant with home exercise program to allow for increased function in affected limb. NOT MET   4. Patient will show decreased total girth measurement in BLE by up to 5 cm  to allow for lower extremity symmetry, shoe and clothing choice, and ability to apply needed compression daily. NOT MET             PLAN     Updates/Grading for next session: none    Carleen Osei, OT, CLWT

## 2024-09-03 ENCOUNTER — CLINICAL SUPPORT (OUTPATIENT)
Dept: REHABILITATION | Facility: HOSPITAL | Age: 74
End: 2024-09-03
Payer: MEDICARE

## 2024-09-03 DIAGNOSIS — I89.0 LYMPHEDEMA: Primary | ICD-10-CM

## 2024-09-03 PROCEDURE — 97140 MANUAL THERAPY 1/> REGIONS: CPT

## 2024-09-03 PROCEDURE — 97016 VASOPNEUMATIC DEVICE THERAPY: CPT

## 2024-09-03 NOTE — PROGRESS NOTES
[facility]  Occupational Therapy Treatment Note  Lymphedema    Date: 9/3/2024  Name: Armand Painting  Clinic Number: [id number]    Therapy Diagnosis:   Encounter Diagnosis   Name Primary?    Lymphedema Yes         Physician: Farooq Huerta MD    Physician Orders: Eval and Treat  Medical Diagnosis: I89.0 (ICD-10-CM) - [id number] (ICD-10-CM) - Venous hypertension of both lower extremities  Evaluation Date: 8/12/2024  Insurance Authorization Period Expiration: 8/5/2025  Plan of Care Certification Period: 11/3/2024  Visit # / Visits authorized: 5 / 20  FOTO: see media, 1 / 3     Precautions:  Standard    Time In: 2:05  Time Out: 2:53  Total Billable Time: 48 minutes     SUBJECTIVE     Pt reports: No concerns. Velcro wraps have not arrived yet.   Armand reported wearing compression outside of therapy visits: No  He was compliant with home exercise program given last session: N/A  Response to previous treatment:no change  Functional change: none    Pain: 0/10        OBJECTIVE     Pt arrived out of compression, in NAD.    Compression garment status: fitted at [facility]  Compression Rx status: received from MD, sent to [facility]  Pneumatic pump status: not referred    Skin abrasion on LLE covered with bandaid.     Treatment     Armand received the treatments listed below:       Manual therapy techniques were applied for 35 minutes, including:    MANUAL LYMPHATIC DRAINAGE (MLD):    While supine with LEs elevated stimulation at terminus, along GI region, B inguinal regions, drainage of entire L LE rita lower leg, ankle, and foot with return proximally.      SEQUENTIAL COMPRESSION PUMP:   Full leg sleeve applied to RLE  Airos 6 with distal pressures starting at 45mmHg entire LE   -Simultaneous with mld    MULTILAYERED BANDAGING:    Issued supplies and bandaged BLE   Use of Cavilon moisturizer for dry skin  Cotton stockinet: size 9  Cellona padding from dorsum of foot to knee,   2-6cm 2-8cm and  2-10cm Rosidal K rolls foot to distal knee  Elastomull to toes    Pt to leave intact 24-48 hrs as tolerated, discontinue with any problems, return rolled bandages next session. Wash and wear schedules confirmed.         Patient Education and Home Exercises      Education provided:   - Progress towards goals          Assessment     No change this session. Discharge once Velcro wraps arrive.     Pt would continue to benefit from skilled OT.      Armand is progressing well towards his goals and there are no updates to goals at this time. Pt prognosis is Good.     Pt will continue to benefit from skilled outpatient occupational therapy to address the deficits listed in the problem list on initial evaluation provide pt/family education and to maximize pt's level of independence in the home and community environment.     Pt's spiritual, cultural and educational needs considered and pt agreeable to plan of care and goals.    Anticipated barriers to occupational therapy: none    Goals:    Short Term Goals: (3 weeks)  Goals: Progressing / MET   1. Patient will demonstrate 100% knowledge of lymphedema precautions and signs of infection to allow for reduced lymphedema risk, infection risk, and/or exacerbation of condition.  NOT MET   2. Patient will tolerate daily activities wearing multilayered bandaging to allow for lymphatic drainage support, skin elasticity, and reduction in shape and size of limb.  NOT MET   3. Patient and/or caregiver will order/obtain appropriate compression garments to maintain lymphatic and venous support.  NOT MET   4. Patient will show decreased total girth measurement in BLE by up to 3 cm to allow for lower extremity symmetry, shoe and clothing choice, and ability to apply needed compression. NOT MET      Long Term Goals: (6 weeks)  Goals: Progressing / MET   1. Patient and/or caregiver to shayne/doff compression garment independently and with daily compliance to assist in lymphedema management, skin  elasticity, and tissue density NOT MET   2. Patient and/or caregiver will be independent in use of pneumatic compression pump or self manual lymphatic drainage techniques to areas within reach to enhance lymphatic drainage and skin condition.  NOT MET   3. Patient to be independent and compliant with home exercise program to allow for increased function in affected limb. NOT MET   4. Patient will show decreased total girth measurement in BLE by up to 5 cm  to allow for lower extremity symmetry, shoe and clothing choice, and ability to apply needed compression daily. NOT MET             PLAN     Updates/Grading for next session: none    Carleen Osei, OT, CLWT

## 2024-09-04 DIAGNOSIS — E66.01 MORBID OBESITY WITH BMI OF 40.0-44.9, ADULT: Chronic | ICD-10-CM

## 2024-09-04 NOTE — TELEPHONE ENCOUNTER
Care Due:                  Date            Visit Type   Department     Provider  --------------------------------------------------------------------------------                                [facility]                              PRIMARY      MED/ INTERNAL  Last Visit: 08-      CARE ([facility])   MED/ PAM Vargas                              [facility]                              PRIMARY      MED/ INTERNAL  Next Visit: 02-      CARE ([facility])   MED/ PAM Vargas                                                            Last  Test          Frequency    Reason                     Performed    Due Date  --------------------------------------------------------------------------------    HBA1C.......  6 months...  metFORMIN................  05- 11-    [facility] Embedded Care Due Messages. Reference number: [id number].   9/04/2024 10:12:53 AM CDT

## 2024-09-04 NOTE — TELEPHONE ENCOUNTER
----- Message from Melony Muñoz sent at 9/3/2024  9:38 AM CDT -----  Regarding: daughter    Type: Patient Call Back     Who called:daughter     What is the request in detail:pt daughter called stating her fathers naltrexone-bupropion (CONTRAVE) 8-90 mg TbSR needs to be send to a different pharmacy  [facility] [address] [place] [address]  [phone]   fax [phone]- please fax the prescription     Can the clinic reply by [facility]? No     Would the patient rather a call back or a response via [facility]? Call back     Best call back number: [phone] Gwendolyn, pt daughter        Additional Information:     Thank you.

## 2024-09-05 ENCOUNTER — CLINICAL SUPPORT (OUTPATIENT)
Dept: REHABILITATION | Facility: HOSPITAL | Age: 74
End: 2024-09-05
Payer: MEDICARE

## 2024-09-05 DIAGNOSIS — I89.0 LYMPHEDEMA: Primary | ICD-10-CM

## 2024-09-05 PROCEDURE — 97140 MANUAL THERAPY 1/> REGIONS: CPT

## 2024-09-05 PROCEDURE — 97535 SELF CARE MNGMENT TRAINING: CPT

## 2024-09-05 PROCEDURE — 97016 VASOPNEUMATIC DEVICE THERAPY: CPT

## 2024-09-05 NOTE — PROGRESS NOTES
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    Lymphedema Yes         Physician: Farooq Huerta MD    Physician Orders: Eval and Treat  Medical Diagnosis: I89.0 (ICD-10-CM) - [id number] (ICD-10-CM) - Venous hypertension of both lower extremities  Evaluation Date: 8/12/2024  Insurance Authorization Period Expiration: 8/5/2025  Plan of Care Certification Period: 11/3/2024  Visit # / Visits authorized: 6 / 20  FOTO: see media, 1 / 3     Precautions:  Standard    Time In: 1:30  Time Out: 2:25  Total Billable Time: 55 minutes     SUBJECTIVE     Pt reports: No changes. Velcro wraps not yet delivered.   Armand reported wearing compression outside of therapy visits: No  He was compliant with home exercise program given last session: N/A  Response to previous treatment:no change  Functional change: none    Pain: 0/10        OBJECTIVE     Pt arrived out of compression, in NAD.    Compression garment status: fitted at [facility]  Compression Rx status: received from MD, sent [facility]  Pneumatic pump status: not referred    Skin abrasion on LLE covered with bandaid.     LE Girth Measurements: taken in supine  [facility] RIGHT LE  8/12/24 LEFT LE  8/12/24   SBP - -   10 below SBP 42.0 cm 46.0 cm   20 below SBP 46.0 cm 46.0 cm   30 below SBP 39.5 cm 40.5 cm   35 below SBP 36.0 cm 34.5 cm   Ankle 34.0 cm 34.5 cm   Forefoot 28.0 cm 29.5 cm       Treatment     Armand received the treatments listed below:       Manual therapy techniques were applied for 10 minutes, including:    SEQUENTIAL COMPRESSION PUMP:   Full leg sleeve applied to RLE  Airos 6 with distal pressures starting at 45mmHg entire LE   -Simultaneous with mld    MULTILAYERED BANDAGING:    Issued supplies and bandaged BLE   Use of Cavilon moisturizer for dry skin  Cotton stockinet: size  9  Cellona padding from dorsum of foot to knee,   2-6cm 2-8cm and 2-10cm Rosidal K rolls foot to distal knee  Elastomull to toes    Pt to leave intact 24-48 hrs as tolerated, discontinue with any problems, return rolled bandages next session. Wash and wear schedules confirmed.     Self-care/home management techniques were completed for 15 minutes including:    Educated pt on:  -plan to hold therapy until Velcro wraps arrive.  -garments are required to maintain reductions achieved in therapy.  -pt to RTC once Velcro wraps arrive for reassessment.        Patient Education and Home Exercises      Education provided:   - Progress towards goals          Assessment     No change this session. Discharge once Velcro wraps arrive.     Pt would continue to benefit from skilled OT.      Armand is progressing well towards his goals and there are no updates to goals at this time. Pt prognosis is Good.     Pt will continue to benefit from skilled outpatient occupational therapy to address the deficits listed in the problem list on initial evaluation provide pt/family education and to maximize pt's level of independence in the home and community environment.     Pt's spiritual, cultural and educational needs considered and pt agreeable to plan of care and goals.    Anticipated barriers to occupational therapy: none    Goals:    Short Term Goals: (3 weeks)  Goals: Progressing / MET   1. Patient will demonstrate 100% knowledge of lymphedema precautions and signs of infection to allow for reduced lymphedema risk, infection risk, and/or exacerbation of condition.  NOT MET   2. Patient will tolerate daily activities wearing multilayered bandaging to allow for lymphatic drainage support, skin elasticity, and reduction in shape and size of limb.  NOT MET   3. Patient and/or caregiver will order/obtain appropriate compression garments to maintain lymphatic and venous support.  NOT MET   4. Patient will show decreased total girth  measurement in BLE by up to 3 cm to allow for lower extremity symmetry, shoe and clothing choice, and ability to apply needed compression. NOT MET      Long Term Goals: (6 weeks)  Goals: Progressing / MET   1. Patient and/or caregiver to shayne/doff compression garment independently and with daily compliance to assist in lymphedema management, skin elasticity, and tissue density NOT MET   2. Patient and/or caregiver will be independent in use of pneumatic compression pump or self manual lymphatic drainage techniques to areas within reach to enhance lymphatic drainage and skin condition.  NOT MET   3. Patient to be independent and compliant with home exercise program to allow for increased function in affected limb. NOT MET   4. Patient will show decreased total girth measurement in BLE by up to 5 cm  to allow for lower extremity symmetry, shoe and clothing choice, and ability to apply needed compression daily. NOT MET             PLAN     Updates/Grading for next session: none    Carleen Osei, OT, CLWT

## 2024-10-06 DIAGNOSIS — E78.2 MIXED HYPERLIPIDEMIA: ICD-10-CM

## 2024-10-06 DIAGNOSIS — R73.03 PREDIABETES: Chronic | ICD-10-CM

## 2024-10-06 RX ORDER — METFORMIN HYDROCHLORIDE 500 MG/1
500 TABLET, EXTENDED RELEASE ORAL DAILY
Qty: 90 TABLET | Refills: 0 | Status: SHIPPED | OUTPATIENT
Start: 2024-10-06

## 2024-10-06 RX ORDER — PRAVASTATIN SODIUM 20 MG/1
20 TABLET ORAL
Qty: 90 TABLET | Refills: 1 | OUTPATIENT
Start: 2024-10-06

## 2024-10-06 NOTE — TELEPHONE ENCOUNTER
No care due was identified.  [facility] Embedded Care Due Messages. Reference number: [id number].   10/06/2024 5:52:37 PM CDT

## 2024-10-06 NOTE — TELEPHONE ENCOUNTER
No care due was identified.  [facility] Embedded Care Due Messages. Reference number: [id number].   10/06/2024 5:52:06 PM CDT

## 2024-10-07 ENCOUNTER — TELEPHONE (OUTPATIENT)
Dept: ENDOSCOPY | Facility: HOSPITAL | Age: 74
End: 2024-10-07

## 2024-10-07 ENCOUNTER — CLINICAL SUPPORT (OUTPATIENT)
Dept: ENDOSCOPY | Facility: HOSPITAL | Age: 74
End: 2024-10-07
Attending: INTERNAL MEDICINE
Payer: MEDICARE

## 2024-10-07 VITALS — HEIGHT: 68 IN | BODY MASS INDEX: 43.35 KG/M2 | WEIGHT: 286 LBS

## 2024-10-07 DIAGNOSIS — D12.6 TUBULAR ADENOMA OF COLON: ICD-10-CM

## 2024-10-07 DIAGNOSIS — Z12.11 COLON CANCER SCREENING: Primary | ICD-10-CM

## 2024-10-07 NOTE — TELEPHONE ENCOUNTER
Spoke to pt to schedule procedure(s) Colonoscopy       Physician to perform procedure(s)  First available  Date of Procedure (s) 12/12/24  Arrival Time 8:00 AM  Time of Procedure(s) 9:00 AM   Location of Procedure(s) [address]   Type of Rx Prep sent to patient: PEG  Instructions provided to patient via Postal Mail    Patient was informed on the following information and verbalized understanding. Screening questionnaire reviewed with patient and complete. If procedure requires anesthesia, a responsible adult needs to be present to accompany the patient home, patient cannot drive after receiving anesthesia. Appointment details are tentative, especially check-in time. Patient will receive a prep-op call 7 days prior to confirm check-in time for procedure. If applicable the patient should contact their pharmacy to verify Rx for procedure prep is ready for pick-up. Patient was advised to call the scheduling department at [phone] if pharmacy states no Rx is available. Patient was advised to call the endoscopy scheduling department if any questions or concerns arise.       Endoscopy Scheduling Department  Pt notified of the following:  Scoping physician will be determined day of Endoscopy procedure.   Arrival time may change (morning or afternoon adjustment) because Pt will be worked into the physician's scoping schedule. As a result, this may cause a delay in the procedure start time. Pt verbalized understanding.           Colonoscopy Procedure Prep Instructions    Date of procedure: 12/12/24 Arrive at: 8:00 AM    Location of Department:   [facility] [address] [place] [address]  Take the Elevators to 2nd Floor Endoscopy Procedural Area    As soon as possible:   your prep from pharmacy and over the counter DULCOLAX LAXATIVE TABLETS            On the day before your procedure   What You CAN do:   You may have clear liquids ONLY-see below for list.     Liquids That Are OK  to Drink:   Water  Sports drinks (Gatorade, Power-Aid)  Coffee or tea (no cream or nondairy creamer)  Clear juices without pulp (apple, white grape)  Gelatin desserts (no fruit or toppings)  Clear soda (sprite, coke, ginger ale)  Chicken broth (until 12 midnight the night before procedure)    What You CANNOT do:   Do not EAT solid food, drink milk or anything   colored red.  Do not drink alcohol.  Do not take oral medications within 1 hour of starting   each dose of prep.  No gum chewing or candy morning of procedure                       Note:   (Please disregard the insert instructions from pharmacy).  PEG Bowel Prep is indicated for cleansing of the colon as a preparation for colonoscopy in adults.   Be sure to tell your doctor about all the medicines you take, including prescription and non-prescription medicines, vitamins, and herbal supplements. PEG Bowel Prep may affect how other medicines work.  Medication taken by mouth may not be absorbed properly when taken within 1 hour before the start of each dose of PEG Bowel Prep.    It is not uncommon to experience some abdominal cramping, nausea and/or vomiting when taking the prep. If you have nausea and/or vomiting while taking the prep, stop drinking for 20 to 30 minutes then continue.      How to take prep:    PEG Bowel Prep is a (2-day) prep.    One (1) bottle of prep are required for a complete preparation for colonoscopy. Dilute the solution concentrate as directed prior to use. You must drink water with each dose of prep, and additional water after each dose.    DOSE 1--Day Before Colonoscopy 12/11/24     Drink at least 6 to 8 glasses of clear liquids from time you wake up until you begin your prep and then continue until bedtime to avoid dehydration.     12:00 pm (NOON) Mix your entire container of prep with lukewarm water and refrigerate. Take four (4) Dulcolax (Bisacodyl) tablets with at least 8 ounces or more of clear liquids.       6:00 pm:    You must  complete Steps 1 and 2 below before going to bed:    Step 1-Drink half the liquid in the container within one (1) hour.   Step 2-Refrigerate the remaining half of the liquid for dose 2. See below when to begin this step.                       IMPORTANT: If you experience preparation-related symptoms (for example, nausea, bloating, or cramping), stop, or slow the rate of drinking the additional water until your symptoms decrease.    DOSE 2--Day of the Colonoscopy 12/12/24 at 2-3 AM.    For this dose, repeat Step 1 shown above using the remaining half of the liquid prep.   You may continue drinking water/clear liquids until   4 hours before your colonoscopy or as directed by the scheduling nurse  5:00 AM.      For information about your procedure, two (2) things to view prior to colonoscopy:  Please watch this informational video. It is important to watch this animated consent video prior to your arrival. If you haven't watched the video prior to arriving, you are required to watch it during admission which can causes delays.    Options for viewing:   Using a keyboard:  press and hold the control tab (Ctrl) and left mouse click to follow links.           Colonoscopy Instructional Video                                                                                   OR    Type link address into your web browser's address bar:  [email or web address]/[email or web address]      Educational Booklet with pictures:      Colonoscopy Prep - Liquid      Comments:        IMPORTANT INFORMATION TO KNOW BEFORE YOUR PROCEDURE    [facility] 2nd Floor       When you arrive:  Enter at the rear of the building through the emergency department screening station or the Outpatient Registration door, then continue to endoscopy department on the 2nd floor.             If your procedure requires the administration of anesthesia, it is necessary for a responsible adult to drive you home. (Medical Transportation, Uber,  Lyft, Taxi, etc. may ONLY be used if a responsible adult is present to accompany you home.  The responsible adult CAN'T be the  of the service).      person must be available to return to pick you up within 15 minutes of being notified of discharge.       Please bring a picture ID, insurance card, & copayment      Take Medications as directed below:        If you begin taking any blood thinning medications, injectable weight loss/diabetes medications (other than insulin) , or Adipex (Phentermine) please contact the endoscopy scheduling department listed below as soon as possible.    If you are diabetic see the attached instruction sheet regarding your medication.     If you take HEART, BLOOD PRESSURE, SEIZURE, PAIN, LUNG (including inhalers/nebulizers), ANTI-REJECTION (transplant patients), or PSYCHIATRIC medications, please take at your regular times with a sip of water or as directed by the scheduling nurse.     Important contact information:    Endoscopy [phone] Hours of operation Monday-Friday 8:00-4:30pm.    Questions about insurance or financial obligations call [phone] or [phone].    If you have questions regarding the prep or need to reschedule, please call [phone]. After hours questions requiring immediate assistance, contact [facility] On-Call nurse line at [phone] or [phone].   NOTE:     On occasion, unforeseen circumstances may cause a delay in your procedure start time. We respect your time and appreciate your patience during these circumstances.      Comments:       If you are unsure about any of these instructions, call [facility] at [phone].                          Oral Medicine  Day of Prep  Day of Procedure           Glyburide    Do NOT take morning of procedure. If you         Glucotrol (Glipizide)  Do NOT take. take twice daily, take with dinner.         Amaryl (Glimepiride)                                Glucophage    Do  NOT take morning of procedure. Take         Glumetza  Take as  when you start eating again.          Fortamet (Metformin)  prescribed.                              Januvia (Sitaglipitin)    Do NOT take morning of procedure. Take         Nesina (Aloglipitin)    when you start eating again.          Onglyza (Saxaglipitin)  Take as              Tradjenta (Linaglipitin)  prescribed.                              Invokana (Canagliflozin)               Farxiga (Dapagliflozin)  Stop 2 days  Do NOT take morning of procedure. Take         Jardiance(Empagliflozin) before prep.  when you start eating again.                          Actos (Pioglitazone)  Take as  Do NOT take morning of procedure. Take           prescribed.  when you start eating again.                          Injectable & Combination Daily Dose  Weekly Dose           Medicine                Adlyxin (Lixisenatide)  If you take these For weekly dose, hold dose at least 8 days prior        Byetta (Exenatide)  medications daily to appointment. You may take the dose after your        Bydureon (Exenatide XL) on the day of your procedure.            Ozempic (Semaglutide)  appointment hold              Trulicity (Dulaglutide)  that dose until              Victoza (Liraglutide)  after your              Mounjaro (Tirzepatide)  procedure.              Suzan Zimmerman                It is important to monitor your blood sugar while doing the bowel preparation. On the day of your bowel   prep, when you are on a clear liquid diet, you may drink beverages with sugar as your source of glucose.   Be sure to mix the prep with water or sugar free liquid only. Below are instructions on how to adjust your   diabetic medications prior to your scheduled procedure. Call the healthcare provider who manages your   diabetes if you have questions.   Insulin for Type 1   Diabetes Mellitus Day of Prep                                         Day of procedure          Mony  If you use in the morning, take as prescribed.  If you take in the morning,         Lantus If you use in the evening, inject 70% of dose. inject 80% of dose. If you take        Levemir      in the evenings, inject usual         Toujeo      dose.           Tresiba                Semglee                                Afrezza Count carbs and adjust dose   Do NOT take morning of procedure.         Apidra accordingly. If not carb counting,  Take when you start eating again.         Humalog 100 take 25% of usual meal dose.             Humalog 200 May use correction dose every              Novolog 4 hours. Do NOT use once sugar-free             liquid prep is started.                             Insulin Pump Count carbs and adjust your   May use temp basal rate at 70 % starting          dose as needed. May use temp basal at midnight until after procedure.          rate at 70 % after sugar-free clear liquid             prep is started until midnight.                              Insulin for Type 2   Diabetes Mellitus Day of Prep                                         Day of procedure          Basaglar If you use in the morning, take as prescribed.  If you take in the morning,         Lantus If you use in the evening, inject 70% of dose. inject 80% of dose. If you take        Levemir      in the evenings, inject usual         Toujeo      dose.           Tresiba                                                Afrezza Inject 50 % of dose with clear liquid diet.   Do NOT take morning of         Apidra Do NOT use once sugar-free clear liquid prep procedure. Take when you         Fiasp is started. If you are using a correction scale,  start eating meals again.         Humalog 100 take dose every 4 hours as needed.             Humalog 200                Novolog                                NPH  Inject 50% of breakfast and dinner   Do NOT take morning of         doses with clear liquid diet.    procedure. Take usual dose               when you eat dinner.                         Regular  Inject 50% of breakfast dose and do NOT take Do NOT take morning of          dinner dose once sugar-free liquid prep has   procedure. Take usual dose          started. If using correction scale, make take dose when you eat dinner.          every 6 hours as needed.                              Novolog Mix 70/30 Inject 50% of breakfast dose. Inject 25%  Do NOT take breakfast dose.         Humolog Mix 75/25 of dinner dose.     Take usual dose when you eat         Humolog Mix 50/50      dinner.                           U500 Take 50% of AM or breakfast unit isai dose.  Do NOT take mornining of           Take 25% of lunch or dinner or PM unit isai dose.  procedure. Take when you               start eating again.                          V-Go  Continue to wear your V-Go device. Do NOT click  Coninue to wear your V-Go         once sugar-free clear liquid prep is started.   device. Resume clicks with               meals.

## 2024-10-07 NOTE — TELEPHONE ENCOUNTER
Refill Decision Note   [employer]  is requesting a refill authorization.  Brief Assessment and Rationale for Refill:  Approve     Medication Therapy Plan:         Comments:     Note composed:11:16 PM 10/06/2024

## 2024-10-07 NOTE — TELEPHONE ENCOUNTER
Refill Decision Note   [employer]  is requesting a refill authorization.  Brief Assessment and Rationale for Refill:  Quick Discontinue     Medication Therapy Plan:  The original prescription was discontinued on 3/13/2024 by Matti Ann MD.      Comments:     Note composed:11:16 PM 10/06/2024

## 2024-11-05 ENCOUNTER — TELEPHONE (OUTPATIENT)
Dept: VASCULAR SURGERY | Facility: CLINIC | Age: 74
End: 2024-11-05
Payer: MEDICARE

## 2024-11-05 NOTE — TELEPHONE ENCOUNTER
----- Message from Ritu sent at 11/5/2024  9:05 AM CST -----  Type:  Patient Returning Call    Who Called:  self     Who Left Message for Patient:  Aaliyah     Does the patient know what this is regarding?: no     Would the patient rather a call back or a response via [facility]?  call    Best Call Back Number: .[phone] (home)

## 2024-11-06 ENCOUNTER — TELEPHONE (OUTPATIENT)
Dept: VASCULAR SURGERY | Facility: CLINIC | Age: 74
End: 2024-11-06

## 2024-11-06 ENCOUNTER — HOSPITAL ENCOUNTER (OUTPATIENT)
Dept: CARDIOLOGY | Facility: HOSPITAL | Age: 74
Discharge: HOME | End: 2024-11-06
Attending: SURGERY
Payer: MEDICARE

## 2024-11-06 DIAGNOSIS — I87.303 VENOUS HYPERTENSION OF BOTH LOWER EXTREMITIES: ICD-10-CM

## 2024-11-06 DIAGNOSIS — I89.0 LYMPHEDEMA: ICD-10-CM

## 2024-11-06 LAB
LEFT GREAT SAPHENOUS DISTAL THIGH DIA: 0.37 CM
LEFT GREAT SAPHENOUS JUNCTION DIA: 0.67 CM
LEFT GREAT SAPHENOUS KNEE DIA: 0.43 CM
LEFT GREAT SAPHENOUS MIDDLE THIGH DIA: 0.42 CM
LEFT GREAT SAPHENOUS PROXIMAL CALF DIA: 0.44 CM
LEFT SMALL SAPHENOUS KNEE DIA: 0.23 CM
LEFT SMALL SAPHENOUS SPJ DIA: 0.27 CM
RIGHT GREAT SAPHENOUS DISTAL THIGH DIA: 0.41 CM
RIGHT GREAT SAPHENOUS JUNCTION DIA: 0.96 CM
RIGHT GREAT SAPHENOUS KNEE DIA: 0.4 CM
RIGHT GREAT SAPHENOUS MIDDLE THIGH DIA: 0.38 CM
RIGHT GREAT SAPHENOUS PROXIMAL CALF DIA: 0.35 CM
RIGHT SMALL SAPHENOUS KNEE DIA: 0.49 CM
RIGHT SMALL SAPHENOUS SPJ DIA: 0.25 CM

## 2024-11-06 PROCEDURE — 93970 EXTREMITY STUDY: CPT | Mod: 26,,, | Performed by: SURGERY

## 2024-11-06 PROCEDURE — 93970 EXTREMITY STUDY: CPT | Mod: TC

## 2024-11-19 ENCOUNTER — TELEPHONE (OUTPATIENT)
Dept: FAMILY MEDICINE | Facility: CLINIC | Age: 74
End: 2024-11-19
Payer: MEDICARE

## 2024-11-20 NOTE — TELEPHONE ENCOUNTER
Returned call to patient. Advised has appointment on 11/25/24 to discuss results. Gave patient upcoming appointments and times. Patient requesting a telephone reminder the day before appointments. Advised patient can mail upcoming appointments to him , patient stated he needs a phone call reminder.

## 2024-11-20 NOTE — TELEPHONE ENCOUNTER
----- Message from Laine sent at 11/19/2024  3:51 PM CST -----  Regarding: self  Who called: self    What is the request in detail: pt wanted office to call about his test results he did last week.    Can the clinic reply by [facility]? No    Would the patient rather a call back or a response via [facility]? Call back    Best call back number: [phone]      Additional Information:    Thank you.

## 2024-11-22 ENCOUNTER — OFFICE VISIT (OUTPATIENT)
Dept: FAMILY MEDICINE | Facility: CLINIC | Age: 74
End: 2024-11-22
Payer: MEDICARE

## 2024-11-22 ENCOUNTER — TELEPHONE (OUTPATIENT)
Dept: VASCULAR SURGERY | Facility: CLINIC | Age: 74
End: 2024-11-22
Payer: MEDICARE

## 2024-11-22 ENCOUNTER — HOSPITAL ENCOUNTER (OUTPATIENT)
Dept: RADIOLOGY | Facility: HOSPITAL | Age: 74
Discharge: HOME | End: 2024-11-22
Payer: MEDICARE

## 2024-11-22 VITALS
HEIGHT: 68 IN | DIASTOLIC BLOOD PRESSURE: 68 MMHG | BODY MASS INDEX: 43.75 KG/M2 | HEART RATE: 67 BPM | TEMPERATURE: 98 F | SYSTOLIC BLOOD PRESSURE: 126 MMHG | WEIGHT: 288.69 LBS | OXYGEN SATURATION: 96 %

## 2024-11-22 DIAGNOSIS — I87.2 VENOUS STASIS DERMATITIS OF LEFT LOWER EXTREMITY: ICD-10-CM

## 2024-11-22 DIAGNOSIS — M54.50 LUMBAR BACK PAIN: ICD-10-CM

## 2024-11-22 DIAGNOSIS — L85.3 DRY SKIN: ICD-10-CM

## 2024-11-22 DIAGNOSIS — C60.9 SQUAMOUS CELL CARCINOMA, PENIS: ICD-10-CM

## 2024-11-22 DIAGNOSIS — M54.50 LUMBAR BACK PAIN: Primary | ICD-10-CM

## 2024-11-22 DIAGNOSIS — I87.2 VENOUS INSUFFICIENCY OF BOTH LOWER EXTREMITIES: ICD-10-CM

## 2024-11-22 DIAGNOSIS — Z00.00 ROUTINE HEALTH MAINTENANCE: ICD-10-CM

## 2024-11-22 PROCEDURE — 72100 X-RAY EXAM L-S SPINE 2/3 VWS: CPT | Mod: 26,,, | Performed by: RADIOLOGY

## 2024-11-22 PROCEDURE — 99999 PR PBB SHADOW E&M-EST. PATIENT-LVL V: CPT | Mod: PBBFAC,,,

## 2024-11-22 PROCEDURE — 72100 X-RAY EXAM L-S SPINE 2/3 VWS: CPT | Mod: TC,FY,PO

## 2024-11-22 RX ORDER — AMMONIUM LACTATE 12 G/100G
LOTION TOPICAL
Qty: 396 G | Refills: 1 | Status: SHIPPED | OUTPATIENT
Start: 2024-11-22

## 2024-11-22 RX ORDER — MUPIROCIN 20 MG/G
OINTMENT TOPICAL 3 TIMES DAILY
Qty: 30 G | Refills: 1 | Status: SHIPPED | OUTPATIENT
Start: 2024-11-22

## 2024-11-22 RX ORDER — LIDOCAINE 50 MG/G
1 PATCH TOPICAL DAILY
Qty: 30 PATCH | Refills: 1 | Status: SHIPPED | OUTPATIENT
Start: 2024-11-22

## 2024-11-22 RX ORDER — DICLOFENAC SODIUM 10 MG/G
2 GEL TOPICAL DAILY
Qty: 50 G | Refills: 1 | Status: SHIPPED | OUTPATIENT
Start: 2024-11-22

## 2024-11-22 NOTE — PROGRESS NOTES
HPI     Armand Painting is a 74 y.o. male with multiple medical diagnoses as listed in the medical history and problem list that presents for follow up on leg swelling and back pain. PCP Dr. Vargas with last visit in this clinic on 8/6/24.     Chief Complaint   Patient presents with    Leg Swelling       Back Pain  This is a new problem. The current episode started in the past 7 days. The problem occurs 2 to 4 times per day. The problem is unchanged. The pain is present in the lumbar spine. The quality of the pain is described as aching. The pain does not radiate. The symptoms are aggravated by bending and twisting. Pertinent negatives include no abdominal pain, bladder incontinence, bowel incontinence, chest pain, dysuria, fever, numbness, perianal numbness, tingling or weakness. He has tried NSAIDs for the symptoms. The treatment provided mild relief.     Patient here for follow up on leg swelling and back pain, other concers below:     Edema: Had U/S of bilateral LE on 11/6/24 for same concern; results reviewed, negative for DVT. History of venous insuffiencey in bilareral LE, has not been able to obtain compression stockings.     Back pain: acute lumbar back pain for last week or so. Thinks he lifted something heavy and pulled a muscle. Does not radiate to legs or abdomen. Denies tingling, numbness, saddle anesthesia. Also reports neck surgery in the past, hasn't been able to hold head up all the way since then. Has fallen 2 times in the last week, says his legs give out and he falls to his knees. Denies hitting head or LOC.     Vision: Wears glasses daily, nearsighted. Has not had prescription updated in many years. Needs eye check, vision is not good.  Does not like text messages, did not finish high school and has difficulty reading. Prefers when providers leave voicemail.      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 Prescribed topical chemotherapy cream in June, which he used for a while but is no  longer using due to the burning/discomfort it caused. Urged to follow up with Dr. Jarquin (Urology) about this cream or switching to another treatment.     Assessment & Plan     1. Lumbar back pain    TTP of lumbar spine. No numbness/tingling. Worsens with twisting. No obvious deformity. Will check xray to be cautious. Encouraged voltaren gel and lidocaine patches PRN for pain. Referral placed for PT to help with back and leg strengthening. Follow up with clinic for any worsening symptoms, or if symptoms fail to improve.       - Ambulatory referral/consult to Physical/Occupational Therapy; Future  - X-Ray Lumbar Spine AP And Lateral; Future  - diclofenac sodium (VOLTAREN ARTHRITIS PAIN) 1 % Gel; Apply 2 g topically once daily.  Dispense: 50 g; Refill: 1  - LIDOcaine (LIDODERM) 5 %; Place 1 patch onto the skin once daily. Remove & Discard patch within 12 hours or as directed by MD  Dispense: 30 patch; Refill: 1    2. Venous insufficiency of both lower extremities    2+ pitting edema noted to bilateral lower extremities. Recent U/S negative for DVT. Encouraged elevation and purchasing compression stockings to be worn daily. Printed out order and gave address of local medical supply store for him to go to for purchasing. Continue to walk often to promote good circulation.    - COMPRESSION STOCKINGS    3. Venous stasis dermatitis of left lower extremity (see photo)    Left shin with dry, hypopigmented rash. No erythema or warmth noted. No open areas or drainage. No s/s of infection. Will treat with mupirocin ointment TID. Follow up with clinic for any worsening symptoms, or if symptoms fail to improve.       - mupirocin (BACTROBAN) 2 % ointment; Apply topically 3 (three) times daily.  Dispense: 30 g; Refill: 1        4. 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 Encouraged to f/u with Dr. Jarquin for further treatment.     5. Dry  skin    Dry, flaky skin to bilateral upper and lower extremities. Encouraged moisturizer. Prescribed Lac-Hydrin daily. The current medical regimen is effective;  continue present plan and medications.    - ammonium lactate (LAC-HYDRIN) 12 % lotion; Apply topically as needed for Dry Skin.  Dispense: 396 g; Refill: 1    6. Routine health maintenance    Will refer to ophthalmologist for routine eye exam and to podiatry for nail care for thickened, lengthened toe nails.     - Ambulatory referral/consult to Ophthalmology; Future  -Ambulatory referral to Podiatry; Future    Discussed DDx, condition, and treatment.   Education sent to patient portal/included in after visit summary.  ED precautions given.   Notify provider if symptoms do not resolve or increase in severity.   Patient verbalizes understanding and agrees with plan of care.    --------------------------------------------      Health Maintenance:  Health Maintenance         Date Due Completion Date    Diabetes Urine Screening Never done ---    Foot Exam Never done ---    Shingles Vaccine (1 of 2) 01/12/2016 11/17/2015    Colorectal Cancer Screening 10/31/2023 10/31/2018    Override on 6/7/2012: Done    Influenza Vaccine (1) 09/01/2024 11/3/2023    COVID-19 Vaccine (4 - 2024-25 season) 09/01/2024 11/3/2023    Hemoglobin A1c 11/22/2024 5/22/2024    Lipid Panel 03/08/2025 3/8/2024    Eye Exam 05/01/2025 5/1/2024    Low Dose Statin 11/22/2025 11/22/2024    TETANUS VACCINE 05/23/2026 5/23/2016            Discussed the importance of overdue vaccines which were offered during this encounter. Patient declined overdue vaccines at this time and Advised patient on the importance of completing overdue health maintenance items    Follow Up:  No follow-ups on file.    Exam     Review of Systems:  (as noted above)  Review of Systems   Constitutional:  Negative for activity change, appetite change, fatigue and fever.   HENT:  Negative for trouble swallowing.    Respiratory:   "Negative for chest tightness and shortness of breath.    Cardiovascular:  Negative for chest pain.   Gastrointestinal:  Negative for abdominal pain, blood in stool and bowel incontinence.   Genitourinary:  Negative for bladder incontinence and dysuria.   Musculoskeletal:  Positive for back pain. Negative for gait problem and joint swelling.   Skin:  Positive for rash. Negative for wound.   Neurological:  Negative for dizziness, tingling, weakness and numbness.       Physical Exam:   Physical Exam  Constitutional:       General: He is not in acute distress.     Appearance: Normal appearance. He is not ill-appearing.   HENT:      Head: Normocephalic and atraumatic.   Cardiovascular:      Rate and Rhythm: Normal rate and regular rhythm.      Pulses: Normal pulses.      Heart sounds: Normal heart sounds. No murmur heard.  Pulmonary:      Effort: Pulmonary effort is normal. No respiratory distress.      Breath sounds: Normal breath sounds. No wheezing.   Musculoskeletal:      Right lower le+ Edema present.      Left lower le+ Edema present.   Skin:     General: Skin is warm and dry.      Capillary Refill: Capillary refill takes less than 2 seconds.      Findings: Rash present.             Comments: Venous stasis dermatitis left shin   Neurological:      General: No focal deficit present.      Mental Status: He is alert and oriented to person, place, and time.   Psychiatric:         Mood and Affect: Mood normal.         Behavior: Behavior normal.       Protective Sensation (w/ 10 gram monofilament):  Right: Intact  Left: Intact    Visual Inspection:  Nails Intact - with Evidence of Foot Deformity- Bilateral long thick nails need trimming    Pedal Pulses:   Right: Present  Left: Present    Posterior Tibialis Pulses:   Right:Present  Left: Present    Vitals:    24 0803   BP: 126/68   Pulse: 67   Temp: 97.7 °F (36.5 °C)   TempSrc: Oral   SpO2: 96%   Weight: 131 kg (288 lb 11.1 oz)   Height: 5' 8" (1.727 m)    "   Body mass index is 43.9 kg/m².        History     Past Medical History:  Past Medical History:   Diagnosis Date    Arthritis     Erectile dysfunction     Gout     Hyperlipidemia     Hypertension     Morbid obesity     Nuclear sclerosis of both eyes 01/16/2018    Peripheral edema     Prediabetes     Screening for AAA (abdominal aortic aneurysm)     6/2016 AAA US no aneurysm    Sleep apnea        Past Surgical History:  Past Surgical History:   Procedure Laterality Date    CARPAL TUNNEL RELEASE Right     CIRCUMCISION N/A 5/31/2024    Procedure: CIRCUMCISION;  Surgeon: Gricelda Pandey MD;  Location: [facility] OR;  Service: Urology;  Laterality: N/A;  RN PREOP 5/22/2024---CLEARED BY CARDS    COLONOSCOPY N/A 10/31/2018    Procedure: COLONOSCOPY;  Surgeon: Clementine Batista MD;  Location: [facility] ENDO;  Service: Endoscopy;  Laterality: N/A;    EPIDURAL STEROID INJECTION N/A 08/07/2020    Procedure: INJECTION, STEROID, EPIDURAL  C7-T1;  Surgeon: Stan Brunner MD;  Location: [facility] PAIN MGT;  Service: Pain Management;  Laterality: N/A;  C7-T1 Epidural Steroid Injection  consent needed    EYE SURGERY Right 03/07/2024    INTRAOCULAR PROSTHESES INSERTION Right 03/07/2024    Procedure: INSERTION, IOL PROSTHESIS;  Surgeon: Bishnu Bojorquez MD;  Location: [facility] OR;  Service: Ophthalmology;  Laterality: Right;    PHACOEMULSIFICATION OF CATARACT Right 03/07/2024    Procedure: PHACOEMULSIFICATION, CATARACT;  Surgeon: Bishnu Bojorquez MD;  Location: [facility] OR;  Service: Ophthalmology;  Laterality: Right;  RN PHONE PREOP 2/26/2024   ARRIVAL 530 AM    SPINE SURGERY  03/31/2016    C3-C7 laminoplasty,Dr Caruso    tooth implant         Social History:  Social History     Socioeconomic History    Marital status:    Tobacco Use    Smoking status: Former     Current packs/day: 0.00     Types: Cigarettes     Quit date: 1/7/2009     Years since quitting: 15.8     Passive exposure: Past    Smokeless tobacco: Former   Substance and  Sexual Activity    Alcohol use: Yes     Alcohol/week: 12.0 standard drinks of alcohol     Types: 12 Cans of beer per week    Drug use: No    Sexual activity: Not Currently     Partners: Female     Comment: , children.     Social Drivers of Health     Financial Resource Strain: Low Risk  (4/5/2022)    Overall Financial Resource Strain (CARDIA)     Difficulty of Paying Living Expenses: Not hard at all   Food Insecurity: No Food Insecurity (4/5/2022)    Hunger Vital Sign     Worried About Running Out of Food in the Last Year: Never true     Ran Out of Food in the Last Year: Never true   Transportation Needs: No Transportation Needs (4/5/2022)    PRAPARE - Transportation     Lack of Transportation (Medical): No     Lack of Transportation (Non-Medical): No   Physical Activity: Insufficiently Active (4/5/2022)    Exercise Vital Sign     Days of Exercise per Week: 1 day     Minutes of Exercise per Session: 10 min   Stress: No Stress Concern Present (4/5/2022)    [employer] - Occupational Stress Questionnaire     Feeling of Stress : Not at all   Housing Stability: Low Risk  (4/5/2022)    Housing Stability Vital Sign     Unable to Pay for Housing in the Last Year: No     Number of Places Lived in the Last Year: 1     Unstable Housing in the Last Year: No       Family History:  Family History   Problem Relation Name Age of Onset    COPD Mother      No Known Problems Father      No Known Problems Sister x1     Cataracts Brother x2     Glaucoma Brother x2     No Known Problems Maternal Aunt      No Known Problems Maternal Uncle      No Known Problems Paternal Aunt      No Known Problems Paternal Uncle      No Known Problems Maternal Grandmother      No Known Problems Maternal Grandfather      No Known Problems Paternal Grandmother      No Known Problems Paternal Grandfather      Amblyopia Neg Hx      Blindness Neg Hx      Cancer Neg Hx      Diabetes Neg Hx      Hypertension Neg Hx       Macular degeneration Neg Hx      Retinal detachment Neg Hx      Strabismus Neg Hx      Stroke Neg Hx      Thyroid disease Neg Hx         Allergies and Medications: (updated and reviewed)  Review of patient's allergies indicates:   Allergen Reactions    Codeine Swelling     Codeine with Aspirin caused chest pain     Current Outpatient Medications   Medication Sig Dispense Refill    allopurinoL (ZYLOPRIM) 100 MG tablet Take 1 tablet (100 mg total) by mouth once daily. 90 tablet 3    aspirin (ECOTRIN) 81 MG EC tablet Take 81 mg by mouth once daily.      atorvastatin (LIPITOR) 40 MG tablet Take 1 tablet (40 mg total) by mouth every evening. 90 tablet 3    clotrimazole-betamethasone 1-0.05% (LOTRISONE) cream APPLY TO AFFECTED AREA TWICE A DAY 45 g 3    fluorouraciL (EFUDEX) 5 % cream Apply topically 2 (two) times daily. Apply to penile skin once daily Monday-Friday for 6 weeks, remove cream from skin after 2 hours of contact time 40 g 2    furosemide (LASIX) 40 MG tablet Take 1 tablet (40 mg total) by mouth once daily. 90 tablet 3    glucosamine/chondr gtz A sod (OSTEO BI-FLEX ORAL) Take by mouth.      ketoconazole (NIZORAL) 2 % cream Apply topically once daily. To affected area on penis/inguinal area 60 g 3    lisinopriL 10 MG tablet TAKE 1 TABLET BY MOUTH EVERY DAY 90 tablet 3    metFORMIN (GLUCOPHAGE-XR) 500 MG ER 24hr tablet TAKE 1 TABLET (500 MG TOTAL) BY MOUTH ONCE DAILY. NEEDS OFFICE VISIT FOR FURTHER REFILLS 90 tablet 0    multivitamin (ONE-A-DAY ESSENTIAL ORAL) Take by mouth.      naltrexone-bupropion (CONTRAVE) 8-90 mg TbSR Take 1 tablet by mouth once daily. 30 tablet 1    tamsulosin (FLOMAX) 0.4 mg Cap TAKE 2 CAPSULES BY MOUTH ONCE DAILY. 180 capsule 3    ammonium lactate (LAC-HYDRIN) 12 % lotion Apply topically as needed for Dry Skin. 396 g 1    diclofenac sodium (VOLTAREN ARTHRITIS PAIN) 1 % Gel Apply 2 g topically once daily. 50 g 1    LIDOcaine (LIDODERM) 5 % Place 1 patch onto the skin once daily. Remove &  Discard patch within 12 hours or as directed by MD 30 patch 1    mupirocin (BACTROBAN) 2 % ointment Apply topically 3 (three) times daily. 30 g 1     No current facility-administered medications for this visit.       Patient Care Team:  Clark Vargas MD as PCP - General (Internal Medicine)  Fernando Jacinto MD as Consulting Physician (Orthopedic Surgery)  Venkata Caruso MD as Consulting Physician (Neurosurgery)  Aureliano Mcneil MD as Consulting Physician (Ophthalmology)  Maricel Cotter OD as Consulting Physician (Optometry)  Ashanti Diaz RN (Inactive) as Oncology Navigator  Trino Jarquin MD as Consulting Physician (Urology)         - The patient is given an After Visit Summary that lists all medications with directions, allergies, education, orders placed during this encounter and follow-up instructions.      - I have reviewed the patient's medical information including past medical, family, and social history sections including the medications and allergies.      - We discussed the patient's current medications.     This note was created by combination of typed  and MModal dictation.  Transcription errors may be present.  If there are any questions, please contact me.                 SANTOS Phelps

## 2024-11-22 NOTE — Clinical Note
Just for you to look over when you get a chance. Let me know if you have any recommendations/feedback! Thanks, Jennifer

## 2024-11-25 ENCOUNTER — OFFICE VISIT (OUTPATIENT)
Dept: VASCULAR SURGERY | Facility: CLINIC | Age: 74
End: 2024-11-25
Payer: MEDICARE

## 2024-11-25 VITALS
SYSTOLIC BLOOD PRESSURE: 122 MMHG | WEIGHT: 284.19 LBS | HEART RATE: 76 BPM | BODY MASS INDEX: 43.07 KG/M2 | DIASTOLIC BLOOD PRESSURE: 81 MMHG | HEIGHT: 68 IN

## 2024-11-25 DIAGNOSIS — I87.303 VENOUS HYPERTENSION OF BOTH LOWER EXTREMITIES: ICD-10-CM

## 2024-11-25 DIAGNOSIS — I89.0 LYMPHEDEMA: Primary | ICD-10-CM

## 2024-11-25 DIAGNOSIS — E66.01 MORBID OBESITY WITH BMI OF 40.0-44.9, ADULT: ICD-10-CM

## 2024-11-25 PROCEDURE — 99214 OFFICE O/P EST MOD 30 MIN: CPT | Mod: S$GLB,,, | Performed by: SURGERY

## 2024-11-25 PROCEDURE — 3079F DIAST BP 80-89 MM HG: CPT | Mod: CPTII,S$GLB,, | Performed by: SURGERY

## 2024-11-25 PROCEDURE — 3074F SYST BP LT 130 MM HG: CPT | Mod: CPTII,S$GLB,, | Performed by: SURGERY

## 2024-11-25 PROCEDURE — 3044F HG A1C LEVEL LT 7.0%: CPT | Mod: CPTII,S$GLB,, | Performed by: SURGERY

## 2024-11-25 PROCEDURE — 3288F FALL RISK ASSESSMENT DOCD: CPT | Mod: CPTII,S$GLB,, | Performed by: SURGERY

## 2024-11-25 PROCEDURE — 1126F AMNT PAIN NOTED NONE PRSNT: CPT | Mod: CPTII,S$GLB,, | Performed by: SURGERY

## 2024-11-25 PROCEDURE — 1101F PT FALLS ASSESS-DOCD LE1/YR: CPT | Mod: CPTII,S$GLB,, | Performed by: SURGERY

## 2024-11-25 PROCEDURE — 99999 PR PBB SHADOW E&M-EST. PATIENT-LVL III: CPT | Mod: PBBFAC,,, | Performed by: SURGERY

## 2024-11-25 PROCEDURE — 1159F MED LIST DOCD IN RCRD: CPT | Mod: CPTII,S$GLB,, | Performed by: SURGERY

## 2024-11-25 PROCEDURE — 4010F ACE/ARB THERAPY RXD/TAKEN: CPT | Mod: CPTII,S$GLB,, | Performed by: SURGERY

## 2024-11-25 NOTE — PROGRESS NOTES
Farooq Huerta MD, VI                                 [facility]                           [facility]                             11/25/2024    HPI:  Armand Painting is a 74 y.o. male with   Patient Active Problem List   Diagnosis    Gout, arthritis    ED (erectile dysfunction)    Back pain    Morbid obesity with BMI of 40.0-44.9, adult    Essential hypertension 06/12/2024 TTE LV normal size mild concentric hypertrophy normal systolic function LVEF 60-65%.  Mild aortic valve sclerosis.  Aortic root mildly dilated.    Mixed hyperlipidemia    Pararenal abdominal aortic aneurysm (AAA) without rupture 06/12/2024 AAA ultrasound showing suprarenal AAA max diameter 3.1 cm.    Prediabetes    Anasarca    Cervical stenosis of spinal canal 3/2016 C3-7 laminoplasty    Hemorrhoids    Benign prostatic hyperplasia with urinary frequency    Nuclear sclerosis of both eyes    Refractive error    SIS (obstructive sleep apnea) on sleep study 4/2018 with AHI 55.  CPAP at 14    Diverticulosis of large intestine without hemorrhage on colonoscopy 10/2018    Tubular adenoma of colon 10/2018 colonoscopy sigmoid tubular adenoma    Neck pain    Poor posture    Alpha thalassemia silent carrier    Chronic pain disorder    Cervical post-laminectomy syndrome    Lumbar spondylosis    Chronic pain    Macular hole, right eye    CHAO (dyspnea on exertion)    Chest pain, atypical    Nuclear sclerotic cataract of right eye    Balanitis    Tinea corporis    Squamous cell carcinoma, penis s/p excision; 5FU    Venous insufficiency of both lower extremities    Lymphedema    being managed by PCP and specialists who is here today for evaluation of BLE edema.  Patient states location is BLE occurring for months.  Associated signs and symptoms include pain.  Quality is heavy and severity is 8/10.  Symptoms began mo ago.  Alleviating factors include elevation.  Worsening factors include dependency.  Patient has not been wearing  compression stockings for greater than 3 months.  +FH of venous disease.  Symptoms do limit patient's functional status and daily activities.  no DVT history.  no venous interventions.  no low sodium diet.  no excessive water intake.    Migraine with aura: no  PFO/ASD/right to left shunt: no  Pregnant: no  Breastfeeding: no    no MI  no Stroke  Tobacco use: denies    11/2024: completed therapy.  Elevation.      Past Medical History:   Diagnosis Date    Arthritis     Erectile dysfunction     Gout     Hyperlipidemia     Hypertension     Morbid obesity     Nuclear sclerosis of both eyes 01/16/2018    Peripheral edema     Prediabetes     Screening for AAA (abdominal aortic aneurysm)     6/2016 AAA US no aneurysm    Sleep apnea      Past Surgical History:   Procedure Laterality Date    CARPAL TUNNEL RELEASE Right     CIRCUMCISION N/A 5/31/2024    Procedure: CIRCUMCISION;  Surgeon: Gricelda Pandey MD;  Location: [facility] OR;  Service: Urology;  Laterality: N/A;  RN PREOP 5/22/2024---CLEARED BY CARDS    COLONOSCOPY N/A 10/31/2018    Procedure: COLONOSCOPY;  Surgeon: Clementine Batista MD;  Location: [facility] ENDO;  Service: Endoscopy;  Laterality: N/A;    EPIDURAL STEROID INJECTION N/A 08/07/2020    Procedure: INJECTION, STEROID, EPIDURAL  C7-T1;  Surgeon: Stan Brunner MD;  Location: [facility] PAIN MGT;  Service: Pain Management;  Laterality: N/A;  C7-T1 Epidural Steroid Injection  consent needed    EYE SURGERY Right 03/07/2024    INTRAOCULAR PROSTHESES INSERTION Right 03/07/2024    Procedure: INSERTION, IOL PROSTHESIS;  Surgeon: Bishnu Bojorquez MD;  Location: [facility] OR;  Service: Ophthalmology;  Laterality: Right;    PHACOEMULSIFICATION OF CATARACT Right 03/07/2024    Procedure: PHACOEMULSIFICATION, CATARACT;  Surgeon: Bishnu Bojorquez MD;  Location: [facility] OR;  Service: Ophthalmology;  Laterality: Right;  RN PHONE PREOP 2/26/2024   ARRIVAL 530 AM    SPINE SURGERY  03/31/2016    C3-C7 laminoplasty,Dr Shady quintero  implant       Family History   Problem Relation Name Age of Onset    COPD Mother      No Known Problems Father      No Known Problems Sister x1     Cataracts Brother x2     Glaucoma Brother x2     No Known Problems Maternal Aunt      No Known Problems Maternal Uncle      No Known Problems Paternal Aunt      No Known Problems Paternal Uncle      No Known Problems Maternal Grandmother      No Known Problems Maternal Grandfather      No Known Problems Paternal Grandmother      No Known Problems Paternal Grandfather      Amblyopia Neg Hx      Blindness Neg Hx      Cancer Neg Hx      Diabetes Neg Hx      Hypertension Neg Hx      Macular degeneration Neg Hx      Retinal detachment Neg Hx      Strabismus Neg Hx      Stroke Neg Hx      Thyroid disease Neg Hx       Social History     Socioeconomic History    Marital status:    Tobacco Use    Smoking status: Former     Current packs/day: 0.00     Types: Cigarettes     Quit date: 1/7/2009     Years since quitting: 15.8     Passive exposure: Past    Smokeless tobacco: Former   Substance and Sexual Activity    Alcohol use: Yes     Alcohol/week: 12.0 standard drinks of alcohol     Types: 12 Cans of beer per week    Drug use: No    Sexual activity: Not Currently     Partners: Female     Comment: , children.     Social Drivers of Health     Financial Resource Strain: Low Risk  (4/5/2022)    Overall Financial Resource Strain (CARDIA)     Difficulty of Paying Living Expenses: Not hard at all   Food Insecurity: No Food Insecurity (4/5/2022)    Hunger Vital Sign     Worried About Running Out of Food in the Last Year: Never true     Ran Out of Food in the Last Year: Never true   Transportation Needs: No Transportation Needs (4/5/2022)    PRAPARE - Transportation     Lack of Transportation (Medical): No     Lack of Transportation (Non-Medical): No   Physical Activity: Insufficiently Active (4/5/2022)    Exercise Vital Sign     Days of Exercise per Week: 1 day     Minutes of  Exercise per Session: 10 min   Stress: No Stress Concern Present (4/5/2022)    [employer] [employer] - Occupational Stress Questionnaire     Feeling of Stress : Not at all   Housing Stability: Low Risk  (4/5/2022)    Housing Stability Vital Sign     Unable to Pay for Housing in the Last Year: No     Number of Places Lived in the Last Year: 1     Unstable Housing in the Last Year: No       Current Outpatient Medications:     allopurinoL (ZYLOPRIM) 100 MG tablet, Take 1 tablet (100 mg total) by mouth once daily., Disp: 90 tablet, Rfl: 3    ammonium lactate (LAC-HYDRIN) 12 % lotion, Apply topically as needed for Dry Skin., Disp: 396 g, Rfl: 1    aspirin (ECOTRIN) 81 MG EC tablet, Take 81 mg by mouth once daily., Disp: , Rfl:     atorvastatin (LIPITOR) 40 MG tablet, Take 1 tablet (40 mg total) by mouth every evening., Disp: 90 tablet, Rfl: 3    clotrimazole-betamethasone 1-0.05% (LOTRISONE) cream, APPLY TO AFFECTED AREA TWICE A DAY, Disp: 45 g, Rfl: 3    diclofenac sodium (VOLTAREN ARTHRITIS PAIN) 1 % Gel, Apply 2 g topically once daily., Disp: 50 g, Rfl: 1    fluorouraciL (EFUDEX) 5 % cream, Apply topically 2 (two) times daily. Apply to penile skin once daily Monday-Friday for 6 weeks, remove cream from skin after 2 hours of contact time, Disp: 40 g, Rfl: 2    furosemide (LASIX) 40 MG tablet, Take 1 tablet (40 mg total) by mouth once daily., Disp: 90 tablet, Rfl: 3    glucosamine/chondr gtz A sod (OSTEO BI-FLEX ORAL), Take by mouth., Disp: , Rfl:     ketoconazole (NIZORAL) 2 % cream, Apply topically once daily. To affected area on penis/inguinal area, Disp: 60 g, Rfl: 3    LIDOcaine (LIDODERM) 5 %, Place 1 patch onto the skin once daily. Remove & Discard patch within 12 hours or as directed by MD, Disp: 30 patch, Rfl: 1    lisinopriL 10 MG tablet, TAKE 1 TABLET BY MOUTH EVERY DAY, Disp: 90 tablet, Rfl: 3    metFORMIN (GLUCOPHAGE-XR) 500 MG ER 24hr tablet, TAKE 1 TABLET (500 MG TOTAL) BY MOUTH ONCE  DAILY. NEEDS OFFICE VISIT FOR FURTHER REFILLS, Disp: 90 tablet, Rfl: 0    multivitamin (ONE-A-DAY ESSENTIAL ORAL), Take by mouth., Disp: , Rfl:     mupirocin (BACTROBAN) 2 % ointment, Apply topically 3 (three) times daily., Disp: 30 g, Rfl: 1    naltrexone-bupropion (CONTRAVE) 8-90 mg TbSR, Take 1 tablet by mouth once daily., Disp: 30 tablet, Rfl: 1    tamsulosin (FLOMAX) 0.4 mg Cap, TAKE 2 CAPSULES BY MOUTH ONCE DAILY., Disp: 180 capsule, Rfl: 3    REVIEW OF SYSTEMS:  General: No fevers or chills; ENT: No sore throat; Allergy and Immunology: no persistent infections; Hematological and Lymphatic: No history of bleeding or easy bruising; Endocrine: negative; Respiratory: no cough, shortness of breath, or wheezing; Cardiovascular: no chest pain or dyspnea on exertion; Gastrointestinal: no abdominal pain/back, change in bowel habits, or bloody stools; Genito-Urinary: no dysuria, trouble voiding, or hematuria; Musculoskeletal: edema; Neurological: no TIA or stroke symptoms; Psychiatric: no nervousness, anxiety or depression.    PHYSICAL EXAM:      Pulse: 76         General appearance:  Alert, well-appearing, and in no distress.  Oriented to person, place, and time                    Neurological: Normal speech, no focal findings noted; CN II - XII grossly intact. RLE with sensation to light touch, LLE with sensation to light touch.            Musculoskeletal: Digits/nail without cyanosis/clubbing.  Strength 5/5 BLE.                    Neck: Supple, no significant adenopathy                  Chest:  No wheezes, symmetric air entry. No use of accessory muscles               Cardiac: Normal rate and regular rhythm            Abdomen: Soft, nontender, nondistended      Extremities:   1+ R DP pulse, 1+ L DP pulse      2+ RLE edema, 2+ LLE edema    Skin:  RLE no ulcer; LLE no ulcer      RLE no spider veins, LLE no spider veins      RLE no varicose veins, LLE no varicose veins    CEAP 3/3    VCSS 8    LAB RESULTS:  No results  "found for: "CBC"  Lab Results   Component Value Date    LABPROT 9.9 03/31/2016    INR 0.9 03/31/2016     Lab Results   Component Value Date     07/12/2024    K 3.7 07/12/2024    CL 99 07/12/2024    CO2 31 (H) 07/12/2024     07/12/2024    BUN 12 07/12/2024    CREATININE 1.0 07/12/2024    CALCIUM 9.8 07/12/2024    ANIONGAP 11 07/12/2024    EGFRNONAA >60.0 02/14/2022     Lab Results   Component Value Date    WBC 7.08 05/22/2024    RBC 4.63 05/22/2024    HGB 12.0 (L) 05/22/2024    HCT 39.0 (L) 05/22/2024    MCV 84 05/22/2024    MCH 25.9 (L) 05/22/2024    MCHC 30.8 (L) 05/22/2024    RDW 14.5 05/22/2024     05/22/2024    MPV 11.1 05/22/2024    GRAN 3.4 05/22/2024    GRAN 48.5 05/22/2024    LYMPH 3.0 05/22/2024    LYMPH 41.8 05/22/2024    MONO 0.5 05/22/2024    MONO 6.9 05/22/2024    EOS 0.2 05/22/2024    BASO 0.02 05/22/2024    EOSINOPHIL 2.4 05/22/2024    BASOPHIL 0.3 05/22/2024    DIFFMETHOD Automated 05/22/2024     .  Lab Results   Component Value Date    HGBA1C 6.3 (H) 05/22/2024       IMAGING:  All pertinent imaging has been reviewed and interpreted independently.    IMP/PLAN:  74 y.o. male with   Patient Active Problem List   Diagnosis    Gout, arthritis    ED (erectile dysfunction)    Back pain    Morbid obesity with BMI of 40.0-44.9, adult    Essential hypertension 06/12/2024 TTE LV normal size mild concentric hypertrophy normal systolic function LVEF 60-65%.  Mild aortic valve sclerosis.  Aortic root mildly dilated.    Mixed hyperlipidemia    Pararenal abdominal aortic aneurysm (AAA) without rupture 06/12/2024 AAA ultrasound showing suprarenal AAA max diameter 3.1 cm.    Prediabetes    Anasarca    Cervical stenosis of spinal canal 3/2016 C3-7 laminoplasty    Hemorrhoids    Benign prostatic hyperplasia with urinary frequency    Nuclear sclerosis of both eyes    Refractive error    SIS (obstructive sleep apnea) on sleep study 4/2018 with AHI 55.  CPAP at 14    Diverticulosis of large intestine " without hemorrhage on colonoscopy 10/2018    Tubular adenoma of colon 10/2018 colonoscopy sigmoid tubular adenoma    Neck pain    Poor posture    Alpha thalassemia silent carrier    Chronic pain disorder    Cervical post-laminectomy syndrome    Lumbar spondylosis    Chronic pain    Macular hole, right eye    CHAO (dyspnea on exertion)    Chest pain, atypical    Nuclear sclerotic cataract of right eye    Balanitis    Tinea corporis    Squamous cell carcinoma, penis s/p excision; 5FU    Venous insufficiency of both lower extremities    Lymphedema    being managed by PCP and specialists who is here today for evaluation of BLE edema and pain.    -Patient is diagnosed with lymphedema and presents with hyperpigmentation of the lower extremity.  Patient has previously attempted compression, elevation, and exercise for at least 4 weeks.  Patient has secondary lymphedema and has tried and failed compression of 20-30 mm Hg, elevation and exercise for >1 month period however symptoms persist.  Basic pump trial performed and discontinued due to sensitivity and pain to static pressure.  Symptoms of swelling, pain and hyperplasia present.  A compression device is recommended to treat current symptoms and prevent disease progression. The patient has tried elevation, exercise x 4 weeks and symptoms remain.  The patient has marked hyperkeratosis with hyperplasia and hyperpigmentation. Pt has been compliant with diet/med eval, MLD, skin care  - recommend lymphedema clinic therapy and pumps  -recommend compression with Rx stockings, elevation, dietary changes associated with water and sodium intake discussed at length with patient  -Exercise   -Bariatric Medicine referral  -venous insuff US without reflux or DVT  -RTC prn    I spent 12 minutes evaluating this patient and greater than 50% of the time was spent counseling, coordinator care and discussing the plan of care.  All questions were answered and patient stated understanding with  agreement with the above treatment plan.    Farooq Huerta MD RPVI  Vascular and Endovascular Surgery

## 2024-11-26 ENCOUNTER — TELEPHONE (OUTPATIENT)
Dept: FAMILY MEDICINE | Facility: CLINIC | Age: 74
End: 2024-11-26
Payer: MEDICARE

## 2024-11-26 NOTE — TELEPHONE ENCOUNTER
----- Message from SANTOS Phelps sent at 11/25/2024  3:10 PM CST -----  Please let him know his Xray showed some degenerative disc disease (wear and tear) in his lower back. He should continue with his pain patches and pain reliever gel, and follow up with physical therapy (I put in the consult). Thank you!

## 2024-11-27 ENCOUNTER — TELEPHONE (OUTPATIENT)
Dept: FAMILY MEDICINE | Facility: CLINIC | Age: 74
End: 2024-11-27
Payer: MEDICARE

## 2024-11-27 NOTE — TELEPHONE ENCOUNTER
----- Message from Zainab sent at 11/27/2024  9:41 AM CST -----  .Type:  Patient Returning Call    Who Called: Self     Who Left Message for Patient: Misty Davila MA    Does the patient know what this is regarding?: Yes     Would the patient rather a call back or a response via [facility]? Call Back    Best Call Back Number: .[phone] (home)       Additional Information:

## 2024-12-02 ENCOUNTER — TELEPHONE (OUTPATIENT)
Dept: FAMILY MEDICINE | Facility: CLINIC | Age: 74
End: 2024-12-02
Payer: MEDICARE

## 2024-12-02 NOTE — TELEPHONE ENCOUNTER
----- Message from Med Assistant Petersen sent at 11/27/2024 11:23 AM CST -----  Type:  Patient Returning Call    Who Called:  Pt   Who Left Message for Patient:  Violet De Paz LPN  Does the patient know what this is regarding?:    Would the patient rather a call back or a response via [facility]?  Call back  Callback   Best Call Back Number:  Telephone Information:  Mobile          [phone]     Additional Information:    Thank you.

## 2024-12-05 ENCOUNTER — TELEPHONE (OUTPATIENT)
Dept: FAMILY MEDICINE | Facility: CLINIC | Age: 74
End: 2024-12-05
Payer: MEDICARE

## 2024-12-05 NOTE — TELEPHONE ENCOUNTER
----- Message from Laine sent at 12/5/2024  8:50 AM CST -----  Regarding: self  Who called: self    What is the request in detail: pt has coloscopy scheduled 12/12/24 and he does not remember the instructions to where he is supposed to get liquid to drink     Can the clinic reply by [facility]? No    Would the patient rather a call back or a response via [facility]? Call back    Best call back number: [phone]    Additional Information:    Thank you.

## 2024-12-06 ENCOUNTER — TELEPHONE (OUTPATIENT)
Dept: FAMILY MEDICINE | Facility: CLINIC | Age: 74
End: 2024-12-06
Payer: MEDICARE

## 2024-12-06 NOTE — TELEPHONE ENCOUNTER
Spoke with patient. Patient requesting to know about his colonoscopy appointment. Patient was advised that appointment is for 12/12/24. Patient verbalized understanding. Patient requesting advise on his prostate. Patient states Dr. Pandey ordered some test. Patient was advised to contact her office for further advise. Patient does not have any other questions or concerns at this time.

## 2024-12-06 NOTE — TELEPHONE ENCOUNTER
----- Message from Pola sent at 12/6/2024  9:25 AM CST -----  Regarding: self  Type:  Patient Returning Call    Who Called:self    Who Left Message for Patient: Austin Garcia MA    Does the patient know what this is regarding?:no    Would the patient rather a call back or a response via [facility]?callback    Best Call Back Number:[phone]    Additional Information:

## 2024-12-08 ENCOUNTER — ANESTHESIA EVENT (OUTPATIENT)
Dept: ENDOSCOPY | Facility: HOSPITAL | Age: 74
End: 2024-12-08
Payer: MEDICARE

## 2024-12-10 DIAGNOSIS — Z12.11 SCREENING FOR COLON CANCER: Primary | ICD-10-CM

## 2024-12-10 RX ORDER — POLYETHYLENE GLYCOL 3350, SODIUM SULFATE ANHYDROUS, SODIUM BICARBONATE, SODIUM CHLORIDE, POTASSIUM CHLORIDE 236; 22.74; 6.74; 5.86; 2.97 G/4L; G/4L; G/4L; G/4L; G/4L
4 POWDER, FOR SOLUTION ORAL ONCE
Qty: 4000 ML | Refills: 0 | Status: SHIPPED | OUTPATIENT
Start: 2024-12-10 | End: 2024-12-10

## 2024-12-12 ENCOUNTER — HOSPITAL ENCOUNTER (OUTPATIENT)
Facility: HOSPITAL | Age: 74
Discharge: HOME | End: 2024-12-12
Attending: STUDENT IN AN ORGANIZED HEALTH CARE EDUCATION/TRAINING PROGRAM | Admitting: STUDENT IN AN ORGANIZED HEALTH CARE EDUCATION/TRAINING PROGRAM
Payer: MEDICARE

## 2024-12-12 ENCOUNTER — ANESTHESIA (OUTPATIENT)
Dept: ENDOSCOPY | Facility: HOSPITAL | Age: 74
End: 2024-12-12
Payer: MEDICARE

## 2024-12-12 VITALS
SYSTOLIC BLOOD PRESSURE: 135 MMHG | OXYGEN SATURATION: 98 % | TEMPERATURE: 98 F | HEART RATE: 50 BPM | RESPIRATION RATE: 13 BRPM | DIASTOLIC BLOOD PRESSURE: 88 MMHG

## 2024-12-12 DIAGNOSIS — Z86.0100 ENCOUNTER FOR COLONOSCOPY DUE TO HISTORY OF COLONIC POLYP: ICD-10-CM

## 2024-12-12 DIAGNOSIS — D12.6 TUBULAR ADENOMA OF COLON: Primary | ICD-10-CM

## 2024-12-12 DIAGNOSIS — Z12.11 ENCOUNTER FOR COLONOSCOPY DUE TO HISTORY OF COLONIC POLYP: ICD-10-CM

## 2024-12-12 PROCEDURE — 37000008 HC ANESTHESIA 1ST 15 MINUTES: Performed by: STUDENT IN AN ORGANIZED HEALTH CARE EDUCATION/TRAINING PROGRAM

## 2024-12-12 PROCEDURE — 45385 COLONOSCOPY W/LESION REMOVAL: CPT | Mod: PT | Performed by: STUDENT IN AN ORGANIZED HEALTH CARE EDUCATION/TRAINING PROGRAM

## 2024-12-12 PROCEDURE — 88305 TISSUE EXAM BY PATHOLOGIST: CPT | Performed by: PATHOLOGY

## 2024-12-12 PROCEDURE — 45385 COLONOSCOPY W/LESION REMOVAL: CPT | Mod: PT,GC,, | Performed by: STUDENT IN AN ORGANIZED HEALTH CARE EDUCATION/TRAINING PROGRAM

## 2024-12-12 PROCEDURE — 27201089 HC SNARE, DISP (ANY): Performed by: STUDENT IN AN ORGANIZED HEALTH CARE EDUCATION/TRAINING PROGRAM

## 2024-12-12 PROCEDURE — 25000003 PHARM REV CODE 250: Performed by: STUDENT IN AN ORGANIZED HEALTH CARE EDUCATION/TRAINING PROGRAM

## 2024-12-12 PROCEDURE — 63600175 PHARM REV CODE 636 W HCPCS: Performed by: STUDENT IN AN ORGANIZED HEALTH CARE EDUCATION/TRAINING PROGRAM

## 2024-12-12 PROCEDURE — 37000009 HC ANESTHESIA EA ADD 15 MINS: Performed by: STUDENT IN AN ORGANIZED HEALTH CARE EDUCATION/TRAINING PROGRAM

## 2024-12-12 PROCEDURE — 88305 TISSUE EXAM BY PATHOLOGIST: CPT | Mod: 26,,, | Performed by: PATHOLOGY

## 2024-12-12 RX ORDER — LIDOCAINE HYDROCHLORIDE 10 MG/ML
1 INJECTION, SOLUTION EPIDURAL; INFILTRATION; INTRACAUDAL; PERINEURAL ONCE
Status: DISCONTINUED | OUTPATIENT
Start: 2024-12-12 | End: 2024-12-12 | Stop reason: HOSPADM

## 2024-12-12 RX ORDER — LIDOCAINE HYDROCHLORIDE 20 MG/ML
INJECTION, SOLUTION EPIDURAL; INFILTRATION; INTRACAUDAL; PERINEURAL
Status: DISCONTINUED
Start: 2024-12-12 | End: 2024-12-12 | Stop reason: HOSPADM

## 2024-12-12 RX ORDER — LIDOCAINE HYDROCHLORIDE 20 MG/ML
INJECTION INTRAVENOUS
Status: DISCONTINUED | OUTPATIENT
Start: 2024-12-12 | End: 2024-12-12

## 2024-12-12 RX ORDER — PROPOFOL 10 MG/ML
VIAL (ML) INTRAVENOUS
Status: DISCONTINUED
Start: 2024-12-12 | End: 2024-12-12 | Stop reason: HOSPADM

## 2024-12-12 RX ORDER — SODIUM CHLORIDE 9 MG/ML
INJECTION, SOLUTION INTRAVENOUS CONTINUOUS
Status: DISCONTINUED | OUTPATIENT
Start: 2024-12-12 | End: 2024-12-12 | Stop reason: HOSPADM

## 2024-12-12 RX ORDER — PROPOFOL 10 MG/ML
VIAL (ML) INTRAVENOUS
Status: DISCONTINUED | OUTPATIENT
Start: 2024-12-12 | End: 2024-12-12

## 2024-12-12 RX ADMIN — PROPOFOL 30 MG: 10 INJECTION, EMULSION INTRAVENOUS at 09:12

## 2024-12-12 RX ADMIN — PROPOFOL 50 MG: 10 INJECTION, EMULSION INTRAVENOUS at 09:12

## 2024-12-12 RX ADMIN — LIDOCAINE HYDROCHLORIDE 80 MG: 20 INJECTION, SOLUTION INTRAVENOUS at 09:12

## 2024-12-12 RX ADMIN — LIDOCAINE HYDROCHLORIDE 20 MG: 20 INJECTION, SOLUTION INTRAVENOUS at 09:12

## 2024-12-12 RX ADMIN — SODIUM CHLORIDE: 0.9 INJECTION, SOLUTION INTRAVENOUS at 09:12

## 2024-12-12 NOTE — H&P
Short Stay Endoscopy History and Physical    PCP - Clark Vargas MD    Procedure - Colonoscopy  ASA - per anesthesia  Mallampati - per anesthesia  History of Anesthesia problems - no  Family history Anesthesia problems -  no   Plan of anesthesia - General    HPI:  This is a 74 y.o. male here for evaluation of : Hx of tubular adenoma in 2018    ROS:  Constitutional: No fevers, chills  CV: No chest pain  Pulm: No shortness of breath  GI: see HPI  Derm: No rash    Medical History:  has a past medical history of Arthritis, Erectile dysfunction, Gout, Hyperlipidemia, Hypertension, Morbid obesity, Nuclear sclerosis of both eyes (01/16/2018), Peripheral edema, Prediabetes, Screening for AAA (abdominal aortic aneurysm), and Sleep apnea.    Surgical History:  has a past surgical history that includes tooth implant; Spine surgery (03/31/2016); Colonoscopy (N/A, 10/31/2018); Epidural steroid injection (N/A, 08/07/2020); Phacoemulsification of cataract (Right, 03/07/2024); Intraocular prosthesis insertion (Right, 03/07/2024); Eye surgery (Right, 03/07/2024); Carpal tunnel release (Right); and Circumcision (N/A, 5/31/2024).    Family History: family history includes COPD in his mother; Cataracts in his brother; Glaucoma in his brother; No Known Problems in his father, maternal aunt, maternal grandfather, maternal grandmother, maternal uncle, paternal aunt, paternal grandfather, paternal grandmother, paternal uncle, and sister.. Otherwise no colon cancer, inflammatory bowel disease, or GI malignancies.    Social History:  reports that he quit smoking about 15 years ago. His smoking use included cigarettes. He has been exposed to tobacco smoke. He has quit using smokeless tobacco. He reports current alcohol use of about 12.0 standard drinks of alcohol per week. He reports that he does not use drugs.    Review of patient's allergies indicates:   Allergen Reactions    Codeine Swelling     Codeine with Aspirin caused chest  pain       Medications:   Medications Prior to Admission   Medication Sig Dispense Refill Last Dose/Taking    allopurinoL (ZYLOPRIM) 100 MG tablet Take 1 tablet (100 mg total) by mouth once daily. 90 tablet 3     ammonium lactate (LAC-HYDRIN) 12 % lotion Apply topically as needed for Dry Skin. 396 g 1     aspirin (ECOTRIN) 81 MG EC tablet Take 81 mg by mouth once daily.       atorvastatin (LIPITOR) 40 MG tablet Take 1 tablet (40 mg total) by mouth every evening. 90 tablet 3     clotrimazole-betamethasone 1-0.05% (LOTRISONE) cream APPLY TO AFFECTED AREA TWICE A DAY 45 g 3     diclofenac sodium (VOLTAREN ARTHRITIS PAIN) 1 % Gel Apply 2 g topically once daily. 50 g 1     fluorouraciL (EFUDEX) 5 % cream Apply topically 2 (two) times daily. Apply to penile skin once daily Monday-Friday for 6 weeks, remove cream from skin after 2 hours of contact time 40 g 2     furosemide (LASIX) 40 MG tablet Take 1 tablet (40 mg total) by mouth once daily. 90 tablet 3     glucosamine/chondr gtz A sod (OSTEO BI-FLEX ORAL) Take by mouth.       ketoconazole (NIZORAL) 2 % cream Apply topically once daily. To affected area on penis/inguinal area 60 g 3     LIDOcaine (LIDODERM) 5 % Place 1 patch onto the skin once daily. Remove & Discard patch within 12 hours or as directed by MD 30 patch 1     lisinopriL 10 MG tablet TAKE 1 TABLET BY MOUTH EVERY DAY 90 tablet 3     metFORMIN (GLUCOPHAGE-XR) 500 MG ER 24hr tablet TAKE 1 TABLET (500 MG TOTAL) BY MOUTH ONCE DAILY. NEEDS OFFICE VISIT FOR FURTHER REFILLS 90 tablet 0     multivitamin (ONE-A-DAY ESSENTIAL ORAL) Take by mouth.       mupirocin (BACTROBAN) 2 % ointment Apply topically 3 (three) times daily. 30 g 1     naltrexone-bupropion (CONTRAVE) 8-90 mg TbSR Take 1 tablet by mouth once daily. 30 tablet 1     tamsulosin (FLOMAX) 0.4 mg Cap TAKE 2 CAPSULES BY MOUTH ONCE DAILY. 180 capsule 3          Physical Exam:    Vital Signs:   Vitals:    12/12/24 0903   BP: (!) 168/91   Pulse: (!) 56   Resp: 19    Temp: 97.9 °F (36.6 °C)       General Appearance: Well appearing in no acute distress  Eyes:    No scleral icterus  ENT: lips and tongue normal  Lungs: no use of accessory muscles  Heart:  normal rate, regular rhythm  Abdomen: Soft, non tender, non distended   Extremities: no edema  Skin: No rash      Labs:  Lab Results   Component Value Date    WBC 7.08 05/22/2024    HGB 12.0 (L) 05/22/2024    HCT 39.0 (L) 05/22/2024     05/22/2024    CHOL 171 03/08/2024    TRIG 137 03/08/2024    HDL 42 03/08/2024    ALT 22 07/12/2024    AST 17 07/12/2024     07/12/2024    K 3.7 07/12/2024    CL 99 07/12/2024    CREATININE 1.0 07/12/2024    BUN 12 07/12/2024    CO2 31 (H) 07/12/2024    TSH 2.546 07/12/2024    PSA 0.41 05/23/2016    INR 0.9 03/31/2016    HGBA1C 6.3 (H) 05/22/2024       I have explained the risks and benefits of endoscopy procedures to the patient including but not limited to bleeding, perforation, infection, and death.  The patient was asked if they understand and allowed to ask any further questions to their satisfaction.    Ntiin Munoz MD

## 2024-12-12 NOTE — PLAN OF CARE
Procedure and recovery complete. Pt awake and alert. MD Munoz spouse at bedside, procedure findings and suggestions discussed. Discharge instructions given, pt verbalized understanding of instruction. Iv was D/c'd, inner cannula intact. No distress noted. No c/o pain. Gait steady, able to ambulate without assistance. Pt walked out accompanied by spouse.

## 2024-12-12 NOTE — ANESTHESIA POSTPROCEDURE EVALUATION
Anesthesia Post Evaluation    Patient: Armand Painting    Procedure(s) Performed: Procedure(s) (LRB):  COLONOSCOPY, SCREENING, HIGH RISK PATIENT (N/A)    Final Anesthesia Type: general      Patient location during evaluation: GI PACU  Patient participation: Yes- Able to Participate  Level of consciousness: awake and alert  Post-procedure vital signs: reviewed and stable  Pain management: adequate  Airway patency: patent    PONV status at discharge: No PONV  Anesthetic complications: no      Cardiovascular status: hemodynamically stable  Respiratory status: unassisted and spontaneous ventilation  Hydration status: euvolemic  Follow-up not needed.              Vitals Value Taken Time   /88 12/12/24 1030   Temp 36.4 °C (97.6 °F) 12/12/24 0957   Pulse 50 12/12/24 1030   Resp 13 12/12/24 1030   SpO2 98 % 12/12/24 1030         Event Time   Out of Recovery 10:51:55         Pain/Jas Score: Jas Score: 10 (12/12/2024 10:30 AM)

## 2024-12-12 NOTE — TRANSFER OF CARE
Anesthesia Transfer of Care Note    Patient: Armand Painting    Procedure(s) Performed: Procedure(s) (LRB):  COLONOSCOPY, SCREENING, HIGH RISK PATIENT (N/A)    Patient location: GI    Anesthesia Type: MAC    Transport from OR: Transported from OR on 2-3 L/min O2 by NC with adequate spontaneous ventilation    Post pain: adequate analgesia    Post assessment: no apparent anesthetic complications and tolerated procedure well    Post vital signs: stable    Level of consciousness: responds to stimulation and lethargic    Nausea/Vomiting: no nausea/vomiting    Complications: none    Transfer of care protocol was followed      Last vitals: Visit Vitals  /83 (BP Location: Left arm, Patient Position: Lying)   Pulse 64   Temp 36.4 °C (97.6 °F) (Oral)   Resp 15   SpO2 99%

## 2024-12-12 NOTE — ANESTHESIA PREPROCEDURE EVALUATION
Past Medical History:   Diagnosis Date    Arthritis     Erectile dysfunction     Gout     Hyperlipidemia     Hypertension     Morbid obesity     Nuclear sclerosis of both eyes 01/16/2018    Peripheral edema     Prediabetes     Screening for AAA (abdominal aortic aneurysm)     6/2016 AAA US no aneurysm    Sleep apnea        VITALS  There were no vitals filed for this visit.      LABS  Pre-op Assessment    I have reviewed the Patient Summary Reports.     I have reviewed the Nursing Notes.       Review of Systems  Anesthesia Hx:  No problems with previous Anesthesia             Denies Family Hx of Anesthesia complications.    Denies Personal Hx of Anesthesia complications.                    Social:  No Alcohol Use, Former Smoker       Hematology/Oncology:  Hematology Normal   Oncology Normal                                   EENT/Dental:  EENT/Dental Normal           Cardiovascular:  Exercise tolerance: good   Hypertension           hyperlipidemia CHAO   06/24 Echo: EF 60-65%; PAP 36   Functional Capacity good / => 4 METS                         Pulmonary:       Denies Shortness of breath.  Sleep Apnea, CPAP intermittently          Education provided regarding risk of obstructive sleep apnea            Hepatic/GI:  Hepatic/GI Normal                    Musculoskeletal:  Arthritis               Neurological:  Neurology Normal                                      Endocrine:  Diabetes         Morbid Obesity / BMI > 40  Dermatological:  Skin Normal    Psych:  Psychiatric Normal                    Physical Exam  General: Well nourished, Cooperative, Alert and Oriented    Airway:  Mallampati: II   Mouth Opening: Normal  TM Distance: 4 - 6 cm  Tongue: Large  Neck ROM: Extension Decreased    Dental:  Upper dentures  Chest/Lungs:  Normal Respiratory Rate, Clear to auscultation    Heart:  Rate: Normal  Rhythm:  Regular Rhythm      Wt Readings from Last 3 Encounters:   11/25/24 128.9 kg (284 lb 2.8 oz)   11/22/24 131 kg (288 lb 11.1 oz)   10/07/24 129.7 kg (286 lb)     Temp Readings from Last 3 Encounters:   11/22/24 36.5 °C (97.7 °F) (Oral)   08/06/24 36.6 °C (97.8 °F) (Oral)   05/31/24 36.4 °C (97.6 °F) (Oral)     BP Readings from Last 3 Encounters:   11/25/24 122/81   11/22/24 126/68   08/06/24 124/80     Pulse Readings from Last 3 Encounters:   11/25/24 76   11/22/24 67   08/06/24 73         Anesthesia Plan  Type of Anesthesia, risks & benefits discussed:    Anesthesia Type: Gen ETT, MAC, Gen Natural Airway  Intra-op Monitoring Plan: Standard ASA Monitors  Post Op Pain Control Plan: multimodal analgesia and IV/PO Opioids PRN  Induction:  IV  Informed Consent: Informed consent signed with the Patient and all parties understand the risks and agree with anesthesia plan.  All questions answered.   ASA Score: 3  Day of Surgery Review of History & Physical: H&P Update referred to the surgeon/provider.  Anesthesia Plan Notes:         Ready For Surgery From Anesthesia Perspective.     .

## 2024-12-12 NOTE — [employer]OVATION PATIENT INSTRUCTIONS
Discharge Summary/Instructions after an Endoscopic Procedure  Patient Name: Armand Painting  Patient MRN: [id number]  Patient YOB: 1950  Thursday, December 12, 2024  Evelyn Cedeño MD  Dear patient,  As a result of recent federal legislation (The Federal Cures Act), you may   receive lab or pathology results from your procedure in your [facility]   account before your physician is able to contact you. Your physician or   their representative will relay the results to you with their   recommendations at their soonest availability.  Thank you,  RESTRICTIONS:  During your procedure today, you received medications for sedation.  These   medications may affect your judgment, balance and coordination.  Therefore,   for 24 hours, you have the following restrictions:   - DO NOT drive a car, operate machinery, make legal/financial decisions,   sign important papers or drink alcohol.    ACTIVITY:  Today: no heavy lifting, straining or running due to procedural   sedation/anesthesia.  The following day: return to full activity including work.  DIET:  Eat and drink normally unless instructed otherwise.     TREATMENT FOR COMMON SIDE EFFECTS:  - Mild abdominal pain, nausea, belching, bloating or excessive gas:  rest,   eat lightly and use a heating pad.  - Sore Throat: treat with throat lozenges and/or gargle with warm salt   water.  - Because air was used during the procedure, expelling large amounts of air   from your rectum or belching is normal.  - If a bowel prep was taken, you may not have a bowel movement for 1-3 days.    This is normal.  SYMPTOMS TO WATCH FOR AND REPORT TO YOUR PHYSICIAN:  1. Abdominal pain or bloating, other than gas cramps.  2. Chest pain.  3. Back pain.  4. Signs of infection such as: chills or fever occurring within 24 hours   after the procedure.  5. Rectal bleeding, which would show as bright red, maroon, or black stools.   (A tablespoon of blood from the rectum is not serious, especially  if   hemorrhoids are present.)  6. Vomiting.  7. Weakness or dizziness.  GO DIRECTLY TO THE NEAREST EMERGENCY ROOM IF YOU HAVE ANY OF THE FOLLOWING:      Difficulty breathing              Chills and/or fever over 101 F   Persistent vomiting and/or vomiting blood   Severe abdominal pain   Severe chest pain   Black, tarry stools   Bleeding- more than one tablespoon   Any other symptom or condition that you feel may need urgent attention  Your doctor recommends these additional instructions:  If any biopsies were taken, your doctors clinic will contact you in 1 to 2   weeks with any results.  - Patient has a contact number available for emergencies.  The signs and   symptoms of potential delayed complications were discussed with the   patient.  Return to normal activities tomorrow.  Written discharge   instructions were provided to the patient.   - Discharge patient to home.   - Resume previous diet.   - Continue present medications.   - Await pathology results.   - Repeat colonoscopy in 7-10 years for surveillance.   - Return to primary care physician as previously scheduled.  For questions, problems or results please call your physician - Evelyn Cedeño MD at Work:  [phone].  [facility] can be reached at [phone]     IF A COMPLICATION OR EMERGENCY SITUATION ARISES AND YOU ARE UNABLE TO REACH   YOUR PHYSICIAN - GO DIRECTLY TO THE EMERGENCY ROOM.  MD Evelyn Corey MD  12/12/2024 10:06:00 AM  This report has been verified and signed electronically.  Dear patient,  As a result of recent federal legislation (The Federal Cures Act), you may   receive lab or pathology results from your procedure in your [facility]   account before your physician is able to contact you. Your physician or   their representative will relay the results to you with their   recommendations at their soonest availability.  Thank you,  PROVATION

## 2024-12-13 LAB
FINAL PATHOLOGIC DIAGNOSIS: NORMAL
GROSS: NORMAL
Lab: NORMAL

## 2024-12-16 ENCOUNTER — TELEPHONE (OUTPATIENT)
Dept: GASTROENTEROLOGY | Facility: CLINIC | Age: 74
End: 2024-12-16
Payer: MEDICARE

## 2024-12-16 NOTE — TELEPHONE ENCOUNTER
----- Message from Evelyn Cedeño MD sent at 12/14/2024  8:32 AM CST -----  Please let the patient know that the polyp removed during his recent colonoscopy was a benign tubular adenoma. Repeat colonoscopy in 7-10 years.

## 2024-12-16 NOTE — TELEPHONE ENCOUNTER
MA called/spoke with patient. Reviewed biopsy results and provider recommendations. Patient verbalized understanding and had no other questions at this time.

## 2024-12-21 DIAGNOSIS — I10 ESSENTIAL HYPERTENSION: ICD-10-CM

## 2024-12-21 DIAGNOSIS — E78.2 MIXED HYPERLIPIDEMIA: ICD-10-CM

## 2024-12-21 DIAGNOSIS — R73.03 PREDIABETES: Chronic | ICD-10-CM

## 2024-12-21 NOTE — TELEPHONE ENCOUNTER
Care Due:                  Date            Visit Type   Department     Provider  --------------------------------------------------------------------------------                                [facility]                              PRIMARY      MED/ INTERNAL  Last Visit: 08-      CARE ([facility])   MED/ EILEENS      Clark Vargas                              [facility]                              PRIMARY      MED/ INTERNAL  Next Visit: 02-      CARE ([facility])   MED/ PEDS      Clark Vargas                                                            Last  Test          Frequency    Reason                     Performed    Due Date  --------------------------------------------------------------------------------    HBA1C.......  6 months...  metFORMIN................  05- 11-    Uric Acid...  12 months..  allopurinoL..............  03- 03-    [facility] Embedded Care Due Messages. Reference number: [id number].   12/21/2024 10:28:10 AM CST

## 2024-12-22 RX ORDER — PRAVASTATIN SODIUM 20 MG/1
20 TABLET ORAL
Qty: 90 TABLET | Refills: 1 | OUTPATIENT
Start: 2024-12-22

## 2024-12-22 RX ORDER — METFORMIN HYDROCHLORIDE 500 MG/1
500 TABLET, EXTENDED RELEASE ORAL DAILY
Qty: 90 TABLET | Refills: 0 | Status: SHIPPED | OUTPATIENT
Start: 2024-12-22

## 2024-12-22 RX ORDER — HYDROCHLOROTHIAZIDE 25 MG/1
TABLET ORAL
Qty: 90 TABLET | Refills: 0 | OUTPATIENT
Start: 2024-12-22

## 2024-12-22 NOTE — TELEPHONE ENCOUNTER
Refill Routing Note   Medication(s) are not appropriate for processing by [facility] for the following reason(s):        Required labs outdated    [facility] action(s):  Defer      Medication Therapy Plan: FLOS      Appointments  past 12m or future 3m with PCP    Date Provider   Last Visit   8/6/2024 Clark Vargas MD   Next Visit   2/6/2025 Clark Vargas MD   ED visits in past 90 days: 0        Note composed:12:35 PM 12/22/2024

## 2025-01-31 ENCOUNTER — PATIENT OUTREACH (OUTPATIENT)
Dept: ADMINISTRATIVE | Facility: HOSPITAL | Age: 75
End: 2025-01-31
Payer: MEDICARE

## 2025-02-13 ENCOUNTER — OFFICE VISIT (OUTPATIENT)
Dept: UROLOGY | Facility: CLINIC | Age: 75
End: 2025-02-13
Payer: MEDICARE

## 2025-02-13 VITALS — BODY MASS INDEX: 43.18 KG/M2 | WEIGHT: 284 LBS

## 2025-02-13 DIAGNOSIS — N48.9 PENILE LESION: ICD-10-CM

## 2025-02-13 DIAGNOSIS — C60.9 PENILE CANCER: Primary | ICD-10-CM

## 2025-02-13 DIAGNOSIS — N52.01 ERECTILE DYSFUNCTION DUE TO ARTERIAL INSUFFICIENCY: ICD-10-CM

## 2025-02-13 PROCEDURE — 3288F FALL RISK ASSESSMENT DOCD: CPT | Mod: CPTII,S$GLB,, | Performed by: UROLOGY

## 2025-02-13 PROCEDURE — 99999 PR PBB SHADOW E&M-EST. PATIENT-LVL III: CPT | Mod: PBBFAC,,, | Performed by: UROLOGY

## 2025-02-13 PROCEDURE — 1159F MED LIST DOCD IN RCRD: CPT | Mod: CPTII,S$GLB,, | Performed by: UROLOGY

## 2025-02-13 PROCEDURE — 1101F PT FALLS ASSESS-DOCD LE1/YR: CPT | Mod: CPTII,S$GLB,, | Performed by: UROLOGY

## 2025-02-13 PROCEDURE — G2211 COMPLEX E/M VISIT ADD ON: HCPCS | Mod: S$GLB,,, | Performed by: UROLOGY

## 2025-02-13 PROCEDURE — 99214 OFFICE O/P EST MOD 30 MIN: CPT | Mod: S$GLB,,, | Performed by: UROLOGY

## 2025-02-13 PROCEDURE — 1160F RVW MEDS BY RX/DR IN RCRD: CPT | Mod: CPTII,S$GLB,, | Performed by: UROLOGY

## 2025-02-13 RX ORDER — SILDENAFIL 100 MG/1
100 TABLET, FILM COATED ORAL DAILY PRN
Qty: 30 TABLET | Refills: 11 | Status: SHIPPED | OUTPATIENT
Start: 2025-02-13

## 2025-02-13 NOTE — PROGRESS NOTES
"Subjective:       Armand Painting is a 74 y.o. male who is an established patient who was referred by Dr Vargas  for evaluation of dysuria.      He recently saw PCP with c/o dysuria. UCx at that time showed moderate growth 50-99k Enterobacter. He was also increased to Flomax BID - urgency in AM, nocturia x 2-3, strong stream. Does not appear he was treated with antibiotics. He was given cream (nystatin-triamcinolone) for suspected balantis. He notes a small cut on foreskin. He does have some burning externally after voiding but denies any dysuria.     He also reports problems with ED. He was given Cialis by PCP but may not have worked well. He has not tried Viagra.     PVR (bladder scan) today - 45cc (not true PVR)    Last PSA 5/16 - 0.41    11/18/2019  Now reports intermittent dysuria that has returned. He reports this is more external irritation. The lesion has reappeared on foreskin. He is out of cream from PCP.   Also reports Viagra 100mg is not working well, worked only one time. He did not try it with emptying stomach.     3/3/2020  He is using Lotrisone cream. He reports penile lesion is still present but improving. Concerned about cost of cream.     8/4/2020  Still with mild urinary frequency (again unable to give specimen) and still with penile lesion. Lesion will come and go, returns when does not use Lotrisone. Did go away completely but now returned. Feels that is related to being wet due to not being circumcised.     2/4/2021  Still with penile lesion - "on and off". Improves with cream.     8/30/2022  Again concerned about wound on penis. Using cream most days. Can be painful. Lesion with come and go but never 100% resolves.     3/9/2023  Occasional pain with urge to void. Wound has improved but still remains. Using Lotrisone. Asking about ED meds.     12/4/2023  Again with penile skin issue. Using Lotrisone without improvement. Has not seen derm (previously referred).     4/4/2024  Has now seen derm, given " ketoconazole. He is unsure if this is helping but he is unsure what cream he is using. It looks like he most recently got Lotrisone, which was recommended not to be used by derm. A1c - 6.4.    5/20/2024  Still with penile skin irritation - has become more painful and will more ulcerative lesions. Difficulty retracting foreskin due to pain. +edema, seeing cardiology.     He is now s/p circumcision 5/31/24 for nonhealing ulcerative penile lesion. 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     Referred to  onc - saw Dr Jarquin 6/2024. Given topical chemo (5FU) x 6 weeks. He was recommended to f/u in 3 months but did not see. He states he used the 5FU twice only.     He returns now with ED - known for at least 5-6 years. Last tried Viagra years ago - reports it was not helpful.                       The following portions of the patient's history were reviewed and updated as appropriate: allergies, current medications, past family history, past medical history, past social history, past surgical history and problem list.    Review of Systems  Constitutional: no fever or chills  ENT: no nasal congestion or sore throat  Respiratory: no cough or shortness of breath  Cardiovascular: no chest pain or palpitations  Gastrointestinal: no nausea or vomiting, tolerating diet  Genitourinary: as per HPI  Hematologic/Lymphatic: no easy bruising or lymphadenopathy  Musculoskeletal: no arthralgias or myalgias  Skin: no rashes or lesions  Neurological: no seizures or tremors  Behavioral/Psych: no auditory or visual hallucinations       Objective:    Vitals: Wt 128.8 kg (284 lb)   BMI 43.18 kg/m²     Physical Exam   General: well developed, well nourished in no acute distress  Head: normocephalic, atraumatic  Neck: supple, trachea midline, no obvious enlargement of thyroid  HEENT: EOMI, mucus  "membranes moist, sclera anicteric, no hearing impairment  Lungs: symmetric expansion, non-labored breathing  Neuro: alert and oriented x 3, no gross deficits  Psych: normal judgment and insight, normal mood/affect and non-anxious  Genitourinary:   Well healed incision. Skin edges intact. No lesions.No inguinal LAD.   JASEN: deferred      Lab Review   Urine analysis today in clinic shows - no urine      Lab Results   Component Value Date    WBC 7.08 05/22/2024    HGB 12.0 (L) 05/22/2024    HCT 39.0 (L) 05/22/2024    MCV 84 05/22/2024     05/22/2024     Lab Results   Component Value Date    CREATININE 1.0 07/12/2024    BUN 12 07/12/2024     Lab Results   Component Value Date    PSA 0.41 05/23/2016     No results found for: "PSADIAG"    Imaging  Reports and images were personally reviewed by me and discussed with the patient today         Assessment/Plan:      1. Nocturia    - Less bother with Flomax BID, continue   - Last PSA years ago but very low. Likely okay to hold on repeat check.      2. Dysuria    - Intermittent dysuria has returned   - Consider cystoscopy - seems to be more external     3. Penile lesion    - Lotrisone cream refilled   - Unsure etiology though has remained stable   - Discussed excisional biopsy thoroughly - he declines this. Discussed possible circumcision - he declines this. Again discussed - will consider next visit. Concern for infection risk with biopsy due to location of wound.    - Again discussed derm referral - declined initially, referred but he did not see, referral placed again.    - Again discussed circumcision - he will continue to consider this. I recommend circumcision for complete removal of abnormal skin but he declines at this time.    - He would like to see derm prior to consider circumcision. Rec for ketoconazole.  Reordered keto today as seems he was given Lotrisone recently (rec against this by derm).    - s/p circumcision 5/31/24       4. Erectile dysfunction due to " arterial insufficiency    - Cialis did not work well   - Viagra 100mg on empty stomach (goodrx coupon) - not helpful previously, wants repeat trial. Again recommend empty stomach.    - Briefly discussed KESHIA or ICI.    - Again discussed ICI as next best option as zero erection for 5+ years and no response with PDE5i.    - Repeat Viagra trial     5. Penile SCC   - Discussed at length    - Saw  onc 6/2024 - given 5FU. He used twice and noted irritation so stopped. He declines further treatment. He opts for surveillance with exams only.       Follow up in 6 months for repeat exam           Visit today included increased complexity associated with the care of the episodic problem penile ca, ED addressed and managing the longitudinal care of the patient due to the serious and/or complex managed problem(s) penile ca, ED.

## 2025-03-17 RX ORDER — ATORVASTATIN CALCIUM 40 MG/1
40 TABLET, FILM COATED ORAL NIGHTLY
Qty: 90 TABLET | Refills: 3 | Status: SHIPPED | OUTPATIENT
Start: 2025-03-17

## 2025-03-17 RX ORDER — FUROSEMIDE 40 MG/1
40 TABLET ORAL
Qty: 90 TABLET | Refills: 3 | Status: SHIPPED | OUTPATIENT
Start: 2025-03-17

## 2025-04-28 DIAGNOSIS — Z00.00 ENCOUNTER FOR MEDICARE ANNUAL WELLNESS EXAM: ICD-10-CM

## 2025-05-21 NOTE — ASSESSMENT & PLAN NOTE
Check labs on allopurinol.  Orders:    Uric Acid; Future    allopurinoL (ZYLOPRIM) 100 MG tablet; Take 1 tablet (100 mg total) by mouth once daily.

## 2025-05-21 NOTE — ASSESSMENT & PLAN NOTE
Check labs.  On metformin.  Orders:    Comprehensive Metabolic Panel; Future    Hemoglobin A1C; Future    CBC Without Differential; Future

## 2025-05-21 NOTE — PROGRESS NOTES
This note was created by combination of typed  and M-Modal dictation.  Transcription errors may be present.   This note was also generated with the assistance of ambient listening technology. Verbal consent was obtained by the patient and accompanying visitor(s) for the recording of patient appointment to facilitate this note. I attest to having reviewed and edited the generated note for accuracy, though some syntax or spelling errors may persist. Please contact the author of this note for any clarification.    Assessment and Plan:   Assessment and Plan   Assessment & Plan  Essential hypertension 06/12/2024 TTE LV normal size mild concentric hypertrophy normal systolic function LVEF 60-65%.  Mild aortic valve sclerosis.  Aortic root mildly dilated.  Essential hypertension no outside checks; 3/2014 exercise stress echo no ischemia LVEF 55-60  Blood pressure today stable.  On low-dose lisinopril.  No changes  Orders:    Comprehensive Metabolic Panel; Future    lisinopriL 10 MG tablet; Take 1 tablet (10 mg total) by mouth once daily.    Lymphedema  Seen by vascular.  Plan is conservative management.  Had venous ultrasound done November 2024 negative for reflux.  Was seen by vascular in November.  Notes that lymphedema pump caused a lot of pain.  Patient however notes he is intolerant to compression stockings.  He thinks that these were over-the-counter.  He is on Lasix.  With Middling efficacy.          Mixed hyperlipidemia  Check labs on atorvastatin 40.  Needs to follow up with Cardiology  Orders:    Comprehensive Metabolic Panel; Future    Lipid Panel; Future    Pararenal abdominal aortic aneurysm (AAA) without rupture 06/12/2024 AAA ultrasound showing suprarenal AAA max diameter 3.1 cm.  Managed by Cardiology.  Due for follow-up.  Last ultrasound was done 06/2024 showing suprarenal AAA       Prediabetes  Check labs.  On metformin.  Orders:    Comprehensive Metabolic Panel; Future    Hemoglobin A1C;  Future    CBC Without Differential; Future    Morbid obesity with BMI of 40.0-44.9, adult  We discussed previously about weight loss management medications.  Medicare advantage does not cover GLP1.  We had previously sent in a prescription for Contrave but I do not think he ever got it filled.  We talked about getting GLP1 from compounding pharmacy but he is unable to afford the 250 dollars a month cost.  We also briefly talked about bariatric surgery.  He is wary of it and I am not sure if he would qualify for surgery given age and comorbidities.  Continue working on TLC       Tubular adenoma of colon 12/12/24 colonoscopy TA ascending. Diffuse diverticulosis.  Colonoscopy done 12/2024 with tubular adenoma.  Aged out of repeat       Gout, arthritis  Idiopathic chronic gout without tophus, unspecified site  Check labs on allopurinol.  Orders:    Uric Acid; Future    allopurinoL (ZYLOPRIM) 100 MG tablet; Take 1 tablet (100 mg total) by mouth once daily.    SIS (obstructive sleep apnea) on sleep study 4/2018 with AHI 55.  CPAP at 15  Has not really been using CPAP.  He saw Sleep Medicine last summer.  He tells me that he is due for a replacement machine and they had ordered replacement supplies but reports he never got those.  Will reorder supplies, fullface mask.  He has a sleep study done 4/18/2018.  If he has not been using it it is possible that he may need to re certify with sleep apnea before insurance will agree to pay for it  Orders:    CPAP/BIPAP SUPPLIES    Lumbar spondylosis  Other chronic pain  Would like to limit NSAIDs as much as possible but if this continues to be an issue may need to tried.  Would really avoid narcotics.  Weight loss will help  Remote history of gabapentin unclear if he ever took it.  May consider rechallenging again.  Versus Cymbalta.       Alpha thalassemia silent carrier       Benign prostatic hyperplasia with urinary frequency  On tamsulosin 0.8, stable         Medications  Discontinued During This Encounter   Medication Reason    naltrexone-bupropion (CONTRAVE) 8-90 mg TbSR Therapy completed    allopurinoL (ZYLOPRIM) 100 MG tablet Reorder    lisinopriL 10 MG tablet Reorder    tamsulosin (FLOMAX) 0.4 mg Cap Reorder    metFORMIN (GLUCOPHAGE-XR) 500 MG ER 24hr tablet Reorder       meds sent this encounter:  Medications Ordered This Encounter   Medications    allopurinoL (ZYLOPRIM) 100 MG tablet     Sig: Take 1 tablet (100 mg total) by mouth once daily.     Dispense:  90 tablet     Refill:  3     Pharmacy update refills, keep on file, not requesting Rx to be filled today.    lisinopriL 10 MG tablet     Sig: Take 1 tablet (10 mg total) by mouth once daily.     Dispense:  90 tablet     Refill:  3     Pharmacy update refills, keep on file, not requesting Rx to be filled today.    metFORMIN (GLUCOPHAGE-XR) 500 MG ER 24hr tablet     Sig: Take 1 tablet (500 mg total) by mouth once daily. Keep appointment 2/6/25     Dispense:  90 tablet     Refill:  3     Pharmacy update refills, keep on file, not requesting Rx to be filled today.    olopatadine (PATANOL) 0.1 % ophthalmic solution     Sig: Place 1 drop into both eyes 2 (two) times daily.     Dispense:  5 mL     Refill:  11    tamsulosin (FLOMAX) 0.4 mg Cap     Sig: Take 2 capsules (0.8 mg total) by mouth every evening.     Dispense:  180 capsule     Refill:  3     Pharmacy update refills, keep on file, not requesting Rx to be filled today.         Follow Up:   Future Appointments   Date Time Provider Department Center   5/22/2025  1:00 PM Clark Vargas MD [facility] FAM MED [place]   6/19/2025  9:30 AM Maricel Cotter OD [facility] OPTOMTY [place]   8/14/2025  2:40 PM Gricelda Pandey MD [facility] URO [facility]          Subjective:   Subjective   Chief Complaint   Patient presents with    Follow-up       HPI  Armand is a 74 y.o. male.     Social History     Socioeconomic History    Marital status:    Tobacco Use    Smoking status: Former     Current  packs/day: 0.00     Types: Cigarettes     Quit date: 2009     Years since quittin.3     Passive exposure: Past    Smokeless tobacco: Former   Substance and Sexual Activity    Alcohol use: Yes     Alcohol/week: 12.0 standard drinks of alcohol     Types: 12 Cans of beer per week    Drug use: No    Sexual activity: Not Currently     Partners: Female     Comment: , children.     Social Drivers of Health     Financial Resource Strain: Low Risk  (2022)    Overall Financial Resource Strain (CARDIA)     Difficulty of Paying Living Expenses: Not hard at all   Food Insecurity: No Food Insecurity (2022)    Hunger Vital Sign     Worried About Running Out of Food in the Last Year: Never true     Ran Out of Food in the Last Year: Never true   Transportation Needs: No Transportation Needs (2022)    PRAPARE - Transportation     Lack of Transportation (Medical): No     Lack of Transportation (Non-Medical): No   Physical Activity: Insufficiently Active (2022)    Exercise Vital Sign     Days of Exercise per Week: 1 day     Minutes of Exercise per Session: 10 min   Stress: No Stress Concern Present (2022)    [employer] - Occupational Stress Questionnaire     Feeling of Stress : Not at all   Housing Stability: Low Risk  (2022)    Housing Stability Vital Sign     Unable to Pay for Housing in the Last Year: No     Number of Places Lived in the Last Year: 1     Unstable Housing in the Last Year: No       Last appointment with this clinic was Visit date not found. Last visit with me 2024   To summarize last visit and events leading up to today:  Alpha-thalassemia silent carrier  HTN   Lipid/statin  2022 TTE LV normal size with concentric hypertrophy normal systolic function LVEF 55%.  Indeterminate diastolic function.  2022 nuclear stress test negative for ischemia  Saw cardiology 2024 TTE LV normal size mild concentric hypertrophy normal  systolic function LVEF 60-65%.  Mild aortic valve sclerosis.  Aortic root mildly dilated.  06/12/2024 AAA ultrasound showing suprarenal AAA max diameter 3.1 cm.  Increase flow velocity across the left Ca suggesting greater than 50% stenosis.  06/12/2024 ultrasound venous Dopplers negative for DVT bilaterally.  3/2024 hctz changed to lasix  CHAO. CXR negative.  SIS.  Insomnia.  Fatigue with Benadryl.  Needs to work on sleep hygiene.  Had not been using CPAP  07/12/2024 sleep medicine consult for known SIS.  Encouraged to continue CPAP  preDM; metformin  Gout, allopurinol.  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  06/26/2024 saw  onc - start 5FU topical  3/7/24 cataract surgery R    Nonhealing penile lesion, seen by Urology and ultimately referred to dermatology.  [place] to be recurrent fungal hold on biopsy start topical ketoconazole.  Circumcision would improve this over the long-term     Needs AAA US 6/2025    Last visit me 08/06/2024  Obesity pre diabetes.  Does not qualify for GLP1.  I sent in a prescription for Contrave and if able to get it prove he should follow up in 1 month.  He has an upcoming appointment with bariatrics  Hypertension stable   Venous insufficiency saw vascular surgery.  Tried and failed conservative measures.  On Lasix but still with continued swelling.  Vascular had recommended weight loss as long-term solution.  Hyperlipidemia at the time taking both atorvastatin and pravastatin.  Stop pravastatin   Gout no refills on allopurinol no recent flares.  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  Five FU    Went to lymphedema clinic 08/2024 through 09/2024 11/06/2024 lower extremity venous ultrasound   There is no evidence of a right lower extremity DVT.   There is no evidence of a left lower extremity DVT.   The right  superficial femoral middle vein is normal.   The contralateral (left) common femoral vein is patent.   The left superficial femoral middle vein is normal.   The contralateral (right) common femoral vein is patent.    Color flow evaluation of the right lower extremity demonstrates no evidence of venous thrombosis in the deep or superficial veins, and no reflux.  Color flow evaluation of the left lower extremity demonstrates no evidence of venous thrombosis in the deep or superficial veins, and no reflux.  3. Calf veins limited visualization.     11/22/2024 saw NP for low back pain.  X-ray L-spine no acute findings multilevel degenerative disease.  Referred to PT.  Lidoderm, diclofenac topical    11/25/2024 saw vascular surgery.  Patient intolerant of pump due to pain.  Conservative management follow up as needed    12/12/24 colonoscopy TA ascending. Diffuse diverticulosis.     Saw Urology in follow-up 02/13/2025.  Stable follow-up 6 months for surveillance    Labs ordered    Needs cards follow up with AAA US    Today's visit:    History of Present Illness    HPI:  Armand reports feeling unwell for several months. He describes neck and back pain, as well as swelling in his legs. The leg swelling has decreased slightly. He also has tingling in his hands, described as a prickling sensation, and feels achy all over.    Armand has a history of lymphedema and was evaluated at a lymphedema clinic last summer where they attempted to massage the fluid out. He was prescribed compression socks but found them too tight and painful to wear. He takes a diuretic to help with the swelling.    Armand complains of eye issues, stating that his eyes are constantly running, burning, and sometimes blurry. In the mornings, he sometimes has temporary vision loss, which he compares to flash burns from welding. Armand is scheduled to see an eye doctor, Dr. Cotter, next month.    Armand reports feeling tired all the time, falling asleep easily when  seated. He tried taking Omega XL for energy, which helped slightly but did not fully resolve his fatigue.    Armand mentions occasional pain in his ribs and kidneys area, which is relieved by movement.    Armand has a history of sleep apnea and was using a CPAP machine though it is unclear how recently he had used it, which improved his condition. He stopped using it due to lack of supplies such as filters and mask parts.    Armand denies seeing blood in his urine or stool. Armand denies having diabetes, but has pre-diabetes.    MEDICATIONS:  - Tamsulosin (Flomax), 2 pills at night, for prostate  - Lisinopril 10 mg, 1 pill daily, for blood pressure  - Allopurinol, 1 pill daily, to prevent gout  - Metformin, 1 pill daily, for blood sugar (pre-diabetes)  - Atorvastatin (Lipitor), 1 pill daily, for cholesterol  - Lasix, 1 pill daily, as a fluid pill  - Aspirin, 1 pill daily, for heart  - Omega XL, for energy, started 1-2 months ago, patient reports it is not really working as expected  - Discontinued CPAP machine use due to lack of supplies (filters and other parts)  - Discontinued use of compression socks due to discomfort and tightness    ROS:  General: reports fatigue  Eyes: reports discharge, reports blurry vision  Cardiovascular: reports lower extremity edema  Gastrointestinal: no blood in stool  Genitourinary: no hematuria  Musculoskeletal: reports neck pain, reports back pain  Neurological: reports headache, reports tingling, reports memory loss  Male Genitourinary: reports erectile dysfunction         Patient Care Team:  Clark Vargas MD as PCP - General (Internal Medicine)  Fernando Jacinto MD as Consulting Physician (Orthopedic Surgery)  Venkata Caruso MD as Consulting Physician (Neurosurgery)  Aureliano Mcneil MD as Consulting Physician (Ophthalmology)  Maricel Cotter OD as Consulting Physician (Optometry)  Ashanti Diaz RN (Inactive) as Oncology Navigator  Trino Jarquin MD as Consulting Physician  (Urology)    Patient Active Problem List    Diagnosis Date Noted    Lymphedema 08/15/2024    Venous insufficiency of both lower extremities 06/27/2024 06/12/2024 ultrasound venous Dopplers negative for DVT bilaterally.      Balanitis 05/31/2024    Tinea corporis 05/31/2024    Squamous cell carcinoma, penis s/p excision; 5FU 05/31/2024    Nuclear sclerotic cataract of right eye 03/07/2024    CHAO (dyspnea on exertion) 12/09/2022    Chest pain, atypical 12/09/2022    Macular hole, right eye 04/20/2021    Chronic pain 08/07/2020    Chronic pain disorder 03/10/2020    Cervical post-laminectomy syndrome 03/10/2020    Lumbar spondylosis 03/10/2020    Alpha thalassemia silent carrier 11/14/2019    Neck pain 09/06/2019    Poor posture 09/06/2019    Tubular adenoma of colon 12/12/24 colonoscopy TA ascending. Diffuse diverticulosis. 11/02/2018     10/2018 colonoscopy sigmoid tubular adenoma   12/12/24 colonoscopy TA ascending. Diffuse diverticulosis.       Diverticulosis of large intestine without hemorrhage on colonoscopy 10/2018 11/01/2018    SIS (obstructive sleep apnea) on sleep study 4/2018 with AHI 55.  CPAP at 15      Ahi of 55  Patient denied issue with cpap.  Main barrier is motivation.  Will renew supplies.  Advise patient to bring cpap to clinic next visit.       Nuclear sclerosis of both eyes 01/16/2018    Refractive error 01/16/2018    Benign prostatic hyperplasia with urinary frequency 01/10/2018    Hemorrhoids 06/01/2016    Cervical stenosis of spinal canal 3/2016 C3-7 laminoplasty 03/31/2016     3/31/2016 pt underwent a C3 through C7 Laminoplasty with posterior cervical approach      Prediabetes 03/29/2016    Anasarca 03/29/2016     Generalized swelling.    S/p card evaluation.  Vascular pending  Will send for urine.  Repeat chemistry and bnp.        Pararenal abdominal aortic aneurysm (AAA) without rupture 06/12/2024 AAA ultrasound showing suprarenal AAA max diameter 3.1 cm. 01/13/2015 06/12/2024 AAA  ultrasound showing suprarenal AAA max diameter 3.1 cm.  Increase flow velocity across the left Ca suggesting greater than 50% stenosis.      Essential hypertension 06/12/2024 TTE LV normal size mild concentric hypertrophy normal systolic function LVEF 60-65%.  Mild aortic valve sclerosis.  Aortic root mildly dilated. 03/20/2014 12/16/2022 TTE LV normal size with concentric hypertrophy and normal systolic function LVEF 55%.  Indeterminate diastolic function.  06/12/2024 TTE LV normal size mild concentric hypertrophy normal systolic function LVEF 60-65%.  Mild aortic valve sclerosis.  Aortic root mildly dilated.      Mixed hyperlipidemia 03/20/2014     3/11/2014 exercise stress echo negative for ischemia.  Normal LV systolic function LVEF 55-60.  Concentric remodeling.  12/16/2022 nuclear stress test negative for ischemia.      Morbid obesity with BMI of 40.0-44.9, adult 03/06/2014    Back pain 07/11/2013    ED (erectile dysfunction) 06/07/2013    Gout, arthritis 01/07/2013       PAST MEDICAL PROBLEMS, PAST SURGICAL HISTORY: please see relevant portions of the electronic medical record    ALLERGIES AND MEDICATIONS: updated and reviewed.  Medication List with Changes/Refills   Current Medications    ALLOPURINOL (ZYLOPRIM) 100 MG TABLET    Take 1 tablet (100 mg total) by mouth once daily.    AMMONIUM LACTATE (LAC-HYDRIN) 12 % LOTION    Apply topically as needed for Dry Skin.    ASPIRIN (ECOTRIN) 81 MG EC TABLET    Take 81 mg by mouth once daily.    ATORVASTATIN (LIPITOR) 40 MG TABLET    TAKE 1 TABLET BY MOUTH EVERY DAY IN THE EVENING    CLOTRIMAZOLE-BETAMETHASONE 1-0.05% (LOTRISONE) CREAM    APPLY TO AFFECTED AREA TWICE A DAY    DICLOFENAC SODIUM (VOLTAREN ARTHRITIS PAIN) 1 % GEL    Apply 2 g topically once daily.    FLUOROURACIL (EFUDEX) 5 % CREAM    Apply topically 2 (two) times daily. Apply to penile skin once daily Monday-Friday for 6 weeks, remove cream from skin after 2 hours of contact time    FUROSEMIDE  "(LASIX) 40 MG TABLET    TAKE 1 TABLET BY MOUTH EVERY DAY    GLUCOSAMINE/CHONDR WILSON A SOD (OSTEO BI-FLEX ORAL)    Take by mouth.    KETOCONAZOLE (NIZORAL) 2 % CREAM    Apply topically once daily. To affected area on penis/inguinal area    LIDOCAINE (LIDODERM) 5 %    Place 1 patch onto the skin once daily. Remove & Discard patch within 12 hours or as directed by MD    LISINOPRIL 10 MG TABLET    TAKE 1 TABLET BY MOUTH EVERY DAY    METFORMIN (GLUCOPHAGE-XR) 500 MG ER 24HR TABLET    Take 1 tablet (500 mg total) by mouth once daily. Keep appointment 2/6/25    MULTIVITAMIN (ONE-A-DAY ESSENTIAL ORAL)    Take by mouth.    MUPIROCIN (BACTROBAN) 2 % OINTMENT    Apply topically 3 (three) times daily.    NALTREXONE-BUPROPION (CONTRAVE) 8-90 MG TBSR    Take 1 tablet by mouth once daily.    SILDENAFIL (VIAGRA) 100 MG TABLET    Take 1 tablet (100 mg total) by mouth daily as needed for Erectile Dysfunction. *generic tabs*    TAMSULOSIN (FLOMAX) 0.4 MG CAP    TAKE 2 CAPSULES BY MOUTH ONCE DAILY.         Objective:   Objective   Physical Exam   Vitals:    05/22/25 1332   BP: 134/82   BP Location: Left arm   Patient Position: Sitting   Pulse: 60   Temp: 98.5 °F (36.9 °C)   TempSrc: Oral   SpO2: 98%   Weight: 123.9 kg (273 lb 2.4 oz)   Height: 5' 8" (1.727 m)    Body mass index is 41.53 kg/m².            Physical Exam  Constitutional:       General: He is not in acute distress.     Appearance: He is well-developed.   Eyes:      Extraocular Movements: Extraocular movements intact.   Cardiovascular:      Rate and Rhythm: Normal rate and regular rhythm.      Heart sounds: Normal heart sounds. No murmur heard.  Pulmonary:      Effort: Pulmonary effort is normal.      Breath sounds: Normal breath sounds.   Musculoskeletal:         General: Normal range of motion.      Right lower leg: Edema present.      Left lower leg: Edema present.      Comments: combination of slight pitting anemia with nonpitting edema in the lower extremities   Skin:    "  General: Skin is warm and dry.   Neurological:      Mental Status: He is alert and oriented to person, place, and time.      Coordination: Coordination normal.   Psychiatric:         Behavior: Behavior normal.         Thought Content: Thought content normal.

## 2025-05-21 NOTE — ASSESSMENT & PLAN NOTE
Blood pressure today stable.  On low-dose lisinopril.  No changes  Orders:    Comprehensive Metabolic Panel; Future    lisinopriL 10 MG tablet; Take 1 tablet (10 mg total) by mouth once daily.

## 2025-05-21 NOTE — ASSESSMENT & PLAN NOTE
We discussed previously about weight loss management medications.  Medicare advantage does not cover GLP1.  We had previously sent in a prescription for Contrave but I do not think he ever got it filled.  We talked about getting GLP1 from compounding pharmacy but he is unable to afford the 250 dollars a month cost.  We also briefly talked about bariatric surgery.  He is wary of it and I am not sure if he would qualify for surgery given age and comorbidities.  Continue working on TLC

## 2025-05-21 NOTE — ASSESSMENT & PLAN NOTE
Would like to limit NSAIDs as much as possible but if this continues to be an issue may need to tried.  Would really avoid narcotics.  Weight loss will help  Remote history of gabapentin unclear if he ever took it.  May consider rechallenging again.  Versus Cymbalta.

## 2025-05-21 NOTE — ASSESSMENT & PLAN NOTE
Seen by vascular.  Plan is conservative management.  Had venous ultrasound done November 2024 negative for reflux.  Was seen by vascular in November.  Notes that lymphedema pump caused a lot of pain.  Patient however notes he is intolerant to compression stockings.  He thinks that these were over-the-counter.  He is on Lasix.  With Middling efficacy.

## 2025-05-21 NOTE — ASSESSMENT & PLAN NOTE
Has not really been using CPAP.  He saw Sleep Medicine last summer.  He tells me that he is due for a replacement machine and they had ordered replacement supplies but reports he never got those.  Will reorder supplies, fullface mask.  He has a sleep study done 4/18/2018.  If he has not been using it it is possible that he may need to re certify with sleep apnea before insurance will agree to pay for it  Orders:    CPAP/BIPAP SUPPLIES

## 2025-05-21 NOTE — ASSESSMENT & PLAN NOTE
Check labs on atorvastatin 40.  Needs to follow up with Cardiology  Orders:    Comprehensive Metabolic Panel; Future    Lipid Panel; Future

## 2025-05-22 ENCOUNTER — LAB VISIT (OUTPATIENT)
Dept: LAB | Facility: HOSPITAL | Age: 75
End: 2025-05-22
Attending: INTERNAL MEDICINE
Payer: MEDICARE

## 2025-05-22 ENCOUNTER — OFFICE VISIT (OUTPATIENT)
Dept: FAMILY MEDICINE | Facility: CLINIC | Age: 75
End: 2025-05-22
Payer: MEDICARE

## 2025-05-22 VITALS
WEIGHT: 273.13 LBS | DIASTOLIC BLOOD PRESSURE: 82 MMHG | OXYGEN SATURATION: 98 % | TEMPERATURE: 99 F | HEIGHT: 68 IN | HEART RATE: 60 BPM | SYSTOLIC BLOOD PRESSURE: 134 MMHG | BODY MASS INDEX: 41.39 KG/M2

## 2025-05-22 DIAGNOSIS — G47.33 OSA (OBSTRUCTIVE SLEEP APNEA): ICD-10-CM

## 2025-05-22 DIAGNOSIS — M10.9 GOUT, ARTHRITIS: Chronic | ICD-10-CM

## 2025-05-22 DIAGNOSIS — I10 ESSENTIAL HYPERTENSION: Primary | ICD-10-CM

## 2025-05-22 DIAGNOSIS — D56.3 ALPHA THALASSEMIA SILENT CARRIER: ICD-10-CM

## 2025-05-22 DIAGNOSIS — R35.0 BENIGN PROSTATIC HYPERPLASIA WITH URINARY FREQUENCY: ICD-10-CM

## 2025-05-22 DIAGNOSIS — R73.03 PREDIABETES: Chronic | ICD-10-CM

## 2025-05-22 DIAGNOSIS — M1A.00X0 IDIOPATHIC CHRONIC GOUT WITHOUT TOPHUS, UNSPECIFIED SITE: ICD-10-CM

## 2025-05-22 DIAGNOSIS — I10 ESSENTIAL HYPERTENSION: ICD-10-CM

## 2025-05-22 DIAGNOSIS — E66.01 MORBID OBESITY WITH BMI OF 40.0-44.9, ADULT: Chronic | ICD-10-CM

## 2025-05-22 DIAGNOSIS — G89.29 OTHER CHRONIC PAIN: ICD-10-CM

## 2025-05-22 DIAGNOSIS — D12.6 TUBULAR ADENOMA OF COLON: ICD-10-CM

## 2025-05-22 DIAGNOSIS — E78.2 MIXED HYPERLIPIDEMIA: ICD-10-CM

## 2025-05-22 DIAGNOSIS — I89.0 LYMPHEDEMA: ICD-10-CM

## 2025-05-22 DIAGNOSIS — H10.13 ALLERGIC CONJUNCTIVITIS OF BOTH EYES: ICD-10-CM

## 2025-05-22 DIAGNOSIS — I71.41 PARARENAL ABDOMINAL AORTIC ANEURYSM (AAA) WITHOUT RUPTURE: ICD-10-CM

## 2025-05-22 DIAGNOSIS — N40.1 BENIGN PROSTATIC HYPERPLASIA WITH URINARY FREQUENCY: ICD-10-CM

## 2025-05-22 DIAGNOSIS — M47.816 LUMBAR SPONDYLOSIS: ICD-10-CM

## 2025-05-22 LAB
ALBUMIN SERPL BCP-MCNC: 3.5 G/DL (ref 3.5–5.2)
ALBUMIN/CREAT UR: 8.3 UG/MG
ALP SERPL-CCNC: 72 UNIT/L (ref 40–150)
ALT SERPL W/O P-5'-P-CCNC: 16 UNIT/L (ref 10–44)
ANION GAP (OHS): 8 MMOL/L (ref 8–16)
AST SERPL-CCNC: 19 UNIT/L (ref 11–45)
BILIRUB SERPL-MCNC: 0.3 MG/DL (ref 0.1–1)
BUN SERPL-MCNC: 15 MG/DL (ref 8–23)
CALCIUM SERPL-MCNC: 9.4 MG/DL (ref 8.7–10.5)
CHLORIDE SERPL-SCNC: 102 MMOL/L (ref 95–110)
CHOLEST SERPL-MCNC: 131 MG/DL (ref 120–199)
CHOLEST/HDLC SERPL: 3.5 {RATIO} (ref 2–5)
CO2 SERPL-SCNC: 30 MMOL/L (ref 23–29)
CREAT SERPL-MCNC: 0.9 MG/DL (ref 0.5–1.4)
CREAT UR-MCNC: 84 MG/DL (ref 23–375)
EAG (OHS): 128 MG/DL (ref 68–131)
ERYTHROCYTE [DISTWIDTH] IN BLOOD BY AUTOMATED COUNT: 15.5 % (ref 11.5–14.5)
GFR SERPLBLD CREATININE-BSD FMLA CKD-EPI: >60 ML/MIN/1.73/M2
GLUCOSE SERPL-MCNC: 69 MG/DL (ref 70–110)
HBA1C MFR BLD: 6.1 % (ref 4–5.6)
HCT VFR BLD AUTO: 39.4 % (ref 40–54)
HDLC SERPL-MCNC: 37 MG/DL (ref 40–75)
HDLC SERPL: 28.2 % (ref 20–50)
HGB BLD-MCNC: 11.9 GM/DL (ref 14–18)
LDLC SERPL CALC-MCNC: 71.8 MG/DL (ref 63–159)
MCH RBC QN AUTO: 25.9 PG (ref 27–31)
MCHC RBC AUTO-ENTMCNC: 30.2 G/DL (ref 32–36)
MCV RBC AUTO: 86 FL (ref 82–98)
MICROALBUMIN UR-MCNC: 7 UG/ML (ref ?–5000)
NONHDLC SERPL-MCNC: 94 MG/DL
PLATELET # BLD AUTO: 244 K/UL (ref 150–450)
PMV BLD AUTO: 12.1 FL (ref 9.2–12.9)
POTASSIUM SERPL-SCNC: 3.5 MMOL/L (ref 3.5–5.1)
PROT SERPL-MCNC: 7.8 GM/DL (ref 6–8.4)
RBC # BLD AUTO: 4.6 M/UL (ref 4.6–6.2)
SODIUM SERPL-SCNC: 140 MMOL/L (ref 136–145)
TRIGL SERPL-MCNC: 111 MG/DL (ref 30–150)
URATE SERPL-MCNC: 6.4 MG/DL (ref 3.4–7)
WBC # BLD AUTO: 7.28 K/UL (ref 3.9–12.7)

## 2025-05-22 PROCEDURE — 80053 COMPREHEN METABOLIC PANEL: CPT

## 2025-05-22 PROCEDURE — 80061 LIPID PANEL: CPT

## 2025-05-22 PROCEDURE — 82570 ASSAY OF URINE CREATININE: CPT

## 2025-05-22 PROCEDURE — 83036 HEMOGLOBIN GLYCOSYLATED A1C: CPT

## 2025-05-22 PROCEDURE — 85027 COMPLETE CBC AUTOMATED: CPT

## 2025-05-22 PROCEDURE — 36415 COLL VENOUS BLD VENIPUNCTURE: CPT | Mod: PO

## 2025-05-22 PROCEDURE — 99999 PR PBB SHADOW E&M-EST. PATIENT-LVL IV: CPT | Mod: PBBFAC,,, | Performed by: INTERNAL MEDICINE

## 2025-05-22 PROCEDURE — 84550 ASSAY OF BLOOD/URIC ACID: CPT

## 2025-05-22 RX ORDER — METFORMIN HYDROCHLORIDE 500 MG/1
500 TABLET, EXTENDED RELEASE ORAL DAILY
Qty: 90 TABLET | Refills: 3 | Status: SHIPPED | OUTPATIENT
Start: 2025-05-22

## 2025-05-22 RX ORDER — ALLOPURINOL 100 MG/1
100 TABLET ORAL DAILY
Qty: 90 TABLET | Refills: 3 | Status: SHIPPED | OUTPATIENT
Start: 2025-05-22

## 2025-05-22 RX ORDER — TAMSULOSIN HYDROCHLORIDE 0.4 MG/1
2 CAPSULE ORAL NIGHTLY
Qty: 180 CAPSULE | Refills: 3 | Status: SHIPPED | OUTPATIENT
Start: 2025-05-22

## 2025-05-22 RX ORDER — LISINOPRIL 10 MG/1
10 TABLET ORAL DAILY
Qty: 90 TABLET | Refills: 3 | Status: SHIPPED | OUTPATIENT
Start: 2025-05-22

## 2025-05-22 RX ORDER — OLOPATADINE HYDROCHLORIDE 1 MG/ML
1 SOLUTION OPHTHALMIC 2 TIMES DAILY
Qty: 5 ML | Refills: 11 | Status: SHIPPED | OUTPATIENT
Start: 2025-05-22 | End: 2026-05-22

## 2025-05-22 NOTE — ASSESSMENT & PLAN NOTE
Managed by Cardiology.  Due for follow-up.  Last ultrasound was done 06/2024 showing suprarenal AAA

## 2025-05-23 ENCOUNTER — RESULTS FOLLOW-UP (OUTPATIENT)
Dept: FAMILY MEDICINE | Facility: CLINIC | Age: 75
End: 2025-05-23
Payer: MEDICARE

## 2025-05-23 NOTE — LETTER
May 23, 2025    Armand Painting  [address]  [place] [address]             [facility]  [address]  [place] [address]  Phone: [phone]  Fax: [phone] Dear Armand Painting,     It was nice to see you at your recent office visit.     To summarize your health and your results:  Your labs were stable.     Recommendations and medication additions/changes:  No changes to your medications - stay on your current medicines.     Follow up: Keep your future appointments:          Future Appointments   Date Time Provider Department Center   6/19/2025  9:30 AM Maricel Cotter OD [facility] OPTOMTY [place]   6/24/2025 11:00 AM Darek Vizcarra MD [facility] CARDIO [place]   8/14/2025  2:40 PM Gricelda Pandey MD [facility] [place] [facility]   11/21/2025  7[facility]15 [facility] [place]   12/4/2025  2:20 PM Clark Vargas MD [facility] MED [place]      Your lab values were as follows:  Blood chemistry panel, checking for diabetes (glucose), diseases of the kidneys (creatinine) and minerals (sodium, potassium, chloride, CO2, calcium) - normal.  Liver tests (AST, ALT, alkaline phosphatase, bilirubin), major body proteins (total protein and albumin) - normal.  Complete blood count (WBC - white blood cells which fight infection; hemoglobin [Hb] and hematocrit [Hct] - red blood cells which carry oxygen; platelets [plt] - help with blood clotting) - showed mild anemia which is stable.  You have known alpha-thalassemia and this is the cause of your anemia.  Lipid profile- testing for cholesterol and triglycerides.  Your numbers were good - stay on your current dose of medication. In general, the goals for cholesterol are the following:  Cholesterol: <200 is normal  Triglycerides: <160 is normal  HDL Cholesterol (good cholesterol):>40 is normal. This is the good form of cholesterol, so the higher the better!  LDL Cholesterol (bad cholesterol): <130 is normal, but  the ideal is <100.  With heart disease and  "diabetes, < 70 is better.  Hemoglobin A1C, the test of 3-month average blood sugar, was a little high - you are "prediabetic" (a normal A1c is 5.6 or less; diabetes is an A1c of 6.5 or higher).  Uric acid-the chemical that causes gout-a little high-I would like to see it 6.0 or less.  But no changes in your allopurinol dose.  Urine protein test (the urine albumin/creatinine ratio) - normal - this means you are not spilling protein in your urine.  Normal is < 30.  Microalbuminuria is ; and albuminuria is > 300.      Resulted Orders   Uric Acid   Result Value Ref Range    Uric Acid 6.4 3.4 - 7.0 mg/dL   Comprehensive Metabolic Panel   Result Value Ref Range    Sodium 140 136 - 145 mmol/L    Potassium 3.5 3.5 - 5.1 mmol/L    Chloride 102 95 - 110 mmol/L    CO2 30 (H) 23 - 29 mmol/L    Glucose 69 (L) 70 - 110 mg/dL    BUN 15 8 - 23 mg/dL    Creatinine 0.9 0.5 - 1.4 mg/dL    Calcium 9.4 8.7 - 10.5 mg/dL    Protein Total 7.8 6.0 - 8.4 gm/dL    Albumin 3.5 3.5 - 5.2 g/dL    Bilirubin Total 0.3 0.1 - 1.0 mg/dL      Comment:      For infants and newborns, interpretation of results should be based   on gestational age, weight and in agreement with clinical   observations.    Premature Infant recommended reference ranges:   0-24 hours:  <8.0 mg/dL   24-48 hours: <12.0 mg/dL   3-5 days:    <15.0 mg/dL   6-29 days:   <15.0 mg/dL    ALP 72 40 - 150 unit/L    AST 19 11 - 45 unit/L    ALT 16 10 - 44 unit/L    Anion Gap 8 8 - 16 mmol/L    eGFR >60 >60 mL/min/1.73/m2      Comment:      Estimated GFR calculated using the CKD-EPI creatinine (2021) equation.   Lipid Panel   Result Value Ref Range    Cholesterol Total 131 120 - 199 mg/dL      Comment:      The National Cholesterol Education Program (NCEP) has set the  following guidelines (reference ranges) for Cholesterol:  Optimal.....................<200 mg/dL  Borderline High.............200-239 mg/dL  High........................> or = 240 mg/dL    Triglyceride 111 30 - 150 " mg/dL      Comment:      The National Cholesterol Education Program (NCEP) has set the  following guidelines (reference values) for triglycerides:  Normal......................<150 mg/dL  Borderline High.............150-199 mg/dL  High........................200-499 mg/dL    HDL Cholesterol 37 (L) 40 - 75 mg/dL      Comment:      The National Cholesterol Education Program (NCEP) has set the   following guidelines (reference values) for HDL Cholesterol:   Low...............<40 mg/dL   Optimal...........>60 mg/dL    LDL Cholesterol 71.8 63.0 - 159.0 mg/dL      Comment:      The National Cholesterol Education Program (NCEP) has set the  following guidelines (reference values) for LDL Cholesterol:  Optimal.......................<130 mg/dL  Borderline High...............130-159 mg/dL  High..........................160-189 mg/dL  Very High.....................>190 mg/dL  LDL calculated using the Friedewald equation.    HDL/Cholesterol Ratio 28.2 20.0 - 50.0 %    Cholesterol/HDL Ratio 3.5 2.0 - 5.0    Non HDL Cholesterol 94 mg/dL      Comment:      Risk category and Non-HDL cholesterol goals:  Coronary heart disease (CHD)or equivalent (10-year risk of CHD >20%):  Non-HDL cholesterol goal     <130 mg/dL  Two or more CHD risk factors and 10-year risk of CHD <= 20%:  Non-HDL cholesterol goal     <160 mg/dL  0 to 1 CHD risk factor:  Non-HDL cholesterol goal     <190 mg/dL   Hemoglobin A1C   Result Value Ref Range    Hemoglobin A1c 6.1 (H) 4.0 - 5.6 %      Comment:      ADA Screening Guidelines:  5.7-6.4%  Consistent with prediabetes  >=6.5%  Consistent with diabetes    High levels of fetal hemoglobin interfere with the HbA1C  assay. Heterozygous hemoglobin variants (HbS, HgC, etc)do  not significantly interfere with this assay.   However, presence of multiple variants may affect accuracy.    Estimated Average Glucose 128 68 - 131 mg/dL   CBC Without Differential   Result Value Ref Range    WBC 7.28 3.90 - 12.70 K/uL    RBC  4.60 4.60 - 6.20 M/uL    HGB 11.9 (L) 14.0 - 18.0 gm/dL    HCT 39.4 (L) 40.0 - 54.0 %    MCV 86 82 - 98 fL    MCHC 30.2 (L) 32.0 - 36.0 g/dL    RDW 15.5 (H) 11.5 - 14.5 %    Platelet Count 244 150 - 450 K/uL    MCH 25.9 (L) 27.0 - 31.0 pg    MPV 12.1 9.2 - 12.9 fL   Microalbumin/Creatinine Ratio, Urine   Result Value Ref Range    Urine Microalbumin 7.0   ug/mL    Urine Creatinine 84.0 23.0 - 375.0 mg/dL    Microalbumin/Creatinine Ratio Urine 8.3 <=30.0 ug/mg       Wishing you good health,    Clark Vargas MD

## 2025-05-23 NOTE — PROGRESS NOTES
CMP stable glucose mildly low 69   Lipid good on statin  A1c 6.1 from 6.3 pre diabetes on metformin  CBC mild anemia stable seen previously known alpha-thalassemia silent carrier  Urine microalbumin normal   Uric acid okay 6.4 from previous 6.9 on allopurinol    Results to patient.  Has follow up with me in 6 months.

## 2025-05-30 ENCOUNTER — TELEPHONE (OUTPATIENT)
Dept: PULMONOLOGY | Facility: CLINIC | Age: 75
End: 2025-05-30
Payer: MEDICARE

## 2025-05-30 NOTE — TELEPHONE ENCOUNTER
"                  ----- Message from Med Assistant Ledezma sent at 5/30/2025  3:12 PM CDT -----  Type: Patient Call BackWho called: SelfWhat is the request in detail: pt. Is asking to be scheduled with his provider.. states he needs "parts" for his c - pap machine .. Can the clinic reply by KADIESANNIE?NOWould the patient rather a call back or a response via [facility]? Yes, call Best call back number: [phone]  "

## 2025-05-30 NOTE — TELEPHONE ENCOUNTER
"Transferred Pt to [facility].                ----- Message from Med Assistant Ledezma sent at 5/30/2025  3:12 PM CDT -----  Type: Patient Call BackWho called: SelfWhat is the request in detail: pt. Is asking to be scheduled with his provider.. states he needs "parts" for his c - pap machine .. Can the clinic reply by [facility]?NOWould the patient rather a call back or a response via [facility]? Yes, call Best call back number: [phone]  "

## 2025-06-18 PROBLEM — H25.11 NUCLEAR SCLEROTIC CATARACT OF RIGHT EYE: Status: RESOLVED | Noted: 2024-03-07 | Resolved: 2025-06-18

## 2025-06-18 PROBLEM — H25.13 NUCLEAR SCLEROSIS OF BOTH EYES: Status: RESOLVED | Noted: 2018-01-16 | Resolved: 2025-06-18

## 2025-07-31 ENCOUNTER — TELEPHONE (OUTPATIENT)
Dept: OPTOMETRY | Facility: CLINIC | Age: 75
End: 2025-07-31
Payer: MEDICARE

## 2025-07-31 NOTE — TELEPHONE ENCOUNTER
Copied from CRM #[id number]. Topic: Appointments - Appointment Access  >> Jul 30, 2025  4:56 PM Miguel Ángel wrote:  Type:  Reschedule Appointment Request    Caller is requesting a sooner appointment.  Name of Caller:Armand    When is the first available appointment?soon    Symptoms:annual    Would the patient rather a call back or a response via [facility]? Call    Best Call Back Number:[phone]    Additional Information: try both number      (Resending this message from 7/3/2025 as it was sent incorrectly)

## 2025-08-01 ENCOUNTER — TELEPHONE (OUTPATIENT)
Dept: FAMILY MEDICINE | Facility: CLINIC | Age: 75
End: 2025-08-01
Payer: MEDICARE

## 2025-08-01 NOTE — TELEPHONE ENCOUNTER
Copied from CRM #[id number]. Topic: Appointments - Appointment Access  >> Aug 1, 2025  2:35 PM Guillermo wrote:  Type: Patient Call Back    Who called: self    What is the request in detail: Patient is stating he needs an appt next week with Dr Vargas. Template blocked.     Can the clinic reply by [facility]?no    Would the patient rather a call back or a response via [facility]? call    Best call back number: [phone] or [phone] (H)        Additional Information:

## 2025-08-04 ENCOUNTER — TELEPHONE (OUTPATIENT)
Dept: FAMILY MEDICINE | Facility: CLINIC | Age: 75
End: 2025-08-04
Payer: MEDICARE

## 2025-08-04 NOTE — TELEPHONE ENCOUNTER
Spoke with patient. Patient was advised that he already has an appointment scheduled for December. Patient requesting appointment to be sent through mail. Patient verbalized understanding and does not have any other questions or concerns at this time.

## 2025-08-04 NOTE — TELEPHONE ENCOUNTER
Copied from CRM #[id number]. Topic: Appointments - Same Day Access  >> Aug 4, 2025 11:15 AM Ariana wrote:  Type:  Same Day Appointment Request    Caller is requesting a same day appointment.  Caller declined first available appointment listed below.      Name of Caller: pt     When is the first available appointment? 2/11/26    Symptoms: 4m f/u       Would the patient rather a call back or a response via [facility]? call      Best Call Back Number:[phone]   Patient presents sitting upright in bed, alert and oriented.  No acute distress noted.  Triage reviewed and assessment performed.

## 2025-08-06 ENCOUNTER — OFFICE VISIT (OUTPATIENT)
Dept: OPTOMETRY | Facility: CLINIC | Age: 75
End: 2025-08-06
Payer: MEDICARE

## 2025-08-06 ENCOUNTER — TELEPHONE (OUTPATIENT)
Dept: OPHTHALMOLOGY | Facility: CLINIC | Age: 75
End: 2025-08-06
Payer: MEDICARE

## 2025-08-06 DIAGNOSIS — H02.88B MEIBOMIAN GLAND DISEASE OF UPPER AND LOWER EYELIDS OF BOTH EYES: Primary | ICD-10-CM

## 2025-08-06 DIAGNOSIS — H25.12 NUCLEAR SCLEROSIS OF LEFT EYE: ICD-10-CM

## 2025-08-06 DIAGNOSIS — H02.88A MEIBOMIAN GLAND DISEASE OF UPPER AND LOWER EYELIDS OF BOTH EYES: Primary | ICD-10-CM

## 2025-08-06 DIAGNOSIS — H35.341 MACULAR HOLE, RIGHT EYE: ICD-10-CM

## 2025-08-06 PROCEDURE — 1101F PT FALLS ASSESS-DOCD LE1/YR: CPT | Mod: CPTII,S$GLB,, | Performed by: OPTOMETRIST

## 2025-08-06 PROCEDURE — 3288F FALL RISK ASSESSMENT DOCD: CPT | Mod: CPTII,S$GLB,, | Performed by: OPTOMETRIST

## 2025-08-06 PROCEDURE — 1160F RVW MEDS BY RX/DR IN RCRD: CPT | Mod: CPTII,S$GLB,, | Performed by: OPTOMETRIST

## 2025-08-06 PROCEDURE — 1159F MED LIST DOCD IN RCRD: CPT | Mod: CPTII,S$GLB,, | Performed by: OPTOMETRIST

## 2025-08-06 PROCEDURE — 1125F AMNT PAIN NOTED PAIN PRSNT: CPT | Mod: CPTII,S$GLB,, | Performed by: OPTOMETRIST

## 2025-08-06 PROCEDURE — 99204 OFFICE O/P NEW MOD 45 MIN: CPT | Mod: S$GLB,,, | Performed by: OPTOMETRIST

## 2025-08-06 PROCEDURE — 3044F HG A1C LEVEL LT 7.0%: CPT | Mod: CPTII,S$GLB,, | Performed by: OPTOMETRIST

## 2025-08-06 PROCEDURE — 99999 PR PBB SHADOW E&M-EST. PATIENT-LVL III: CPT | Mod: PBBFAC,,, | Performed by: OPTOMETRIST

## 2025-08-06 NOTE — TELEPHONE ENCOUNTER
Clinic left voicemail for patient to schedule [facility] cataract evaluation with Dr. Bojorquez; patient referred by Dr. Cotter.

## 2025-08-06 NOTE — PROGRESS NOTES
Subjective:       Patient ID: Armand Painting is a 74 y.o. male      Chief Complaint   Patient presents with    Eye Problem     History of Present Illness  Dls; 4/20/21 Dr. Cotter     73 y/o male presents today with c/o blurry vision ou x 2 -3 month ha's   mucous green/white  ou off/on pain 1-2 ou off/on     Eye meds  ? Rx gtts BID          Assessment/Plan:     1. 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 Discussed chronicity.    2. Nuclear sclerosis of left eye  Referral to Dr. Bojorquez for cataract evaluation.   - Ambulatory referral/consult to Ophthalmology    3. Macular hole, right eye  Previously noted and cause of decreased VA OD. Pt was seen by retina. Follow up PRN. Dictation #1  MRN:[id number]  CSN:[id number]     Follow up if symptoms worsen or fail to improve.

## 2025-08-06 NOTE — TELEPHONE ENCOUNTER
----- Message from Mattie sent at 8/6/2025  3:21 PM CDT -----  Regarding: referral appt request  Pt has referral for:      Nuclear sclerosis of left eye [H25.12]    Cataract         Please call pt to schedule. Thank you

## 2025-08-13 ENCOUNTER — TELEPHONE (OUTPATIENT)
Dept: UROLOGY | Facility: CLINIC | Age: 75
End: 2025-08-13
Payer: MEDICARE

## 2025-08-14 ENCOUNTER — OFFICE VISIT (OUTPATIENT)
Dept: UROLOGY | Facility: CLINIC | Age: 75
End: 2025-08-14
Payer: MEDICARE

## 2025-08-14 VITALS — BODY MASS INDEX: 42.5 KG/M2 | WEIGHT: 279.56 LBS

## 2025-08-14 DIAGNOSIS — R35.1 NOCTURIA: ICD-10-CM

## 2025-08-14 DIAGNOSIS — R30.0 DYSURIA: ICD-10-CM

## 2025-08-14 DIAGNOSIS — C60.9 PENILE CANCER: Primary | ICD-10-CM

## 2025-08-14 DIAGNOSIS — N52.01 ERECTILE DYSFUNCTION DUE TO ARTERIAL INSUFFICIENCY: ICD-10-CM

## 2025-08-14 PROCEDURE — 1101F PT FALLS ASSESS-DOCD LE1/YR: CPT | Mod: CPTII,S$GLB,, | Performed by: UROLOGY

## 2025-08-14 PROCEDURE — 1160F RVW MEDS BY RX/DR IN RCRD: CPT | Mod: CPTII,S$GLB,, | Performed by: UROLOGY

## 2025-08-14 PROCEDURE — 99999 PR PBB SHADOW E&M-EST. PATIENT-LVL III: CPT | Mod: PBBFAC,,, | Performed by: UROLOGY

## 2025-08-14 PROCEDURE — 3044F HG A1C LEVEL LT 7.0%: CPT | Mod: CPTII,S$GLB,, | Performed by: UROLOGY

## 2025-08-14 PROCEDURE — 99214 OFFICE O/P EST MOD 30 MIN: CPT | Mod: S$GLB,,, | Performed by: UROLOGY

## 2025-08-14 PROCEDURE — 1159F MED LIST DOCD IN RCRD: CPT | Mod: CPTII,S$GLB,, | Performed by: UROLOGY

## 2025-08-14 PROCEDURE — 3288F FALL RISK ASSESSMENT DOCD: CPT | Mod: CPTII,S$GLB,, | Performed by: UROLOGY

## 2025-08-14 RX ORDER — PAPAVERINE HYDROCHLORIDE 30 MG/ML
INJECTION INTRAMUSCULAR; INTRAVENOUS
Qty: 10 ML | Refills: 11 | Status: SHIPPED | OUTPATIENT
Start: 2025-08-14

## (undated) DEVICE — KIT CUSTOM BASIC EYE ST / MEA

## (undated) DEVICE — DRESSING LEUKOPLAST FLEX 1X3IN

## (undated) DEVICE — ELECTRODE REM PLYHSV RETURN 9

## (undated) DEVICE — NDL 18GA X1 1/2 REG BEVEL

## (undated) DEVICE — UNDERGLOVES BIOGEL PI SZ 7 LF

## (undated) DEVICE — SYR 3CC LUER LOC

## (undated) DEVICE — GOWN NONREINF SET-IN SLV XL

## (undated) DEVICE — SYR 10CC LUER LOCK

## (undated) DEVICE — PACK ENDOSCOPY GENERAL

## (undated) DEVICE — TOWEL OR DISP STRL BLUE 4/PK

## (undated) DEVICE — GAUZE CNFRM STRL 2INX4.1YD

## (undated) DEVICE — NDL HYPO REG 25G X 1 1/2

## (undated) DEVICE — Device

## (undated) DEVICE — BLANKET UPPER BODY 78.7X29.9IN

## (undated) DEVICE — CONTAINER SPECIMEN OR STER 4OZ

## (undated) DEVICE — SUT MONOCRYL 3-0 PS-2 UND

## (undated) DEVICE — GLOVE BIOGEL ECLIPSE SZ 7.5

## (undated) DEVICE — GLOVE SURGICAL LATEX SZ 6.5

## (undated) DEVICE — SOL IRR SOD CHL .9% POUR

## (undated) DEVICE — SOL WATER STERILE IRR 500ML

## (undated) DEVICE — APPLICATOR CHLORAPREP ORN 26ML

## (undated) DEVICE — KIT EYE PIC PACK WB

## (undated) DEVICE — SHEILD & GARTERS FOX METAL EYE